# Patient Record
Sex: FEMALE | Race: WHITE | NOT HISPANIC OR LATINO | Employment: FULL TIME | URBAN - METROPOLITAN AREA
[De-identification: names, ages, dates, MRNs, and addresses within clinical notes are randomized per-mention and may not be internally consistent; named-entity substitution may affect disease eponyms.]

---

## 2017-02-15 ENCOUNTER — OFFICE VISIT (OUTPATIENT)
Dept: PEDIATRICS | Facility: CLINIC | Age: 42
End: 2017-02-15
Payer: COMMERCIAL

## 2017-02-15 VITALS
BODY MASS INDEX: 47.56 KG/M2 | HEART RATE: 59 BPM | TEMPERATURE: 99.3 F | DIASTOLIC BLOOD PRESSURE: 82 MMHG | SYSTOLIC BLOOD PRESSURE: 128 MMHG | WEIGHT: 251.7 LBS

## 2017-02-15 DIAGNOSIS — B35.3 TINEA PEDIS, UNSPECIFIED LATERALITY: ICD-10-CM

## 2017-02-15 DIAGNOSIS — K13.0 MUCOCELE OF LOWER LIP: Primary | ICD-10-CM

## 2017-02-15 PROCEDURE — 99213 OFFICE O/P EST LOW 20 MIN: CPT | Performed by: NURSE PRACTITIONER

## 2017-02-15 NOTE — PROGRESS NOTES
SUBJECTIVE:                                                    Janelle Coelho is a 42 year old female who presents to clinic today for the following health issues:    Sore/bump inside lip that is not resolving x one month or so,also possible fungal infection with right great toe,itching but nothing visible.     -------------------------------------    Problem list and histories reviewed & adjusted, as indicated.  Additional history:     Patient Active Problem List   Diagnosis     Acute posthemorrhagic anemia     ASCUS with positive high risk HPV     Obesity     Excessive or frequent menstruation     Generalized anxiety disorder     Major depressive disorder, recurrent episode, in partial remission (H)     CARDIOVASCULAR SCREENING; LDL GOAL LESS THAN 100     Tobacco abuse     Infected cyst of Bartholin's gland duct     Intermittent asthma, uncomplicated     History of gastric bypass     Insomnia, unspecified type     Family history of breast cancer in mother     Past Surgical History   Procedure Laterality Date     [place] teeth extraction  1995     Gastric bypass  2000     with cholecystectomy and subsequent revision gastric bypass     Gastric bypass  2006     revision of gastric bypass     Excis bartholin gland/cyst  2016       Social History   Substance Use Topics     Smoking status: Current Some Day Smoker     Packs/day: 0.20     Years: 10.00     Types: Cigarettes     Smokeless tobacco: Never Used      Comment: does not want to quit at this time     Alcohol use 0.0 oz/week     0 Standard drinks or equivalent per week      Comment: once a week or less     Family History   Problem Relation Age of Onset     Breast Cancer Mother      Breast Cancer Maternal Grandmother      Family History Negative No family hx of            ROS:  Constitutional, HEENT, cardiovascular, pulmonary, gi and gu systems are negative, except as otherwise noted.    OBJECTIVE:                                                    /82 (BP  Location: Right arm, Patient Position: Chair, Cuff Size: Adult Large)  Pulse 59  Temp 99.3  F (37.4  C) (Tympanic)  Wt 251 lb 11.2 oz (114.2 kg)  BMI 47.56 kg/m2  Body mass index is 47.56 kg/(m^2).  GENERAL: healthy, alert and no distress  NECK: no adenopathy, no asymmetry, masses, or scars and thyroid normal to palpation  SKIN: skin of toes and heal c/w fungal infection, peeling skin.   HEENT: lump of concern on inside of bottom lip, clear, approximately 3 mm x 3 mm, raised, nonerythematous       ASSESSMENT/PLAN:                                                      1. Mucocele of lower lip  Diagnosis reviewed, she would like to consult regarding removal, as she tends to bite it  - ORAL SURGERY REFERRAL    2. Tinea pedis, unspecified laterality  otc therapies revieweed    There are no Patient Instructions on file for this visit.      [facility]a [facility]

## 2017-02-15 NOTE — NURSING NOTE
"Chief Complaint   Patient presents with     Mass       Initial /82 (BP Location: Right arm, Patient Position: Chair, Cuff Size: Adult Large)  Pulse 59  Temp 99.3  F (37.4  C) (Tympanic)  Wt 251 lb 11.2 oz (114.2 kg)  BMI 47.56 kg/m2 Estimated body mass index is 47.56 kg/(m^2) as calculated from the following:    Height as of 12/6/16: 5' 1\" (1.549 m).    Weight as of this encounter: 251 lb 11.2 oz (114.2 kg).  Medication Reconciliation: complete    "

## 2017-02-15 NOTE — MR AVS SNAPSHOT
After Visit Summary   2/15/2017    Janelle Coelho    MRN: [id number]           Patient Information     Date Of Birth          1975        Visit Information        Provider Department      2/15/2017 5:15 PM Ivett Sy APRN CNP [facility]        Today's Diagnoses     Mucocele of lower lip    -  1       Follow-ups after your visit        Additional Services     ORAL SURGERY REFERRAL       Your provider has referred you to: [facility]  [phone] and [facility]  [phone] and [facility] [phone]      Please be aware that coverage of these services is subject to the terms and limitations of your health insurance plan.  Call member services at your health plan with any benefit or coverage questions.      Please bring the following to your appointment:    >>   Any x-rays, CTs or MRIs which have been performed.  Contact the facility where they were done to arrange for  prior to your scheduled appointment.    >>   List of current medications   >>   This referral request   >>   Any documents/labs given to you for this referral                  Who to contact     If you have questions or need follow up information about today's clinic visit or your schedule please contact [facility] directly at [phone].  Normal or non-critical lab and imaging results will be communicated to you by MyChart, letter or phone within 4 business days after the clinic has received the results. If you do not hear from us within 7 days, please contact the clinic through MyChart or phone. If you have a critical or abnormal lab result, we will notify you by phone as soon as possible.  Submit refill requests through [employer] or call your pharmacy and they will forward the refill request to us. Please allow 3 business days for your refill to be completed.          Additional Information  About Your Visit        [employer] Information     [employer] gives you secure access to your electronic health record. If you see a primary care provider, you can also send messages to your care team and make appointments. If you have questions, please call your primary care clinic.  If you do not have a primary care provider, please call [phone] and they will assist you.        Care EveryWhere ID     This is your Care EveryWhere ID. This could be used by other organizations to access your [place] medical records  [id number]        Your Vitals Were     Pulse Temperature BMI (Body Mass Index)             59 99.3  F (37.4  C) (Tympanic) 47.56 kg/m2          Blood Pressure from Last 3 Encounters:   02/15/17 128/82   12/06/16 124/88   10/05/16 100/70    Weight from Last 3 Encounters:   02/15/17 251 lb 11.2 oz (114.2 kg)   12/06/16 254 lb 3.2 oz (115.3 kg)   10/05/16 246 lb 12.8 oz (111.9 kg)              We Performed the Following     ORAL SURGERY REFERRAL        Primary Care Provider Office Phone # Fax #    ISA Anglin [phone]       [facility] [address] [place] [address]        Thank you!     Thank you for choosing [facility]  for your care. Our goal is always to provide you with excellent care. Hearing back from our patients is one way we can continue to improve our services. Please take a few minutes to complete the written survey that you may receive in the mail after your visit with us. Thank you!             Your Updated Medication List - Protect others around you: Learn how to safely use, store and throw away your medicines at www.disposemymeds.org.          This list is accurate as of: 2/15/17  5:42 PM.  Always use your most recent med list.                   Brand Name Dispense Instructions for use    albuterol 108 (90 BASE) MCG/ACT Inhaler    VENTOLIN HFA    1 Inhaler    Inhale 2 puffs into the lungs as needed       cyabnocobalamin 2500 MCG  sublingual tablet    VITAMIN B-12    30 tablet    Place 2,500 mcg under the tongue daily       escitalopram 20 MG tablet    LEXAPRO    90 tablet    Take 1 tablet (20 mg) by mouth daily       ferrous gluconate 324 (38 FE) MG tablet    FERGON    180 tablet    Take 1 tablet (324 mg) by mouth 2 times daily If tolerating once a day for 1 week increase to twice daily       levonorgestrel-ethinyl estradiol 0.15-0.03 MG per tablet    QUASENSE    91 tablet    Take 1 tablet by mouth daily       traZODone 100 MG tablet    DESYREL    90 tablet    Take 1 tablet (100 mg) by mouth At Bedtime       ZYRTEC ALLERGY PO      Take 1 tablet by mouth daily

## 2017-02-23 ENCOUNTER — VIRTUAL VISIT (OUTPATIENT)
Dept: FAMILY MEDICINE | Facility: OTHER | Age: 42
End: 2017-02-23

## 2017-02-23 NOTE — PROGRESS NOTES
Date:   Clinician: Madeline Carr  Clinician NPI: [id number]  Patient: Janelle Coelho  Patient : 1975  Patient Address: [address] [place] [address]  Patient Phone: [phone]  Visit Protocol: URI  Patient Summary:  Janelle is a 42 year old ( : 1975 ) female who initiated a Zip for evaluation of bronchitis.      Her  symptoms started suddenly 8-9 days ago  and consist of cough, chest pain, hoarse voice, sore throat, fever, and malaise.   She denies dyspnea, chills, loss of appetite, ear pain, itchy eyes, vomiting, nausea, dysphagia, nasal congestion, petechial or purpuric rash, myalgias, rhinitis, and post-nasal drainage. She denies a history of facial surgery. When asked follow-up questions about her chest pain she denied feeling chest pain or pressure at rest, worsening chest pain with activity, that the chest pain is causing shortness of breath or that the chest pain is the main concern or reason for seeking medical care. The chest pain is pleuritic and aggravated by cough. The patient is confident the chest pain is only from the cough.   Her moderate facial pain or pressure feels worse when bending over or leaning forward and is located on both sides of her head. The facial pain or pressure started before the onset of other URI symptoms.  Janelle has a moderate headache. The headache did not start before her other symptoms and is located on both sides of her head.   She has a moderately painful sore throat. The patient denies having white spots on the tonsils similar to a sample strep throat image provided. She has not been exposed to Strep. When asked to feel her neck she denied feeling enlarged lymph nodes. She denies axillary lymphadenopathy.   Her moderately severe (cough every 5-10 minutes) productive cough is more bothersome at night. Her cough produces unknown color of sputum. She experiences moderate wheezing on a regular basis.   She has passed urine in the  past 12 hours.   Janelle denies having COPD or other chronic lung disease.   Pulse: Not measured beats in 10 seconds.    Weight (in lbs): 243   She states she is not pregnant and denies breastfeeding. She has menstruated in the past month.   Janelle smokes or uses smokeless tobacco.   Additional information as reported by the patient (free text): Woke up 8 days again with a chest cold - tightness, wheezing, burning cough.  After a couple days the cough loosened up and can feel phlegm movement.  Coughing is brought on by talking and laying down. Sitting upright with no talking only occasional cough (still with phlegm and loud barking type cough), at night when I try to go to sleep wheezing increases and cough is nearly constant, at least every minute or two - keeps me awake and wakes me up. I have had Bronchitus several times in the past.   MEDICATIONS:  Escitalopram (Lexapro), ibuprofen (Advil, Motrin), birth control pill, and acetaminophen (Tylenol)  , ALLERGIES:  NKDA   Clinician Response:  Dear Janelle,  Based on the information you have provided, you likely have viral bronchitis.   I am prescribing fluticasone propionate nasal (Flonase) 50 mcg/spray. Take one or two inhalations in each nostril one time a day. There are no refills with this prescription.   I am prescribing benzonatate (Tessalon Perles) 100 mg to treat your cough. Take one to two tablets by mouth three times a day as needed for cough. There are no refills with this prescription.   Unless your are allergic to the over-the-counter medication(s) below, I recommend using:   Guaifenesin + dextromethorphan (Robitussin DM, Mucinex DM). This is an over-the-counter medication that does not require a prescription.   A decongestant such as pseudoephedrine (Sudafed or store brand) to help your symptoms. This is an over-the-counter medication that does not require a prescription.   Try the following to help with your throat pain and discomfort:     Use throat  lozenges    Gargle with warm salt water (1/4 teaspoon of salt per 8 ounce glass of water).    Suck on frozen items such as Popsicles or ice cubes.     Call 911 or go to the emergency room if you feel that your throat is closing off, you suddenly develop a rash, you are unable to swallow fluids, you are drooling, or you are having difficulty breathing.  Follow up with your primary care clinician if your symptoms are not improving in 3-4 days.   Drink plenty of liquids, especially water and take time to rest your body. This may mean taking a nap or going to bed earlier. Your body is fighting a virus and liquids and rest will improve the pace of recovery. Remember to regularly wash your hands and avoid close contact with others to prevent spreading your condition.   Finally, as your clinician, I need you to know that becoming tobacco-free is the most important thing you can do to protect your current and future health.   Diagnosis: Viral Bronchitis possible  Diagnosis ICD: J40  Prescription: fluticasone propionate (Flonase) 50mcg nasal spray 16 gm, 30 days supply. Take one or two inhalations in each nostril one time a day. Refills: 0, Refill as needed: no, Allow substitutions: yes  Prescription: benzonatate (Tessalon Perles) 100mg oral tablet 30 tablets, 5 days supply. Take one to two tablets by mouth three times a day as needed. disp. 30. Refills: 0, Refill as needed: no, Allow substitutions: yes

## 2017-02-24 ENCOUNTER — OFFICE VISIT (OUTPATIENT)
Dept: ENDOCRINOLOGY | Facility: CLINIC | Age: 42
End: 2017-02-24
Payer: COMMERCIAL

## 2017-02-24 VITALS
SYSTOLIC BLOOD PRESSURE: 116 MMHG | WEIGHT: 243.4 LBS | HEIGHT: 61 IN | BODY MASS INDEX: 45.95 KG/M2 | DIASTOLIC BLOOD PRESSURE: 80 MMHG | HEART RATE: 76 BPM

## 2017-02-24 DIAGNOSIS — E66.01 MORBID OBESITY, UNSPECIFIED OBESITY TYPE (H): Primary | ICD-10-CM

## 2017-02-24 DIAGNOSIS — Z13.6 CARDIOVASCULAR SCREENING; LDL GOAL LESS THAN 100: ICD-10-CM

## 2017-02-24 DIAGNOSIS — E55.9 VITAMIN D DEFICIENCY: ICD-10-CM

## 2017-02-24 DIAGNOSIS — Z98.84 HISTORY OF ROUX-EN-Y GASTRIC BYPASS: ICD-10-CM

## 2017-02-24 PROCEDURE — 93000 ELECTROCARDIOGRAM COMPLETE: CPT | Performed by: CLINICAL NURSE SPECIALIST

## 2017-02-24 PROCEDURE — 99204 OFFICE O/P NEW MOD 45 MIN: CPT | Performed by: CLINICAL NURSE SPECIALIST

## 2017-02-24 RX ORDER — PHENTERMINE HYDROCHLORIDE 37.5 MG/1
37.5 TABLET ORAL
Qty: 32 TABLET | Refills: 0 | Status: SHIPPED | OUTPATIENT
Start: 2017-02-24 | End: 2017-04-03

## 2017-02-24 NOTE — MR AVS SNAPSHOT
After Visit Summary   2/24/2017    Janelle Coelho    MRN: [id number]           Patient Information     Date Of Birth          1975        Visit Information        Provider Department      2/24/2017 10:00 AM Mikaela España APRN CNP [facility]        Today's Diagnoses     Morbid obesity, unspecified obesity type (H)    -  1    BMI 45.0-49.9, adult (H)        History of Heraclio-en-Y gastric bypass        CARDIOVASCULAR SCREENING; LDL GOAL LESS THAN 100           Follow-ups after your visit        Follow-up notes from your care team     Return in about 1 month (around 3/24/2017).      Who to contact     If you have questions or need follow up information about today's clinic visit or your schedule please contact [facility] directly at [phone].  Normal or non-critical lab and imaging results will be communicated to you by MyChart, letter or phone within 4 business days after the clinic has received the results. If you do not hear from us within 7 days, please contact the clinic through [employer] or phone. If you have a critical or abnormal lab result, we will notify you by phone as soon as possible.  Submit refill requests through [employer] or call your pharmacy and they will forward the refill request to us. Please allow 3 business days for your refill to be completed.          Additional Information About Your Visit        MyChart Information     [employer] gives you secure access to your electronic health record. If you see a primary care provider, you can also send messages to your care team and make appointments. If you have questions, please call your primary care clinic.  If you do not have a primary care provider, please call [phone] and they will assist you.        Care EveryWhere ID     This is your Care EveryWhere ID. This could be used by other organizations to access your [place] medical records  [id number]        Your Vitals Were     Pulse  "Height BMI (Body Mass Index)             76 1.549 m (5' 1\") 45.99 kg/m2          Blood Pressure from Last 3 Encounters:   02/24/17 116/80   02/15/17 128/82   12/06/16 124/88    Weight from Last 3 Encounters:   02/24/17 110.4 kg (243 lb 6.4 oz)   02/15/17 114.2 kg (251 lb 11.2 oz)   12/06/16 115.3 kg (254 lb 3.2 oz)              We Performed the Following     EKG 12-lead complete w/read - Clinics          Today's Medication Changes          These changes are accurate as of: 2/24/17 11:59 PM.  If you have any questions, ask your nurse or doctor.               Start taking these medicines.        Dose/Directions    phentermine 37.5 MG tablet   Commonly known as:  ADIPEX-P   Used for:  Morbid obesity, unspecified obesity type (H), BMI 45.0-49.9, adult (H), History of Heraclio-en-Y gastric bypass, CARDIOVASCULAR SCREENING; LDL GOAL LESS THAN 100   Started by:  Mikaela España APRN CNP        Dose:  37.5 mg   Take 1 tablet (37.5 mg) by mouth every morning (before breakfast)   Quantity:  32 tablet   Refills:  0            Where to get your medicines      Some of these will need a paper prescription and others can be bought over the counter.  Ask your nurse if you have questions.     Bring a paper prescription for each of these medications     phentermine 37.5 MG tablet                Primary Care Provider Office Phone # Fax #    ISA Anglin [phone]       [facility] [address] [place] [address] [place] [address]        Thank you!     Thank you for choosing [facility]  for your care. Our goal is always to provide you with excellent care. Hearing back from our patients is one way we can continue to improve our services. Please take a few minutes to complete the written survey that you may receive in the mail after your visit with us. Thank you!             Your Updated Medication List - Protect others around you: Learn how to safely use, store and throw away " your medicines at www.disposemymeds.org.          This list is accurate as of: 2/24/17 11:59 PM.  Always use your most recent med list.                   Brand Name Dispense Instructions for use    albuterol 108 (90 BASE) MCG/ACT Inhaler    VENTOLIN HFA    1 Inhaler    Inhale 2 puffs into the lungs as needed       cyabnocobalamin 2500 MCG sublingual tablet    VITAMIN B-12    30 tablet    Place 2,500 mcg under the tongue daily       escitalopram 20 MG tablet    LEXAPRO    90 tablet    Take 1 tablet (20 mg) by mouth daily       ferrous gluconate 324 (38 FE) MG tablet    FERGON    180 tablet    Take 1 tablet (324 mg) by mouth 2 times daily If tolerating once a day for 1 week increase to twice daily       levonorgestrel-ethinyl estradiol 0.15-0.03 MG per tablet    QUASENSE    91 tablet    Take 1 tablet by mouth daily       phentermine 37.5 MG tablet    ADIPEX-P    32 tablet    Take 1 tablet (37.5 mg) by mouth every morning (before breakfast)       traZODone 100 MG tablet    DESYREL    90 tablet    Take 1 tablet (100 mg) by mouth At Bedtime       ZYRTEC ALLERGY PO      Take 1 tablet by mouth daily

## 2017-02-24 NOTE — NURSING NOTE
"Chief Complaint   Patient presents with     Consult For     Weight loss       Initial /80 (BP Location: Left arm, Cuff Size: Adult Regular)  Pulse 76  Ht 1.549 m (5' 1\")  Wt 110.4 kg (243 lb 6.4 oz)  BMI 45.99 kg/m2 Estimated body mass index is 45.99 kg/(m^2) as calculated from the following:    Height as of this encounter: 1.549 m (5' 1\").    Weight as of this encounter: 110.4 kg (243 lb 6.4 oz).  Medication Reconciliation: complete     Waist: 50.75in    Noemi Willams CMA    2/24/2017  10:05 AM      "

## 2017-02-24 NOTE — PROGRESS NOTES
Name: Janelle oCelho  Seen at the request of Ivett Sy for obesity.  HPI:  Janelle Coelho is a 42 year old female who presents for the evaluation of obesity.  She has a history of gastric bypass approximately 1998, highest weight prior to bypass was 275 lbs, she lost about 100-110 lbs post bypass.  She underwent bypass revision in 2004.  She had significant iron deficiency following the first surgery requiring IV iron infusions.  She developed an allergy to the infusions and this required treatment with steroids in order to tolerate the iron replacement.  She subsequently gained weight partly due to the steroids.      Is taking OTC vitamin D3, unsure of exact dose (? 1000 vs 2000 IU/day).    Menses: on oral contraceptives, has a light period q 3 months  Diarrhea/Constipation:Yes: chronic diarrhea, looses stools 2-3 times per day  Changes in Hair or Skin:No  Diabetes:No  Sleep:  Sleep Apnea/Snores:No  Hypertension or CAD:No  Hyperlipidemia:No  Hirsutism:No  Easy Bruising:No  Use of Steroids:Yes, history of steroid use due to iron allergy and need for iron infusion due anemia, secondary to gastric bypass  Family history of Obesity:   Diet: Started weight watchers last week  Exercise:Recent hernia surgery  PMH/PSH:  Past Medical History   Diagnosis Date     ASCUS favor benign 2012     hpv neg. needs cotest in 3 years     Depressive disorder, not elsewhere classified 2/10/2014     LSIL (low grade squamous intraepithelial lesion) on Pap smear 2008     per chart notes colp dysplasia?     Obesity      Past Surgical History   Procedure Laterality Date     [place] teeth extraction  1995     Gastric bypass  2000     with cholecystectomy and subsequent revision gastric bypass     Gastric bypass  2006     revision of gastric bypass     Excis bartholin gland/cyst  2016     Family Hx:  Family History   Problem Relation Age of Onset     Breast Cancer Mother      Breast Cancer Maternal Grandmother      Family History  "Negative No family hx of      Thyroid disease: No         DM2: Yes: mother, brother         Autoimmune: DM1, SLE, RA, Vitiligo No    Social Hx:  Social History     Social History     Marital status: Single     Spouse name: N/A     Number of children: N/A     Years of education: N/A     Occupational History      [employer]      [employer]     Social History Main Topics     Smoking status: Current Some Day Smoker     Packs/day: 0.20     Years: 10.00     Types: Cigarettes     Smokeless tobacco: Never Used      Comment: does not want to quit at this time     Alcohol use 0.0 oz/week     0 Standard drinks or equivalent per week      Comment: once a week or less     Drug use: No     Sexual activity: Not Currently     Partners: Male     Other Topics Concern     Bike Helmet Yes     Seat Belt Yes     Social History Narrative          MEDICATIONS:  has a current medication list which includes the following prescription(s): phentermine, albuterol, trazodone, levonorgestrel-ethinyl estradiol, escitalopram, ferrous gluconate, cyabnocobalamin, and cetirizine hcl.    ROS     GENERAL: no weight loss, fevers, chills, malaise, or night sweats. + weight gain.  HEENT: no dysphagia, odynophagia, diplopia, neck pain or tenderness, dry/scratch eyes, URI, cough, sinus drainage, tinnitus, sinus pressure  CV: no chest pain, pressure, palpitations, skipped beats, LOC  LUNGS: no SOB, BENAVIDEZ, cough, sputum production, wheezing   ABDOMEN: no diarrhea, constipation, abdominal pain  EXTREMITIES: no rashes, ulcers, edema  NEUROLOGY: no changes in vision, tingling or numbness in hands or feet.   MSK: no muscle aches or pains, weakness  SKIN: no rashes or lesions  ENDOCRINE: no heat or cold intolerance    Physical Exam   VS: /80 (BP Location: Left arm, Cuff Size: Adult Regular)  Pulse 76  Ht 1.549 m (5' 1\")  Wt 110.4 kg (243 lb 6.4 oz)  BMI 45.99 kg/m2  GENERAL: AXOX3, NAD, well dressed, answering questions appropriately, " "appears stated age.  HEENT: no exophthalmos, no proptosis, EOMI, no lig lag, no retraction  NECK:  Supple, no thyromegaly, no adenopathy  CV: RRR, no rubs, gallops, no murmurs  LUNGS: CTAB, no wheezes, rales, or rhonchi  ABDOMEN: soft, nontender, nondistended, no broad striae noted.  EXTREMITIES: no edema, +pulses, no rashes, no lesions  NEUROLOGY: CN grossly intact, no tremors  MSK: grossly intact  SKIN: no acanthosis, skin tags; no rashes, no lesions, no intertrigo    LABS:  !COMPREHENSIVE Latest Ref Rng & Units 10/13/2015   SODIUM 133 - 144 mmol/L 138   POTASSIUM 3.4 - 5.3 mmol/L 4.2   CHLORIDE 94 - 109 mmol/L 106   BUN 5 - 24 mg/dL 6   Creatinine 0.52 - 1.04 mg/dL 0.80   Glucose 70 - 99 mg/dL 76   ANION GAP 3 - 14 mmol/L 9.4   CALCIUM 8.5 - 10.4 mg/dL 8.5   ALBUMIN 3.4 - 5.0 g/dL 2.9 (L)   PROTEIN, TOTAL 6.8 - 8.8 g/dL 7.0     Component    Latest Ref Rng & Units 10/13/2015 10/5/2016   Vitamin D Deficiency screening    20 - 75 ug/L <13 (L) . <13 (L) .     Vital Signs 12/6/2016 2/15/2017 2/24/2017   Weight (LB) 254 lb 3.2 oz 251 lb 11.2 oz 243 lb 6.4 oz   Height 5' 1\"  5' 1\"   BMI 48.13  46.09     All pertinent notes, labs, and images personally reviewed by me.     A/P  Ms.Heather TARAH Coelho is a 42 year old here for the evaluation of obesity:    1. Morbid Obesity.  History of Heraclio-en-Y gastric bypass.  BMI 46.09-  Obesity is associated with a significant increase in mortality and risk of many disorders, including diabetes mellitus, hypertension, dyslipidemia, heart disease, stroke, sleep apnea, cancer, and many others. Conversely, weight loss is associated with a reduction in obesity-associated morbidity.    Endocrine evaluation of obesity includes Diabetes/prediabetes, Cushing's and thyroid dysfunction.  The relevant work up for the diagnosis and management of obesity and various treatment options were discussed. Body Mass Index (BMI) has been a standard means for calculating risk for overweight and obesity. The " new American Association of Clinical Endocrinology (AACE) algorithm recommends lifestyle modifications as the initial phase of treatment for all patients with the BMI equal or greater than 25 kg/m2. Lifestyle modifications includes use of medical nutrition therapy, exercise, tobacco cessation, adequate quality and quantity of sleep, limited consumption of alcohol and reduced stress through implementation of a structured, multidisciplinary program.    In patients with complications associated with obesity, graded interventions are recommended including pharmacological therapy such as phentermine, orlistat, lorcaserin and phentermine/topiramate ER, contrave ( buproprion/naltreone) and the use of very low calorie meal replacement programs.    If medical intervention is insufficient, surgical therapy may be considered, especially in patients with BMI greater than or equal to 35 kg/m2 with multiple complications. Surgical treatments include lap-band, gastric sleeve or gastric bypass surgery.    Will obtain the following:    Labs ordered today:   Orders Placed This Encounter   Procedures     Comprehensive metabolic panel     C-peptide     Hemoglobin A1c     Insulin level     Lipid panel reflex to direct LDL     TSH     T4 FREE     Vitamin D Deficiency     EKG 12-lead complete w/read - Clinics     I informed the pt that:  1.Weight loss medications can be taken to assist with weight reduction when combined with appropriate healthy lifestyle changes.  2.I discussed possible s/e, risks and benefits of weight loss medications.  3.All medications are FDA approved, however, some may be used ''off label'' for their weight loss benefits and some ''short term'' medications can be used for longer periods to achieve desired clinical outcomes.  4.All patients taking weight loss medications must be seen in clinic for refill authorization.    Counseling exercise ( V65.41)  Dietary counseling( V65.3)  Calculated BMI above the upper  parameter and f/u plan was documented in the medical record.  Discussed indications, risks and benefits of all medications prescribed, and answered questions to patient's satisfaction.  EKG obtained and read in clinic - wnl.  Start Phentermine 37.5 mg, 1/2 tab q am x 1 week then increase to 1 tab po qd.      Radiology/Consults ordered today: None    More than 50% of the time spent with Ms. Coelho on counseling / coordinating her care.  Total face to face time was greater than or equal to 45 minutes.      Follow-up:  1 month    Mikaela España NP  Endocrinology  [facility]  CC: Ivett Sy   ____________________________________________________________

## 2017-03-05 PROCEDURE — 82530 CORTISOL FREE: CPT | Mod: 90 | Performed by: INTERNAL MEDICINE

## 2017-03-05 PROCEDURE — 99000 SPECIMEN HANDLING OFFICE-LAB: CPT | Performed by: INTERNAL MEDICINE

## 2017-03-06 ENCOUNTER — OFFICE VISIT (OUTPATIENT)
Dept: PEDIATRICS | Facility: CLINIC | Age: 42
End: 2017-03-06
Payer: COMMERCIAL

## 2017-03-06 VITALS
OXYGEN SATURATION: 98 % | DIASTOLIC BLOOD PRESSURE: 70 MMHG | BODY MASS INDEX: 45.12 KG/M2 | HEIGHT: 61 IN | HEART RATE: 76 BPM | SYSTOLIC BLOOD PRESSURE: 108 MMHG | WEIGHT: 239 LBS | TEMPERATURE: 98 F

## 2017-03-06 DIAGNOSIS — D64.9 ANEMIA, UNSPECIFIED TYPE: ICD-10-CM

## 2017-03-06 DIAGNOSIS — Z13.6 CARDIOVASCULAR SCREENING; LDL GOAL LESS THAN 100: ICD-10-CM

## 2017-03-06 DIAGNOSIS — Z01.818 PREOP GENERAL PHYSICAL EXAM: Primary | ICD-10-CM

## 2017-03-06 DIAGNOSIS — E66.01 MORBID OBESITY, UNSPECIFIED OBESITY TYPE (H): ICD-10-CM

## 2017-03-06 DIAGNOSIS — Z98.84 HISTORY OF ROUX-EN-Y GASTRIC BYPASS: ICD-10-CM

## 2017-03-06 LAB
ALBUMIN SERPL-MCNC: 3.4 G/DL (ref 3.4–5)
ALP SERPL-CCNC: 77 U/L (ref 40–150)
ALT SERPL W P-5'-P-CCNC: 23 U/L (ref 0–50)
ANION GAP SERPL CALCULATED.3IONS-SCNC: 10 MMOL/L (ref 3–14)
AST SERPL W P-5'-P-CCNC: 11 U/L (ref 0–45)
BASOPHILS # BLD AUTO: 0 10E9/L (ref 0–0.2)
BASOPHILS NFR BLD AUTO: 0.4 %
BILIRUB SERPL-MCNC: 0.3 MG/DL (ref 0.2–1.3)
BUN SERPL-MCNC: 11 MG/DL (ref 7–30)
CALCIUM SERPL-MCNC: 8.7 MG/DL (ref 8.5–10.1)
CHLORIDE SERPL-SCNC: 108 MMOL/L (ref 94–109)
CHOLEST SERPL-MCNC: 159 MG/DL
CO2 SERPL-SCNC: 22 MMOL/L (ref 20–32)
CREAT SERPL-MCNC: 0.88 MG/DL (ref 0.52–1.04)
DEPRECATED CALCIDIOL+CALCIFEROL SERPL-MC: 21 UG/L (ref 20–75)
DIFFERENTIAL METHOD BLD: ABNORMAL
EOSINOPHIL # BLD AUTO: 0.3 10E9/L (ref 0–0.7)
EOSINOPHIL NFR BLD AUTO: 3.6 %
ERYTHROCYTE [DISTWIDTH] IN BLOOD BY AUTOMATED COUNT: 18.4 % (ref 10–15)
GFR SERPL CREATININE-BSD FRML MDRD: 70 ML/MIN/1.7M2
GLUCOSE SERPL-MCNC: 76 MG/DL (ref 70–99)
HBA1C MFR BLD: 5.1 % (ref 4.3–6)
HCT VFR BLD AUTO: 33.7 % (ref 35–47)
HDLC SERPL-MCNC: 43 MG/DL
HGB BLD-MCNC: 10.7 G/DL (ref 11.7–15.7)
INSULIN SERPL-ACNC: 12 MU/L (ref 3–25)
LDLC SERPL CALC-MCNC: 94 MG/DL
LYMPHOCYTES # BLD AUTO: 1.8 10E9/L (ref 0.8–5.3)
LYMPHOCYTES NFR BLD AUTO: 23.2 %
MCH RBC QN AUTO: 28.2 PG (ref 26.5–33)
MCHC RBC AUTO-ENTMCNC: 31.8 G/DL (ref 31.5–36.5)
MCV RBC AUTO: 89 FL (ref 78–100)
MONOCYTES # BLD AUTO: 0.5 10E9/L (ref 0–1.3)
MONOCYTES NFR BLD AUTO: 7.1 %
NEUTROPHILS # BLD AUTO: 5 10E9/L (ref 1.6–8.3)
NEUTROPHILS NFR BLD AUTO: 65.7 %
NONHDLC SERPL-MCNC: 116 MG/DL
PLATELET # BLD AUTO: 459 10E9/L (ref 150–450)
POTASSIUM SERPL-SCNC: 4.3 MMOL/L (ref 3.4–5.3)
PROT SERPL-MCNC: 7.1 G/DL (ref 6.8–8.8)
RBC # BLD AUTO: 3.79 10E12/L (ref 3.8–5.2)
SODIUM SERPL-SCNC: 140 MMOL/L (ref 133–144)
T4 FREE SERPL-MCNC: 0.99 NG/DL (ref 0.76–1.46)
TRIGL SERPL-MCNC: 108 MG/DL
TSH SERPL DL<=0.05 MIU/L-ACNC: 2.37 MU/L (ref 0.4–4)
WBC # BLD AUTO: 7.6 10E9/L (ref 4–11)

## 2017-03-06 PROCEDURE — 99214 OFFICE O/P EST MOD 30 MIN: CPT | Performed by: NURSE PRACTITIONER

## 2017-03-06 PROCEDURE — 83525 ASSAY OF INSULIN: CPT | Performed by: NURSE PRACTITIONER

## 2017-03-06 PROCEDURE — 82306 VITAMIN D 25 HYDROXY: CPT | Performed by: NURSE PRACTITIONER

## 2017-03-06 PROCEDURE — 80050 GENERAL HEALTH PANEL: CPT | Performed by: NURSE PRACTITIONER

## 2017-03-06 PROCEDURE — 36415 COLL VENOUS BLD VENIPUNCTURE: CPT | Performed by: NURSE PRACTITIONER

## 2017-03-06 PROCEDURE — 84681 ASSAY OF C-PEPTIDE: CPT | Performed by: NURSE PRACTITIONER

## 2017-03-06 PROCEDURE — 83036 HEMOGLOBIN GLYCOSYLATED A1C: CPT | Performed by: NURSE PRACTITIONER

## 2017-03-06 PROCEDURE — 84439 ASSAY OF FREE THYROXINE: CPT | Performed by: NURSE PRACTITIONER

## 2017-03-06 PROCEDURE — 80061 LIPID PANEL: CPT | Performed by: NURSE PRACTITIONER

## 2017-03-06 NOTE — NURSING NOTE
"Chief Complaint   Patient presents with     Pre-Op Exam       Initial /70 (Cuff Size: Adult Large)  Pulse 76  Temp 98  F (36.7  C) (Tympanic)  Ht 5' 1\" (1.549 m)  Wt 239 lb (108.4 kg)  LMP 02/15/2017 (Approximate)  SpO2 98%  BMI 45.16 kg/m2 Estimated body mass index is 45.16 kg/(m^2) as calculated from the following:    Height as of this encounter: 5' 1\" (1.549 m).    Weight as of this encounter: 239 lb (108.4 kg).  Medication Reconciliation: complete   Siri Keith, PASTORA    "

## 2017-03-06 NOTE — PROGRESS NOTES
"[facility]  [address]  [address]  [place] [address]  [phone]  Dept: [phone]    PRE-OP EVALUATION:  Today's date: 3/6/2017    Janelle Coelho (: 1975) presents for pre-operative evaluation assessment as requested by Dr. Yap.  She requires evaluation and anesthesia risk assessment prior to undergoing surgery/procedure for treatment of OBGYN problem .  Proposed procedure: revision of Bartholin gland scar tissue    Date of Surgery/ Procedure: 3/8/17  Time of Surgery/ Procedure: 1:00pm   Hospital/Surgical Facility: [facility]  Fax number for surgical facility: [phone]  Primary Physician: Ivett Sy  Type of Anesthesia Anticipated: to be determined - \"some type of local\" per patient    Patient has a Health Care Directive or Living Will:  NO    Preop Questions 3/3/2017   1.  Do you have a history of heart attack, stroke, stent, bypass or surgery on an artery in the head, neck, heart or legs? No   2.  Do you ever have any pain or discomfort in your chest? No   3.  Do you have a history of  Heart Failure? No   4.   Are you troubled by shortness of breath when:  walking on a level surface, or up a slight hill, or at night? No   5.  Do you currently have a cold, bronchitis or other respiratory infection? No   6.  Do you have a cough, shortness of breath, or wheezing? No   7.  Do you sometimes get pains in the calves of your legs when you walk? No   8. Do you or anyone in your family have previous history of blood clots? YES - Mom had blood clots after double mastectomy   9.  Do you or does anyone in your family have a serious bleeding problem such as prolonged bleeding following surgeries or cuts? No   10. Have you ever had problems with anemia or been told to take iron pills? YES - Chronic TABITHA due to bariatric surgery. Takes iron.    11. Have you had any abnormal blood loss such as black, tarry or bloody stools, or abnormal vaginal " bleeding? No   12. Have you ever had a blood transfusion? YES - After bariatric surgery.    13. Have you or any of your relatives ever had problems with anesthesia? No   14. Do you have sleep apnea, excessive snoring or daytime drowsiness? No   15. Do you have any prosthetic heart valves? No   16. Do you have prosthetic joints? No   17. Is there any chance that you may be pregnant? No         HPI:                                                      Brief HPI related to upcoming procedure:       See problem list for active medical problems.  Problems all longstanding and stable, except as noted/documented.  See ROS for pertinent symptoms related to these conditions.              Anemia: Chronic related to gastric bypass. Stable. Taking iron.                                                                                         .    MEDICAL HISTORY:                                                      Patient Active Problem List    Diagnosis Date Noted     BMI 45.0-49.9, adult (H) 02/24/2017     Priority: Medium     Intermittent asthma, uncomplicated 10/05/2016     Priority: Medium     History of Heraclio-en-Y gastric bypass 10/05/2016     Priority: Medium     Insomnia, unspecified type 10/05/2016     Priority: Medium     Family history of breast cancer in mother 10/05/2016     Priority: Medium     Major depressive disorder, recurrent episode, in partial remission (H) 10/13/2015     Priority: Medium     CARDIOVASCULAR SCREENING; LDL GOAL LESS THAN 100 10/13/2015     Priority: Medium     Tobacco abuse 10/13/2015     Priority: Medium     Infected cyst of Bartholin's gland duct 10/13/2015     Priority: Medium     Generalized anxiety disorder 08/21/2014     Priority: Medium     Diagnosis updated by automated process. Provider to review and confirm.       Excessive or frequent menstruation 05/23/2014     Priority: Medium     Obesity      Priority: Medium     ASCUS with positive high risk HPV 03/23/2013     Priority: Medium      2008: ASCUS , HR HPV, dysplasia on colp per notes  7/09: NIL pap  7/10: NIL pap  9/12: ASCUS, neg HPV. Per MD pap in 1 yr.  4/2014: NIL pap. New ASCCP algorithms, cotest 3 years after ASCUS, neg pap.       Acute posthemorrhagic anemia 10/17/2012     Priority: Medium      Past Medical History   Diagnosis Date     ASCUS favor benign 2012     hpv neg. needs cotest in 3 years     Depressive disorder, not elsewhere classified 2/10/2014     LSIL (low grade squamous intraepithelial lesion) on Pap smear 2008     per chart notes colp dysplasia?     Obesity      Past Surgical History   Procedure Laterality Date     [place] teeth extraction  1995     Gastric bypass  2000     with cholecystectomy and subsequent revision gastric bypass     Gastric bypass  2006     revision of gastric bypass     Excis bartholin gland/cyst  2016     Current Outpatient Prescriptions   Medication Sig Dispense Refill     levonorgestrel-ethinyl estradiol (QUASENSE) 0.15-0.03 MG per tablet Take 1 tablet by mouth daily 91 tablet 3     escitalopram (LEXAPRO) 20 MG tablet Take 1 tablet (20 mg) by mouth daily 90 tablet 1     phentermine (ADIPEX-P) 37.5 MG tablet Take 1 tablet (37.5 mg) by mouth every morning (before breakfast) (Patient not taking: Reported on 3/6/2017) 32 tablet 0     albuterol (VENTOLIN HFA) 108 (90 BASE) MCG/ACT inhaler Inhale 2 puffs into the lungs as needed (Patient not taking: Reported on 3/6/2017) 1 Inhaler 11     traZODone (DESYREL) 100 MG tablet Take 1 tablet (100 mg) by mouth At Bedtime (Patient not taking: Reported on 3/6/2017) 90 tablet 3     ferrous gluconate (FERGON) 324 (38 FE) MG tablet Take 1 tablet (324 mg) by mouth 2 times daily If tolerating once a day for 1 week increase to twice daily (Patient not taking: Reported on 3/6/2017) 180 tablet 3     Cyanocobalamin (VITAMIN  B-12) 2500 MCG tablet Place 2,500 mcg under the tongue daily (Patient not taking: Reported on 3/6/2017) 30 tablet 6     Cetirizine HCl (ZYRTEC ALLERGY  "PO) Take 1 tablet by mouth daily Reported on 3/6/2017       OTC products: none    Allergies   Allergen Reactions     Dextran Anaphylaxis      Latex Allergy: NO    Social History   Substance Use Topics     Smoking status: Current Some Day Smoker     Packs/day: 0.20     Years: 10.00     Types: Cigarettes     Smokeless tobacco: Never Used      Comment: does not want to quit at this time     Alcohol use 0.0 oz/week     0 Standard drinks or equivalent per week      Comment: once a week or less     History   Drug Use No       REVIEW OF SYSTEMS:                                                    Constitutional, neuro, ENT, endocrine, pulmonary, cardiac, gastrointestinal, genitourinary, musculoskeletal, integument and psychiatric systems are negative, except as otherwise noted.    EXAM:                                                    /70 (Cuff Size: Adult Large)  Pulse 76  Temp 98  F (36.7  C) (Tympanic)  Ht 5' 1\" (1.549 m)  Wt 239 lb (108.4 kg)  LMP 02/15/2017 (Approximate)  SpO2 98%  BMI 45.16 kg/m2    GENERAL APPEARANCE: healthy, alert and no distress     EYES: EOMI, PERRL     HENT: ear canals and TM's normal and nose and mouth without ulcers or lesions     NECK: no adenopathy, no asymmetry, masses, or scars and thyroid normal to palpation     RESP: lungs clear to auscultation - no rales, rhonchi or wheezes     CV: regular rates and rhythm, normal S1 S2, no S3 or S4 and no murmur, click or rub     ABDOMEN:  soft, nontender, no HSM or masses and bowel sounds normal     MS: extremities normal- no gross deformities noted, no evidence of inflammation in joints, FROM in all extremities.     SKIN: no suspicious lesions or rashes     NEURO: Normal strength and tone, sensory exam grossly normal, mentation intact and speech normal     PSYCH: mentation appears normal. and affect normal/bright     LYMPHATICS: No axillary, cervical, or supraclavicular nodes    DIAGNOSTICS:                                                  "     EKG: appears normal, NSR, normal axis, normal intervals, no acute ST/T changes c/w ischemia, no LVH by voltage criteria, unchanged from previous tracings    Unresulted Labs Ordered in the Past 30 Days of this Admission     Date and Time Order Name Status Description    3/6/2017 0746 VITAMIN D DEFICIENCY SCREENING In process     3/6/2017 0746 T4 FREE In process     3/6/2017 0746 TSH In process     3/6/2017 0746 LIPID REFLEX TO DIRECT LDL PANEL In process     3/6/2017 0746 INSULIN LEVEL In process     3/6/2017 0746 HEMOGLOBIN A1C In process     3/6/2017 0746 C-PEPTIDE In process     3/6/2017 0746 CORTISOL SALIVA In process     3/6/2017 0746 COMPREHENSIVE METABOLIC PANEL In process     3/6/2017 0740 CBC WITH PLATELETS DIFFERENTIAL In process         Hemoglobin today: 10.7  Recent Labs   Lab Test  12/06/16   1730  09/01/16   1458  03/14/16   1304  10/13/15   0909   05/21/14   1609   HGB  9.2*  11.1*  12.4  10.1*   < >  8.6*   PLT   --   463*  471*  435   < >  388   NA   --    --    --   138   --   137   POTASSIUM   --    --    --   4.2   --   4.1   CR   --    --    --   0.80   --   0.77    < > = values in this interval not displayed.        IMPRESSION:                                                    Reason for surgery/procedure:  revision of Bartholin gland scar tissue      The proposed surgical procedure is considered INTERMEDIATE risk.    REVISED CARDIAC RISK INDEX  The patient has the following serious cardiovascular risks for perioperative complications such as (MI, PE, VFib and 3  AV Block):  No serious cardiac risks    The patient has the following additional risks for perioperative complications:  No identified additional risks      ICD-10-CM    1. Preop general physical exam Z01.818 Revision of bartholin gland cyst scar tissue. CBC with platelets differential   2. Anemia, unspecified type D64.9 Stable. Monitoring. On iron.    3. Morbid obesity, unspecified obesity type (H) E66.01 Not starting phentermine  until after surgery. Comprehensive metabolic panel                                                                                                                                                                                       RECOMMENDATIONS:                                                      Anemia  Anemia and does not require treatment prior to surgery.  Monitor Hemoglobin postoperatively.      --Patient is to take all scheduled medications on the day of surgery EXCEPT for modifications listed below.    APPROVAL GIVEN to proceed with proposed procedure, without further diagnostic evaluation       Signed Electronically by: ISA Olmos CNP    Copy of this evaluation report is provided to requesting physician.    [facility] Preop Guidelines

## 2017-03-06 NOTE — MR AVS SNAPSHOT
After Visit Summary   3/6/2017    Janelle Coelho    MRN: [id number]           Patient Information     Date Of Birth          1975        Visit Information        Provider Department      3/6/2017 7:40 AM Flor Sabillon APRN CNP [facility]        Today's Diagnoses     Preop general physical exam    -  1    Anemia, unspecified type        Morbid obesity, unspecified obesity type (H)        BMI 45.0-49.9, adult (H)        History of Heraclio-en-Y gastric bypass        CARDIOVASCULAR SCREENING; LDL GOAL LESS THAN 100          Care Instructions      Before Your Surgery      Call your surgeon if there is any change in your health. This includes signs of a cold or flu (such as a sore throat, runny nose, cough, rash or fever).    Do not smoke, drink alcohol or take over the counter medicine (unless your surgeon or primary care doctor tells you to) for the 24 hours before and after surgery.    If you take prescribed drugs: Follow your doctor s orders about which medicines to take and which to stop until after surgery.    Eating and drinking prior to surgery: follow the instructions from your surgeon    Take a shower or bath the night before surgery. Use the soap your surgeon gave you to gently clean your skin. If you do not have soap from your surgeon, use your regular soap. Do not shave or scrub the surgery site.  Wear clean pajamas and have clean sheets on your bed.         Follow-ups after your visit        Your next 10 appointments already scheduled     Apr 13, 2017  7:00 AM CDT   [facility] ISA Leone CNP   [facility]    [address]  [facility] [address] [phone]              Who to contact     If you have questions or need follow up information about today's clinic visit or your schedule please contact [facility] directly at [phone].  Normal or non-critical lab and imaging  "results will be communicated to you by MyChart, letter or phone within 4 business days after the clinic has received the results. If you do not hear from us within 7 days, please contact the clinic through Vinny or phone. If you have a critical or abnormal lab result, we will notify you by phone as soon as possible.  Submit refill requests through Vinny or call your pharmacy and they will forward the refill request to us. Please allow 3 business days for your refill to be completed.          Additional Information About Your Visit        Mahindra REVAharUniplaces Information     Vinny gives you secure access to your electronic health record. If you see a primary care provider, you can also send messages to your care team and make appointments. If you have questions, please call your primary care clinic.  If you do not have a primary care provider, please call [phone] and they will assist you.        Care EveryWhere ID     This is your Care EveryWhere ID. This could be used by other organizations to access your [place] medical records  [id number]        Your Vitals Were     Pulse Temperature Height Last Period Pulse Oximetry BMI (Body Mass Index)    76 98  F (36.7  C) (Tympanic) 5' 1\" (1.549 m) 02/15/2017 (Approximate) 98% 45.16 kg/m2       Blood Pressure from Last 3 Encounters:   03/06/17 108/70   02/24/17 116/80   02/15/17 128/82    Weight from Last 3 Encounters:   03/06/17 239 lb (108.4 kg)   02/24/17 243 lb 6.4 oz (110.4 kg)   02/15/17 251 lb 11.2 oz (114.2 kg)              We Performed the Following     C-peptide     CBC with platelets differential     Comprehensive metabolic panel     Cortisol Saliva     Hemoglobin A1c     Insulin level     Lipid panel reflex to direct LDL     T4 FREE     TSH     Vitamin D Deficiency        Primary Care Provider Office Phone # Fax #    ISA Anglin [phone]       [address] [place] [address]        Thank you! "     Thank you for choosing [facility]  for your care. Our goal is always to provide you with excellent care. Hearing back from our patients is one way we can continue to improve our services. Please take a few minutes to complete the written survey that you may receive in the mail after your visit with us. Thank you!             Your Updated Medication List - Protect others around you: Learn how to safely use, store and throw away your medicines at www.disposemymeds.org.          This list is accurate as of: 3/6/17  8:24 AM.  Always use your most recent med list.                   Brand Name Dispense Instructions for use    albuterol 108 (90 BASE) MCG/ACT Inhaler    VENTOLIN HFA    1 Inhaler    Inhale 2 puffs into the lungs as needed       cyabnocobalamin 2500 MCG sublingual tablet    VITAMIN B-12    30 tablet    Place 2,500 mcg under the tongue daily       escitalopram 20 MG tablet    LEXAPRO    90 tablet    Take 1 tablet (20 mg) by mouth daily       ferrous gluconate 324 (38 FE) MG tablet    FERGON    180 tablet    Take 1 tablet (324 mg) by mouth 2 times daily If tolerating once a day for 1 week increase to twice daily       levonorgestrel-ethinyl estradiol 0.15-0.03 MG per tablet    QUASENSE    91 tablet    Take 1 tablet by mouth daily       phentermine 37.5 MG tablet    ADIPEX-P    32 tablet    Take 1 tablet (37.5 mg) by mouth every morning (before breakfast)       traZODone 100 MG tablet    DESYREL    90 tablet    Take 1 tablet (100 mg) by mouth At Bedtime       ZYRTEC ALLERGY PO      Take 1 tablet by mouth daily Reported on 3/6/2017

## 2017-03-07 LAB — C PEPTIDE SERPL-MCNC: 3 NG/ML (ref 0.9–6.9)

## 2017-03-07 RX ORDER — ERGOCALCIFEROL 1.25 MG/1
50000 CAPSULE, LIQUID FILLED ORAL WEEKLY
Qty: 12 CAPSULE | Refills: 1 | Status: SHIPPED | OUTPATIENT
Start: 2017-03-07 | End: 2017-08-16

## 2017-03-07 NOTE — PROGRESS NOTES
Janelle,  Your glucose measures are normal - I don't see any indication of prediabetes.  Your vitamin D is low, it is best to have a D level of at least 30.    I recommend starting a prescription D, 50,000 IU once weekly.  I'll send a prescription to your pharmacy.  Please discontinue the over the counter D supplement when you start the prescription.  Your liver and kidney function are normal.  Your thyroid function is also normal.  Your cholesterol numbers look good other than a slightly low HDL (the good cholesterol).  We can talk about the results in detail at your next follow up if you want.  Mikaela España NP  Endocrinology

## 2017-03-08 LAB
COLLECT TME SPEC: NORMAL
CORTIS SAL-MCNC: 0.05 UG/DL

## 2017-03-10 NOTE — PROGRESS NOTES
Janelle,  Your salivary cortisol level is normal.  Here's a copy for your records.  Mikaela España NP  Endocrinology

## 2017-04-03 DIAGNOSIS — Z98.84 HISTORY OF ROUX-EN-Y GASTRIC BYPASS: ICD-10-CM

## 2017-04-03 DIAGNOSIS — E66.01 MORBID OBESITY, UNSPECIFIED OBESITY TYPE (H): ICD-10-CM

## 2017-04-03 DIAGNOSIS — Z13.6 CARDIOVASCULAR SCREENING; LDL GOAL LESS THAN 100: ICD-10-CM

## 2017-04-03 RX ORDER — PHENTERMINE HYDROCHLORIDE 37.5 MG/1
37.5 TABLET ORAL
Qty: 32 TABLET | Refills: 0 | Status: SHIPPED | OUTPATIENT
Start: 2017-04-03 | End: 2017-04-13

## 2017-04-03 NOTE — TELEPHONE ENCOUNTER
Controlled Substance Refill Request for Phentermine-pt will be 3 days short for appt  Problem List Complete:  No     PROVIDER TO CONSIDER COMPLETION OF PROBLEM LIST AND OVERVIEW/CONTROLLED SUBSTANCE AGREEMENT    Last Written Prescription Date:  02/24/17  Last Fill Quantity: 32,   # refills: 0    Last Office Visit with [facility] provider: 02/24/17 w/Mikaela España    Future Office visit:   Next 5 appointments (look out 90 days)     Apr 13, 2017  7:00 AM CDT   Return Visit with ISA Leone CNP   [facility]    [address]  [facility] [address]   [phone]                  Controlled substance agreement on file: No.     Processing:  Fax Rx to [facility] pharmacy   checked in past 6 months?  No, route to RN

## 2017-04-04 NOTE — TELEPHONE ENCOUNTER
Pt calls to check status. Informed Rx sent 4/3/17.  Pt had not received notice from [facility] so will f/u with pharm.  Ruben Cristobal RN

## 2017-04-13 ENCOUNTER — OFFICE VISIT (OUTPATIENT)
Dept: ENDOCRINOLOGY | Facility: CLINIC | Age: 42
End: 2017-04-13
Payer: COMMERCIAL

## 2017-04-13 VITALS
DIASTOLIC BLOOD PRESSURE: 73 MMHG | HEIGHT: 61 IN | BODY MASS INDEX: 42.29 KG/M2 | RESPIRATION RATE: 16 BRPM | TEMPERATURE: 98.8 F | SYSTOLIC BLOOD PRESSURE: 105 MMHG | HEART RATE: 92 BPM | WEIGHT: 224 LBS

## 2017-04-13 DIAGNOSIS — E55.9 VITAMIN D DEFICIENCY: ICD-10-CM

## 2017-04-13 DIAGNOSIS — E66.01 MORBID OBESITY, UNSPECIFIED OBESITY TYPE (H): Primary | ICD-10-CM

## 2017-04-13 DIAGNOSIS — Z98.84 HISTORY OF ROUX-EN-Y GASTRIC BYPASS: ICD-10-CM

## 2017-04-13 DIAGNOSIS — Z13.6 CARDIOVASCULAR SCREENING; LDL GOAL LESS THAN 100: ICD-10-CM

## 2017-04-13 DIAGNOSIS — D50.9 IRON DEFICIENCY ANEMIA, UNSPECIFIED IRON DEFICIENCY ANEMIA TYPE: ICD-10-CM

## 2017-04-13 PROCEDURE — 99214 OFFICE O/P EST MOD 30 MIN: CPT | Performed by: CLINICAL NURSE SPECIALIST

## 2017-04-13 RX ORDER — CYANOCOBALAMIN (VITAMIN B-12) 2500 MCG
2500 TABLET, SUBLINGUAL SUBLINGUAL DAILY
Qty: 30 TABLET | Refills: 6 | Status: SHIPPED | OUTPATIENT
Start: 2017-04-13 | End: 2018-03-09

## 2017-04-13 RX ORDER — PHENTERMINE HYDROCHLORIDE 37.5 MG/1
37.5 TABLET ORAL
Qty: 32 TABLET | Refills: 2 | Status: SHIPPED | OUTPATIENT
Start: 2017-04-13 | End: 2017-07-06

## 2017-04-13 NOTE — MR AVS SNAPSHOT
After Visit Summary   4/13/2017    Janelle Coelho    MRN: [id number]           Patient Information     Date Of Birth          1975        Visit Information        Provider Department      4/13/2017 7:00 AM Mikaela España APRN CNP [facility]        Today's Diagnoses     Morbid obesity, unspecified obesity type (H)    -  1    Vitamin D deficiency        History of Heraclio-en-Y gastric bypass        Iron deficiency anemia, unspecified iron deficiency anemia type        BMI 40.0-44.9, adult (H)        CARDIOVASCULAR SCREENING; LDL GOAL LESS THAN 100           Follow-ups after your visit        Follow-up notes from your care team     Return in about 3 months (around 7/13/2017).      Who to contact     If you have questions or need follow up information about today's clinic visit or your schedule please contact [facility] directly at [phone].  Normal or non-critical lab and imaging results will be communicated to you by MyChart, letter or phone within 4 business days after the clinic has received the results. If you do not hear from us within 7 days, please contact the clinic through Cognusehart or phone. If you have a critical or abnormal lab result, we will notify you by phone as soon as possible.  Submit refill requests through Loudcaster or call your pharmacy and they will forward the refill request to us. Please allow 3 business days for your refill to be completed.          Additional Information About Your Visit        MyChart Information     Loudcaster gives you secure access to your electronic health record. If you see a primary care provider, you can also send messages to your care team and make appointments. If you have questions, please call your primary care clinic.  If you do not have a primary care provider, please call [phone] and they will assist you.        Care EveryWhere ID     This is your Care EveryWhere ID. This could be used by other  "organizations to access your [place] medical records  [id number]        Your Vitals Were     Pulse Temperature Respirations Height Breastfeeding? BMI (Body Mass Index)    92 98.8  F (37.1  C) (Oral) 16 1.549 m (5' 1\") No 42.32 kg/m2       Blood Pressure from Last 3 Encounters:   04/13/17 105/73   03/06/17 108/70   02/24/17 116/80    Weight from Last 3 Encounters:   04/13/17 101.6 kg (224 lb)   03/06/17 108.4 kg (239 lb)   02/24/17 110.4 kg (243 lb 6.4 oz)              Today, you had the following     No orders found for display         Where to get your medicines      These medications were sent to [facility] [address] [address] [address]  [address]  [address] [facility] [address]     Phone:  [phone]     cyabnocobalamin 2500 MCG sublingual tablet         Some of these will need a paper prescription and others can be bought over the counter.  Ask your nurse if you have questions.     Bring a paper prescription for each of these medications     phentermine 37.5 MG tablet          Primary Care Provider Office Phone # Fax #    ISA Anglin [phone]       [address] [place] [address]        Thank you!     Thank you for choosing [facility]  for your care. Our goal is always to provide you with excellent care. Hearing back from our patients is one way we can continue to improve our services. Please take a few minutes to complete the written survey that you may receive in the mail after your visit with us. Thank you!             Your Updated Medication List - Protect others around you: Learn how to safely use, store and throw away your medicines at www.disposemymeds.org.          This list is accurate as of: 4/13/17  7:40 AM.  Always use your most recent med list.                   Brand Name Dispense Instructions for use    albuterol 108 (90 BASE) MCG/ACT Inhaler    VENTOLIN " HFA    1 Inhaler    Inhale 2 puffs into the lungs as needed       cyabnocobalamin 2500 MCG sublingual tablet    VITAMIN B-12    30 tablet    Place 2,500 mcg under the tongue daily       escitalopram 20 MG tablet    LEXAPRO    90 tablet    Take 1 tablet (20 mg) by mouth daily       ferrous gluconate 324 (38 FE) MG tablet    FERGON    180 tablet    Take 1 tablet (324 mg) by mouth 2 times daily If tolerating once a day for 1 week increase to twice daily       levonorgestrel-ethinyl estradiol 0.15-0.03 MG per tablet    QUASENSE    91 tablet    Take 1 tablet by mouth daily       phentermine 37.5 MG tablet    ADIPEX-P    32 tablet    Take 1 tablet (37.5 mg) by mouth every morning (before breakfast)       traZODone 100 MG tablet    DESYREL    90 tablet    Take 1 tablet (100 mg) by mouth At Bedtime       vitamin D 53707 UNIT capsule    ERGOCALCIFEROL    12 capsule    Take 1 capsule (50,000 Units) by mouth once a week       ZYRTEC ALLERGY PO      Take 1 tablet by mouth daily Reported on 3/6/2017

## 2017-04-13 NOTE — NURSING NOTE
"Chief Complaint   Patient presents with     Weight Loss     f/u meds, weight loss       Initial /73 (BP Location: Right arm, Patient Position: Chair, Cuff Size: Adult Large)  Pulse 92  Temp 98.8  F (37.1  C) (Oral)  Resp 16  Ht 5' 1\" (1.549 m)  Wt 224 lb (101.6 kg)  Breastfeeding? No  BMI 42.32 kg/m2 Estimated body mass index is 42.32 kg/(m^2) as calculated from the following:    Height as of this encounter: 5' 1\" (1.549 m).    Weight as of this encounter: 224 lb (101.6 kg).  Medication Reconciliation: complete     Wt Readings from Last 2 Encounters:   04/13/17 224 lb (101.6 kg)   03/06/17 239 lb (108.4 kg)     Waist circ=47 inches    Shelia [place]/PASTORA  [place]---[facility]      "

## 2017-04-13 NOTE — PROGRESS NOTES
Name: Janelle Coelho  Seen at the request of Ivett Sy for obesity (Last seen 2/24/2017).  HPI:  Janelle Coelho is a 42 year old female who presents for the follow up of obesity.    She has a history of gastric bypass approximately 1998, highest weight prior to bypass was 275 lbs, she lost about 100-110 lbs post bypass.  She underwent bypass revision in 2004.    She had significant iron deficiency following the first surgery requiring IV iron infusions.    She developed an allergy to the infusions and this required treatment with steroids in order to tolerate the iron replacement.    She subsequently gained weight partly due to the steroids.    She started phentermine 37.5 mg/day 2/2017.    Was taking OTC vitamin D3.  Recent D level was still below 30.  OTC was discontinued and she was started on D 50,000 IU weekly 2/2017.    + continued fatigue.  Taking iron more regularly.    Menses: on oral contraceptives, has a light period q 3 months  Diarrhea/Constipation:Yes: chronic diarrhea, looses stools 2-3 times per day  Changes in Hair or Skin:No  Diabetes:No  Sleep:  Sleep Apnea/Snores:No  Hypertension or CAD:No  Hyperlipidemia:No  Hirsutism:No  Easy Bruising:No  Use of Steroids:Yes, history of steroid use due to iron allergy and need for iron infusion due anemia, secondary to gastric bypass  Family history of Obesity:   Diet: Started weight watchers last week  Exercise:Recent hernia surgery  PMH/PSH:  Past Medical History:   Diagnosis Date     ASCUS favor benign 2012    hpv neg. needs cotest in 3 years     Depressive disorder, not elsewhere classified 2/10/2014     LSIL (low grade squamous intraepithelial lesion) on Pap smear 2008    per chart notes colp dysplasia?     Obesity      Past Surgical History:   Procedure Laterality Date     EXCIS BARTHOLIN GLAND/CYST  2016     GASTRIC BYPASS  2000    with cholecystectomy and subsequent revision gastric bypass     GASTRIC BYPASS  2006    revision of gastric  bypass     wisdom teeth extraction  1995     Family Hx:  Family History   Problem Relation Age of Onset     Breast Cancer Mother      Breast Cancer Maternal Grandmother      Family History Negative No family hx of      Thyroid disease: No         DM2: Yes: mother, brother         Autoimmune: DM1, SLE, RA, Vitiligo No    Social Hx:  Social History     Social History     Marital status: Single     Spouse name: N/A     Number of children: N/A     Years of education: N/A     Occupational History      [employer]      [employer]     Social History Main Topics     Smoking status: Current Some Day Smoker     Packs/day: 0.20     Years: 10.00     Types: Cigarettes     Smokeless tobacco: Never Used      Comment: does not want to quit at this time     Alcohol use 0.0 oz/week     0 Standard drinks or equivalent per week      Comment: once a week or less     Drug use: No     Sexual activity: Not Currently     Partners: Male     Other Topics Concern     Bike Helmet Yes     Seat Belt Yes     Social History Narrative          MEDICATIONS:  has a current medication list which includes the following prescription(s): cyabnocobalamin, phentermine, albuterol, trazodone, escitalopram, ferrous gluconate, cetirizine hcl, vitamin d, and levonorgestrel-ethinyl estradiol.    ROS     GENERAL: + intentional weight loss.  No fevers, chills, or night sweats.  HEENT: no dysphagia, odynophagia, diplopia, neck pain or tenderness, dry/scratch eyes, URI, cough, sinus drainage, tinnitus, sinus pressure  CV: no chest pain, pressure, palpitations, skipped beats, LOC  LUNGS: no SOB, BENAVIDEZ, cough, sputum production, wheezing   ABDOMEN: no diarrhea, constipation, abdominal pain  EXTREMITIES: no rashes, ulcers, edema  NEUROLOGY: no changes in vision, tingling or numbness in hands or feet.   MSK: no muscle aches or pains, weakness  SKIN: no rashes or lesions  ENDOCRINE: no heat or cold intolerance    Physical Exam   VS: /73 (BP  "Location: Right arm, Patient Position: Chair, Cuff Size: Adult Large)  Pulse 92  Temp 98.8  F (37.1  C) (Oral)  Resp 16  Ht 1.549 m (5' 1\")  Wt 101.6 kg (224 lb)  Breastfeeding? No  BMI 42.32 kg/m2  GENERAL: AXOX3, NAD, well dressed, answering questions appropriately, appears stated age.  HEENT: no exophthalmos, no proptosis, EOMI, no lig lag, no retraction  NECK:  Supple, no thyromegaly, no adenopathy  CV: RRR, no rubs, gallops, no murmurs  LUNGS: CTAB, no wheezes, rales, or rhonchi  ABDOMEN: soft, nontender, nondistended, no broad striae noted.  EXTREMITIES: no edema, +pulses, no rashes, no lesions  NEUROLOGY: CN grossly intact, no tremors  MSK: grossly intact  SKIN: no acanthosis, skin tags; no rashes, no lesions, no intertrigo    LABS:  !COMPREHENSIVE Latest Ref Rng & Units 10/13/2015 3/6/2017   SODIUM 133 - 144 mmol/L 138 140   POTASSIUM 3.4 - 5.3 mmol/L 4.2 4.3   CHLORIDE 94 - 109 mmol/L 106 108   BUN 7 - 30 mg/dL 6 11   Creatinine 0.52 - 1.04 mg/dL 0.80 0.88   Glucose 70 - 99 mg/dL 76 76   ANION GAP 3 - 14 mmol/L 9.4 10   CALCIUM 8.5 - 10.1 mg/dL 8.5 8.7     !LIPID/HEPATIC Latest Ref Rng & Units 3/6/2017   CHOLESTEROL <200 mg/dL 159   TRIGLYCERIDES <150 mg/dL 108   HDL CHOLESTEROL >49 mg/dL 43 (L)   LDL CHOLESTEROL DIRECT 0.0 - 100.0 mg/dL    LDL CHOLESTEROL, CALCULATED <100 mg/dL 94   VLDL-CHOLESTEROL 0 - 30 mg/dL    NON HDL CHOLESTEROL <130 mg/dL 116   CHOLESTEROL/HDL RATIO 0.0 - 5.0    AST 0 - 45 U/L 11   ALT 0 - 50 U/L 23     Component    Latest Ref Rng & Units 10/13/2015 10/5/2016 3/6/2017   Vitamin D Deficiency screening    20 - 75 ug/L <13 (L) . . . <13 (L) . . . 21     !THYROID Latest Ref Rng & Units 3/6/2017 8/21/2014 5/21/2014   TSH 0.40 - 4.00 mU/L 2.37 2.52 2.20   T4 FREE 0.76 - 1.46 ng/dL 0.99       Vital Signs 12/6/2016 2/15/2017 2/24/2017   Weight (LB) 254 lb 3.2 oz 251 lb 11.2 oz 243 lb 6.4 oz   Height 5' 1\"  5' 1\"   BMI 48.13  46.09     Vital Signs 4/13/2017 4/13/2017   Weight (LB)  " "224 lb   Height  5' 1\"   BMI 42.32      All pertinent notes, labs, and images personally reviewed by me.     A/P  Ms.Janelle Coelho is a 42 year old here for the evaluation of obesity:    1. Morbid Obesity.  History of Heraclio-en-Y gastric bypass.  BMI 46.09-  Obesity is associated with a significant increase in mortality and risk of many disorders, including diabetes mellitus, hypertension, dyslipidemia, heart disease, stroke, sleep apnea, cancer, and many others. Conversely, weight loss is associated with a reduction in obesity-associated morbidity.    Endocrine evaluation of obesity includes Diabetes/prediabetes, Cushing's and thyroid dysfunction.  The relevant work up for the diagnosis and management of obesity and various treatment options were discussed. Body Mass Index (BMI) has been a standard means for calculating risk for overweight and obesity. The new American Association of Clinical Endocrinology (AACE) algorithm recommends lifestyle modifications as the initial phase of treatment for all patients with the BMI equal or greater than 25 kg/m2. Lifestyle modifications includes use of medical nutrition therapy, exercise, tobacco cessation, adequate quality and quantity of sleep, limited consumption of alcohol and reduced stress through implementation of a structured, multidisciplinary program.    In patients with complications associated with obesity, graded interventions are recommended including pharmacological therapy such as phentermine, orlistat, lorcaserin and phentermine/topiramate ER, contrave ( buproprion/naltreone) and the use of very low calorie meal replacement programs.    If medical intervention is insufficient, surgical therapy may be considered, especially in patients with BMI greater than or equal to 35 kg/m2 with multiple complications. Surgical treatments include lap-band, gastric sleeve or gastric bypass surgery.    Will obtain the following:    Labs ordered today:   No orders of the " defined types were placed in this encounter.    I informed the pt that:  1.Weight loss medications can be taken to assist with weight reduction when combined with appropriate healthy lifestyle changes.  2.I discussed possible s/e, risks and benefits of weight loss medications.  3.All medications are FDA approved, however, some may be used ''off label'' for their weight loss benefits and some ''short term'' medications can be used for longer periods to achieve desired clinical outcomes.  4.All patients taking weight loss medications must be seen in clinic for refill authorization.    Counseling exercise ( V65.41) - fit bit, tracking activity  Dietary counseling( V65.3) - weight watchers  Calculated BMI above the upper parameter and f/u plan was documented in the medical record.  Discussed indications, risks and benefits of all medications prescribed, and answered questions to patient's satisfaction.  EKG obtained and read in clinic - wnl.  She has lost 19 lbs since last seen, 243-->224 lbs.  Continue Phentermine 37.5 mg qd.    2.  Vitamin D deficiency.  Currently treated with D 50,000 IU daily.  Continue current D dose.  Recheck D level in another 3 months.    Radiology/Consults ordered today: None    More than 50% of the time spent with Ms. Coelho on counseling / coordinating her care.  Total face to face time was greater than or equal to 25 minutes.      Follow-up:  3 months    Mikaela España NP  Endocrinology  [facility]  CC: Ivett Sy   ____________________________________________________________

## 2017-05-08 DIAGNOSIS — F33.41 MAJOR DEPRESSIVE DISORDER, RECURRENT EPISODE, IN PARTIAL REMISSION (H): Chronic | ICD-10-CM

## 2017-05-08 NOTE — TELEPHONE ENCOUNTER
escitalopram (LEXAPRO) 20 MG tablet      Last Written Prescription Date: 10/05/16  Last Fill Quantity: 90, # refills: 1  Last Office Visit with G primary care provider:  03/06/17        Last PHQ-9 score on record=   PHQ-9 SCORE 10/5/2016   Total Score -   Total Score 0

## 2017-05-10 ENCOUNTER — MYC MEDICAL ADVICE (OUTPATIENT)
Dept: PEDIATRICS | Facility: CLINIC | Age: 42
End: 2017-05-10

## 2017-05-10 RX ORDER — ESCITALOPRAM OXALATE 20 MG/1
20 TABLET ORAL DAILY
Qty: 90 TABLET | Refills: 0 | Status: SHIPPED | OUTPATIENT
Start: 2017-05-10 | End: 2017-08-18

## 2017-05-10 ASSESSMENT — PATIENT HEALTH QUESTIONNAIRE - PHQ9
10. IF YOU CHECKED OFF ANY PROBLEMS, HOW DIFFICULT HAVE THESE PROBLEMS MADE IT FOR YOU TO DO YOUR WORK, TAKE CARE OF THINGS AT HOME, OR GET ALONG WITH OTHER PEOPLE: VERY DIFFICULT
SUM OF ALL RESPONSES TO PHQ QUESTIONS 1-9: 13

## 2017-05-10 NOTE — TELEPHONE ENCOUNTER
Medication is being filled for 1 time refill only due to:  phq9 sent thru [facility] now.   Pily Arias RN

## 2017-05-11 ASSESSMENT — PATIENT HEALTH QUESTIONNAIRE - PHQ9: SUM OF ALL RESPONSES TO PHQ QUESTIONS 1-9: 13

## 2017-07-06 ENCOUNTER — OFFICE VISIT (OUTPATIENT)
Dept: ENDOCRINOLOGY | Facility: CLINIC | Age: 42
End: 2017-07-06
Payer: COMMERCIAL

## 2017-07-06 VITALS
SYSTOLIC BLOOD PRESSURE: 110 MMHG | DIASTOLIC BLOOD PRESSURE: 70 MMHG | HEART RATE: 85 BPM | TEMPERATURE: 98.2 F | BODY MASS INDEX: 39.21 KG/M2 | RESPIRATION RATE: 16 BRPM | OXYGEN SATURATION: 98 % | WEIGHT: 207.5 LBS

## 2017-07-06 DIAGNOSIS — E66.01 MORBID OBESITY DUE TO EXCESS CALORIES (H): Primary | ICD-10-CM

## 2017-07-06 DIAGNOSIS — Z98.84 HISTORY OF ROUX-EN-Y GASTRIC BYPASS: ICD-10-CM

## 2017-07-06 DIAGNOSIS — E55.9 VITAMIN D DEFICIENCY: ICD-10-CM

## 2017-07-06 DIAGNOSIS — E66.01 MORBID OBESITY, UNSPECIFIED OBESITY TYPE (H): ICD-10-CM

## 2017-07-06 DIAGNOSIS — Z13.6 CARDIOVASCULAR SCREENING; LDL GOAL LESS THAN 100: ICD-10-CM

## 2017-07-06 PROCEDURE — 99214 OFFICE O/P EST MOD 30 MIN: CPT | Performed by: CLINICAL NURSE SPECIALIST

## 2017-07-06 RX ORDER — TOPIRAMATE 25 MG/1
25 TABLET, FILM COATED ORAL 2 TIMES DAILY
Qty: 60 TABLET | Refills: 3 | Status: SHIPPED | OUTPATIENT
Start: 2017-07-06 | End: 2017-10-17 | Stop reason: SINTOL

## 2017-07-06 RX ORDER — PHENTERMINE HYDROCHLORIDE 37.5 MG/1
37.5 TABLET ORAL
Qty: 32 TABLET | Refills: 2 | Status: SHIPPED | OUTPATIENT
Start: 2017-07-06 | End: 2017-10-17

## 2017-07-06 NOTE — MR AVS SNAPSHOT
After Visit Summary   7/6/2017    Janelle Coelho    MRN: [id number]           Patient Information     Date Of Birth          1975        Visit Information        Provider Department      7/6/2017 7:00 AM Mikaela España APRN CNP [facility]        Today's Diagnoses     Morbid obesity due to excess calories (H)    -  1    BMI 40.0-44.9, adult (H)        History of Heraclio-en-Y gastric bypass        Vitamin D deficiency        Morbid obesity, unspecified obesity type (H)        CARDIOVASCULAR SCREENING; LDL GOAL LESS THAN 100           Follow-ups after your visit        Follow-up notes from your care team     Return in about 3 months (around 10/6/2017).      Who to contact     If you have questions or need follow up information about today's clinic visit or your schedule please contact [facility] directly at [phone].  Normal or non-critical lab and imaging results will be communicated to you by MyChart, letter or phone within 4 business days after the clinic has received the results. If you do not hear from us within 7 days, please contact the clinic through [employer] or phone. If you have a critical or abnormal lab result, we will notify you by phone as soon as possible.  Submit refill requests through [employer] or call your pharmacy and they will forward the refill request to us. Please allow 3 business days for your refill to be completed.          Additional Information About Your Visit        MyChart Information     [employer] gives you secure access to your electronic health record. If you see a primary care provider, you can also send messages to your care team and make appointments. If you have questions, please call your primary care clinic.  If you do not have a primary care provider, please call [phone] and they will assist you.        Care EveryWhere ID     This is your Care EveryWhere ID. This could be used by other organizations to access your  [place] medical records  [id number]        Your Vitals Were     Pulse Temperature Respirations Last Period Pulse Oximetry BMI (Body Mass Index)    85 98.2  F (36.8  C) (Oral) 16 05/23/2017 (Approximate) 98% 39.21 kg/m2       Blood Pressure from Last 3 Encounters:   07/06/17 110/70   04/13/17 105/73   03/06/17 108/70    Weight from Last 3 Encounters:   07/06/17 94.1 kg (207 lb 8 oz)   04/13/17 101.6 kg (224 lb)   03/06/17 108.4 kg (239 lb)              Today, you had the following     No orders found for display         Today's Medication Changes          These changes are accurate as of: 7/6/17  7:40 AM.  If you have any questions, ask your nurse or doctor.               Start taking these medicines.        Dose/Directions    topiramate 25 MG tablet   Commonly known as:  TOPAMAX   Used for:  Morbid obesity due to excess calories (H), BMI 40.0-44.9, adult (H), History of Heraclio-en-Y gastric bypass, Vitamin D deficiency   Started by:  Mikaela España APRN CNP        Dose:  25 mg   Take 1 tablet (25 mg) by mouth 2 times daily   Quantity:  60 tablet   Refills:  3            Where to get your medicines      [facility] [address] [place] [address] AT [address]  [address] [facility] [address]     Phone:  [phone]     topiramate 25 MG tablet         Some of these will need a paper prescription and others can be bought over the counter.  Ask your nurse if you have questions.     Bring a paper prescription for each of these medications     phentermine 37.5 MG tablet                Primary Care Provider Office Phone # Fax #    ISA Anglin [phone]       [facility] [address] [place] [address]        Equal Access to Services     JASEN VIERA AH: Juan Patiño, razia luqadaha, qavickie kaalbeny sheikh. So davion  [phone].    ATENCIÓN: Si richard chaudhari, tiene a kline disposición servicios gratuitos de asistencia lingüística. Jeri ahn [phone].    We comply with applicable federal civil rights laws and [place] laws. We do not discriminate on the basis of race, color, national origin, age, disability sex, sexual orientation or gender identity.            Thank you!     Thank you for choosing [facility]  for your care. Our goal is always to provide you with excellent care. Hearing back from our patients is one way we can continue to improve our services. Please take a few minutes to complete the written survey that you may receive in the mail after your visit with us. Thank you!             Your Updated Medication List - Protect others around you: Learn how to safely use, store and throw away your medicines at www.disposemymeds.org.          This list is accurate as of: 7/6/17  7:40 AM.  Always use your most recent med list.                   Brand Name Dispense Instructions for use Diagnosis    albuterol 108 (90 BASE) MCG/ACT Inhaler    VENTOLIN HFA    1 Inhaler    Inhale 2 puffs into the lungs as needed    Intermittent asthma, uncomplicated       cyabnocobalamin 2500 MCG sublingual tablet    VITAMIN B-12    30 tablet    Place 2,500 mcg under the tongue daily    History of Heraclio-en-Y gastric bypass, Iron deficiency anemia, unspecified iron deficiency anemia type       escitalopram 20 MG tablet    LEXAPRO    90 tablet    Take 1 tablet (20 mg) by mouth daily    Major depressive disorder, recurrent episode, in partial remission (H)       ferrous gluconate 324 (38 FE) MG tablet    FERGON    180 tablet    Take 1 tablet (324 mg) by mouth 2 times daily If tolerating once a day for 1 week increase to twice daily    Anemia, iron deficiency       levonorgestrel-ethinyl estradiol 0.15-0.03 MG per tablet    QUASENSE    91 tablet    Take 1 tablet by mouth daily    Excessive or frequent menstruation        phentermine 37.5 MG tablet    ADIPEX-P    32 tablet    Take 1 tablet (37.5 mg) by mouth every morning (before breakfast)    Morbid obesity, unspecified obesity type (H), History of Heraclio-en-Y gastric bypass, CARDIOVASCULAR SCREENING; LDL GOAL LESS THAN 100       topiramate 25 MG tablet    TOPAMAX    60 tablet    Take 1 tablet (25 mg) by mouth 2 times daily    Morbid obesity due to excess calories (H), BMI 40.0-44.9, adult (H), History of Heraclio-en-Y gastric bypass, Vitamin D deficiency       traZODone 100 MG tablet    DESYREL    90 tablet    Take 1 tablet (100 mg) by mouth At Bedtime    Insomnia, unspecified type       vitamin D 30830 UNIT capsule    ERGOCALCIFEROL    12 capsule    Take 1 capsule (50,000 Units) by mouth once a week    Vitamin D deficiency       ZYRTEC ALLERGY PO      Take 1 tablet by mouth daily Reported on 3/6/2017

## 2017-07-06 NOTE — PROGRESS NOTES
Name: Janelle Coelho  Seen at the request of Ivett Sy for obesity (Last seen 4/13/2017).  HPI:  Janelle Coelho is a 42 year old female who presents for the follow up of obesity.    She has a history of gastric bypass approximately 1998, highest weight prior to bypass was 275 lbs, she lost about 100-110 lbs post bypass.  She underwent bypass revision in 2004.    She had significant iron deficiency following the first surgery requiring IV iron infusions.    She developed an allergy to the infusions and this required treatment with steroids in order to tolerate the iron replacement.    She subsequently gained weight partly due to the steroids.    She started phentermine 37.5 mg/day 2/2017.    Was taking OTC vitamin D3.  Recent D level was still below 30.  OTC was discontinued and she was started on D 50,000 IU weekly 2/2017.    + continued fatigue.  Taking iron more regularly.    Menses: on oral contraceptives, has a light period q 3 months  Diarrhea/Constipation:Yes: chronic diarrhea, looses stools 2-3 times per day  Changes in Hair or Skin:No  Diabetes:No  Sleep:  Sleep Apnea/Snores:No  Hypertension or CAD:No  Hyperlipidemia:No  Hirsutism:No  Easy Bruising:No  Use of Steroids:Yes, history of steroid use due to iron allergy and need for iron infusion due anemia, secondary to gastric bypass  Family history of Obesity:   Diet: Started weight watchers last week  Exercise:Recent hernia surgery  PMH/PSH:  Past Medical History:   Diagnosis Date     ASCUS favor benign 2012    hpv neg. needs cotest in 3 years     Depressive disorder, not elsewhere classified 2/10/2014     LSIL (low grade squamous intraepithelial lesion) on Pap smear 2008    per chart notes colp dysplasia?     Obesity      Past Surgical History:   Procedure Laterality Date     EXCIS BARTHOLIN GLAND/CYST  2016     GASTRIC BYPASS  2000    with cholecystectomy and subsequent revision gastric bypass     GASTRIC BYPASS  2006    revision of gastric  bypass     wisdom teeth extraction  1995     Family Hx:  Family History   Problem Relation Age of Onset     Breast Cancer Mother      Breast Cancer Maternal Grandmother      Family History Negative No family hx of      Thyroid disease: No         DM2: Yes: mother, brother         Autoimmune: DM1, SLE, RA, Vitiligo No    Social Hx:  Social History     Social History     Marital status: Single     Spouse name: N/A     Number of children: N/A     Years of education: N/A     Occupational History      [employer]      [employer]     Social History Main Topics     Smoking status: Current Some Day Smoker     Packs/day: 0.20     Years: 10.00     Types: Cigarettes     Smokeless tobacco: Never Used      Comment: does not want to quit at this time     Alcohol use 0.0 oz/week     0 Standard drinks or equivalent per week      Comment: once a week or less     Drug use: No     Sexual activity: Not Currently     Partners: Male     Other Topics Concern     Bike Helmet Yes     Seat Belt Yes     Social History Narrative          MEDICATIONS:  has a current medication list which includes the following prescription(s): escitalopram, cyabnocobalamin, phentermine, vitamin d, albuterol, trazodone, levonorgestrel-ethinyl estradiol, ferrous gluconate, and cetirizine hcl.    ROS     GENERAL: + intentional weight loss.  No fevers, chills, or night sweats.  HEENT: no dysphagia, odynophagia, diplopia, neck pain or tenderness  CV: no chest pain, pressure, palpitations, skipped beats, LOC  LUNGS: no SOB, BENAVIDEZ, cough, sputum production, wheezing   ABDOMEN: no diarrhea, constipation, abdominal pain  EXTREMITIES: no rashes, ulcers, edema  NEUROLOGY: no changes in vision, tingling or numbness in hands or feet.   MSK: no muscle aches or pains, weakness  SKIN: no rashes or lesions  ENDOCRINE: no heat or cold intolerance    Physical Exam   VS: There were no vitals taken for this visit.  GENERAL: AXOX3, NAD, well dressed, answering  "questions appropriately, appears stated age.  HEENT: no exophthalmos, no proptosis, EOMI, no lig lag, no retraction  NECK:  Supple, no thyromegaly, no adenopathy  CV: RRR, no rubs, gallops, no murmurs  LUNGS: CTAB, no wheezes, rales, or rhonchi  ABDOMEN: nondistended  EXTREMITIES: no edema, +pulses, no rashes, no lesions  NEUROLOGY: CN grossly intact, no tremors  MSK: grossly intact  SKIN: no acanthosis, skin tags; no rashes, no lesions, no intertrigo    LABS:  !COMPREHENSIVE Latest Ref Rng & Units 10/13/2015 3/6/2017   SODIUM 133 - 144 mmol/L 138 140   POTASSIUM 3.4 - 5.3 mmol/L 4.2 4.3   CHLORIDE 94 - 109 mmol/L 106 108   BUN 7 - 30 mg/dL 6 11   Creatinine 0.52 - 1.04 mg/dL 0.80 0.88   Glucose 70 - 99 mg/dL 76 76   ANION GAP 3 - 14 mmol/L 9.4 10   CALCIUM 8.5 - 10.1 mg/dL 8.5 8.7     !LIPID/HEPATIC Latest Ref Rng & Units 3/6/2017   CHOLESTEROL <200 mg/dL 159   TRIGLYCERIDES <150 mg/dL 108   HDL CHOLESTEROL >49 mg/dL 43 (L)   LDL CHOLESTEROL DIRECT 0.0 - 100.0 mg/dL    LDL CHOLESTEROL, CALCULATED <100 mg/dL 94   VLDL-CHOLESTEROL 0 - 30 mg/dL    NON HDL CHOLESTEROL <130 mg/dL 116   CHOLESTEROL/HDL RATIO 0.0 - 5.0    AST 0 - 45 U/L 11   ALT 0 - 50 U/L 23     Component    Latest Ref Rng & Units 10/13/2015 10/5/2016 3/6/2017   Vitamin D Deficiency screening    20 - 75 ug/L <13 (L) . . . <13 (L) . . . 21     !THYROID Latest Ref Rng & Units 3/6/2017 8/21/2014 5/21/2014   TSH 0.40 - 4.00 mU/L 2.37 2.52 2.20   T4 FREE 0.76 - 1.46 ng/dL 0.99       Vital Signs 12/6/2016 2/15/2017 2/24/2017   Weight (LB) 254 lb 3.2 oz 251 lb 11.2 oz 243 lb 6.4 oz   Height 5' 1\"  5' 1\"   BMI 48.13  46.09     Vital Signs 4/13/2017 7/6/2017   Weight (LB) 224 lb 207 lb 8 oz   Height 5' 1\"    BMI (Calculated) 42.41        All pertinent notes, labs, and images personally reviewed by me.     A/P  Ms.Heather TARAH Coelho is a 42 year old here for the evaluation of obesity:    1. Morbid Obesity.  History of Heraclio-en-Y gastric bypass.  BMI 46.09-  Obesity is " associated with a significant increase in mortality and risk of many disorders, including diabetes mellitus, hypertension, dyslipidemia, heart disease, stroke, sleep apnea, cancer, and many others. Conversely, weight loss is associated with a reduction in obesity-associated morbidity.    Endocrine evaluation of obesity includes Diabetes/prediabetes, Cushing's and thyroid dysfunction.  The relevant work up for the diagnosis and management of obesity and various treatment options were discussed. Body Mass Index (BMI) has been a standard means for calculating risk for overweight and obesity. The new American Association of Clinical Endocrinology (AACE) algorithm recommends lifestyle modifications as the initial phase of treatment for all patients with the BMI equal or greater than 25 kg/m2. Lifestyle modifications includes use of medical nutrition therapy, exercise, tobacco cessation, adequate quality and quantity of sleep, limited consumption of alcohol and reduced stress through implementation of a structured, multidisciplinary program.    In patients with complications associated with obesity, graded interventions are recommended including pharmacological therapy such as phentermine, orlistat, lorcaserin and phentermine/topiramate ER, contrave ( buproprion/naltreone) and the use of very low calorie meal replacement programs.    If medical intervention is insufficient, surgical therapy may be considered, especially in patients with BMI greater than or equal to 35 kg/m2 with multiple complications. Surgical treatments include lap-band, gastric sleeve or gastric bypass surgery.    Will obtain the following:    Labs ordered today:   No orders of the defined types were placed in this encounter.    I informed the pt that:  1.Weight loss medications can be taken to assist with weight reduction when combined with appropriate healthy lifestyle changes.  2.I discussed possible s/e, risks and benefits of weight loss  medications.  3.All medications are FDA approved, however, some may be used ''off label'' for their weight loss benefits and some ''short term'' medications can be used for longer periods to achieve desired clinical outcomes.  4.All patients taking weight loss medications must be seen in clinic for refill authorization.    Counseling exercise ( V65.41) - fit bit, tracking activity  Dietary counseling( V65.3) - weight watchers  Calculated BMI above the upper parameter and f/u plan was documented in the medical record.  Discussed indications, risks and benefits of all medications prescribed, and answered questions to patient's satisfaction.  EKG obtained and read in clinic - wnl.  She has lost 17 lbs since last seen, 224-->207 lbs.  She has lost 47 lbs total since 12/2016, 254-->207 lbs.  Feeling hungry in the evening - phentermine not as effective.  Continue Phentermine 37.5 mg qd.  Add Topamax 25 mg bid.    2.  Vitamin D deficiency.  Most recent D level 3/2017 improved to 21.  Currently treated with D 50,000 IU daily.  Continue current D dose.  Recheck D level in another 3 months (9/2017).    Radiology/Consults ordered today: None    More than 50% of the time spent with Ms. Coelho on counseling / coordinating her care.  Total face to face time was greater than or equal to 25 minutes.      Follow-up:  3 months    Mikaela España NP  Endocrinology  [facility]  CC: Ivett Sy   ____________________________________________________________

## 2017-07-06 NOTE — NURSING NOTE
"Chief Complaint   Patient presents with     Weight Problem     management       Initial /70 (BP Location: Left arm, Patient Position: Chair, Cuff Size: Adult Large)  Pulse 85  Temp 98.2  F (36.8  C) (Oral)  Resp 16  Wt 207 lb 8 oz (94.1 kg)  LMP 05/23/2017 (Approximate)  SpO2 98%  BMI 39.21 kg/m2 Estimated body mass index is 39.21 kg/(m^2) as calculated from the following:    Height as of 4/13/17: 5' 1\" (1.549 m).    Weight as of this encounter: 207 lb 8 oz (94.1 kg).  Medication Reconciliation: complete  Demi Blake M.A.  "

## 2017-08-11 ENCOUNTER — TELEPHONE (OUTPATIENT)
Dept: PEDIATRICS | Facility: CLINIC | Age: 42
End: 2017-08-11

## 2017-08-11 DIAGNOSIS — F33.41 MAJOR DEPRESSIVE DISORDER, RECURRENT EPISODE, IN PARTIAL REMISSION (H): Chronic | ICD-10-CM

## 2017-08-11 NOTE — TELEPHONE ENCOUNTER
escitalopram (LEXAPRO) 20 MG tablet     Last Written Prescription Date: 5/10/17  Last Fill Quantity: 90, # refills: 0  Last Office Visit with G primary care provider:  3/6/17        Last PHQ-9 score on record=   PHQ-9 SCORE 5/10/2017   Total Score -   Total Score MyChart 13 (Moderate depression)   Total Score -

## 2017-08-14 RX ORDER — ESCITALOPRAM OXALATE 20 MG/1
20 TABLET ORAL DAILY
Qty: 90 TABLET | Status: CANCELLED | OUTPATIENT
Start: 2017-08-14

## 2017-08-15 ENCOUNTER — MYC MEDICAL ADVICE (OUTPATIENT)
Dept: PEDIATRICS | Facility: CLINIC | Age: 42
End: 2017-08-15

## 2017-08-15 NOTE — TELEPHONE ENCOUNTER
PHQ-9 SCORE 9/1/2016 10/5/2016 5/10/2017   Total Score - - -   Total Score MyChart - - 13 (Moderate depression)   Total Score 8 0 -     EVIE-7 SCORE 8/21/2014 10/13/2015 10/5/2016   Total Score 9 - -   Total Score - 5 2       Need to update PHQ and EVIE please

## 2017-08-15 NOTE — TELEPHONE ENCOUNTER
Ivett wanted PHQ9 & GAD7 updated. Sent via [employer] message.     Refill for Lexapro is pending. Once patient responds, please make sure it's refilled.

## 2017-08-16 ENCOUNTER — TELEPHONE (OUTPATIENT)
Dept: ENDOCRINOLOGY | Facility: CLINIC | Age: 42
End: 2017-08-16

## 2017-08-16 DIAGNOSIS — E55.9 VITAMIN D DEFICIENCY: ICD-10-CM

## 2017-08-16 ASSESSMENT — PATIENT HEALTH QUESTIONNAIRE - PHQ9
SUM OF ALL RESPONSES TO PHQ QUESTIONS 1-9: 19
SUM OF ALL RESPONSES TO PHQ QUESTIONS 1-9: 19
10. IF YOU CHECKED OFF ANY PROBLEMS, HOW DIFFICULT HAVE THESE PROBLEMS MADE IT FOR YOU TO DO YOUR WORK, TAKE CARE OF THINGS AT HOME, OR GET ALONG WITH OTHER PEOPLE: EXTREMELY DIFFICULT

## 2017-08-16 ASSESSMENT — ANXIETY QUESTIONNAIRES
6. BECOMING EASILY ANNOYED OR IRRITABLE: NEARLY EVERY DAY
GAD7 TOTAL SCORE: 9
2. NOT BEING ABLE TO STOP OR CONTROL WORRYING: NOT AT ALL
GAD7 TOTAL SCORE: 9
4. TROUBLE RELAXING: NEARLY EVERY DAY
5. BEING SO RESTLESS THAT IT IS HARD TO SIT STILL: NOT AT ALL
1. FEELING NERVOUS, ANXIOUS, OR ON EDGE: NEARLY EVERY DAY
7. FEELING AFRAID AS IF SOMETHING AWFUL MIGHT HAPPEN: NOT AT ALL
GAD7 TOTAL SCORE: 9
3. WORRYING TOO MUCH ABOUT DIFFERENT THINGS: NOT AT ALL
7. FEELING AFRAID AS IF SOMETHING AWFUL MIGHT HAPPEN: NOT AT ALL

## 2017-08-16 NOTE — TELEPHONE ENCOUNTER
Called and left a message for the pt to call back.  When she does, please update phq9 and EVIE per below.  Thanks!

## 2017-08-16 NOTE — TELEPHONE ENCOUNTER
vitamin D (ERGOCALCIFEROL) 37449 UNIT capsule    Last Written Prescription Date: 3/7/17  Last Fill Quantity: 12, # refills: 1  Last Office Visit with [facility], [facility] or [facility] prescribing provider: 7/6/2017    Next 5 appointments (look out 90 days)     Aug 25, 2017  8:00 AM CDT   SHORT with ISA Anglin CNP   [facility] ([facility])    [address] [place] [address]  [address] [place] [id number] [phone]                   DEXA Scan:  No order of DX HIP/PELVIS/SPINE is found.     No order of DX HIP/PELVIS/SPINE W LAT FRACTION ANALYSIS is found.       Creatinine   Date Value Ref Range Status   03/06/2017 0.88 0.52 - 1.04 mg/dL Final       Collette Gonsalez, ROMANT  August 16, 2017  1:09 PM

## 2017-08-16 NOTE — TELEPHONE ENCOUNTER
Patient called back that she will do via [employer] and scheduled an appointment.  Nasreen Rosario RN

## 2017-08-17 RX ORDER — ERGOCALCIFEROL 1.25 MG/1
CAPSULE, LIQUID FILLED ORAL
Status: SHIPPED
Start: 2017-08-17 | End: 2017-08-25

## 2017-08-17 ASSESSMENT — ANXIETY QUESTIONNAIRES: GAD7 TOTAL SCORE: 9

## 2017-08-17 ASSESSMENT — PATIENT HEALTH QUESTIONNAIRE - PHQ9: SUM OF ALL RESPONSES TO PHQ QUESTIONS 1-9: 19

## 2017-08-17 NOTE — TELEPHONE ENCOUNTER
PHQ9 & GAD7 updated, patient has appointment scheduled on 8/25/17.     PHQ-9 score:    PHQ-9 SCORE 8/16/2017   Total Score -   Total Score MyChart 19 (Moderately severe depression)   Total Score 19     EVIE-7 SCORE 10/13/2015 10/5/2016 8/16/2017   Total Score - - -   Total Score - - 9 (mild anxiety)   Total Score 5 2 9

## 2017-08-17 NOTE — TELEPHONE ENCOUNTER
Needs lab recheck.  Should not be out of med til 9/7/17.  Note to pharmacy.  Lab ordered.  L/M to call.  Jazmin Khanna RN       2.  Vitamin D deficiency.  Most recent D level 3/2017 improved to 21.  Currently treated with D 50,000 IU daily.  Continue current D dose.  Recheck D level in another 3 months (9/2017).

## 2017-08-17 NOTE — TELEPHONE ENCOUNTER
Pt returned call, 24 weeks given 3.7.17, so runs out 8-8-17  Plans to have lab drawn in am when sees PCP  Prescription approved per [facility] Refill Protocol  Malinda Ramon RN BS

## 2017-08-18 ENCOUNTER — TELEPHONE (OUTPATIENT)
Dept: PEDIATRICS | Facility: CLINIC | Age: 42
End: 2017-08-18

## 2017-08-18 DIAGNOSIS — F33.41 MAJOR DEPRESSIVE DISORDER, RECURRENT EPISODE, IN PARTIAL REMISSION (H): Chronic | ICD-10-CM

## 2017-08-18 RX ORDER — ESCITALOPRAM OXALATE 20 MG/1
20 TABLET ORAL DAILY
Qty: 30 TABLET | Refills: 0 | Status: SHIPPED | OUTPATIENT
Start: 2017-08-18 | End: 2017-10-10

## 2017-08-18 NOTE — TELEPHONE ENCOUNTER
Janelle came into the clinic today  Thinking she had an appointment today at 8:00 she specifically made the appointment for today because she only has a few left of Escitalopram.  Here appointment was schedule on 8/25/17  She would like to know if she could please get her prescription filled before next Friday, since she will be out of this medication.  Please Call Janelle an let her know.

## 2017-08-25 ENCOUNTER — OFFICE VISIT (OUTPATIENT)
Dept: PEDIATRICS | Facility: CLINIC | Age: 42
End: 2017-08-25
Payer: COMMERCIAL

## 2017-08-25 VITALS
BODY MASS INDEX: 36.44 KG/M2 | TEMPERATURE: 98 F | HEART RATE: 87 BPM | DIASTOLIC BLOOD PRESSURE: 70 MMHG | OXYGEN SATURATION: 99 % | WEIGHT: 193 LBS | SYSTOLIC BLOOD PRESSURE: 116 MMHG | HEIGHT: 61 IN

## 2017-08-25 DIAGNOSIS — F33.41 MAJOR DEPRESSIVE DISORDER, RECURRENT EPISODE, IN PARTIAL REMISSION (H): Chronic | ICD-10-CM

## 2017-08-25 DIAGNOSIS — E66.01 MORBID OBESITY DUE TO EXCESS CALORIES (H): ICD-10-CM

## 2017-08-25 DIAGNOSIS — F41.1 GENERALIZED ANXIETY DISORDER: Primary | Chronic | ICD-10-CM

## 2017-08-25 DIAGNOSIS — Z72.0 TOBACCO ABUSE: ICD-10-CM

## 2017-08-25 DIAGNOSIS — E55.9 VITAMIN D DEFICIENCY: ICD-10-CM

## 2017-08-25 DIAGNOSIS — G47.00 INSOMNIA, UNSPECIFIED TYPE: ICD-10-CM

## 2017-08-25 PROCEDURE — 99214 OFFICE O/P EST MOD 30 MIN: CPT | Performed by: NURSE PRACTITIONER

## 2017-08-25 RX ORDER — SERTRALINE HYDROCHLORIDE 25 MG/1
TABLET, FILM COATED ORAL
Qty: 30 TABLET | Refills: 0 | Status: SHIPPED | OUTPATIENT
Start: 2017-08-25 | End: 2017-09-07 | Stop reason: DRUGHIGH

## 2017-08-25 RX ORDER — ERGOCALCIFEROL 1.25 MG/1
CAPSULE, LIQUID FILLED ORAL
Qty: 30 CAPSULE | Refills: 1 | Status: SHIPPED | OUTPATIENT
Start: 2017-08-25 | End: 2017-10-17

## 2017-08-25 NOTE — PROGRESS NOTES
SUBJECTIVE:   Janelle Coelho is a 42 year old female who presents to clinic today for the following health issues:      Medication Followup of Escitalopram     Taking Medication as prescribed: yes    Side Effects:  None    Medication Helping Symptoms:  Not sure      PHQ-9 SCORE 10/5/2016 5/10/2017 8/16/2017   Total Score - - -   Total Score MyChart - 13 (Moderate depression) 19 (Moderately severe depression)   Total Score 0 - 19     EVIE-7 SCORE 10/13/2015 10/5/2016 8/16/2017   Total Score - - -   Total Score - - 9 (mild anxiety)   Total Score 5 2 9     She has a long hx of dep and anx, at last OV we discussed this was 1 yr ago, she noted she had increase in symptoms and upset stomach on lexapro. Hx of citalopram and effexor with withdrawal symptoms. At that time,s he decided to stay on current meds. We had discussed potentially trying zoloft, which she declined at that time. SHe has had worseniing symptoms of anx, which affect her at work and home.     She has been working with endocrine and weight loss, going to weight watchers. She is overall happy with her weight loss thus far.     Wt Readings from Last 4 Encounters:   08/25/17 193 lb (87.5 kg)   07/06/17 207 lb 8 oz (94.1 kg)   04/13/17 224 lb (101.6 kg)   03/06/17 239 lb (108.4 kg)         Problem list and histories reviewed & adjusted, as indicated.  Additional history: as documented    Patient Active Problem List   Diagnosis     Acute posthemorrhagic anemia     ASCUS with positive high risk HPV     Obesity     Excessive or frequent menstruation     Generalized anxiety disorder     Major depressive disorder, recurrent episode, in partial remission (H)     CARDIOVASCULAR SCREENING; LDL GOAL LESS THAN 100     Tobacco abuse     Infected cyst of Bartholin's gland duct     Intermittent asthma, uncomplicated     History of Heraclio-en-Y gastric bypass     Insomnia, unspecified type     Family history of breast cancer in mother     BMI 40.0-44.9, adult (H)     Vitamin  "D deficiency     Past Surgical History:   Procedure Laterality Date     EXCIS BARTHOLIN GLAND/CYST  2016     GASTRIC BYPASS  2000    with cholecystectomy and subsequent revision gastric bypass     GASTRIC BYPASS  2006    revision of gastric bypass     wisdom teeth extraction  1995       Social History   Substance Use Topics     Smoking status: Current Some Day Smoker     Packs/day: 0.20     Years: 10.00     Types: Cigarettes     Smokeless tobacco: Never Used      Comment: does not want to quit at this time     Alcohol use 0.0 oz/week     0 Standard drinks or equivalent per week      Comment: once a week or less     Family History   Problem Relation Age of Onset     Breast Cancer Mother      Breast Cancer Maternal Grandmother      Family History Negative No family hx of              Reviewed and updated as needed this visit by clinical staff     Reviewed and updated as needed this visit by Provider         ROS:  Constitutional, HEENT, cardiovascular, pulmonary, gi and gu systems are negative, except as otherwise noted.      OBJECTIVE:   /70 (BP Location: Right arm, Cuff Size: Adult Regular)  Pulse 87  Temp 98  F (36.7  C) (Oral)  Ht 5' 1\" (1.549 m)  Wt 193 lb (87.5 kg)  SpO2 99%  BMI 36.47 kg/m2  Body mass index is 36.47 kg/(m^2).  GENERAL: healthy, alert and no distress  PSYCH: mentation appears normal, affect normal/bright      ASSESSMENT/PLAN:     1. Generalized anxiety disorder  Not well controlled, will switch to zoloft. We discussed risks of increased symptoms with switching. She notes her symptoms of anx are more so than depression at this time. She will follow up in 1-2 mo  - sertraline (ZOLOFT) 25 MG tablet; Take one tab PO daily x 1 wk, then take TWO tabs daily  Dispense: 30 tablet; Refill: 0    2. Major depressive disorder, recurrent episode, in partial remission (H)  Not as much an issue at this time, notes more symptoms of anx  - sertraline (ZOLOFT) 25 MG tablet; Take one tab PO daily x 1 wk, " then take TWO tabs daily  Dispense: 30 tablet; Refill: 0    3. Insomnia, unspecified type  Stable per meds, takes them 1 time per wk, on average    4. Vitamin D deficiency  Due for refills, will follow up with endocrine regarding this issue.   - vitamin D (ERGOCALCIFEROL) 69467 UNIT capsule; TAKE 1 CAPSULE BY MOUTH 1 TIME A WEEK  Dispense: 30 capsule; Refill: 1    5. Tobacco abuse  Not quite ready to quit, she is aware of the importance. We disucssed medications to use, however since she is having concerns with mood, declines wellbutrin and/or chantix at this time we discussed nicotine replcaement as well. She will consider and follow up prn    6. Morbid obesity due to excess calories (H)  Doing well with weight loss, encouraged to continue with weight watchers and encorine      Patient Instructions   Take half tab of the lexapro x 3 days, then start with ONE tab zoloft x 1 wk, then TWO tabs zoloft thereafter.     RETURN TO CLINIC in 1 - 2 months.       ISA Tavera [facility]

## 2017-08-25 NOTE — MR AVS SNAPSHOT
After Visit Summary   8/25/2017    Janelle Coelho    MRN: [id number]           Patient Information     Date Of Birth          1975        Visit Information        Provider Department      8/25/2017 8:00 AM Ivett Sy APRN CNP [facility]        Today's Diagnoses     Generalized anxiety disorder    -  1    Major depressive disorder, recurrent episode, in partial remission (H)        Insomnia, unspecified type          Care Instructions    Take half tab of the lexapro x 3 days, then start with ONE tab zoloft x 1 wk, then TWO tabs zoloft thereafter.     RETURN TO CLINIC in 1 - 2 months.           Follow-ups after your visit        Follow-up notes from your care team     Return in about 4 weeks (around 9/22/2017) for Follow up.      Who to contact     If you have questions or need follow up information about today's clinic visit or your schedule please contact [facility] directly at [phone].  Normal or non-critical lab and imaging results will be communicated to you by Foodahart, letter or phone within 4 business days after the clinic has received the results. If you do not hear from us within 7 days, please contact the clinic through Foodahart or phone. If you have a critical or abnormal lab result, we will notify you by phone as soon as possible.  Submit refill requests through [email or web address] or call your pharmacy and they will forward the refill request to us. Please allow 3 business days for your refill to be completed.          Additional Information About Your Visit        MyChart Information     [email or web address] gives you secure access to your electronic health record. If you see a primary care provider, you can also send messages to your care team and make appointments. If you have questions, please call your primary care clinic.  If you do not have a primary care provider, please call [phone] and they will assist you.        Care EveryWhere ID     This is your Care  "EveryWhere ID. This could be used by other organizations to access your [facility] medical records  [id number]        Your Vitals Were     Pulse Temperature Height Pulse Oximetry BMI (Body Mass Index)       87 98  F (36.7  C) (Oral) 5' 1\" (1.549 m) 99% 36.47 kg/m2        Blood Pressure from Last 3 Encounters:   08/25/17 116/70   07/06/17 110/70   04/13/17 105/73    Weight from Last 3 Encounters:   08/25/17 193 lb (87.5 kg)   07/06/17 207 lb 8 oz (94.1 kg)   04/13/17 224 lb (101.6 kg)              Today, you had the following     No orders found for display         Today's Medication Changes          These changes are accurate as of: 8/25/17  8:24 AM.  If you have any questions, ask your nurse or doctor.               Start taking these medicines.        Dose/Directions    sertraline 25 MG tablet   Commonly known as:  ZOLOFT   Used for:  Generalized anxiety disorder, Major depressive disorder, recurrent episode, in partial remission (H)   Started by:  Ivett Sy, ISA CNP        Take one tab PO daily x 1 wk, then take TWO tabs daily   Quantity:  30 tablet   Refills:  0            Where to get your medicines      These medications were sent to [facility] [address] [place] [address] AT [address]  [address] [facility] [address]     Phone:  [phone]     sertraline 25 MG tablet                Primary Care Provider Office Phone # Fax #    ISA Anglin [phone] [address] [place] [address]        Equal Access to Services     [facility] CenterDENISE AH: Hadii twin christianson Sojoleen, waaxda luqadaha, qaybta kaalmada adedhara, beny christianson. So [facility] [phone].    ATENCIÓN: Si habla español, tiene a kline disposición servicios gratuitos de asistencia lingüística. Llame al [phone].    We comply with applicable federal civil rights laws and [place] laws. We do not discriminate on the " basis of race, color, national origin, age, disability sex, sexual orientation or gender identity.            Thank you!     Thank you for choosing [facility]  for your care. Our goal is always to provide you with excellent care. Hearing back from our patients is one way we can continue to improve our services. Please take a few minutes to complete the written survey that you may receive in the mail after your visit with us. Thank you!             Your Updated Medication List - Protect others around you: Learn how to safely use, store and throw away your medicines at www.disposemymeds.org.          This list is accurate as of: 8/25/17  8:24 AM.  Always use your most recent med list.                   Brand Name Dispense Instructions for use Diagnosis    albuterol 108 (90 BASE) MCG/ACT Inhaler    VENTOLIN HFA    1 Inhaler    Inhale 2 puffs into the lungs as needed    Intermittent asthma, uncomplicated       cyabnocobalamin 2500 MCG sublingual tablet    VITAMIN B-12    30 tablet    Place 2,500 mcg under the tongue daily    History of Heraclio-en-Y gastric bypass, Iron deficiency anemia, unspecified iron deficiency anemia type       escitalopram 20 MG tablet    LEXAPRO    30 tablet    Take 1 tablet (20 mg) by mouth daily    Major depressive disorder, recurrent episode, in partial remission (H)       ferrous gluconate 324 (38 FE) MG tablet    FERGON    180 tablet    Take 1 tablet (324 mg) by mouth 2 times daily If tolerating once a day for 1 week increase to twice daily    Anemia, iron deficiency       levonorgestrel-ethinyl estradiol 0.15-0.03 MG per tablet    QUASENSE    91 tablet    Take 1 tablet by mouth daily    Excessive or frequent menstruation       phentermine 37.5 MG tablet    ADIPEX-P    32 tablet    Take 1 tablet (37.5 mg) by mouth every morning (before breakfast)    Morbid obesity, unspecified obesity type (H), History of Heraclio-en-Y gastric bypass, CARDIOVASCULAR SCREENING; LDL GOAL LESS THAN 100        sertraline 25 MG tablet    ZOLOFT    30 tablet    Take one tab PO daily x 1 wk, then take TWO tabs daily    Generalized anxiety disorder, Major depressive disorder, recurrent episode, in partial remission (H)       topiramate 25 MG tablet    TOPAMAX    60 tablet    Take 1 tablet (25 mg) by mouth 2 times daily    Morbid obesity due to excess calories (H), BMI 40.0-44.9, adult (H), History of Heraclio-en-Y gastric bypass, Vitamin D deficiency       traZODone 100 MG tablet    DESYREL    90 tablet    Take 1 tablet (100 mg) by mouth At Bedtime    Insomnia, unspecified type       vitamin D 93413 UNIT capsule    ERGOCALCIFEROL     TAKE 1 CAPSULE BY MOUTH 1 TIME A WEEK    Vitamin D deficiency       ZYRTEC ALLERGY PO      Take 1 tablet by mouth daily Reported on 3/6/2017

## 2017-08-25 NOTE — PATIENT INSTRUCTIONS
Take half tab of the lexapro x 3 days, then start with ONE tab zoloft x 1 wk, then TWO tabs zoloft thereafter.     RETURN TO CLINIC in 1 - 2 months.

## 2017-08-25 NOTE — NURSING NOTE
"Chief Complaint   Patient presents with     Recheck Medication       Initial /70 (BP Location: Right arm, Cuff Size: Adult Regular)  Pulse 87  Temp 98  F (36.7  C) (Oral)  Ht 5' 1\" (1.549 m)  Wt 193 lb (87.5 kg)  SpO2 99%  BMI 36.47 kg/m2 Estimated body mass index is 36.47 kg/(m^2) as calculated from the following:    Height as of this encounter: 5' 1\" (1.549 m).    Weight as of this encounter: 193 lb (87.5 kg).  Medication Reconciliation: complete   Susana Gonzales MA    "

## 2017-09-07 ENCOUNTER — MYC MEDICAL ADVICE (OUTPATIENT)
Dept: PEDIATRICS | Facility: CLINIC | Age: 42
End: 2017-09-07

## 2017-09-07 DIAGNOSIS — F41.1 GENERALIZED ANXIETY DISORDER: Chronic | ICD-10-CM

## 2017-09-07 DIAGNOSIS — F33.41 MAJOR DEPRESSIVE DISORDER, RECURRENT EPISODE, IN PARTIAL REMISSION (H): Chronic | ICD-10-CM

## 2017-10-09 DIAGNOSIS — F33.41 MAJOR DEPRESSIVE DISORDER, RECURRENT EPISODE, IN PARTIAL REMISSION (H): Chronic | ICD-10-CM

## 2017-10-09 DIAGNOSIS — F41.1 GENERALIZED ANXIETY DISORDER: Chronic | ICD-10-CM

## 2017-10-10 ENCOUNTER — OFFICE VISIT (OUTPATIENT)
Dept: PEDIATRICS | Facility: CLINIC | Age: 42
End: 2017-10-10
Payer: COMMERCIAL

## 2017-10-10 VITALS
SYSTOLIC BLOOD PRESSURE: 103 MMHG | HEART RATE: 90 BPM | WEIGHT: 186.8 LBS | TEMPERATURE: 97.8 F | BODY MASS INDEX: 35.3 KG/M2 | DIASTOLIC BLOOD PRESSURE: 70 MMHG

## 2017-10-10 DIAGNOSIS — E55.9 VITAMIN D DEFICIENCY: ICD-10-CM

## 2017-10-10 DIAGNOSIS — F33.41 MAJOR DEPRESSIVE DISORDER, RECURRENT EPISODE, IN PARTIAL REMISSION (H): Primary | Chronic | ICD-10-CM

## 2017-10-10 DIAGNOSIS — D50.8 IRON DEFICIENCY ANEMIA SECONDARY TO INADEQUATE DIETARY IRON INTAKE: ICD-10-CM

## 2017-10-10 DIAGNOSIS — F41.1 GENERALIZED ANXIETY DISORDER: Chronic | ICD-10-CM

## 2017-10-10 DIAGNOSIS — J45.20 INTERMITTENT ASTHMA, UNCOMPLICATED: ICD-10-CM

## 2017-10-10 LAB
ERYTHROCYTE [DISTWIDTH] IN BLOOD BY AUTOMATED COUNT: 15.4 % (ref 10–15)
HCT VFR BLD AUTO: 33.1 % (ref 35–47)
HGB BLD-MCNC: 10.7 G/DL (ref 11.7–15.7)
MCH RBC QN AUTO: 25.4 PG (ref 26.5–33)
MCHC RBC AUTO-ENTMCNC: 32.3 G/DL (ref 31.5–36.5)
MCV RBC AUTO: 79 FL (ref 78–100)
PLATELET # BLD AUTO: 436 10E9/L (ref 150–450)
RBC # BLD AUTO: 4.21 10E12/L (ref 3.8–5.2)
VIT B12 SERPL-MCNC: 398 PG/ML (ref 193–986)
WBC # BLD AUTO: 10.6 10E9/L (ref 4–11)

## 2017-10-10 PROCEDURE — 99214 OFFICE O/P EST MOD 30 MIN: CPT | Performed by: NURSE PRACTITIONER

## 2017-10-10 PROCEDURE — 82306 VITAMIN D 25 HYDROXY: CPT | Performed by: INTERNAL MEDICINE

## 2017-10-10 PROCEDURE — 82728 ASSAY OF FERRITIN: CPT | Performed by: INTERNAL MEDICINE

## 2017-10-10 PROCEDURE — 83540 ASSAY OF IRON: CPT | Performed by: INTERNAL MEDICINE

## 2017-10-10 PROCEDURE — 82607 VITAMIN B-12: CPT | Performed by: INTERNAL MEDICINE

## 2017-10-10 PROCEDURE — 36415 COLL VENOUS BLD VENIPUNCTURE: CPT | Performed by: INTERNAL MEDICINE

## 2017-10-10 PROCEDURE — 85027 COMPLETE CBC AUTOMATED: CPT | Performed by: INTERNAL MEDICINE

## 2017-10-10 PROCEDURE — 83550 IRON BINDING TEST: CPT | Performed by: INTERNAL MEDICINE

## 2017-10-10 RX ORDER — SERTRALINE HYDROCHLORIDE 100 MG/1
100 TABLET, FILM COATED ORAL DAILY
Qty: 90 TABLET | Refills: 0 | Status: SHIPPED | OUTPATIENT
Start: 2017-10-10 | End: 2018-01-08

## 2017-10-10 ASSESSMENT — ANXIETY QUESTIONNAIRES
7. FEELING AFRAID AS IF SOMETHING AWFUL MIGHT HAPPEN: SEVERAL DAYS
2. NOT BEING ABLE TO STOP OR CONTROL WORRYING: MORE THAN HALF THE DAYS
IF YOU CHECKED OFF ANY PROBLEMS ON THIS QUESTIONNAIRE, HOW DIFFICULT HAVE THESE PROBLEMS MADE IT FOR YOU TO DO YOUR WORK, TAKE CARE OF THINGS AT HOME, OR GET ALONG WITH OTHER PEOPLE: VERY DIFFICULT
3. WORRYING TOO MUCH ABOUT DIFFERENT THINGS: MORE THAN HALF THE DAYS
1. FEELING NERVOUS, ANXIOUS, OR ON EDGE: MORE THAN HALF THE DAYS
6. BECOMING EASILY ANNOYED OR IRRITABLE: MORE THAN HALF THE DAYS
GAD7 TOTAL SCORE: 12
5. BEING SO RESTLESS THAT IT IS HARD TO SIT STILL: SEVERAL DAYS

## 2017-10-10 ASSESSMENT — PATIENT HEALTH QUESTIONNAIRE - PHQ9
5. POOR APPETITE OR OVEREATING: MORE THAN HALF THE DAYS
SUM OF ALL RESPONSES TO PHQ QUESTIONS 1-9: 16

## 2017-10-10 NOTE — MR AVS SNAPSHOT
After Visit Summary   10/10/2017    Janelle Coelho    MRN: [id number]           Patient Information     Date Of Birth          1975        Visit Information        Provider Department      10/10/2017 2:15 PM Ivett Sy APRN CNP [facility]        Today's Diagnoses     Major depressive disorder, recurrent episode, in partial remission (H)    -  1    Intermittent asthma, uncomplicated        Generalized anxiety disorder          Care Instructions    Increase the zoloft dose to 100 mg daily and if all is going well in 1 mo, mycriat me with an update. If things get worse or you have concerns, schedule an office visit.           Follow-ups after your visit        Your next 10 appointments already scheduled     Oct 17, 2017  8:30 AM CDT   Return Visit with ISA Leone CNP   [facility]    [address] [facility] [address] [phone]              Who to contact     If you have questions or need follow up information about today's clinic visit or your schedule please contact [facility] directly at [phone].  Normal or non-critical lab and imaging results will be communicated to you by Hapten Scienceshart, letter or phone within 4 business days after the clinic has received the results. If you do not hear from us within 7 days, please contact the clinic through Quandoot or phone. If you have a critical or abnormal lab result, we will notify you by phone as soon as possible.  Submit refill requests through Instamour or call your pharmacy and they will forward the refill request to us. Please allow 3 business days for your refill to be completed.          Additional Information About Your Visit        MyChart Information     Instamour gives you secure access to your electronic health record. If you see a primary care provider, you can also send messages to your care team and make appointments. If you have  questions, please call your primary care clinic.  If you do not have a primary care provider, please call [phone] and they will assist you.        Care EveryWhere ID     This is your Care EveryWhere ID. This could be used by other organizations to access your [facility] medical records  [id number]        Your Vitals Were     Pulse Temperature BMI (Body Mass Index)             90 97.8  F (36.6  C) (Tympanic) 35.3 kg/m2          Blood Pressure from Last 3 Encounters:   10/10/17 103/70   08/25/17 116/70   07/06/17 110/70    Weight from Last 3 Encounters:   10/10/17 186 lb 12.8 oz (84.7 kg)   08/25/17 193 lb (87.5 kg)   07/06/17 207 lb 8 oz (94.1 kg)              We Performed the Following     Asthma Action Plan (AAP)     DEPRESSION ACTION PLAN (DAP)          Today's Medication Changes          These changes are accurate as of: 10/10/17  3:18 PM.  If you have any questions, ask your nurse or doctor.               These medicines have changed or have updated prescriptions.        Dose/Directions    * sertraline 50 MG tablet   Commonly known as:  ZOLOFT   This may have changed:  Another medication with the same name was added. Make sure you understand how and when to take each.   Used for:  Generalized anxiety disorder, Major depressive disorder, recurrent episode, in partial remission (H)   Changed by:  Ivett Sy APRN CNP        Dose:  50 mg   Take 1 tablet (50 mg) by mouth daily   Quantity:  30 tablet   Refills:  0       * sertraline 100 MG tablet   Commonly known as:  ZOLOFT   This may have changed:  You were already taking a medication with the same name, and this prescription was added. Make sure you understand how and when to take each.   Used for:  Major depressive disorder, recurrent episode, in partial remission (H), Generalized anxiety disorder   Changed by:  Ivett Sy APRN CNP        Dose:  100 mg   Take 1 tablet (100 mg) by mouth daily   Quantity:  90 tablet   Refills:  0        * Notice:  This list has 2 medication(s) that are the same as other medications prescribed for you. Read the directions carefully, and ask your doctor or other care provider to review them with you.         Where to get your medicines      These medications were sent to [facility] [id number]  [place], [place] - [address] [place] [address] AT [address]  [address] [facility] [address]     Phone:  [phone]     sertraline 100 MG tablet                Primary Care Provider Office Phone # Fax #    Ivett DANGELO ISA Sy [phone] [address] [place] [address]        Equal Access to Services     ROCIO VIERA : Hadii twin holly hadasho Soomaali, waaxda luqadaha, qaybta kaalmada adeegyada, beny stephens . So [facility] [phone].    ATENCIÓN: Si habla español, tiene a kline disposición servicios gratuitos de asistencia lingüística. Llame al [phone].    We comply with applicable federal civil rights laws and [place] laws. We do not discriminate on the basis of race, color, national origin, age, disability, sex, sexual orientation, or gender identity.            Thank you!     Thank you for choosing [facility]  for your care. Our goal is always to provide you with excellent care. Hearing back from our patients is one way we can continue to improve our services. Please take a few minutes to complete the written survey that you may receive in the mail after your visit with us. Thank you!             Your Updated Medication List - Protect others around you: Learn how to safely use, store and throw away your medicines at www.disposemymeds.org.          This list is accurate as of: 10/10/17  3:18 PM.  Always use your most recent med list.                   Brand Name Dispense Instructions for use Diagnosis    albuterol 108 (90 BASE) MCG/ACT Inhaler    VENTOLIN HFA    1 Inhaler    Inhale 2 puffs into the lungs as needed     Intermittent asthma, uncomplicated       cyabnocobalamin 2500 MCG sublingual tablet    VITAMIN B-12    30 tablet    Place 2,500 mcg under the tongue daily    History of Heraclio-en-Y gastric bypass, Iron deficiency anemia, unspecified iron deficiency anemia type       ferrous gluconate 324 (38 FE) MG tablet    FERGON    180 tablet    Take 1 tablet (324 mg) by mouth 2 times daily If tolerating once a day for 1 week increase to twice daily    Anemia, iron deficiency       levonorgestrel-ethinyl estradiol 0.15-0.03 MG per tablet    QUASENSE    91 tablet    Take 1 tablet by mouth daily    Excessive or frequent menstruation       phentermine 37.5 MG tablet    ADIPEX-P    32 tablet    Take 1 tablet (37.5 mg) by mouth every morning (before breakfast)    Morbid obesity, unspecified obesity type (H), History of Heraclio-en-Y gastric bypass, CARDIOVASCULAR SCREENING; LDL GOAL LESS THAN 100       * sertraline 50 MG tablet    ZOLOFT    30 tablet    Take 1 tablet (50 mg) by mouth daily    Generalized anxiety disorder, Major depressive disorder, recurrent episode, in partial remission (H)       * sertraline 100 MG tablet    ZOLOFT    90 tablet    Take 1 tablet (100 mg) by mouth daily    Major depressive disorder, recurrent episode, in partial remission (H), Generalized anxiety disorder       topiramate 25 MG tablet    TOPAMAX    60 tablet    Take 1 tablet (25 mg) by mouth 2 times daily    Morbid obesity due to excess calories (H), BMI 40.0-44.9, adult (H), History of Heraclio-en-Y gastric bypass, Vitamin D deficiency       traZODone 100 MG tablet    DESYREL    90 tablet    Take 1 tablet (100 mg) by mouth At Bedtime    Insomnia, unspecified type       vitamin D 91844 UNIT capsule    ERGOCALCIFEROL    30 capsule    TAKE 1 CAPSULE BY MOUTH 1 TIME A WEEK    Vitamin D deficiency       ZYRTEC ALLERGY PO      Take 1 tablet by mouth daily Reported on 3/6/2017        * Notice:  This list has 2 medication(s) that are the same as other medications  prescribed for you. Read the directions carefully, and ask your doctor or other care provider to review them with you.

## 2017-10-10 NOTE — TELEPHONE ENCOUNTER
sertraline (ZOLOFT) 50 MG tablet     Last Written Prescription Date: 9/7/2017  Last Fill Quantity: 30, # refills: 0  [place] Office [place] with [facility] [place]:  8/25/2017   Next 5 appointments (look out 90 days)     Oct 10, 2017  2:15 PM CDT   SHORT with ISA Anglin CNP   [facility] ([facility])    [address]  [address]  [facility] [address]   [phone]            Oct 17, 2017  8:30 AM CDT   Return Visit with ISA Leone CNP   [facility]    [address]  [facility] [address] [phone]                   Last PHQ-9 score on record=   PHQ-9 SCORE 8/16/2017   Total Score -   Total Score MyChart 19 (Moderately severe depression)   Total Score 19

## 2017-10-10 NOTE — PROGRESS NOTES
SUBJECTIVE:   Janelle Coelho is a 42 year old female who presents to clinic today for the following health issues    Depression and Anxiety Follow-Up    Status since last visit: Improved     Other associated symptoms:None    Complicating factors:     Significant life event: No     Current substance abuse: None    PHQ-9 Score and MyChart F/U Questions 10/5/2016 8/16/2017 10/10/2017   Total Score 0 19 16   Q9: Suicide Ideation Not at all Not at all Not at all     EVIE-7 SCORE 10/5/2016 8/16/2017 10/10/2017   Total Score - - -   Total Score - 9 (mild anxiety) -   Total Score 2 9 12       PHQ-9  English  PHQ-9   Any Language  GAD7  Suicide Assessment Five-step Evaluation and Treatment (SAFE-T)      Amount of exercise or physical activity: None    Problems taking medications regularly: No    Medication side effects: none  Diet: regular (no restrictions)    At last OV 6 wks ago, we switched from citalopram and efexor TO zoloft. Since switching, she notes her symptoms have been improved. She notes her symptoms improved somewhat, but still struggles with dep and anx symptoms. She noted previoulsy her anx had been worse, and at thispoint, she feels both symptoms equally.     Also, hx of iron deficiency anemia and B12 deficiency. These levels had previously been monitored, but has been a while. She takes a b12 daily, an an iron supplement about 3 times per wk as she tends to forget. She does have ongoing fatigue like symptoms, but could be more attributed to mood.     Problem list and histories reviewed & adjusted, as indicated.  Additional history: as documented    Patient Active Problem List   Diagnosis     Acute posthemorrhagic anemia     ASCUS with positive high risk HPV     Obesity     Excessive or frequent menstruation     Generalized anxiety disorder     Major depressive disorder, recurrent episode, in partial remission (H)     CARDIOVASCULAR SCREENING; LDL GOAL LESS THAN 100     Tobacco abuse     Infected cyst of  Bartholin's gland duct     Intermittent asthma, uncomplicated     History of Heraclio-en-Y gastric bypass     Insomnia, unspecified type     Family history of breast cancer in mother     BMI 40.0-44.9, adult (H)     Vitamin D deficiency     Past Surgical History:   Procedure Laterality Date     EXCIS BARTHOLIN GLAND/CYST  2016     GASTRIC BYPASS  2000    with cholecystectomy and subsequent revision gastric bypass     GASTRIC BYPASS  2006    revision of gastric bypass     wisdom teeth extraction  1995       Social History   Substance Use Topics     Smoking status: Current Some Day Smoker     Packs/day: 0.20     Years: 10.00     Types: Cigarettes     Smokeless tobacco: Never Used      Comment: does not want to quit at this time     Alcohol use 0.0 oz/week     0 Standard drinks or equivalent per week      Comment: once a week or less     Family History   Problem Relation Age of Onset     Breast Cancer Mother      Breast Cancer Maternal Grandmother      Family History Negative No family hx of              Reviewed and updated as needed this visit by clinical staffTobacco  Allergies  Meds  Med Hx  Surg Hx  Fam Hx  Soc Hx      Reviewed and updated as needed this visit by Provider         ROS:  Constitutional, HEENT, cardiovascular, pulmonary, gi and gu systems are negative, except as otherwise noted.      OBJECTIVE:   /70  Pulse 90  Temp 97.8  F (36.6  C) (Tympanic)  Wt 186 lb 12.8 oz (84.7 kg)  BMI 35.3 kg/m2  Body mass index is 35.3 kg/(m^2).  GENERAL: healthy, alert and no distress  PSYCH: mentation appears normal, affect normal/bright      ASSESSMENT/PLAN:     1. Major depressive disorder, recurrent episode, in partial remission (H)  Imprved, but not greatly. Will increase zoloft dose. If all is well in 1 mo, ok to continue to refill x 6 mo. If not, instructed to schedule OV.   - DEPRESSION ACTION PLAN (DAP)  - sertraline (ZOLOFT) 100 MG tablet; Take 1 tablet (100 mg) by mouth daily  Dispense: 90 tablet;  Refill: 0    2. Intermittent asthma, uncomplicated  stable  - Asthma Action Plan (AAP)    3. Generalized anxiety disorder  Per above  - sertraline (ZOLOFT) 100 MG tablet; Take 1 tablet (100 mg) by mouth daily  Dispense: 90 tablet; Refill: 0    4. Iron deficiency anemia secondary to inadequate dietary iron intake  Fatigue like symptoms, could be due to many things. Will check labs.   - CBC with platelets  - Iron and iron binding capacity  - Ferritin  - Vitamin B12    5. Vitamin D deficiency    - Vitamin D Deficiency  - Ferritin  - Vitamin B12    Patient Instructions   Increase the zoloft dose to 100 mg daily and if all is going well in 1 mo, mychart me with an update. If things get worse or you have concerns, schedule an office visit.       ISA Tavera [facility]

## 2017-10-10 NOTE — LETTER
My Asthma Action Plan  Name: Janelle Coelho   YOB: 1975  Date: 10/10/2017   My doctor: ISA Tavera CNP   My clinic: [facility]        My Control Medicine: None  My Rescue Medicine: Albuterol (Proair/Ventolin/Proventil) inhaler 1-2 puffs every 4-6hrs as needed   My Asthma Severity: intermittent  Avoid your asthma triggers: upper respiratory infections and exercise or sports               GREEN ZONE   Good Control    I feel good    No cough or wheeze    Can work, sleep and play without asthma symptoms       Take your asthma control medicine every day.     1. If exercise triggers your asthma, take your rescue medication    15 minutes before exercise or sports, and    During exercise if you have asthma symptoms  2. Spacer to use with inhaler: If you have a spacer, make sure to use it with your inhaler             YELLOW ZONE Getting Worse  I have ANY of these:    I do not feel good    Cough or wheeze    Chest feels tight    Wake up at night   1. Keep taking your Green Zone medications  2. Start taking your rescue medicine:    every 20 minutes for up to 1 hour. Then every 4 hours for 24-48 hours.  3. If you stay in the Yellow Zone for more than 12-24 hours, contact your doctor.  4. If you do not return to the Green Zone in 12-24 hours or you get worse, start taking your oral steroid medicine if prescribed by your provider.           RED ZONE Medical Alert - Get Help  I have ANY of these:    I feel awful    Medicine is not helping    Breathing getting harder    Trouble walking or talking    Nose opens wide to breathe       1. Take your rescue medicine NOW  2. If your provider has prescribed an oral steroid medicine, start taking it NOW  3. Call your doctor NOW  4. If you are still in the Red Zone after 20 minutes and you have not reached your doctor:    Take your rescue medicine again and    Call 911 or go to the emergency room right away    See your regular doctor within 2  weeks of an Emergency Room or Urgent Care visit for follow-up treatment.        Electronically signed by: Maryanne Harkins, October 10, 2017    Annual Reminders:  Meet with Asthma Educator,  Flu Shot in the Fall, consider Pneumonia Vaccination for patients with asthma (aged 19 and older).    Pharmacy: [facility] [address] [place] [address] [place]  AT [address]                    Asthma Triggers  How To Control Things That Make Your Asthma Worse    Triggers are things that make your asthma worse.  Look at the list below to help you find your triggers and what you can do about them.  You can help prevent asthma flare-ups by staying away from your triggers.      Trigger                                                          What you can do   Cigarette Smoke  Tobacco smoke can make asthma worse. Do not allow smoking in your home, car or around you.  Be sure no one smokes at a child s day care or school.  If you smoke, ask your health care provider for ways to help you quit.  Ask family members to quit too.  Ask your health care provider for a referral to Quit Plan to help you quit smoking, or call 0-496-594-PLAN.     Colds, Flu, Bronchitis  These are common triggers of asthma. Wash your hands often.  Don t touch your eyes, nose or mouth.  Get a flu shot every year.     Dust Mites  These are tiny bugs that live in cloth or carpet. They are too small to see. Wash sheets and blankets in hot water every week.   Encase pillows and mattress in dust mite proof covers.  Avoid having carpet if you can. If you have carpet, vacuum weekly.   Use a dust mask and HEPA vacuum.   Pollen and Outdoor Mold  Some people are allergic to trees, grass, or weed pollen, or molds. Try to keep your windows closed.  Limit time out doors when pollen count is high.   Ask you health care provider about taking medicine during allergy season.     Animal Dander  Some people are allergic to skin flakes, urine or saliva from  pets with fur or feathers. Keep pets with fur or feathers out of your home.    If you can t keep the pet outdoors, then keep the pet out of your bedroom.  Keep the bedroom door closed.  Keep pets off cloth furniture and away from stuffed toys.     Mice, Rats, and Cockroaches  Some people are allergic to the waste from these pests.   Cover food and garbage.  Clean up spills and food crumbs.  Store grease in the refrigerator.   Keep food out of the bedroom.   Indoor Mold  This can be a trigger if your home has high moisture. Fix leaking faucets, pipes, or other sources of water.   Clean moldy surfaces.  Dehumidify basement if it is damp and smelly.   Smoke, Strong Odors, and Sprays  These can reduce air quality. Stay away from strong odors and sprays, such as perfume, powder, hair spray, paints, smoke incense, paint, cleaning products, candles and new carpet.   Exercise or Sports  Some people with asthma have this trigger. Be active!  Ask your doctor about taking medicine before sports or exercise to prevent symptoms.    Warm up for 5-10 minutes before and after sports or exercise.     Other Triggers of Asthma  Cold air:  Cover your nose and mouth with a scarf.  Sometimes laughing or crying can be a trigger.  Some medicines and food can trigger asthma.

## 2017-10-10 NOTE — NURSING NOTE
"Chief Complaint   Patient presents with     Anxiety     Depression       Initial /70  Pulse 90  Temp 97.8  F (36.6  C) (Tympanic)  Wt 186 lb 12.8 oz (84.7 kg)  BMI 35.3 kg/m2 Estimated body mass index is 35.3 kg/(m^2) as calculated from the following:    Height as of 8/25/17: 5' 1\" (1.549 m).    Weight as of this encounter: 186 lb 12.8 oz (84.7 kg).  Medication Reconciliation: complete  "

## 2017-10-10 NOTE — PATIENT INSTRUCTIONS
Increase the zoloft dose to 100 mg daily and if all is going well in 1 mo, mychart me with an update. If things get worse or you have concerns, schedule an office visit.

## 2017-10-10 NOTE — LETTER
My Depression Action Plan  Name: Janelle Coelho   Date of Birth 1975  Date: 10/10/2017    My doctor: Ivett Sy   My clinic: [address]  [address]  [place] [address] [phone]          GREEN    ZONE   Good Control    What it looks like:     Things are going generally well. You have normal up s and down s. You may even feel depressed from time to time, but bad moods usually last less than a day.   What you need to do:  1. Continue to care for yourself (see self care plan)  2. Check your depression survival kit and update it as needed  3. Follow your physician s recommendations including any medication.  4. Do not stop taking medication unless you consult with your physician first.           YELLOW         ZONE Getting Worse    What it looks like:     Depression is starting to interfere with your life.     It may be hard to get out of bed; you may be starting to isolate yourself from others.    Symptoms of depression are starting to last most all day and this has happened for several days.     You may have suicidal thoughts but they are not constant.   What you need to do:     1. Call your care team, your response to treatment will improve if you keep your care team informed of your progress. Yellow periods are signs an adjustment may need to be made.     2. Continue your self-care, even if you have to fake it!    3. Talk to someone in your support network    4. Open up your depression survival kit           RED    ZONE Medical Alert - Get Help    What it looks like:     Depression is seriously interfering with your life.     You may experience these or other symptoms: You can t get out of bed most days, can t work or engage in other necessary activities, you have trouble taking care of basic hygiene, or basic responsibilities, thoughts of suicide or death that will not go away, self-injurious behavior.     What you need to do:  1. Call  your care team and request a same-day appointment. If they are not available (weekends or after hours) call your local crisis line, emergency room or 911.      Electronically signed by: Maryanne Harkins, October 10, 2017    Depression Self Care Plan / Survival Kit    Self-Care for Depression  Here s the deal. Your body and mind are really not as separate as most people think.  What you do and think affects how you feel and how you feel influences what you do and think. This means if you do things that people who feel good do, it will help you feel better.  Sometimes this is all it takes.  There is also a place for medication and therapy depending on how severe your depression is, so be sure to consult with your medical provider and/ or Behavioral Health Consultant if your symptoms are worsening or not improving.     In order to better manage my stress, I will:    Exercise  Get some form of exercise, every day. This will help reduce pain and release endorphins, the  feel good  chemicals in your brain. This is almost as good as taking antidepressants!  This is not the same as joining a gym and then never going! (they count on that by the way ) It can be as simple as just going for a walk or doing some gardening, anything that will get you moving.      Hygiene   Maintain good hygiene (Get out of bed in the morning, Make your bed, Brush your teeth, Take a shower, and Get dressed like you were going to work, even if you are unemployed).  If your clothes don't fit try to get ones that do.    Diet  I will strive to eat foods that are good for me, drink plenty of water, and avoid excessive sugar, caffeine, alcohol, and other mood-altering substances.  Some foods that are helpful in depression are: complex carbohydrates, B vitamins, flaxseed, fish or fish oil, fresh fruits and vegetables.    Psychotherapy  I agree to participate in Individual Therapy (if recommended).    Medication  If prescribed medications, I agree to take  them.  Missing doses can result in serious side effects.  I understand that drinking alcohol, or other illicit drug use, may cause potential side effects.  I will not stop my medication abruptly without first discussing it with my provider.    Staying Connected With Others  I will stay in touch with my friends, family members, and my primary care provider/team.    Use your imagination  Be creative.  We all have a creative side; it doesn t matter if it s oil painting, sand castles, or mud pies! This will also kick up the endorphins.    Witness Beauty  (AKA stop and smell the roses) Take a look outside, even in mid-winter. Notice colors, textures. Watch the squirrels and birds.     Service to others  Be of service to others.  There is always someone else in need.  By helping others we can  get out of ourselves  and remember the really important things.  This also provides opportunities for practicing all the other parts of the program.    Humor  Laugh and be silly!  Adjust your TV habits for less news and crime-drama and more comedy.    Control your stress  Try breathing deep, massage therapy, biofeedback, and meditation. Find time to relax each day.     My support system    Clinic Contact:  Phone number:    Contact 1:  Phone number:    Contact 2:  Phone number:    Worship/:  Phone number:    Therapist:  Phone number:    Local crisis center:    Phone number:    Other community support:  Phone number:

## 2017-10-11 LAB
DEPRECATED CALCIDIOL+CALCIFEROL SERPL-MC: 33 UG/L (ref 20–75)
FERRITIN SERPL-MCNC: 17 NG/ML (ref 12–150)
IRON SATN MFR SERPL: 5 % (ref 15–46)
IRON SERPL-MCNC: 21 UG/DL (ref 35–180)
TIBC SERPL-MCNC: 397 UG/DL (ref 240–430)

## 2017-10-11 ASSESSMENT — ANXIETY QUESTIONNAIRES: GAD7 TOTAL SCORE: 12

## 2017-10-11 ASSESSMENT — ASTHMA QUESTIONNAIRES: ACT_TOTALSCORE: 25

## 2017-10-16 ENCOUNTER — MYC MEDICAL ADVICE (OUTPATIENT)
Dept: PEDIATRICS | Facility: CLINIC | Age: 42
End: 2017-10-16

## 2017-10-16 RX ORDER — ERGOCALCIFEROL 1.25 MG/1
50000 CAPSULE, LIQUID FILLED ORAL WEEKLY
Qty: 12 CAPSULE | Refills: 1 | Status: SHIPPED | OUTPATIENT
Start: 2017-10-16 | End: 2017-10-17

## 2017-10-16 NOTE — PROGRESS NOTES
Janelle,  Your D level is now in the normal range.  You will need to continue taking vitamin D to keep the level from going below normal.  I'll renew your D prescription.  I recommend rechecking the level in another 6 months to make sure it isn't getting too high.  Let me know if you have any questions.  Mikaela España NP  Endocrinology

## 2017-10-16 NOTE — TELEPHONE ENCOUNTER
Okay to wait until patient's PCP is back in the office next week. Would not necessarily need referral to heme. Ivett could place orders in appropriate.

## 2017-10-17 ENCOUNTER — OFFICE VISIT (OUTPATIENT)
Dept: ENDOCRINOLOGY | Facility: CLINIC | Age: 42
End: 2017-10-17
Payer: COMMERCIAL

## 2017-10-17 VITALS
BODY MASS INDEX: 34.86 KG/M2 | TEMPERATURE: 98.4 F | RESPIRATION RATE: 12 BRPM | SYSTOLIC BLOOD PRESSURE: 124 MMHG | HEART RATE: 98 BPM | WEIGHT: 184.5 LBS | DIASTOLIC BLOOD PRESSURE: 84 MMHG

## 2017-10-17 DIAGNOSIS — E55.9 VITAMIN D DEFICIENCY: Primary | ICD-10-CM

## 2017-10-17 DIAGNOSIS — Z98.84 HISTORY OF ROUX-EN-Y GASTRIC BYPASS: ICD-10-CM

## 2017-10-17 DIAGNOSIS — E66.01 MORBID OBESITY, UNSPECIFIED OBESITY TYPE (H): ICD-10-CM

## 2017-10-17 DIAGNOSIS — Z13.6 CARDIOVASCULAR SCREENING; LDL GOAL LESS THAN 100: ICD-10-CM

## 2017-10-17 PROCEDURE — 99214 OFFICE O/P EST MOD 30 MIN: CPT | Performed by: CLINICAL NURSE SPECIALIST

## 2017-10-17 RX ORDER — ERGOCALCIFEROL 1.25 MG/1
50000 CAPSULE, LIQUID FILLED ORAL WEEKLY
Qty: 12 CAPSULE | Refills: 1 | Status: SHIPPED | OUTPATIENT
Start: 2017-10-17 | End: 2018-06-04

## 2017-10-17 RX ORDER — PHENTERMINE HYDROCHLORIDE 37.5 MG/1
37.5 TABLET ORAL
Qty: 32 TABLET | Refills: 2 | Status: SHIPPED | OUTPATIENT
Start: 2017-10-17 | End: 2018-03-09

## 2017-10-17 NOTE — MR AVS SNAPSHOT
After Visit Summary   10/17/2017    Janelle Coehlo    MRN: [id number]           Patient Information     Date Of Birth          1975        Visit Information        Provider Department      10/17/2017 8:30 AM Mikaela España APRN CNP [facility]        Today's Diagnoses     Vitamin D deficiency    -  1    Morbid obesity, unspecified obesity type (H)        BMI 34.0-34.9,adult        History of Heraclio-en-Y gastric bypass        CARDIOVASCULAR SCREENING; LDL GOAL LESS THAN 100           Follow-ups after your visit        Follow-up notes from your care team     Return in about 3 months (around 1/17/2018).      Who to contact     If you have questions or need follow up information about today's clinic visit or your schedule please contact [facility] directly at [phone].  Normal or non-critical lab and imaging results will be communicated to you by MyChart, letter or phone within 4 business days after the clinic has received the results. If you do not hear from us within 7 days, please contact the clinic through [employer] or phone. If you have a critical or abnormal lab result, we will notify you by phone as soon as possible.  Submit refill requests through [employer] or call your pharmacy and they will forward the refill request to us. Please allow 3 business days for your refill to be completed.          Additional Information About Your Visit        MyChart Information     [employer] gives you secure access to your electronic health record. If you see a primary care provider, you can also send messages to your care team and make appointments. If you have questions, please call your primary care clinic.  If you do not have a primary care provider, please call [phone] and they will assist you.        Care EveryWhere ID     This is your Care EveryWhere ID. This could be used by other organizations to access your [facility] medical records  [id number]        Your  Vitals Were     Pulse Temperature Respirations BMI (Body Mass Index)          98 98.4  F (36.9  C) (Oral) 12 34.86 kg/m2         Blood Pressure from Last 3 Encounters:   10/17/17 124/84   10/10/17 103/70   08/25/17 116/70    Weight from Last 3 Encounters:   10/17/17 83.7 kg (184 lb 8 oz)   10/10/17 84.7 kg (186 lb 12.8 oz)   08/25/17 87.5 kg (193 lb)              Today, you had the following     No orders found for display         Today's Medication Changes          These changes are accurate as of: 10/17/17  9:12 AM.  If you have any questions, ask your nurse or doctor.               Start taking these medicines.        Dose/Directions    vitamin D 28915 UNIT capsule   Commonly known as:  ERGOCALCIFEROL   Used for:  Vitamin D deficiency   Started by:  Mikaela España APRN CNP        Dose:  85321 Units   Take 1 capsule (50,000 Units) by mouth once a week   Quantity:  12 capsule   Refills:  1         Stop taking these medicines if you haven't already. Please contact your care team if you have questions.     topiramate 25 MG tablet   Commonly known as:  TOPAMAX   Stopped by:  Mikaela España APRN CNP                Where to get your medicines      These medications were sent to [facility] [address] [place] [address] AT [address]  [address] [facility] [address]     Phone:  [phone]     vitamin D 25960 UNIT capsule         Some of these will need a paper prescription and others can be bought over the counter.  Ask your nurse if you have questions.     Bring a paper prescription for each of these medications     phentermine 37.5 MG tablet                Primary Care Provider Office Phone # Fax #    ISA Anglin [phone] [address] [place] [address]        Equal Access to Services     JASEN VIERA AH: Juan Patiño, waaxda luqadaha, qaybta ratna meraz, beny gonzalez  lael leonardo So [facility] [phone].    ATENCIÓN: Si habla watson, tiene a kline disposición servicios gratuitos de asistencia lingüística. Jeri ahn [phone].    We comply with applicable federal civil rights laws and [place] laws. We do not discriminate on the basis of race, color, national origin, age, disability, sex, sexual orientation, or gender identity.            Thank you!     Thank you for choosing [facility]  for your care. Our goal is always to provide you with excellent care. Hearing back from our patients is one way we can continue to improve our services. Please take a few minutes to complete the written survey that you may receive in the mail after your visit with us. Thank you!             Your Updated Medication List - Protect others around you: Learn how to safely use, store and throw away your medicines at www.disposemymeds.org.          This list is accurate as of: 10/17/17  9:12 AM.  Always use your most recent med list.                   Brand Name Dispense Instructions for use Diagnosis    albuterol 108 (90 BASE) MCG/ACT Inhaler    VENTOLIN HFA    1 Inhaler    Inhale 2 puffs into the lungs as needed    Intermittent asthma, uncomplicated       cyabnocobalamin 2500 MCG sublingual tablet    VITAMIN B-12    30 tablet    Place 2,500 mcg under the tongue daily    History of Heraclio-en-Y gastric bypass, Iron deficiency anemia, unspecified iron deficiency anemia type       ferrous gluconate 324 (38 FE) MG tablet    FERGON    180 tablet    Take 1 tablet (324 mg) by mouth 2 times daily If tolerating once a day for 1 week increase to twice daily    Anemia, iron deficiency       levonorgestrel-ethinyl estradiol 0.15-0.03 MG per tablet    QUASENSE    91 tablet    Take 1 tablet by mouth daily    Excessive or frequent menstruation       phentermine 37.5 MG tablet    ADIPEX-P    32 tablet    Take 1 tablet (37.5 mg) by mouth every morning (before breakfast)    Morbid obesity, unspecified  obesity type (H), History of Heraclio-en-Y gastric bypass, CARDIOVASCULAR SCREENING; LDL GOAL LESS THAN 100       sertraline 100 MG tablet    ZOLOFT    90 tablet    Take 1 tablet (100 mg) by mouth daily    Major depressive disorder, recurrent episode, in partial remission (H), Generalized anxiety disorder       traZODone 100 MG tablet    DESYREL    90 tablet    Take 1 tablet (100 mg) by mouth At Bedtime    Insomnia, unspecified type       vitamin D 52650 UNIT capsule    ERGOCALCIFEROL    12 capsule    Take 1 capsule (50,000 Units) by mouth once a week    Vitamin D deficiency       ZYRTEC ALLERGY PO      Take 1 tablet by mouth daily Reported on 3/6/2017

## 2017-10-17 NOTE — PROGRESS NOTES
Name: Janelle Ceolho  Seen at the request of Ivett Sy for obesity (Last seen 7/6/2017).  HPI:  Janelle Coelho is a 42 year old female who presents for the follow up of obesity.    She has a history of gastric bypass approximately 1998, highest weight prior to bypass was 275 lbs, she lost about 100-110 lbs post bypass.  She underwent bypass revision in 2004.    She had significant iron deficiency following the first surgery requiring IV iron infusions.    She developed an allergy to the infusions and this required treatment with steroids in order to tolerate the iron replacement.    She subsequently gained weight partly due to the steroids.    She started phentermine 37.5 mg/day 2/2017.    Was taking OTC vitamin D3.  Recent D level was still below 30.  OTC was discontinued and she was started on D 50,000 IU weekly 2/2017.    + continued fatigue.  Taking iron more regularly.    Menses: on oral contraceptives, has a light period q 3 months  Diarrhea/Constipation:Yes: chronic diarrhea, looses stools 2-3 times per day  Changes in Hair or Skin:No  Diabetes:No  Sleep:  Sleep Apnea/Snores:No  Hypertension or CAD:No  Hyperlipidemia:No  Hirsutism:No  Easy Bruising:No  Use of Steroids:Yes, history of steroid use due to iron allergy and need for iron infusion due anemia, secondary to gastric bypass  Family history of Obesity:   Diet: Started weight watchers last week  Exercise:Recent hernia surgery  PMH/PSH:  Past Medical History:   Diagnosis Date     ASCUS favor benign 2012    hpv neg. needs cotest in 3 years     Depressive disorder, not elsewhere classified 2/10/2014     LSIL (low grade squamous intraepithelial lesion) on Pap smear 2008    per chart notes colp dysplasia?     Obesity      Past Surgical History:   Procedure Laterality Date     EXCIS BARTHOLIN GLAND/CYST  2016     GASTRIC BYPASS  2000    with cholecystectomy and subsequent revision gastric bypass     GASTRIC BYPASS  2006    revision of gastric  bypass     wisdom teeth extraction  1995     Family Hx:  Family History   Problem Relation Age of Onset     Breast Cancer Mother      Breast Cancer Maternal Grandmother      Family History Negative No family hx of      Thyroid disease: No         DM2: Yes: mother, brother         Autoimmune: DM1, SLE, RA, Vitiligo No    Social Hx:  Social History     Social History     Marital status: Single     Spouse name: N/A     Number of children: N/A     Years of education: N/A     Occupational History      [employer]      counter [employer]     Social History Main Topics     Smoking status: Current Some Day Smoker     Packs/day: 0.20     Years: 10.00     Types: Cigarettes     Smokeless tobacco: Never Used      Comment: does not want to quit at this time     Alcohol use 0.0 oz/week     0 Standard drinks or equivalent per week      Comment: once a week or less     Drug use: No     Sexual activity: No     Other Topics Concern     Bike Helmet Yes     Seat Belt Yes     Social History Narrative          MEDICATIONS:  has a current medication list which includes the following prescription(s): vitamin d, phentermine, sertraline, cyabnocobalamin, albuterol, trazodone, levonorgestrel-ethinyl estradiol, ferrous gluconate, and cetirizine hcl.    ROS     GENERAL: + intentional weight loss.  No fevers, chills, or night sweats.  HEENT: no dysphagia, odynophagia, diplopia, neck pain or tenderness  CV: no chest pain, pressure, palpitations, skipped beats, LOC  LUNGS: no SOB, BENAVIDEZ, cough, sputum production, wheezing   ABDOMEN: no diarrhea, constipation, abdominal pain  EXTREMITIES: no rashes, ulcers, edema  NEUROLOGY: no changes in vision, tingling or numbness in hands or feet.   MSK: no muscle aches or pains, weakness  SKIN: no rashes or lesions  ENDOCRINE: no heat or cold intolerance    Physical Exam   VS: /84 (BP Location: Right arm, Patient Position: Chair, Cuff Size: Adult Regular)  Pulse 98  Temp 98.4  F (36.9  C)  "(Oral)  Resp 12  Wt 83.7 kg (184 lb 8 oz)  BMI 34.86 kg/m2  GENERAL: AXOX3, NAD, well dressed, answering questions appropriately, appears stated age.  HEENT: no exophthalmos, no proptosis, EOMI, no lig lag, no retraction  NECK:  Supple, no thyromegaly, no adenopathy  CV: RRR, no rubs, gallops, no murmurs  LUNGS: CTAB, no wheezes, rales, or rhonchi  ABDOMEN: nondistended  EXTREMITIES: no edema, +pulses, no rashes, no lesions  NEUROLOGY: CN grossly intact, no tremors  MSK: grossly intact  SKIN: no acanthosis, skin tags; no rashes, no lesions, no intertrigo    LABS:  !COMPREHENSIVE Latest Ref Rng & Units 10/13/2015 3/6/2017   SODIUM 133 - 144 mmol/L 138 140   POTASSIUM 3.4 - 5.3 mmol/L 4.2 4.3   CHLORIDE 94 - 109 mmol/L 106 108   BUN 7 - 30 mg/dL 6 11   Creatinine 0.52 - 1.04 mg/dL 0.80 0.88   Glucose 70 - 99 mg/dL 76 76   ANION GAP 3 - 14 mmol/L 9.4 10   CALCIUM 8.5 - 10.1 mg/dL 8.5 8.7     !LIPID/HEPATIC Latest Ref Rng & Units 3/6/2017   CHOLESTEROL <200 mg/dL 159   TRIGLYCERIDES <150 mg/dL 108   HDL CHOLESTEROL >49 mg/dL 43 (L)   LDL CHOLESTEROL DIRECT 0.0 - 100.0 mg/dL    LDL CHOLESTEROL, CALCULATED <100 mg/dL 94   VLDL-CHOLESTEROL 0 - 30 mg/dL    NON HDL CHOLESTEROL <130 mg/dL 116   CHOLESTEROL/HDL RATIO 0.0 - 5.0    AST 0 - 45 U/L 11   ALT 0 - 50 U/L 23     Component    Latest Ref Rng & Units 10/5/2016 3/6/2017 10/10/2017   Vitamin D Deficiency screening    20 - 75 ug/L <13 (L)  21 33     !THYROID Latest Ref Rng & Units 3/6/2017 8/21/2014 5/21/2014   TSH 0.40 - 4.00 mU/L 2.37 2.52 2.20   T4 FREE 0.76 - 1.46 ng/dL 0.99       Vital Signs 12/6/2016 2/15/2017 2/24/2017   Weight (LB) 254 lb 3.2 oz 251 lb 11.2 oz 243 lb 6.4 oz   Height 5' 1\"  5' 1\"   BMI 48.13  46.09     Vital Signs 4/13/2017 7/6/2017 8/25/2017 10/10/2017   Weight (LB) 224 lb 207 lb 8 oz 193 lb 186 lb 12.8 oz   Height 5' 1\"  5' 1\"    BMI (Calculated) 42.41  36.54      Vital Signs 10/17/2017   Weight (LB) 184 lb 8 oz   Height    BMI (Calculated) " 34.86       All pertinent notes, labs, and images personally reviewed by me.     A/P  Ms.Janelle Coelho is a 42 year old here for the evaluation of obesity:    1. Morbid Obesity.  History of Heraclio-en-Y gastric bypass.  BMI 48.13-->34.86-  Obesity is associated with a significant increase in mortality and risk of many disorders, including diabetes mellitus, hypertension, dyslipidemia, heart disease, stroke, sleep apnea, cancer, and many others. Conversely, weight loss is associated with a reduction in obesity-associated morbidity.    Endocrine evaluation of obesity includes Diabetes/prediabetes, Cushing's and thyroid dysfunction.  The relevant work up for the diagnosis and management of obesity and various treatment options were discussed. Body Mass Index (BMI) has been a standard means for calculating risk for overweight and obesity. The new American Association of Clinical Endocrinology (AACE) algorithm recommends lifestyle modifications as the initial phase of treatment for all patients with the BMI equal or greater than 25 kg/m2. Lifestyle modifications includes use of medical nutrition therapy, exercise, tobacco cessation, adequate quality and quantity of sleep, limited consumption of alcohol and reduced stress through implementation of a structured, multidisciplinary program.    In patients with complications associated with obesity, graded interventions are recommended including pharmacological therapy such as phentermine, orlistat, lorcaserin and phentermine/topiramate ER, contrave ( buproprion/naltreone) and the use of very low calorie meal replacement programs.    If medical intervention is insufficient, surgical therapy may be considered, especially in patients with BMI greater than or equal to 35 kg/m2 with multiple complications. Surgical treatments include lap-band, gastric sleeve or gastric bypass surgery.    Will obtain the following:    Labs ordered today:   No orders of the defined types were placed  in this encounter.    I informed the pt that:  1.Weight loss medications can be taken to assist with weight reduction when combined with appropriate healthy lifestyle changes.  2.I discussed possible s/e, risks and benefits of weight loss medications.  3.All medications are FDA approved, however, some may be used ''off label'' for their weight loss benefits and some ''short term'' medications can be used for longer periods to achieve desired clinical outcomes.  4.All patients taking weight loss medications must be seen in clinic for refill authorization.    Counseling exercise ( V65.41) - fit bit, tracking activity, not as active lately due to work and upcoming exam - needs to study when not working.  Dietary counseling( V65.3) - weight watchers  Calculated BMI above the upper parameter and f/u plan was documented in the medical record.  Discussed indications, risks and benefits of all medications prescribed, and answered questions to patient's satisfaction.  EKG obtained and read in clinic - wnl.  She has lost 23 lbs since last seen, 207-->184 lbs.  She has lost 70 lbs total since 12/2016, 254-->184 lbs.  Altogether, she has lost 28% of her initial weight.  Feeling hungry in the evening - phentermine not as effective.  Continue Phentermine 37.5 mg qd.  Discontinue Topamax 25 mg bid     2.  Vitamin D deficiency.  Most recent D level 3/2017 improved to 21.  Currently treated with D 50,000 IU daily.  Recent D level 33. Continue current D dose - 50,000 IU weekly.      Radiology/Consults ordered today: None    More than 50% of the time spent with Ms. Coelho on counseling / coordinating her care.  Total face to face time was greater than or equal to 25 minutes.      Follow-up:  3 months    Mikaela España NP  Endocrinology  [facility]  CC: Ivett Sy   ____________________________________________________________

## 2017-10-17 NOTE — NURSING NOTE
"Chief Complaint   Patient presents with     Weight Loss       Initial /84 (BP Location: Right arm, Patient Position: Chair, Cuff Size: Adult Regular)  Pulse 98  Temp 98.4  F (36.9  C) (Oral)  Resp 12  Wt 184 lb 8 oz (83.7 kg)  BMI 34.86 kg/m2 Estimated body mass index is 34.86 kg/(m^2) as calculated from the following:    Height as of 8/25/17: 5' 1\" (1.549 m).    Weight as of this encounter: 184 lb 8 oz (83.7 kg).  Medication Reconciliation: complete    "

## 2017-11-14 DIAGNOSIS — N92.0 EXCESSIVE OR FREQUENT MENSTRUATION: ICD-10-CM

## 2017-11-16 RX ORDER — LEVONORGESTREL AND ETHINYL ESTRADIOL 0.15-0.03
1 KIT ORAL DAILY
Qty: 91 TABLET | Refills: 0 | Status: SHIPPED | OUTPATIENT
Start: 2017-11-16 | End: 2018-02-10

## 2017-11-17 NOTE — TELEPHONE ENCOUNTER
1 month Supply with 2 RF sent 10/17/17.    Fax sent to pharm informing above.  Please disregard request.    Collette Gonsalez, ROMANT  November 17, 2017  2:44 PM

## 2017-11-20 RX ORDER — PHENTERMINE HYDROCHLORIDE 37.5 MG/1
CAPSULE ORAL
Qty: 32 CAPSULE | Refills: 0
Start: 2017-11-20

## 2017-12-27 ENCOUNTER — TELEPHONE (OUTPATIENT)
Dept: PEDIATRICS | Facility: CLINIC | Age: 42
End: 2017-12-27

## 2017-12-27 NOTE — TELEPHONE ENCOUNTER
Panel Management Review          Composite cancer screening  Chart review shows that this patient is due/due soon for the following None  Summary:    Patient is due/failing the following:   PHQ9    Action needed:   Patient needs to do PHQ9.    Type of outreach:    Sent J[employer] message.    Questions for provider review:    None                                                                                                                                    Maryanne Harkins CMA([facility])

## 2018-01-08 DIAGNOSIS — F41.1 GENERALIZED ANXIETY DISORDER: Chronic | ICD-10-CM

## 2018-01-08 DIAGNOSIS — F33.41 MAJOR DEPRESSIVE DISORDER, RECURRENT EPISODE, IN PARTIAL REMISSION (H): Chronic | ICD-10-CM

## 2018-01-08 NOTE — TELEPHONE ENCOUNTER
"Requested Prescriptions   Pending Prescriptions Disp Refills     sertraline (ZOLOFT) 100 MG tablet [Pharmacy Med Name: SERTRALINE 100MG TABLETS]    Last Written Prescription Date:  10/10/2017  Last Fill Quantity: 90,  # refills: 0   Last Office Visit with [facility], [facility] or [facility] prescribing provider:  10/10/2017   Future Office Visit:    Next 5 appointments (look out 90 days)     Jan 18, 2018  7:30 AM CST   [facility] with ISA Leone CNP   [facility]    [address]  [facility] [address] [phone]                  90 tablet 0     Sig: TAKE 1 TABLET(100 MG) BY MOUTH DAILY    SSRIs Protocol Failed    1/8/2018  2:11 PM       Failed - PHQ-9 score less than 5 in past 6 months    The PHQ-9 criteria is meant to fail. It requires a PHQ-9 score review    PHQ-9 SCORE 5/10/2017 8/16/2017 10/10/2017   Total Score - - -   Total Score MyChart 13 (Moderate depression) 19 (Moderately severe depression) -   Total Score - 19 16     EVIE-7 SCORE 10/5/2016 8/16/2017 10/10/2017   Total Score - - -   Total Score - 9 (mild anxiety) -   Total Score 2 9 12                Passed - Patient is age 18 or older       Passed - No active pregnancy on record       Passed - No positive pregnancy test in last 12 months       Passed - Recent (6 mo) or future visit with authorizing provider's specialty    Patient had office visit in the last 6 months or has a visit in the next 30 days with authorizing provider.  See \"Patient Info\" tab in inbasket, or \"Choose Columns\" in Meds & Orders section of the refill encounter.             "

## 2018-01-10 ENCOUNTER — MYC MEDICAL ADVICE (OUTPATIENT)
Dept: PEDIATRICS | Facility: CLINIC | Age: 43
End: 2018-01-10

## 2018-01-10 DIAGNOSIS — D50.9 ANEMIA, IRON DEFICIENCY: ICD-10-CM

## 2018-01-10 RX ORDER — FERROUS GLUCONATE 324(38)MG
324 TABLET ORAL 2 TIMES DAILY
Qty: 180 TABLET | Refills: 3 | Status: SHIPPED | OUTPATIENT
Start: 2018-01-10 | End: 2018-06-01

## 2018-01-10 NOTE — TELEPHONE ENCOUNTER
Requested Prescriptions   Pending Prescriptions Disp Refills     ferrous gluconate (FERGON) 324 (38 FE) MG tablet 180 tablet 3     Sig: Take 1 tablet (324 mg) by mouth 2 times daily If tolerating once a day for 1 week increase to twice daily    There is no refill protocol information for this order      Last office visit 10/10/17  Prescription approved per [facility] Refill Protocol.  Nasreen Rosario RN

## 2018-01-11 RX ORDER — SERTRALINE HYDROCHLORIDE 100 MG/1
TABLET, FILM COATED ORAL
Qty: 90 TABLET | Refills: 0 | Status: SHIPPED | OUTPATIENT
Start: 2018-01-11 | End: 2018-04-04

## 2018-01-11 ASSESSMENT — ANXIETY QUESTIONNAIRES
IF YOU CHECKED OFF ANY PROBLEMS ON THIS QUESTIONNAIRE, HOW DIFFICULT HAVE THESE PROBLEMS MADE IT FOR YOU TO DO YOUR WORK, TAKE CARE OF THINGS AT HOME, OR GET ALONG WITH OTHER PEOPLE: VERY DIFFICULT
2. NOT BEING ABLE TO STOP OR CONTROL WORRYING: NOT AT ALL
1. FEELING NERVOUS, ANXIOUS, OR ON EDGE: SEVERAL DAYS
GAD7 TOTAL SCORE: 5
6. BECOMING EASILY ANNOYED OR IRRITABLE: NEARLY EVERY DAY
7. FEELING AFRAID AS IF SOMETHING AWFUL MIGHT HAPPEN: NOT AT ALL
5. BEING SO RESTLESS THAT IT IS HARD TO SIT STILL: NOT AT ALL
3. WORRYING TOO MUCH ABOUT DIFFERENT THINGS: SEVERAL DAYS

## 2018-01-11 ASSESSMENT — PATIENT HEALTH QUESTIONNAIRE - PHQ9
5. POOR APPETITE OR OVEREATING: NOT AT ALL
SUM OF ALL RESPONSES TO PHQ QUESTIONS 1-9: 7

## 2018-01-11 NOTE — TELEPHONE ENCOUNTER
EVIE-7 SCORE 8/16/2017 10/10/2017 1/11/2018   Total Score - - -   Total Score 9 (mild anxiety) - -   Total Score 9 12 5       PHQ-9 SCORE 8/16/2017 10/10/2017 1/11/2018   Total Score - - -   Total Score MyChart 19 (Moderately severe depression) - -   Total Score 19 16 7       Refilled, ok to follow up in 2 months.

## 2018-01-11 NOTE — TELEPHONE ENCOUNTER
Patient was sent PHQ/EVIE to fill out through, please review scores,  Please refill x 1 if possible, she has some other medical issues going on  Right now, so is not able to come in.    Cari Colón, RN  Triage Nurse

## 2018-01-12 ASSESSMENT — ANXIETY QUESTIONNAIRES: GAD7 TOTAL SCORE: 5

## 2018-01-31 ENCOUNTER — TRANSFERRED RECORDS (OUTPATIENT)
Dept: HEALTH INFORMATION MANAGEMENT | Facility: CLINIC | Age: 43
End: 2018-01-31

## 2018-02-05 ENCOUNTER — TRANSFERRED RECORDS (OUTPATIENT)
Dept: HEALTH INFORMATION MANAGEMENT | Facility: CLINIC | Age: 43
End: 2018-02-05

## 2018-02-07 ENCOUNTER — TRANSFERRED RECORDS (OUTPATIENT)
Dept: HEALTH INFORMATION MANAGEMENT | Facility: CLINIC | Age: 43
End: 2018-02-07

## 2018-02-08 ENCOUNTER — ANESTHESIA - HEALTHEAST (OUTPATIENT)
Dept: SURGERY | Facility: AMBULATORY SURGERY CENTER | Age: 43
End: 2018-02-08

## 2018-02-08 ASSESSMENT — MIFFLIN-ST. JEOR: SCORE: 1348.49

## 2018-02-09 ENCOUNTER — SURGERY - HEALTHEAST (OUTPATIENT)
Dept: SURGERY | Facility: AMBULATORY SURGERY CENTER | Age: 43
End: 2018-02-09

## 2018-02-09 ASSESSMENT — MIFFLIN-ST. JEOR: SCORE: 1339.42

## 2018-02-10 DIAGNOSIS — N92.0 EXCESSIVE OR FREQUENT MENSTRUATION: ICD-10-CM

## 2018-02-12 NOTE — TELEPHONE ENCOUNTER
"Requested Prescriptions   Pending Prescriptions Disp Refills     QUASENSE 0.15-0.03 MG per tablet [Pharmacy Med Name: QUASENSE TABLETS 91S]    Last Written Prescription Date:  11/16/2017  Last Fill Quantity: 91,  # refills: 0   Last office visit: 10/10/2017 with prescribing provider:  Ivett Sy     Future Office Visit:     91 tablet 0     Sig: TAKE 1 TABLET BY MOUTH ONCE DAILY    Contraceptives Protocol Failed    2/10/2018  2:57 PM       Failed - Patient is not a current smoker if age is 35 or older       Passed - Recent or future visit with authorizing provider's specialty    Patient had office visit in the last year or has a visit in the next 30 days with authorizing provider.  See \"Patient Info\" tab in inbasket, or \"Choose Columns\" in Meds & Orders section of the refill encounter.            Passed - No active pregnancy on record       Passed - No positive pregnancy test in past 12 months          "

## 2018-02-13 NOTE — TELEPHONE ENCOUNTER
Routing refill request to provider for review/approval because:  Patient is a current smoker.     Kaitlyn ORTIZ RN, BSN, PHN  [place] [facility] RN

## 2018-03-02 ENCOUNTER — TRANSFERRED RECORDS (OUTPATIENT)
Dept: HEALTH INFORMATION MANAGEMENT | Facility: CLINIC | Age: 43
End: 2018-03-02

## 2018-03-09 ENCOUNTER — OFFICE VISIT (OUTPATIENT)
Dept: ENDOCRINOLOGY | Facility: CLINIC | Age: 43
End: 2018-03-09
Payer: COMMERCIAL

## 2018-03-09 VITALS
WEIGHT: 177.2 LBS | BODY MASS INDEX: 33.46 KG/M2 | TEMPERATURE: 98.3 F | DIASTOLIC BLOOD PRESSURE: 60 MMHG | HEIGHT: 61 IN | HEART RATE: 76 BPM | SYSTOLIC BLOOD PRESSURE: 100 MMHG

## 2018-03-09 DIAGNOSIS — Z98.84 HISTORY OF ROUX-EN-Y GASTRIC BYPASS: ICD-10-CM

## 2018-03-09 DIAGNOSIS — E55.9 VITAMIN D DEFICIENCY: ICD-10-CM

## 2018-03-09 DIAGNOSIS — E66.811 CLASS 1 OBESITY DUE TO EXCESS CALORIES WITH SERIOUS COMORBIDITY AND BODY MASS INDEX (BMI) OF 33.0 TO 33.9 IN ADULT: Primary | ICD-10-CM

## 2018-03-09 DIAGNOSIS — E66.09 CLASS 1 OBESITY DUE TO EXCESS CALORIES WITH SERIOUS COMORBIDITY AND BODY MASS INDEX (BMI) OF 33.0 TO 33.9 IN ADULT: Primary | ICD-10-CM

## 2018-03-09 PROCEDURE — 99214 OFFICE O/P EST MOD 30 MIN: CPT | Performed by: CLINICAL NURSE SPECIALIST

## 2018-03-09 NOTE — MR AVS SNAPSHOT
After Visit Summary   3/9/2018    Janelle Coelho    MRN: [id number]           Patient Information     Date Of Birth          1975        Visit Information        Provider Department      3/9/2018 9:30 AM Mikaela España APRN CNP [facility]        Today's Diagnoses     Class 1 obesity due to excess calories with serious comorbidity and body mass index (BMI) of 33.0 to 33.9 in adult    -  1      Care Instructions        Take contrave with food and titrate as follows:    Week 1: one tablet with food.  Week 2: one tablet twice daily, breakfast and dinner  Week 3: two tabs with breakfast, one tab with dinner  Wee 4 and thereafter (if tolerated): two tabs twice daily.    Nausea is the most common side effect.      We'll plan to check your vitamin D level at your next follow up in 3 months.    Websites:   Meal planning [facility] [employer] program - handouts.    Mikaela España NP  Endocrinology            Follow-ups after your visit        Who to contact     If you have questions or need follow up information about today's clinic visit or your schedule please contact [facility] directly at [phone].  Normal or non-critical lab and imaging results will be communicated to you by [employer], letter or phone within 4 business days after the clinic has received the results. If you do not hear from us within 7 days, please contact the clinic through [employer] or phone. If you have a critical or abnormal lab result, we will notify you by phone as soon as possible.  Submit refill requests through [employer] or call your pharmacy and they will forward the refill request to us. Please allow 3 business days for your refill to be completed.          Additional Information About Your Visit        MyChart Information     [employer] gives you secure access to your electronic health record. If you see a primary care provider, you can also send messages to your care team and make  "appointments. If you have questions, please call your primary care clinic.  If you do not have a primary care provider, please call [phone] and they will assist you.        Care EveryWhere ID     This is your Care EveryWhere ID. This could be used by other organizations to access your [facility] medical records  [id number]        Your Vitals Were     Pulse Temperature Height Last Period Breastfeeding? BMI (Body Mass Index)    76 98.3  F (36.8  C) (Oral) 1.556 m (5' 1.25\") 02/14/2018 (Approximate) No 33.21 kg/m2       Blood Pressure from Last 3 Encounters:   03/09/18 100/60   10/17/17 124/84   10/10/17 103/70    Weight from Last 3 Encounters:   03/09/18 80.4 kg (177 lb 3.2 oz)   10/17/17 83.7 kg (184 lb 8 oz)   10/10/17 84.7 kg (186 lb 12.8 oz)              Today, you had the following     No orders found for display         Today's Medication Changes          These changes are accurate as of 3/9/18 10:01 AM.  If you have any questions, ask your nurse or doctor.               Start taking these medicines.        Dose/Directions    naltrexone-bupropion 8-90 MG per 12 hr tablet   Commonly known as:  CONTRAVE   Used for:  Class 1 obesity due to excess calories with serious comorbidity and body mass index (BMI) of 33.0 to 33.9 in adult   Started by:  Mikaela España APRN CNP        Dose:  2 tablet   Take 2 tablets by mouth 2 times daily   Quantity:  360 tablet   Refills:  3            Where to get your medicines      These medications were sent to [facility] [address] [id number] [facility] [address] [facility] [address]  [address] [facility] [address]     Phone:  [phone]     naltrexone-bupropion 8-90 MG per 12 hr tablet                Primary Care Provider Office Phone # Fax #    ISA Anglin [phone] [address] [place] [address]        Equal Access to Services     JASEN VIERA AH: razia Matt " theo stacy viniciusbeny lay. So [facility] [phone].    ATENCIÓN: Si richard chaudhari, tiene a kline disposición servicios gratuitos de asistencia lingüística. Jeri al [phone].    We comply with applicable federal civil rights laws and [place] laws. We do not discriminate on the basis of race, color, national origin, age, disability, sex, sexual orientation, or gender identity.            Thank you!     Thank you for choosing [facility]  for your care. Our goal is always to provide you with excellent care. Hearing back from our patients is one way we can continue to improve our services. Please take a few minutes to complete the written survey that you may receive in the mail after your visit with us. Thank you!             Your Updated Medication List - Protect others around you: Learn how to safely use, store and throw away your medicines at www.disposemymeds.org.          This list is accurate as of 3/9/18 10:01 AM.  Always use your most recent med list.                   Brand Name Dispense Instructions for use Diagnosis    ferrous gluconate 324 (38 FE) MG tablet    FERGON    180 tablet    Take 1 tablet (324 mg) by mouth 2 times daily If tolerating once a day for 1 week increase to twice daily    Anemia, iron deficiency       naltrexone-bupropion 8-90 MG per 12 hr tablet    CONTRAVE    360 tablet    Take 2 tablets by mouth 2 times daily    Class 1 obesity due to excess calories with serious comorbidity and body mass index (BMI) of 33.0 to 33.9 in adult       QUASENSE 0.15-0.03 MG per tablet   Generic drug:  levonorgestrel-ethinyl estradiol     91 tablet    TAKE 1 TABLET BY MOUTH ONCE DAILY    Excessive or frequent menstruation       REMICADE IV      Working way up to every 8 weeks        sertraline 100 MG tablet    ZOLOFT    90 tablet    TAKE 1 TABLET(100 MG) BY MOUTH DAILY    Major depressive disorder, recurrent episode, in partial remission  (H), Generalized anxiety disorder       traZODone 100 MG tablet    DESYREL    90 tablet    Take 1 tablet (100 mg) by mouth At Bedtime    Insomnia, unspecified type       vitamin D 41342 UNIT capsule    ERGOCALCIFEROL    12 capsule    Take 1 capsule (50,000 Units) by mouth once a week    Vitamin D deficiency       ZYRTEC ALLERGY PO      Take 1 tablet by mouth daily Reported on 3/6/2017

## 2018-03-09 NOTE — PATIENT INSTRUCTIONS
Take contrave with food and titrate as follows:    Week 1: one tablet with food.  Week 2: one tablet twice daily, breakfast and dinner  Week 3: two tabs with breakfast, one tab with dinner  Wee 4 and thereafter (if tolerated): two tabs twice daily.    Nausea is the most common side effect.      We'll plan to check your vitamin D level at your next follow up in 3 months.    Websites:   Meal planning [facility] move program - handouts.    Mikaela España NP  Endocrinology

## 2018-03-09 NOTE — LETTER
3/9/2018         RE: Janelle Coelho  [address]  [place] [address]        Dear Colleague,    Thank you for referring your patient, Janelle Coelho, to the [facility]. Please see a copy of my visit note below.    Name: Janelle Coelho  Seen at the request of Ivett Sy for obesity (Last seen 10/17/2017).  HPI:  Janelle Coelho is a 43 year old female who presents for the follow up of obesity.    She has a history of gastric bypass approximately 1998, highest weight prior to bypass was 275 lbs, she lost about 100-110 lbs post bypass.  She underwent bypass revision in 2004.    She had significant iron deficiency following the first surgery requiring IV iron infusions.    She developed an allergy to the infusions and this required treatment with steroids in order to tolerate the iron replacement.    She subsequently gained weight partly due to the steroids.    She started phentermine 37.5 mg/day 2/2017.    Was diagnosed with Crohn's disease since last seen.  She stopped the phentermine in January 2018 - wasn't effective any longer.  Is interested in trying Contrave.  Was taking OTC vitamin D3.  Recent D level was still below 30.  OTC was discontinued and she was started on D 50,000 IU weekly 2/2017.    + continued fatigue.  Taking iron more regularly.    Menses: on oral contraceptives, has a light period q 3 months  Diarrhea/Constipation:Yes: chronic diarrhea, looses stools 2-3 times per day  Changes in Hair or Skin:No  Diabetes:No  Sleep:  Sleep Apnea/Snores:No  Hypertension or CAD:No  Hyperlipidemia:No  Hirsutism:No  Easy Bruising:No  Use of Steroids:Yes, history of steroid use due to iron allergy and need for iron infusion due anemia, secondary to gastric bypass  Family history of Obesity:   Diet: Started weight watchers last week  Exercise:Recent hernia surgery  PMH/PSH:  Past Medical History:   Diagnosis Date     ASCUS favor benign 2012    hpv neg. needs cotest  in 3 years     Depressive disorder, not elsewhere classified 2/10/2014     LSIL (low grade squamous intraepithelial lesion) on Pap smear 2008    per chart notes colp dysplasia?     Obesity      Past Surgical History:   Procedure Laterality Date     EXCIS BARTHOLIN GLAND/CYST  2016     GASTRIC BYPASS  2000    with cholecystectomy and subsequent revision gastric bypass     GASTRIC BYPASS  2006    revision of gastric bypass     wisdom teeth extraction  1995     Family Hx:  Family History   Problem Relation Age of Onset     Breast Cancer Mother      Breast Cancer Maternal Grandmother      Family History Negative No family hx of      Thyroid disease: No         DM2: Yes: mother, brother         Autoimmune: DM1, SLE, RA, Vitiligo No    Social Hx:  Social History     Social History     Marital status: Single     Spouse name: N/A     Number of children: N/A     Years of education: N/A     Occupational History      [employer]      [email or web address]     Social History Main Topics     Smoking status: Current Some Day Smoker     Packs/day: 0.20     Years: 10.00     Types: Cigarettes     Smokeless tobacco: Never Used      Comment: does not want to quit at this time     Alcohol use 0.0 oz/week     0 Standard drinks or equivalent per week      Comment: once a week or less     Drug use: No     Sexual activity: No     Other Topics Concern     Bike Helmet Yes     Seat Belt Yes     Social History Narrative          MEDICATIONS:  has a current medication list which includes the following prescription(s): infliximab, naltrexone-bupropion, quasense, sertraline, ferrous gluconate, vitamin d, trazodone, and cetirizine hcl.    ROS     GENERAL: + intentional weight loss.  No fevers, chills, or night sweats.  HEENT: no dysphagia, odynophagia, diplopia, neck pain or tenderness  CV: no chest pain, pressure, palpitations, skipped beats, LOC  LUNGS: no SOB, BENAVIDEZ, cough, sputum production, wheezing   ABDOMEN: no diarrhea,  "constipation, abdominal pain  EXTREMITIES: no rashes, ulcers, edema  NEUROLOGY: no changes in vision, tingling or numbness in hands or feet.   MSK: no muscle aches or pains, weakness  SKIN: no rashes or lesions  ENDOCRINE: no heat or cold intolerance    Physical Exam   VS: /60 (BP Location: Left arm, Patient Position: Chair, Cuff Size: Adult Large)  Pulse 76  Temp 98.3  F (36.8  C) (Oral)  Ht 1.556 m (5' 1.25\")  Wt 80.4 kg (177 lb 3.2 oz)  LMP 02/14/2018 (Approximate)  Breastfeeding? No  BMI 33.21 kg/m2  GENERAL: NAD, well dressed, answering questions appropriately, appears stated age.  HEENT: OP clear, no exopthalmous, no proptosis, EOMI, no lig lag, no retraction, no scleral icterus  RESPIRATORY: Normal respiratory effort.  CARDIOVASCULAR: No peripheral edema.  EXTREMITIES: no edema, +pulses, no rashes, no lesions  NEUROLOGY: CN grossly intact, no tremors  MSK: grossly intact, No digital cyanosis. Normal gait and station.  SKIN: no rashes, no lesions, no ulcers  PSYCH: Intact judgment and insight. A&OX3 with a cordial affect.    LABS:  !COMPREHENSIVE Latest Ref Rng & Units 10/13/2015 3/6/2017   SODIUM 133 - 144 mmol/L 138 140   POTASSIUM 3.4 - 5.3 mmol/L 4.2 4.3   CHLORIDE 94 - 109 mmol/L 106 108   BUN 7 - 30 mg/dL 6 11   Creatinine 0.52 - 1.04 mg/dL 0.80 0.88   Glucose 70 - 99 mg/dL 76 76   ANION GAP 3 - 14 mmol/L 9.4 10   CALCIUM 8.5 - 10.1 mg/dL 8.5 8.7     Component    Latest Ref Rng & Units 3/6/2017   Glucose 76   C-Peptide    0.9 - 6.9 ng/mL 3.0   Hemoglobin A1C    4.3 - 6.0 % 5.1   Insulin    3 - 25 mU/L 12.0     !LIPID/HEPATIC Latest Ref Rng & Units 3/6/2017   CHOLESTEROL <200 mg/dL 159   TRIGLYCERIDES <150 mg/dL 108   HDL CHOLESTEROL >49 mg/dL 43 (L)   LDL CHOLESTEROL DIRECT 0.0 - 100.0 mg/dL    LDL CHOLESTEROL, CALCULATED <100 mg/dL 94   VLDL-CHOLESTEROL 0 - 30 mg/dL    NON HDL CHOLESTEROL <130 mg/dL 116   CHOLESTEROL/HDL RATIO 0.0 - 5.0    AST 0 - 45 U/L 11   ALT 0 - 50 U/L 23     Component " "   Latest Ref Rng & Units 10/5/2016 3/6/2017 10/10/2017   Vitamin D Deficiency screening    20 - 75 ug/L <13 (L)  21 33     !THYROID Latest Ref Rng & Units 3/6/2017 8/21/2014 5/21/2014   TSH 0.40 - 4.00 mU/L 2.37 2.52 2.20   T4 FREE 0.76 - 1.46 ng/dL 0.99       Vital Signs 12/6/2016 2/15/2017 2/24/2017   Weight (LB) 254 lb 3.2 oz 251 lb 11.2 oz 243 lb 6.4 oz   Height 5' 1\"  5' 1\"   BMI 48.13  46.09     Vital Signs 4/13/2017 7/6/2017 8/25/2017 10/10/2017   Weight (LB) 224 lb 207 lb 8 oz 193 lb 186 lb 12.8 oz   Height 5' 1\"  5' 1\"    BMI (Calculated) 42.41  36.54      Vital Signs 10/10/2017 10/17/2017 3/9/2018   Weight (LB) 186 lb 12.8 oz 184 lb 8 oz 177 lb 3.2 oz   Height   5' 1.25\"   BMI (Calculated)   33.28     Waist circumference: 38.75\" (today)    All pertinent notes, labs, and images personally reviewed by me.     A/P  Ms.Heather TARAH Coelho is a 43 year old here for the evaluation of obesity:    1. Morbid Obesity.  History of Heraclio-en-Y gastric bypass.  BMI 48.13-->34.86-  Obesity is associated with a significant increase in mortality and risk of many disorders, including diabetes mellitus, hypertension, dyslipidemia, heart disease, stroke, sleep apnea, cancer, and many others. Conversely, weight loss is associated with a reduction in obesity-associated morbidity.    Endocrine evaluation of obesity includes Diabetes/prediabetes, Cushing's and thyroid dysfunction.  The relevant work up for the diagnosis and management of obesity and various treatment options were discussed. Body Mass Index (BMI) has been a standard means for calculating risk for overweight and obesity. The new American Association of Clinical Endocrinology (AACE) algorithm recommends lifestyle modifications as the initial phase of treatment for all patients with the BMI equal or greater than 25 kg/m2. Lifestyle modifications includes use of medical nutrition therapy, exercise, tobacco cessation, adequate quality and quantity of sleep, limited " consumption of alcohol and reduced stress through implementation of a structured, multidisciplinary program.    In patients with complications associated with obesity, graded interventions are recommended including pharmacological therapy such as phentermine, orlistat, lorcaserin and phentermine/topiramate ER, contrave ( buproprion/naltreone) and the use of very low calorie meal replacement programs.    If medical intervention is insufficient, surgical therapy may be considered, especially in patients with BMI greater than or equal to 35 kg/m2 with multiple complications. Surgical treatments include lap-band, gastric sleeve or gastric bypass surgery.      Labs ordered:   Orders Placed This Encounter   Procedures     Comprehensive metabolic panel     Vitamin D Deficiency     **TSH with free T4 reflex FUTURE anytime     I informed the pt that:  1.Weight loss medications can be taken to assist with weight reduction when combined with appropriate healthy lifestyle changes.  2.I discussed possible s/e, risks and benefits of weight loss medications.  3.All medications are FDA approved, however, some may be used ''off label'' for their weight loss benefits and some ''short term'' medications can be used for longer periods to achieve desired clinical outcomes.  4.All patients taking weight loss medications must be seen in clinic for refill authorization.    Counseling exercise ( V65.41) - fit bit, tracking activity, not as active lately due to work and upcoming exam - needs to study when not working.  Dietary counseling( V65.3) - weight watchers  Calculated BMI above the upper parameter and f/u plan was documented in the medical record.  Discussed indications, risks and benefits of all medications prescribed, and answered questions to patient's satisfaction.  EKG obtained and read in clinic - wnl.  She has lost 9 lbs since last seen, 186-->177 lbs.  She has lost 77 lbs total since 12/2016, 254-->177 lbs.  Altogether, she  has lost 30% of her initial weight.  Feeling hungry in the evening - phentermine not as effective.  Discontinue Phentermine 37.5 mg qd.  Start Contrave, titrate as directed one tab qd --> two tabs bid.    2.  Vitamin D deficiency.  Most recent D level 10/2017 improved to 33.  Currently treated with D 50,000 IU daily.  Recent D level 33. Continue current D dose - 50,000 IU weekly.      Radiology/Consults ordered today: None    More than 50% of the time spent with Ms. Coelho on counseling / coordinating her care.  Total face to face time was greater than or equal to 25 minutes.      Follow-up:  3 months    Mikaela España NP  Endocrinology  [facility]  CC: Ivett Sy   ____________________________________________________________      Again, thank you for allowing me to participate in the care of your patient.        Sincerely,        ISA Leone CNP

## 2018-03-09 NOTE — PROGRESS NOTES
Name: Janelle Coelho  Seen at the request of Ivett Sy for obesity (Last seen 10/17/2017).  HPI:  Janelle Coelho is a 43 year old female who presents for the follow up of obesity.    She has a history of gastric bypass approximately 1998, highest weight prior to bypass was 275 lbs, she lost about 100-110 lbs post bypass.  She underwent bypass revision in 2004.    She had significant iron deficiency following the first surgery requiring IV iron infusions.    She developed an allergy to the infusions and this required treatment with steroids in order to tolerate the iron replacement.    She subsequently gained weight partly due to the steroids.    She started phentermine 37.5 mg/day 2/2017.    Was diagnosed with Crohn's disease since last seen.  She stopped the phentermine in January 2018 - wasn't effective any longer.  Is interested in trying Contrave.  Was taking OTC vitamin D3.  Recent D level was still below 30.  OTC was discontinued and she was started on D 50,000 IU weekly 2/2017.    + continued fatigue.  Taking iron more regularly.    Menses: on oral contraceptives, has a light period q 3 months  Diarrhea/Constipation:Yes: chronic diarrhea, looses stools 2-3 times per day  Changes in Hair or Skin:No  Diabetes:No  Sleep:  Sleep Apnea/Snores:No  Hypertension or CAD:No  Hyperlipidemia:No  Hirsutism:No  Easy Bruising:No  Use of Steroids:Yes, history of steroid use due to iron allergy and need for iron infusion due anemia, secondary to gastric bypass  Family history of Obesity:   Diet: Started weight watchers last week  Exercise:Recent hernia surgery  PMH/PSH:  Past Medical History:   Diagnosis Date     ASCUS favor benign 2012    hpv neg. needs cotest in 3 years     Depressive disorder, not elsewhere classified 2/10/2014     LSIL (low grade squamous intraepithelial lesion) on Pap smear 2008    per chart notes colp dysplasia?     Obesity      Past Surgical History:   Procedure Laterality Date     EXCIS  BARTHOLIN GLAND/CYST  2016     GASTRIC BYPASS  2000    with cholecystectomy and subsequent revision gastric bypass     GASTRIC BYPASS  2006    revision of gastric bypass     wisdom teeth extraction  1995     Family Hx:  Family History   Problem Relation Age of Onset     Breast Cancer Mother      Breast Cancer Maternal Grandmother      Family History Negative No family hx of      Thyroid disease: No         DM2: Yes: mother, brother         Autoimmune: DM1, SLE, RA, Vitiligo No    Social Hx:  Social History     Social History     Marital status: Single     Spouse name: N/A     Number of children: N/A     Years of education: N/A     Occupational History      [employer]      counter [employer]     Social History Main Topics     Smoking status: Current Some Day Smoker     Packs/day: 0.20     Years: 10.00     Types: Cigarettes     Smokeless tobacco: Never Used      Comment: does not want to quit at this time     Alcohol use 0.0 oz/week     0 Standard drinks or equivalent per week      Comment: once a week or less     Drug use: No     Sexual activity: No     Other Topics Concern     Bike Helmet Yes     Seat Belt Yes     Social History Narrative          MEDICATIONS:  has a current medication list which includes the following prescription(s): infliximab, naltrexone-bupropion, quasense, sertraline, ferrous gluconate, vitamin d, trazodone, and cetirizine hcl.    ROS     GENERAL: + intentional weight loss.  No fevers, chills, or night sweats.  HEENT: no dysphagia, odynophagia, diplopia, neck pain or tenderness  CV: no chest pain, pressure, palpitations, skipped beats, LOC  LUNGS: no SOB, BENAVIDEZ, cough, sputum production, wheezing   ABDOMEN: no diarrhea, constipation, abdominal pain  EXTREMITIES: no rashes, ulcers, edema  NEUROLOGY: no changes in vision, tingling or numbness in hands or feet.   MSK: no muscle aches or pains, weakness  SKIN: no rashes or lesions  ENDOCRINE: no heat or cold intolerance    Physical Exam  "  VS: /60 (BP Location: Left arm, Patient Position: Chair, Cuff Size: Adult Large)  Pulse 76  Temp 98.3  F (36.8  C) (Oral)  Ht 1.556 m (5' 1.25\")  Wt 80.4 kg (177 lb 3.2 oz)  LMP 02/14/2018 (Approximate)  Breastfeeding? No  BMI 33.21 kg/m2  GENERAL: NAD, well dressed, answering questions appropriately, appears stated age.  HEENT: OP clear, no exopthalmous, no proptosis, EOMI, no lig lag, no retraction, no scleral icterus  RESPIRATORY: Normal respiratory effort.  CARDIOVASCULAR: No peripheral edema.  EXTREMITIES: no edema, +pulses, no rashes, no lesions  NEUROLOGY: CN grossly intact, no tremors  MSK: grossly intact, No digital cyanosis. Normal gait and station.  SKIN: no rashes, no lesions, no ulcers  PSYCH: Intact judgment and insight. A&OX3 with a cordial affect.    LABS:  !COMPREHENSIVE Latest Ref Rng & Units 10/13/2015 3/6/2017   SODIUM 133 - 144 mmol/L 138 140   POTASSIUM 3.4 - 5.3 mmol/L 4.2 4.3   CHLORIDE 94 - 109 mmol/L 106 108   BUN 7 - 30 mg/dL 6 11   Creatinine 0.52 - 1.04 mg/dL 0.80 0.88   Glucose 70 - 99 mg/dL 76 76   ANION GAP 3 - 14 mmol/L 9.4 10   CALCIUM 8.5 - 10.1 mg/dL 8.5 8.7     Component    Latest Ref Rng & Units 3/6/2017   Glucose 76   C-Peptide    0.9 - 6.9 ng/mL 3.0   Hemoglobin A1C    4.3 - 6.0 % 5.1   Insulin    3 - 25 mU/L 12.0     !LIPID/HEPATIC Latest Ref Rng & Units 3/6/2017   CHOLESTEROL <200 mg/dL 159   TRIGLYCERIDES <150 mg/dL 108   HDL CHOLESTEROL >49 mg/dL 43 (L)   LDL CHOLESTEROL DIRECT 0.0 - 100.0 mg/dL    LDL CHOLESTEROL, CALCULATED <100 mg/dL 94   VLDL-CHOLESTEROL 0 - 30 mg/dL    NON HDL CHOLESTEROL <130 mg/dL 116   CHOLESTEROL/HDL RATIO 0.0 - 5.0    AST 0 - 45 U/L 11   ALT 0 - 50 U/L 23     Component    Latest Ref Rng & Units 10/5/2016 3/6/2017 10/10/2017   Vitamin D Deficiency screening    20 - 75 ug/L <13 (L)  21 33     !THYROID Latest Ref Rng & Units 3/6/2017 8/21/2014 5/21/2014   TSH 0.40 - 4.00 mU/L 2.37 2.52 2.20   T4 FREE 0.76 - 1.46 ng/dL 0.99       Vital " "Signs 12/6/2016 2/15/2017 2/24/2017   Weight (LB) 254 lb 3.2 oz 251 lb 11.2 oz 243 lb 6.4 oz   Height 5' 1\"  5' 1\"   BMI 48.13  46.09     Vital Signs 4/13/2017 7/6/2017 8/25/2017 10/10/2017   Weight (LB) 224 lb 207 lb 8 oz 193 lb 186 lb 12.8 oz   Height 5' 1\"  5' 1\"    BMI (Calculated) 42.41  36.54      Vital Signs 10/10/2017 10/17/2017 3/9/2018   Weight (LB) 186 lb 12.8 oz 184 lb 8 oz 177 lb 3.2 oz   Height   5' 1.25\"   BMI (Calculated)   33.28     Waist circumference: 38.75\" (today)    All pertinent notes, labs, and images personally reviewed by me.     A/P  Ms.Heather TARAH Coelho is a 43 year old here for the evaluation of obesity:    1. Morbid Obesity.  History of Heraclio-en-Y gastric bypass.  BMI 48.13-->34.86-  Obesity is associated with a significant increase in mortality and risk of many disorders, including diabetes mellitus, hypertension, dyslipidemia, heart disease, stroke, sleep apnea, cancer, and many others. Conversely, weight loss is associated with a reduction in obesity-associated morbidity.    Endocrine evaluation of obesity includes Diabetes/prediabetes, Cushing's and thyroid dysfunction.  The relevant work up for the diagnosis and management of obesity and various treatment options were discussed. Body Mass Index (BMI) has been a standard means for calculating risk for overweight and obesity. The new American Association of Clinical Endocrinology (AACE) algorithm recommends lifestyle modifications as the initial phase of treatment for all patients with the BMI equal or greater than 25 kg/m2. Lifestyle modifications includes use of medical nutrition therapy, exercise, tobacco cessation, adequate quality and quantity of sleep, limited consumption of alcohol and reduced stress through implementation of a structured, multidisciplinary program.    In patients with complications associated with obesity, graded interventions are recommended including pharmacological therapy such as phentermine, orlistat, " lorcaserin and phentermine/topiramate ER, contrave ( buproprion/naltreone) and the use of very low calorie meal replacement programs.    If medical intervention is insufficient, surgical therapy may be considered, especially in patients with BMI greater than or equal to 35 kg/m2 with multiple complications. Surgical treatments include lap-band, gastric sleeve or gastric bypass surgery.      Labs ordered:   Orders Placed This Encounter   Procedures     Comprehensive metabolic panel     Vitamin D Deficiency     **TSH with free T4 reflex FUTURE anytime     I informed the pt that:  1.Weight loss medications can be taken to assist with weight reduction when combined with appropriate healthy lifestyle changes.  2.I discussed possible s/e, risks and benefits of weight loss medications.  3.All medications are FDA approved, however, some may be used ''off label'' for their weight loss benefits and some ''short term'' medications can be used for longer periods to achieve desired clinical outcomes.  4.All patients taking weight loss medications must be seen in clinic for refill authorization.    Counseling exercise ( V65.41) - fit bit, tracking activity, not as active lately due to work and upcoming exam - needs to study when not working.  Dietary counseling( V65.3) - weight watchers  Calculated BMI above the upper parameter and f/u plan was documented in the medical record.  Discussed indications, risks and benefits of all medications prescribed, and answered questions to patient's satisfaction.  EKG obtained and read in clinic - wnl.  She has lost 9 lbs since last seen, 186-->177 lbs.  She has lost 77 lbs total since 12/2016, 254-->177 lbs.  Altogether, she has lost 30% of her initial weight.  Feeling hungry in the evening - phentermine not as effective.  Discontinue Phentermine 37.5 mg qd.  Start Contrave, titrate as directed one tab qd --> two tabs bid.    2.  Vitamin D deficiency.  Most recent D level 10/2017 improved to  33.  Currently treated with D 50,000 IU daily.  Recent D level 33. Continue current D dose - 50,000 IU weekly.      Radiology/Consults ordered today: None    More than 50% of the time spent with Ms. Coelho on counseling / coordinating her care.  Total face to face time was greater than or equal to 25 minutes.      Follow-up:  3 months    Mikaela España NP  Endocrinology  [facility]  CC: Ivett Sy   ____________________________________________________________

## 2018-04-04 ENCOUNTER — TRANSFERRED RECORDS (OUTPATIENT)
Dept: HEALTH INFORMATION MANAGEMENT | Facility: CLINIC | Age: 43
End: 2018-04-04

## 2018-04-09 ENCOUNTER — TRANSFERRED RECORDS (OUTPATIENT)
Dept: HEALTH INFORMATION MANAGEMENT | Facility: CLINIC | Age: 43
End: 2018-04-09

## 2018-04-12 ENCOUNTER — MYC MEDICAL ADVICE (OUTPATIENT)
Dept: PEDIATRICS | Facility: CLINIC | Age: 43
End: 2018-04-12

## 2018-04-12 NOTE — LETTER
[address] [address]  [phone]      May 22, 2018    Janelle Coelho                                                                                                                                                       [address] [place] [address]              Dear Janelle,    We have been unable to reach you by phone. Please make an appointment with Ivett Sy to follow up on your depression/anxiety.                      Sincerely,  Ivett Sy, BRANDON/keeley

## 2018-05-04 ENCOUNTER — TRANSFERRED RECORDS (OUTPATIENT)
Dept: HEALTH INFORMATION MANAGEMENT | Facility: CLINIC | Age: 43
End: 2018-05-04

## 2018-05-10 ASSESSMENT — ANXIETY QUESTIONNAIRES
2. NOT BEING ABLE TO STOP OR CONTROL WORRYING: MORE THAN HALF THE DAYS
GAD7 TOTAL SCORE: 10
7. FEELING AFRAID AS IF SOMETHING AWFUL MIGHT HAPPEN: NOT AT ALL
3. WORRYING TOO MUCH ABOUT DIFFERENT THINGS: MORE THAN HALF THE DAYS
1. FEELING NERVOUS, ANXIOUS, OR ON EDGE: MORE THAN HALF THE DAYS
4. TROUBLE RELAXING: SEVERAL DAYS
7. FEELING AFRAID AS IF SOMETHING AWFUL MIGHT HAPPEN: NOT AT ALL
5. BEING SO RESTLESS THAT IT IS HARD TO SIT STILL: NOT AT ALL
GAD7 TOTAL SCORE: 10
GAD7 TOTAL SCORE: 10
6. BECOMING EASILY ANNOYED OR IRRITABLE: NEARLY EVERY DAY

## 2018-05-10 ASSESSMENT — PATIENT HEALTH QUESTIONNAIRE - PHQ9
10. IF YOU CHECKED OFF ANY PROBLEMS, HOW DIFFICULT HAVE THESE PROBLEMS MADE IT FOR YOU TO DO YOUR WORK, TAKE CARE OF THINGS AT HOME, OR GET ALONG WITH OTHER PEOPLE: VERY DIFFICULT
SUM OF ALL RESPONSES TO PHQ QUESTIONS 1-9: 13
SUM OF ALL RESPONSES TO PHQ QUESTIONS 1-9: 13

## 2018-05-10 NOTE — TELEPHONE ENCOUNTER
AMS-PHQ9 score today is 13, previously 7 and GAD7 score today is 10, previously 5, advise on how to proceed.

## 2018-05-11 ASSESSMENT — PATIENT HEALTH QUESTIONNAIRE - PHQ9: SUM OF ALL RESPONSES TO PHQ QUESTIONS 1-9: 13

## 2018-05-11 ASSESSMENT — ANXIETY QUESTIONNAIRES: GAD7 TOTAL SCORE: 10

## 2018-05-31 ENCOUNTER — TRANSFERRED RECORDS (OUTPATIENT)
Dept: HEALTH INFORMATION MANAGEMENT | Facility: CLINIC | Age: 43
End: 2018-05-31

## 2018-05-31 ENCOUNTER — MEDICAL CORRESPONDENCE (OUTPATIENT)
Dept: HEALTH INFORMATION MANAGEMENT | Facility: CLINIC | Age: 43
End: 2018-05-31

## 2018-06-01 ENCOUNTER — VIRTUAL VISIT (OUTPATIENT)
Dept: PEDIATRICS | Facility: CLINIC | Age: 43
End: 2018-06-01
Payer: COMMERCIAL

## 2018-06-01 DIAGNOSIS — K50.119 CROHN'S DISEASE OF LARGE INTESTINE WITH COMPLICATION (H): ICD-10-CM

## 2018-06-01 DIAGNOSIS — F41.1 GENERALIZED ANXIETY DISORDER: Chronic | ICD-10-CM

## 2018-06-01 DIAGNOSIS — F33.41 MAJOR DEPRESSIVE DISORDER, RECURRENT EPISODE, IN PARTIAL REMISSION (H): Primary | Chronic | ICD-10-CM

## 2018-06-01 PROCEDURE — 98967 PH1 ASSMT&MGMT NQHP 11-20: CPT | Performed by: NURSE PRACTITIONER

## 2018-06-01 RX ORDER — SERTRALINE HYDROCHLORIDE 100 MG/1
150 TABLET, FILM COATED ORAL DAILY
Qty: 90 TABLET | Refills: 0 | Status: SHIPPED | OUTPATIENT
Start: 2018-06-01 | End: 2018-08-17

## 2018-06-01 ASSESSMENT — ANXIETY QUESTIONNAIRES
2. NOT BEING ABLE TO STOP OR CONTROL WORRYING: NOT AT ALL
7. FEELING AFRAID AS IF SOMETHING AWFUL MIGHT HAPPEN: NOT AT ALL
1. FEELING NERVOUS, ANXIOUS, OR ON EDGE: SEVERAL DAYS
IF YOU CHECKED OFF ANY PROBLEMS ON THIS QUESTIONNAIRE, HOW DIFFICULT HAVE THESE PROBLEMS MADE IT FOR YOU TO DO YOUR WORK, TAKE CARE OF THINGS AT HOME, OR GET ALONG WITH OTHER PEOPLE: VERY DIFFICULT
6. BECOMING EASILY ANNOYED OR IRRITABLE: NEARLY EVERY DAY
GAD7 TOTAL SCORE: 4
3. WORRYING TOO MUCH ABOUT DIFFERENT THINGS: NOT AT ALL
5. BEING SO RESTLESS THAT IT IS HARD TO SIT STILL: NOT AT ALL

## 2018-06-01 ASSESSMENT — PATIENT HEALTH QUESTIONNAIRE - PHQ9: 5. POOR APPETITE OR OVEREATING: NOT AT ALL

## 2018-06-01 NOTE — MR AVS SNAPSHOT
After Visit Summary   6/1/2018    Janelle Coelho    MRN: [id number]           Patient Information     Date Of Birth          1975        Visit Information        Provider Department      6/1/2018 2:45 PM Ivett Sy APRN CNP [facility]        Today's Diagnoses     Major depressive disorder, recurrent episode, in partial remission (H)    -  1    Generalized anxiety disorder        Crohn's disease of large intestine with complication (H)           Follow-ups after your visit        Additional Services     MENTAL HEALTH REFERRAL  - Adult; Outpatient Treatment; Individual/Couples/Family/Group Therapy/Health Psychology; FMG: [facility] [phone]; We will contact you to schedule the appointment or please call with any questions       All scheduling is subject to the client's specific insurance plan & benefits, provider/location availability, and provider clinical specialities.  Please arrive 15 minutes early for your first appointment and bring your completed paperwork.    Please be aware that coverage of these services is subject to the terms and limitations of your health insurance plan.  Call member services at your health plan with any benefit or coverage questions.                            Follow-up notes from your care team     Return in about 4 weeks (around 6/29/2018) for Depression and/or anxiety.      Your next 10 appointments already scheduled     Tao 15, 2018  7:00 AM CDT   Return Visit with ISA Leone CNP   [facility] ([facility]    [address]  [facility] [address] [phone]              Who to contact     If you have questions or need follow up information about today's clinic visit or your schedule please contact [facility] directly at [phone].  Normal or non-critical lab and imaging results will be communicated to you by MyChart, letter or phone within 4  business days after the clinic has received the results. If you do not hear from us within 7 days, please contact the clinic through [employer] or phone. If you have a critical or abnormal lab result, we will notify you by phone as soon as possible.  Submit refill requests through [employer] or call your pharmacy and they will forward the refill request to us. Please allow 3 business days for your refill to be completed.          Additional Information About Your Visit        [employer] Information     [employer] gives you secure access to your electronic health record. If you see a primary care provider, you can also send messages to your care team and make appointments. If you have questions, please call your primary care clinic.  If you do not have a primary care provider, please call [phone] and they will assist you.        Care EveryWhere ID     This is your Care EveryWhere ID. This could be used by other organizations to access your [place] medical records  [id number]         Blood Pressure from Last 3 Encounters:   03/09/18 100/60   10/17/17 124/84   10/10/17 103/70    Weight from Last 3 Encounters:   03/09/18 177 lb 3.2 oz (80.4 kg)   10/17/17 184 lb 8 oz (83.7 kg)   10/10/17 186 lb 12.8 oz (84.7 kg)              We Performed the Following     MENTAL HEALTH REFERRAL  - Adult; Outpatient Treatment; Individual/Couples/Family/Group Therapy/Health Psychology; G: [facility] [phone]; We will contact you to schedule the appointment or please call with any questions          Today's Medication Changes          These changes are accurate as of 6/1/18  3:14 PM.  If you have any questions, ask your nurse or doctor.               These medicines have changed or have updated prescriptions.        Dose/Directions    * sertraline 100 MG tablet   Commonly known as:  ZOLOFT   This may have changed:  Another medication with the same name was added. Make sure you understand how and when to take each.    Used for:  Major depressive disorder, recurrent episode, in partial remission (H), Generalized anxiety disorder   Changed by:  Ivett Sy APRN CNP        TAKE 1 TABLET(100 MG) BY MOUTH DAILY   Quantity:  90 tablet   Refills:  0       * sertraline 100 MG tablet   Commonly known as:  ZOLOFT   This may have changed:  You were already taking a medication with the same name, and this prescription was added. Make sure you understand how and when to take each.   Used for:  Major depressive disorder, recurrent episode, in partial remission (H), Generalized anxiety disorder   Changed by:  Ivett Sy APRN CNP        Dose:  150 mg   Take 1.5 tablets (150 mg) by mouth daily   Quantity:  90 tablet   Refills:  0       * Notice:  This list has 2 medication(s) that are the same as other medications prescribed for you. Read the directions carefully, and ask your doctor or other care provider to review them with you.         Where to get your medicines      These medications were sent to [facility] [address] [place] [address] [place] [id number] [place], [facility] [address]     Phone:  [phone]     sertraline 100 MG tablet                Primary Care Provider Office Phone # Fax #    ISA Anglin [phone]       [address] [place] [address]        Equal Access to Services     [facility]: Hadii twin holly hadasho Sochristelali, waaxda luqadaha, qaybta kaalmada mariya, beny christianson. So [facility] [phone].    ATENCIÓN: Si habla español, tiene a kline disposición servicios gratuitos de asistencia lingüística. Jeri al [phone].    We comply with applicable federal civil rights laws and [place] laws. We do not discriminate on the basis of race, color, national origin, age, disability, sex, sexual orientation, or gender identity.            Thank you!     Thank you  for choosing [facility]  for your care. Our goal is always to provide you with excellent care. Hearing back from our patients is one way we can continue to improve our services. Please take a few minutes to complete the written survey that you may receive in the mail after your visit with us. Thank you!             Your Updated Medication List - Protect others around you: Learn how to safely use, store and throw away your medicines at www.disposemymeds.org.          This list is accurate as of 6/1/18  3:14 PM.  Always use your most recent med list.                   Brand Name Dispense Instructions for use Diagnosis    QUASENSE 0.15-0.03 MG per tablet   Generic drug:  levonorgestrel-ethinyl estradiol     91 tablet    TAKE 1 TABLET BY MOUTH ONCE DAILY    Excessive or frequent menstruation       REMICADE IV      Working way up to every 8 weeks        * sertraline 100 MG tablet    ZOLOFT    90 tablet    TAKE 1 TABLET(100 MG) BY MOUTH DAILY    Major depressive disorder, recurrent episode, in partial remission (H), Generalized anxiety disorder       * sertraline 100 MG tablet    ZOLOFT    90 tablet    Take 1.5 tablets (150 mg) by mouth daily    Major depressive disorder, recurrent episode, in partial remission (H), Generalized anxiety disorder       traZODone 100 MG tablet    DESYREL    90 tablet    Take 1 tablet (100 mg) by mouth At Bedtime    Insomnia, unspecified type       vitamin D 17382 UNIT capsule    ERGOCALCIFEROL    12 capsule    Take 1 capsule (50,000 Units) by mouth once a week    Vitamin D deficiency       ZYRTEC ALLERGY PO      Take 1 tablet by mouth daily Reported on 3/6/2017        * Notice:  This list has 2 medication(s) that are the same as other medications prescribed for you. Read the directions carefully, and ask your doctor or other care provider to review them with you.

## 2018-06-01 NOTE — PROGRESS NOTES
Subjective:   Janelle Coelho is a 43 year old female who scheduled a phone visit to discuss mood.    CC:   Depression and Anxiety Follow-Up    Status since last visit: Worsened-she is isolating herself, irritable, not negative thoughts/no self harm, may be due to dealing with Chrons dx.    Other associated symptoms:None    Complicating factors:     Significant life event: Yes-dx with Chrons disease     Current substance abuse: None    PHQ-9 SCORE 1/11/2018 5/10/2018 6/1/2018   Total Score - - -   Total Score MyChart - 13 (Moderate depression) -   Total Score 7 13 11     EVIE-7 SCORE 1/11/2018 5/10/2018 6/1/2018   Total Score - - -   Total Score - 10 (moderate anxiety) -   Total Score 5 10 4     She has had crohns and struggling with maintaining a good diet. She has lost a lot, but regained about 20 pounds.     At last OV 8 mo ago, we increased zoloft dose. Since then, she doesn't remember a marked improvement of her mood. She has been working veyr closely with GI and feels the cause of her low mood has to do with figuring out  Symptom management through GI. Denies thoughts of self harm or harming others and denies suicidal ideation.    Medical, surgical and social histories reviewed.  Current Outpatient Prescriptions   Medication Sig Dispense Refill     Cetirizine HCl (ZYRTEC ALLERGY PO) Take 1 tablet by mouth daily Reported on 3/6/2017       InFLIXimab (REMICADE IV) Working way up to every 8 weeks       QUASENSE 0.15-0.03 MG per tablet TAKE 1 TABLET BY MOUTH ONCE DAILY 91 tablet 1     sertraline (ZOLOFT) 100 MG tablet TAKE 1 TABLET(100 MG) BY MOUTH DAILY 90 tablet 0     traZODone (DESYREL) 100 MG tablet Take 1 tablet (100 mg) by mouth At Bedtime 90 tablet 3     vitamin D (ERGOCALCIFEROL) 32201 UNIT capsule Take 1 capsule (50,000 Units) by mouth once a week 12 capsule 1       Objective:   Phone visit, speech, thought content within normal limits. Affective component of speech within normal limits. Rate and tone  within normal limits.    Assessment:  1. Major depressive disorder, recurrent episode, in partial remission (H)    2. Generalized anxiety disorder    3. Crohn's disease of large intestine with complication (H)      PLAN:  She is willing to establish with a therapist and I did offer to fill out FMLA papers so she could have the work time to go to appts for therapist. She will continue to work on diet and exercise and will increase dose of zoloft from 100 mg to 150 mg.     A total of 12 minutes was spent on the phone with the patient.

## 2018-06-02 ASSESSMENT — PATIENT HEALTH QUESTIONNAIRE - PHQ9: SUM OF ALL RESPONSES TO PHQ QUESTIONS 1-9: 11

## 2018-06-02 ASSESSMENT — ASTHMA QUESTIONNAIRES: ACT_TOTALSCORE: 25

## 2018-06-02 ASSESSMENT — ANXIETY QUESTIONNAIRES: GAD7 TOTAL SCORE: 4

## 2018-06-04 ENCOUNTER — MYC MEDICAL ADVICE (OUTPATIENT)
Dept: PEDIATRICS | Facility: CLINIC | Age: 43
End: 2018-06-04

## 2018-06-04 DIAGNOSIS — E55.9 VITAMIN D DEFICIENCY: ICD-10-CM

## 2018-06-04 NOTE — TELEPHONE ENCOUNTER
Per Ivett-Virtual visit dated 6/1/18     PLAN:  She is willing to establish with a therapist and I did offer to fill out FMLA papers so she could have the work time to go to [facility] for therapist. She will continue to work on diet and exercise and will increase dose of zoloft from 100 mg to 150 mg.     Called pharmacy and clarified prescription for Zoloft.  Pharmacy states that rx did go through insurance now.    Will inform pt of the above through [employer].    Annette Harmon RN

## 2018-06-06 RX ORDER — ERGOCALCIFEROL 1.25 MG/1
CAPSULE, LIQUID FILLED ORAL
Qty: 12 CAPSULE | Refills: 0 | Status: SHIPPED | OUTPATIENT
Start: 2018-06-06 | End: 2018-06-15

## 2018-06-06 NOTE — TELEPHONE ENCOUNTER
Please contact Janelle and ask her to schedule a lab appointment. I have previously placed lab orders.  Thanks  Mikaela España NP  Endocrinology

## 2018-06-06 NOTE — TELEPHONE ENCOUNTER
Appt 6/15/18 for weight management.  Not PSO dose.  Sent to provider.  Jazmin Khanna RN    3/9/18  A/P  Ms.Heather TARAH Coelho is a 43 year old here for the evaluation of obesity:     1. Morbid Obesity.  History of Heraclio-en-Y gastric bypass.  BMI 48.13-->34.86-  Obesity is associated with a significant increase in mortality and risk of many disorders, including diabetes mellitus, hypertension, dyslipidemia, heart disease, stroke, sleep apnea, cancer, and many others. Conversely, weight loss is associated with a reduction in obesity-associated morbidity.    Endocrine evaluation of obesity includes Diabetes/prediabetes, Cushing's and thyroid dysfunction.  The relevant work up for the diagnosis and management of obesity and various treatment options were discussed. Body Mass Index (BMI) has been a standard means for calculating risk for overweight and obesity. The new American Association of Clinical Endocrinology (AACE) algorithm recommends lifestyle modifications as the initial phase of treatment for all patients with the BMI equal or greater than 25 kg/m2. Lifestyle modifications includes use of medical nutrition therapy, exercise, tobacco cessation, adequate quality and quantity of sleep, limited consumption of alcohol and reduced stress through implementation of a structured, multidisciplinary program.     In patients with complications associated with obesity, graded interventions are recommended including pharmacological therapy such as phentermine, orlistat, lorcaserin and phentermine/topiramate ER, contrave ( buproprion/naltreone) and the use of very low calorie meal replacement programs.     If medical intervention is insufficient, surgical therapy may be considered, especially in patients with BMI greater than or equal to 35 kg/m2 with multiple complications. Surgical treatments include lap-band, gastric sleeve or gastric bypass surgery.        Labs ordered:       Orders Placed This Encounter   Procedures      Comprehensive metabolic panel     Vitamin D Deficiency     **TSH with free T4 reflex FUTURE anytime      I informed the pt that:  1.Weight loss medications can be taken to assist with weight reduction when combined with appropriate healthy lifestyle changes.  2.I discussed possible s/e, risks and benefits of weight loss medications.  3.All medications are FDA approved, however, some may be used ''off label'' for their weight loss benefits and some ''short term'' medications can be used for longer periods to achieve desired clinical outcomes.  4.All patients taking weight loss medications must be seen in clinic for refill authorization.     Counseling exercise ( V65.41) - fit bit, tracking activity, not as active lately due to work and upcoming exam - needs to study when not working.  Dietary counseling( V65.3) - weight watchers  Calculated BMI above the upper parameter and f/u plan was documented in the medical record.  Discussed indications, risks and benefits of all medications prescribed, and answered questions to patient's satisfaction.  EKG obtained and read in clinic - wnl.  She has lost 9 lbs since last seen, 186-->177 lbs.  She has lost 77 lbs total since 12/2016, 254-->177 lbs.  Altogether, she has lost 30% of her initial weight.  Feeling hungry in the evening - phentermine not as effective.  Discontinue Phentermine 37.5 mg qd.  Start Contrave, titrate as directed one tab qd --> two tabs bid.     2.  Vitamin D deficiency.  Most recent D level 10/2017 improved to 33.  Currently treated with D 50,000 IU daily.  Recent D level 33. Continue current D dose - 50,000 IU weekly.       Radiology/Consults ordered today: None     More than 50% of the time spent with Ms. Coelho on counseling / coordinating her care.  Total face to face time was greater than or equal to 25 minutes.        Follow-up:  3 months     Mikaela España NP  Endocrinology  [facility]  CC: Ivett Sy    ____________________________________________________________      Instructions        Return in about 3 months (around 6/9/2018).

## 2018-06-06 NOTE — TELEPHONE ENCOUNTER
Requested Prescriptions   Pending Prescriptions Disp Refills     vitamin D (ERGOCALCIFEROL) 69210 UNIT capsule [Pharmacy Med Name: VITAMIN D2 50,000IU (ERGO) CAP RX] 12 capsule 0     Sig: TAKE 1 CAPSULE BY MOUTH 1 TIME A WEEK    There is no refill protocol information for this order        Last Written Prescription Date:  10/17/17  Last Fill Quantity: 12,  # refills: 1   Last Office Visit: 3/9/2018   Future Office Visit:    Next 5 appointments (look out 90 days)     Tao 15, 2018  7:00 AM CDT   Return Visit with ISA Leone [facility] ([facility])    [address]  [facility] [address] [phone]

## 2018-06-15 ENCOUNTER — OFFICE VISIT (OUTPATIENT)
Dept: ENDOCRINOLOGY | Facility: CLINIC | Age: 43
End: 2018-06-15
Payer: COMMERCIAL

## 2018-06-15 VITALS
TEMPERATURE: 98.4 F | BODY MASS INDEX: 36.51 KG/M2 | HEART RATE: 74 BPM | SYSTOLIC BLOOD PRESSURE: 103 MMHG | WEIGHT: 194.8 LBS | DIASTOLIC BLOOD PRESSURE: 70 MMHG

## 2018-06-15 DIAGNOSIS — E55.9 VITAMIN D DEFICIENCY: ICD-10-CM

## 2018-06-15 DIAGNOSIS — Z98.84 HISTORY OF ROUX-EN-Y GASTRIC BYPASS: ICD-10-CM

## 2018-06-15 DIAGNOSIS — E66.09 CLASS 1 OBESITY DUE TO EXCESS CALORIES WITH SERIOUS COMORBIDITY AND BODY MASS INDEX (BMI) OF 33.0 TO 33.9 IN ADULT: ICD-10-CM

## 2018-06-15 DIAGNOSIS — E66.811 CLASS 1 OBESITY DUE TO EXCESS CALORIES WITH SERIOUS COMORBIDITY AND BODY MASS INDEX (BMI) OF 33.0 TO 33.9 IN ADULT: ICD-10-CM

## 2018-06-15 LAB — DEPRECATED CALCIDIOL+CALCIFEROL SERPL-MC: 16 UG/L (ref 20–75)

## 2018-06-15 PROCEDURE — 82306 VITAMIN D 25 HYDROXY: CPT | Performed by: CLINICAL NURSE SPECIALIST

## 2018-06-15 PROCEDURE — 36415 COLL VENOUS BLD VENIPUNCTURE: CPT | Performed by: CLINICAL NURSE SPECIALIST

## 2018-06-15 PROCEDURE — 99214 OFFICE O/P EST MOD 30 MIN: CPT | Performed by: CLINICAL NURSE SPECIALIST

## 2018-06-15 RX ORDER — ERGOCALCIFEROL 1.25 MG/1
CAPSULE, LIQUID FILLED ORAL
Qty: 12 CAPSULE | Refills: 3 | Status: SHIPPED | OUTPATIENT
Start: 2018-06-15 | End: 2018-06-18

## 2018-06-15 NOTE — PROGRESS NOTES
Name: Janelle Coelho  Seen at the request of Ivett Sy for obesity (Last seen 3/9/2018).  HPI:  Janelle Coelho is a 43 year old female who presents for the follow up of obesity.    She has a history of gastric bypass approximately 1998, highest weight prior to bypass was 275 lbs, she lost about 100-110 lbs post bypass.  She underwent bypass revision in 2004.    She had significant iron deficiency following the first surgery requiring IV iron infusions.    She developed an allergy to the infusions and this required treatment with steroids in order to tolerate the iron replacement.    She subsequently gained weight partly due to the steroids.    She started phentermine 37.5 mg/day 2/2017.  Phentermine discontinued and started Contrave 3 months ago.  Has gained 17 lbs since starting Contrave.    Was diagnosed with Crohn's disease.  She stopped the phentermine in January 2018 - wasn't effective any longer.  Interested in trying Contrave.  Was taking OTC vitamin D3.  Recent D level was still below 30.  OTC was discontinued and she was started on D 50,000 IU weekly 2/2017.    + continued fatigue.  Taking iron more regularly.    Menses: on oral contraceptives, has a light period q 3 months  Diarrhea/Constipation:Yes: chronic diarrhea, looses stools 2-3 times per day  Changes in Hair or Skin:No  Diabetes:No  Sleep:  Sleep Apnea/Snores:No  Hypertension or CAD:No  Hyperlipidemia:No  Hirsutism:No  Easy Bruising:No  Use of Steroids:Yes, history of steroid use due to iron allergy and need for iron infusion due anemia, secondary to gastric bypass  Family history of Obesity:   Diet: Started weight watchers last week  Exercise:Recent hernia surgery  PMH/PSH:  Past Medical History:   Diagnosis Date     ASCUS favor benign 2012    hpv neg. needs cotest in 3 years     Depressive disorder, not elsewhere classified 2/10/2014     LSIL (low grade squamous intraepithelial lesion) on Pap smear 2008    per chart notes colp  dysplasia?     Obesity      Past Surgical History:   Procedure Laterality Date     EXCIS BARTHOLIN GLAND/CYST  2016     GASTRIC BYPASS  2000    with cholecystectomy and subsequent revision gastric bypass     GASTRIC BYPASS  2006    revision of gastric bypass     wisdom teeth extraction  1995     Family Hx:  Family History   Problem Relation Age of Onset     Breast Cancer Mother      Breast Cancer Maternal Grandmother      Family History Negative No family hx of      Thyroid disease: No         DM2: Yes: mother, brother         Autoimmune: DM1, SLE, RA, Vitiligo No    Social Hx:  Social History     Social History     Marital status: Single     Spouse name: N/A     Number of children: N/A     Years of education: N/A     Occupational History      [employer]      counter tops     Social History Main Topics     Smoking status: Current Some Day Smoker     Packs/day: 0.20     Years: 10.00     Types: Cigarettes     Smokeless tobacco: Never Used      Comment: does not want to quit at this time     Alcohol use 0.0 oz/week     0 Standard drinks or equivalent per week      Comment: once a week or less     Drug use: No     Sexual activity: No     Other Topics Concern     Bike Helmet Yes     Seat Belt Yes     Social History Narrative          MEDICATIONS:  has a current medication list which includes the following prescription(s): cetirizine hcl, infliximab, quasense, sertraline, trazodone, and vitamin d.    ROS     GENERAL: + intentional weight loss.  No fevers, chills, or night sweats.  HEENT: no dysphagia, odynophagia, diplopia, neck pain or tenderness  CV: no chest pain, pressure, palpitations, skipped beats, LOC  LUNGS: no SOB, BENAVIDEZ, cough, sputum production, wheezing   ABDOMEN: no diarrhea, constipation, abdominal pain  EXTREMITIES: no rashes, ulcers, edema  NEUROLOGY: no changes in vision, tingling or numbness in hands or feet.   MSK: no muscle aches or pains, weakness  SKIN: no rashes or  lesions  ENDOCRINE: no heat or cold intolerance    Physical Exam   VS: /70 (BP Location: Left arm, Patient Position: Chair, Cuff Size: Adult Large)  Pulse 74  Temp 98.4  F (36.9  C) (Oral)  Wt 88.4 kg (194 lb 12.8 oz)  LMP 05/15/2018 (Approximate)  Breastfeeding? No  BMI 36.51 kg/m2  GENERAL: NAD, well dressed, answering questions appropriately, appears stated age.  HEENT:no exopthalmous, no proptosisI, no lig lag, no retraction, no scleral icterus  RESPIRATORY: Clear.  Normal respiratory effort.  CARDIOVASCULAR: RRR.  No peripheral edema.  EXTREMITIES: no edema, +pulses, no rashes, no lesions  NEUROLOGY: CN grossly intact, no tremors  MSK: grossly intact, No digital cyanosis. Normal gait and station.  SKIN: no rashes, no lesions, no ulcers  PSYCH: Intact judgment and insight. A&OX3 with a cordial affect.    LABS:  !COMPREHENSIVE Latest Ref Rng & Units 10/13/2015 3/6/2017   SODIUM 133 - 144 mmol/L 138 140   POTASSIUM 3.4 - 5.3 mmol/L 4.2 4.3   CHLORIDE 94 - 109 mmol/L 106 108   BUN 7 - 30 mg/dL 6 11   Creatinine 0.52 - 1.04 mg/dL 0.80 0.88   Glucose 70 - 99 mg/dL 76 76   ANION GAP 3 - 14 mmol/L 9.4 10   CALCIUM 8.5 - 10.1 mg/dL 8.5 8.7     Component    Latest Ref Rng & Units 3/6/2017   Glucose 76   C-Peptide    0.9 - 6.9 ng/mL 3.0   Hemoglobin A1C    4.3 - 6.0 % 5.1   Insulin    3 - 25 mU/L 12.0     !LIPID/HEPATIC Latest Ref Rng & Units 3/6/2017   CHOLESTEROL <200 mg/dL 159   TRIGLYCERIDES <150 mg/dL 108   HDL CHOLESTEROL >49 mg/dL 43 (L)   LDL CHOLESTEROL DIRECT 0.0 - 100.0 mg/dL    LDL CHOLESTEROL, CALCULATED <100 mg/dL 94   VLDL-CHOLESTEROL 0 - 30 mg/dL    NON HDL CHOLESTEROL <130 mg/dL 116   CHOLESTEROL/HDL RATIO 0.0 - 5.0    AST 0 - 45 U/L 11   ALT 0 - 50 U/L 23     Component    Latest Ref Rng & Units 10/5/2016 3/6/2017 10/10/2017   Vitamin D Deficiency screening    20 - 75 ug/L <13 (L)  21 33     !THYROID Latest Ref Rng & Units 3/6/2017 8/21/2014 5/21/2014   TSH 0.40 - 4.00 mU/L 2.37 2.52 2.20   T4  "FREE 0.76 - 1.46 ng/dL 0.99       Vital Signs 12/6/2016 2/15/2017 2/24/2017   Weight (LB) 254 lb 3.2 oz 251 lb 11.2 oz 243 lb 6.4 oz   Height 5' 1\"  5' 1\"   BMI 48.13  46.09     Vital Signs 4/13/2017 7/6/2017 8/25/2017 10/10/2017   Weight (LB) 224 lb 207 lb 8 oz 193 lb 186 lb 12.8 oz   Height 5' 1\"  5' 1\"    BMI (Calculated) 42.41  36.54      Vital Signs 10/17/2017 3/9/2018 6/15/2018   Weight (LB) 184 lb 8 oz 177 lb 3.2 oz 194 lb 12.8 oz   Height  5' 1.25\"    BMI (Calculated)  33.28 36.51       Waist circumference: 38.75\" (3/9/2018).  40.25\" (today).    All pertinent notes, labs, and images personally reviewed by me.     A/P  Ms.Heather TARAH Coelho is a 43 year old here for the evaluation of obesity:    1. Morbid Obesity.  History of Heraclio-en-Y gastric bypass.  BMI 48.13-->34.86, now increased 36.51.  Obesity is associated with a significant increase in mortality and risk of many disorders, including diabetes mellitus, hypertension, dyslipidemia, heart disease, stroke, sleep apnea, cancer, and many others. Conversely, weight loss is associated with a reduction in obesity-associated morbidity.    Endocrine evaluation of obesity includes Diabetes/prediabetes, Cushing's and thyroid dysfunction.  The relevant work up for the diagnosis and management of obesity and various treatment options were discussed. Body Mass Index (BMI) has been a standard means for calculating risk for overweight and obesity. The new American Association of Clinical Endocrinology (AACE) algorithm recommends lifestyle modifications as the initial phase of treatment for all patients with the BMI equal or greater than 25 kg/m2. Lifestyle modifications includes use of medical nutrition therapy, exercise, tobacco cessation, adequate quality and quantity of sleep, limited consumption of alcohol and reduced stress through implementation of a structured, multidisciplinary program.    In patients with complications associated with obesity, graded " interventions are recommended including pharmacological therapy such as phentermine, orlistat, lorcaserin and phentermine/topiramate ER, contrave ( buproprion/naltreone) and the use of very low calorie meal replacement programs.    If medical intervention is insufficient, surgical therapy may be considered, especially in patients with BMI greater than or equal to 35 kg/m2 with multiple complications. Surgical treatments include lap-band, gastric sleeve or gastric bypass surgery.      Labs ordered:   No orders of the defined types were placed in this encounter.    I informed the pt that:  1.Weight loss medications can be taken to assist with weight reduction when combined with appropriate healthy lifestyle changes.  2.I discussed possible s/e, risks and benefits of weight loss medications.  3.All medications are FDA approved, however, some may be used ''off label'' for their weight loss benefits and some ''short term'' medications can be used for longer periods to achieve desired clinical outcomes.  4.All patients taking weight loss medications must be seen in clinic for refill authorization.    Counseling exercise ( V65.41) - fit bit, tracking activity, not as active lately due to work and upcoming exam - needs to study when not working.  Dietary counseling( V65.3) - weight watchers  Calculated BMI above the upper parameter and f/u plan was documented in the medical record.  Discussed indications, risks and benefits of all medications prescribed, and answered questions to patient's satisfaction.  EKG obtained and read in clinic - wnl.  She had lost 77 lbs total since 12/2016, 254-->177 lbs.  Altogether, she had lost 30% of her initial weight.  Phentermine became ineffective.  Discontinued Phentermine 37.5 mg qd. 3/2018.  Started Contrave 3/2018.  Has gained 17 lbs since starting contrave.  Discontinue Contrave.  Start Saxenda qd, titrate dose gradually as tolerated 0.6 mg qd --> 3.0 mg qd.    2.  Vitamin D  deficiency.  Most recent D level 10/2017 improved to 33.  Currently treated with D 50,000 IU daily.  Recent D level 33. Continue current D dose - 50,000 IU weekly.  Will obtain D level today and adjust D dose if indicted.    Radiology/Consults ordered today: None    More than 50% of the time spent with Ms. Coelho on counseling / coordinating her care.  Total face to face time was greater than or equal to 25 minutes.      Follow-up:  3 months    Mikaela España NP  Endocrinology  [facility]  CC: Ivett Sy   ____________________________________________________________

## 2018-06-15 NOTE — LETTER
6/15/2018         RE: Janelle Coelho  [address]  [place] [address]        Dear Colleague,    Thank you for referring your patient, Janelle Coelho, to the [facility]. Please see a copy of my visit note below.    Name: Janelle Coelho  Seen at the request of Ivett Sy for obesity (Last seen 3/9/2018).  HPI:  Janelle Coelho is a 43 year old female who presents for the follow up of obesity.    She has a history of gastric bypass approximately 1998, highest weight prior to bypass was 275 lbs, she lost about 100-110 lbs post bypass.  She underwent bypass revision in 2004.    She had significant iron deficiency following the first surgery requiring IV iron infusions.    She developed an allergy to the infusions and this required treatment with steroids in order to tolerate the iron replacement.    She subsequently gained weight partly due to the steroids.    She started phentermine 37.5 mg/day 2/2017.  Phentermine discontinued and started Contrave 3 months ago.  Has gained 17 lbs since starting Contrave.    Was diagnosed with Crohn's disease.  She stopped the phentermine in January 2018 - wasn't effective any longer.  Interested in trying Contrave.  Was taking OTC vitamin D3.  Recent D level was still below 30.  OTC was discontinued and she was started on D 50,000 IU weekly 2/2017.    + continued fatigue.  Taking iron more regularly.    Menses: on oral contraceptives, has a light period q 3 months  Diarrhea/Constipation:Yes: chronic diarrhea, looses stools 2-3 times per day  Changes in Hair or Skin:No  Diabetes:No  Sleep:  Sleep Apnea/Snores:No  Hypertension or CAD:No  Hyperlipidemia:No  Hirsutism:No  Easy Bruising:No  Use of Steroids:Yes, history of steroid use due to iron allergy and need for iron infusion due anemia, secondary to gastric bypass  Family history of Obesity:   Diet: Started weight watchers last week  Exercise:Recent hernia surgery  PMH/PSH:  Past  Medical History:   Diagnosis Date     ASCUS favor benign 2012    hpv neg. needs cotest in 3 years     Depressive disorder, not elsewhere classified 2/10/2014     LSIL (low grade squamous intraepithelial lesion) on Pap smear 2008    per chart notes colp dysplasia?     Obesity      Past Surgical History:   Procedure Laterality Date     EXCIS BARTHOLIN GLAND/CYST  2016     GASTRIC BYPASS  2000    with cholecystectomy and subsequent revision gastric bypass     GASTRIC BYPASS  2006    revision of gastric bypass     wisdom teeth extraction  1995     Family Hx:  Family History   Problem Relation Age of Onset     Breast Cancer Mother      Breast Cancer Maternal Grandmother      Family History Negative No family hx of      Thyroid disease: No         DM2: Yes: mother, brother         Autoimmune: DM1, SLE, RA, Vitiligo No    Social Hx:  Social History     Social History     Marital status: Single     Spouse name: N/A     Number of children: N/A     Years of education: N/A     Occupational History      [employer]      counter tops     Social History Main Topics     Smoking status: Current Some Day Smoker     Packs/day: 0.20     Years: 10.00     Types: Cigarettes     Smokeless tobacco: Never Used      Comment: does not want to quit at this time     Alcohol use 0.0 oz/week     0 Standard drinks or equivalent per week      Comment: once a week or less     Drug use: No     Sexual activity: No     Other Topics Concern     Bike Helmet Yes     Seat Belt Yes     Social History Narrative          MEDICATIONS:  has a current medication list which includes the following prescription(s): cetirizine hcl, infliximab, quasense, sertraline, trazodone, and vitamin d.    ROS     GENERAL: + intentional weight loss.  No fevers, chills, or night sweats.  HEENT: no dysphagia, odynophagia, diplopia, neck pain or tenderness  CV: no chest pain, pressure, palpitations, skipped beats, LOC  LUNGS: no SOB, BENAVIDEZ, cough, sputum production,  wheezing   ABDOMEN: no diarrhea, constipation, abdominal pain  EXTREMITIES: no rashes, ulcers, edema  NEUROLOGY: no changes in vision, tingling or numbness in hands or feet.   MSK: no muscle aches or pains, weakness  SKIN: no rashes or lesions  ENDOCRINE: no heat or cold intolerance    Physical Exam   VS: /70 (BP Location: Left arm, Patient Position: Chair, Cuff Size: Adult Large)  Pulse 74  Temp 98.4  F (36.9  C) (Oral)  Wt 88.4 kg (194 lb 12.8 oz)  LMP 05/15/2018 (Approximate)  Breastfeeding? No  BMI 36.51 kg/m2  GENERAL: NAD, well dressed, answering questions appropriately, appears stated age.  HEENT:no exopthalmous, no proptosisI, no lig lag, no retraction, no scleral icterus  RESPIRATORY: Clear.  Normal respiratory effort.  CARDIOVASCULAR: RRR.  No peripheral edema.  EXTREMITIES: no edema, +pulses, no rashes, no lesions  NEUROLOGY: CN grossly intact, no tremors  MSK: grossly intact, No digital cyanosis. Normal gait and station.  SKIN: no rashes, no lesions, no ulcers  PSYCH: Intact judgment and insight. A&OX3 with a cordial affect.    LABS:  !COMPREHENSIVE Latest Ref Rng & Units 10/13/2015 3/6/2017   SODIUM 133 - 144 mmol/L 138 140   POTASSIUM 3.4 - 5.3 mmol/L 4.2 4.3   CHLORIDE 94 - 109 mmol/L 106 108   BUN 7 - 30 mg/dL 6 11   Creatinine 0.52 - 1.04 mg/dL 0.80 0.88   Glucose 70 - 99 mg/dL 76 76   ANION GAP 3 - 14 mmol/L 9.4 10   CALCIUM 8.5 - 10.1 mg/dL 8.5 8.7     Component    Latest Ref Rng & Units 3/6/2017   Glucose 76   C-Peptide    0.9 - 6.9 ng/mL 3.0   Hemoglobin A1C    4.3 - 6.0 % 5.1   Insulin    3 - 25 mU/L 12.0     !LIPID/HEPATIC Latest Ref Rng & Units 3/6/2017   CHOLESTEROL <200 mg/dL 159   TRIGLYCERIDES <150 mg/dL 108   HDL CHOLESTEROL >49 mg/dL 43 (L)   LDL CHOLESTEROL DIRECT 0.0 - 100.0 mg/dL    LDL CHOLESTEROL, CALCULATED <100 mg/dL 94   VLDL-CHOLESTEROL 0 - 30 mg/dL    NON HDL CHOLESTEROL <130 mg/dL 116   CHOLESTEROL/HDL RATIO 0.0 - 5.0    AST 0 - 45 U/L 11   ALT 0 - 50 U/L 23  "    Component    Latest Ref Rng & Units 10/5/2016 3/6/2017 10/10/2017   Vitamin D Deficiency screening    20 - 75 ug/L <13 (L)  21 33     !THYROID Latest Ref Rng & Units 3/6/2017 8/21/2014 5/21/2014   TSH 0.40 - 4.00 mU/L 2.37 2.52 2.20   T4 FREE 0.76 - 1.46 ng/dL 0.99       Vital Signs 12/6/2016 2/15/2017 2/24/2017   Weight (LB) 254 lb 3.2 oz 251 lb 11.2 oz 243 lb 6.4 oz   Height 5' 1\"  5' 1\"   BMI 48.13  46.09     Vital Signs 4/13/2017 7/6/2017 8/25/2017 10/10/2017   Weight (LB) 224 lb 207 lb 8 oz 193 lb 186 lb 12.8 oz   Height 5' 1\"  5' 1\"    BMI (Calculated) 42.41  36.54      Vital Signs 10/17/2017 3/9/2018 6/15/2018   Weight (LB) 184 lb 8 oz 177 lb 3.2 oz 194 lb 12.8 oz   Height  5' 1.25\"    BMI (Calculated)  33.28 36.51       Waist circumference: 38.75\" (3/9/2018).  40.25\" (today).    All pertinent notes, labs, and images personally reviewed by me.     A/P  Ms.Heather TARAH Coelho is a 43 year old here for the evaluation of obesity:    1. Morbid Obesity.  History of Heraclio-en-Y gastric bypass.  BMI 48.13-->34.86, now increased 36.51.  Obesity is associated with a significant increase in mortality and risk of many disorders, including diabetes mellitus, hypertension, dyslipidemia, heart disease, stroke, sleep apnea, cancer, and many others. Conversely, weight loss is associated with a reduction in obesity-associated morbidity.    Endocrine evaluation of obesity includes Diabetes/prediabetes, Cushing's and thyroid dysfunction.  The relevant work up for the diagnosis and management of obesity and various treatment options were discussed. Body Mass Index (BMI) has been a standard means for calculating risk for overweight and obesity. The new American Association of Clinical Endocrinology (AACE) algorithm recommends lifestyle modifications as the initial phase of treatment for all patients with the BMI equal or greater than 25 kg/m2. Lifestyle modifications includes use of medical nutrition therapy, exercise, tobacco " cessation, adequate quality and quantity of sleep, limited consumption of alcohol and reduced stress through implementation of a structured, multidisciplinary program.    In patients with complications associated with obesity, graded interventions are recommended including pharmacological therapy such as phentermine, orlistat, lorcaserin and phentermine/topiramate ER, contrave ( buproprion/naltreone) and the use of very low calorie meal replacement programs.    If medical intervention is insufficient, surgical therapy may be considered, especially in patients with BMI greater than or equal to 35 kg/m2 with multiple complications. Surgical treatments include lap-band, gastric sleeve or gastric bypass surgery.      Labs ordered:   No orders of the defined types were placed in this encounter.    I informed the pt that:  1.Weight loss medications can be taken to assist with weight reduction when combined with appropriate healthy lifestyle changes.  2.I discussed possible s/e, risks and benefits of weight loss medications.  3.All medications are FDA approved, however, some may be used ''off label'' for their weight loss benefits and some ''short term'' medications can be used for longer periods to achieve desired clinical outcomes.  4.All patients taking weight loss medications must be seen in clinic for refill authorization.    Counseling exercise ( V65.41) - fit bit, tracking activity, not as active lately due to work and upcoming exam - needs to study when not working.  Dietary counseling( V65.3) - weight watchers  Calculated BMI above the upper parameter and f/u plan was documented in the medical record.  Discussed indications, risks and benefits of all medications prescribed, and answered questions to patient's satisfaction.  EKG obtained and read in clinic - wnl.  She had lost 77 lbs total since 12/2016, 254-->177 lbs.  Altogether, she had lost 30% of her initial weight.  Phentermine became  ineffective.  Discontinued Phentermine 37.5 mg qd. 3/2018.  Started Contrave 3/2018.  Has gained 17 lbs since starting contrave.  Discontinue Contrave.  Start Saxenda qd, titrate dose gradually as tolerated 0.6 mg qd --> 3.0 mg qd.    2.  Vitamin D deficiency.  Most recent D level 10/2017 improved to 33.  Currently treated with D 50,000 IU daily.  Recent D level 33. Continue current D dose - 50,000 IU weekly.  Will obtain D level today and adjust D dose if indicted.    Radiology/Consults ordered today: None    More than 50% of the time spent with Ms. Coelho on counseling / coordinating her care.  Total face to face time was greater than or equal to 25 minutes.      Follow-up:  3 months    Mikaela España NP  Endocrinology  [facility]  CC: Ivett Sy   ____________________________________________________________      Again, thank you for allowing me to participate in the care of your patient.        Sincerely,        IAS Leone CNP

## 2018-06-15 NOTE — MR AVS SNAPSHOT
After Visit Summary   6/15/2018    Janelle Coelho    MRN: [id number]           Patient Information     Date Of Birth          1975        Visit Information        Provider Department      6/15/2018 7:00 AM Mikaela España APRN CNP [facility]        Today's Diagnoses     BMI 36.0-36.9,adult    -  1    morbid obesity        Vitamin D deficiency        History of Heraclio-en-Y gastric bypass           Follow-ups after your visit        Follow-up notes from your care team     Return in about 3 months (around 9/15/2018).      Your next 10 appointments already scheduled     Sep 14, 2018  7:00 AM CDT   Return Visit with ISA Leone CNP   [facility] ([facility]    [address]  [facility] [address] [phone]              Who to contact     If you have questions or need follow up information about today's clinic visit or your schedule please contact [facility] directly at [phone].  Normal or non-critical lab and imaging results will be communicated to you by Kabanchikhart, letter or phone within 4 business days after the clinic has received the results. If you do not hear from us within 7 days, please contact the clinic through [employer] or phone. If you have a critical or abnormal lab result, we will notify you by phone as soon as possible.  Submit refill requests through [facility] or call your pharmacy and they will forward the refill request to us. Please allow 3 business days for your refill to be completed.          Additional Information About Your Visit        Kabanchikhart Information     [facility] gives you secure access to your electronic health record. If you see a primary care provider, you can also send messages to your care team and make appointments. If you have questions, please call your primary care clinic.  If you do not have a primary care provider, please call [phone] and they will assist  you.        Care EveryWhere ID     This is your Care EveryWhere ID. This could be used by other organizations to access your [facility] medical records  [id number]        Your Vitals Were     Pulse Temperature Last Period Breastfeeding? BMI (Body Mass Index)       74 98.4  F (36.9  C) (Oral) 05/15/2018 (Approximate) No 36.51 kg/m2        Blood Pressure from Last 3 Encounters:   06/15/18 103/70   03/09/18 100/60   10/17/17 124/84    Weight from Last 3 Encounters:   06/15/18 88.4 kg (194 lb 12.8 oz)   03/09/18 80.4 kg (177 lb 3.2 oz)   10/17/17 83.7 kg (184 lb 8 oz)              We Performed the Following     Vitamin D Deficiency          Today's Medication Changes          These changes are accurate as of 6/15/18  7:41 AM.  If you have any questions, ask your nurse or doctor.               Start taking these medicines.        Dose/Directions    insulin pen needle 31G X 5 MM   Commonly known as:  B-D U/F   Used for:  Class 1 obesity due to excess calories with serious comorbidity and body mass index (BMI) of 33.0 to 33.9 in adult, Vitamin D deficiency, History of Heraclio-en-Y gastric bypass, BMI 36.0-36.9,adult   Started by:  Mikaela España APRN CNP        Use 1 daily or as directed.   Quantity:  100 each   Refills:  prn       Liraglutide -Weight Management 18 MG/3ML pen   Commonly known as:  SAXENDA   Used for:  Class 1 obesity due to excess calories with serious comorbidity and body mass index (BMI) of 33.0 to 33.9 in adult, Vitamin D deficiency, History of Heraclio-en-Y gastric bypass, BMI 36.0-36.9,adult   Started by:  Mikaela España APRN CNP        Titrate as follows: Week 1: 0.6 mg sq daily Week 2: 1.2 mg sq daily Week 3: 1.8 mg sq daily. Week 4: 2.4 mg sq daily Week 5 and thereafter: 3.0 mg sq daily   Quantity:  15 mL   Refills:  3            Where to get your medicines      These medications were sent to [facility] [address] [place], [place] [address] [facility] [address]  [facility] [place] [address]     Phone:  [phone]     insulin pen needle 31G X 5 MM    vitamin D 04495 UNIT capsule         Some of these will need a paper prescription and others can be bought over the counter.  Ask your nurse if you have questions.     Bring a paper prescription for each of these medications     Liraglutide -Weight Management 18 MG/3ML pen                Primary Care Provider Office Phone # Fax #    Ivett DANGELO ISA Sy [phone] [address] [place] [address]        Equal Access to Services     [facility]: Hadii aad ku hadasho Soomaali, waaxda luqadaha, qaybta kaalmada adeegyajose, beny stephens . So [facility] [phone].    ATENCIÓN: Si habla español, tiene a kline disposición servicios gratuitos de asistencia lingüística. Llame al [phone].    We comply with applicable federal civil rights laws and [place] laws. We do not discriminate on the basis of race, color, national origin, age, disability, sex, sexual orientation, or gender identity.            Thank you!     Thank you for choosing [facility]  for your care. Our goal is always to provide you with excellent care. Hearing back from our patients is one way we can continue to improve our services. Please take a few minutes to complete the written survey that you may receive in the mail after your visit with us. Thank you!             Your Updated Medication List - Protect others around you: Learn how to safely use, store and throw away your medicines at www.disposemymeds.org.          This list is accurate as of 6/15/18  7:41 AM.  Always use your most recent med list.                   Brand Name Dispense Instructions for use Diagnosis    insulin pen needle 31G X 5 MM    B-D U/F    100 each    Use 1 daily or as directed.    Class 1 obesity due to excess calories with serious comorbidity and body mass index (BMI) of 33.0 to 33.9 in  adult, Vitamin D deficiency, History of Heraclio-en-Y gastric bypass, BMI 36.0-36.9,adult       Liraglutide -Weight Management 18 MG/3ML pen    SAXENDA    15 mL    Titrate as follows: Week 1: 0.6 mg sq daily Week 2: 1.2 mg sq daily Week 3: 1.8 mg sq daily. Week 4: 2.4 mg sq daily Week 5 and thereafter: 3.0 mg sq daily    Class 1 obesity due to excess calories with serious comorbidity and body mass index (BMI) of 33.0 to 33.9 in adult, Vitamin D deficiency, History of Heraclio-en-Y gastric bypass, BMI 36.0-36.9,adult       QUASENSE 0.15-0.03 MG per tablet   Generic drug:  levonorgestrel-ethinyl estradiol     91 tablet    TAKE 1 TABLET BY MOUTH ONCE DAILY    Excessive or frequent menstruation       REMICADE IV      Working way up to every 8 weeks        sertraline 100 MG tablet    ZOLOFT    90 tablet    Take 1.5 tablets (150 mg) by mouth daily    Major depressive disorder, recurrent episode, in partial remission (H), Generalized anxiety disorder       traZODone 100 MG tablet    DESYREL    90 tablet    Take 1 tablet (100 mg) by mouth At Bedtime    Insomnia, unspecified type       vitamin D 90988 UNIT capsule    ERGOCALCIFEROL    12 capsule    TAKE 1 CAPSULE BY MOUTH 1 TIME A WEEK    Vitamin D deficiency       ZYRTEC ALLERGY PO      Take 1 tablet by mouth daily Reported on 3/6/2017

## 2018-06-18 ENCOUNTER — TELEPHONE (OUTPATIENT)
Dept: ENDOCRINOLOGY | Facility: CLINIC | Age: 43
End: 2018-06-18

## 2018-06-18 RX ORDER — ERGOCALCIFEROL 1.25 MG/1
CAPSULE, LIQUID FILLED ORAL
Qty: 12 CAPSULE | Refills: 3 | Status: SHIPPED | OUTPATIENT
Start: 2018-06-18 | End: 2018-11-01

## 2018-06-18 NOTE — TELEPHONE ENCOUNTER
Fax from [employer] that Saxenda needs PA.  [facility]ly [facility] key: [id number].  Please try PA due to below.  Jazmin Khanna, RN    6/15/18  Janelle Coelho is a 43 year old female who presents for the follow up of obesity.    She has a history of gastric bypass approximately 1998, highest weight prior to bypass was 275 lbs, she lost about 100-110 lbs post bypass.  She underwent bypass revision in 2004.    She had significant iron deficiency following the first surgery requiring IV iron infusions.    She developed an allergy to the infusions and this required treatment with steroids in order to tolerate the iron replacement.    She subsequently gained weight partly due to the steroids.    She started phentermine 37.5 mg/day 2/2017.  Phentermine discontinued and started Contrave 3 months ago.  Has gained 17 lbs since starting Contrave.    Counseling exercise ( V65.41) - fit bit, tracking activity, not as active lately due to work and upcoming exam - needs to study when not working.  Dietary counseling( V65.3) - weight watchers  Calculated BMI above the upper parameter and f/u plan was documented in the medical record.  Discussed indications, risks and benefits of all medications prescribed, and answered questions to patient's satisfaction.  EKG obtained and read in clinic - wnl.  She had lost 77 lbs total since 12/2016, 254-->177 lbs.  Altogether, she had lost 30% of her initial weight.  Phentermine became ineffective.  Discontinued Phentermine 37.5 mg qd. 3/2018.  Started Contrave 3/2018.  Has gained 17 lbs since starting contrave.  Discontinue Contrave.  Start Saxenda qd, titrate dose gradually as tolerated 0.6 mg qd --> 3.0 mg qd.

## 2018-06-18 NOTE — TELEPHONE ENCOUNTER
PA Initiation    Medication: SAXENDA 18MG/3ML  Insurance Company: [employer] - Phone [phone] Fax [phone]  Pharmacy Filling the Rx: [facility] [address] [place] [address] [place] [address] [address]  [address]  Filling Pharmacy Phone: [phone]  Filling Pharmacy Fax:    Start Date: 6/18/18

## 2018-06-18 NOTE — PROGRESS NOTES
Janelle,  Your D is still low.  I would recommend continuing the prescription D longer, at least until your D level is normal.  I'll send a new prescription to your pharmacy.  We can recheck your D level again in 3-6 months.  Mikaela España NP  Endocrinology

## 2018-06-18 NOTE — TELEPHONE ENCOUNTER
Prior Authorization Approval    Authorization Effective Date: 5/18/2018  Authorization Expiration Date: 9/19/2018  Medication: SAXENDA 18MG/3ML - APPROVED  Approved Dose/Quantity: DAILY  Reference #: [id number]   Insurance Company: [employer] - Phone [phone] Fax [phone]  Expected CoPay: NA     CoPay Card Available:      Foundation Assistance Needed:    Which Pharmacy is filling the prescription (Not needed for infusion/clinic administered): [facility] [address] [place] [address] AT [facility] [address]  Pharmacy Notified: Yes  Patient Notified: No

## 2018-06-21 ENCOUNTER — TRANSFERRED RECORDS (OUTPATIENT)
Dept: HEALTH INFORMATION MANAGEMENT | Facility: CLINIC | Age: 43
End: 2018-06-21

## 2018-07-26 ENCOUNTER — MEDICAL CORRESPONDENCE (OUTPATIENT)
Dept: HEALTH INFORMATION MANAGEMENT | Facility: CLINIC | Age: 43
End: 2018-07-26

## 2018-07-31 ENCOUNTER — TRANSFERRED RECORDS (OUTPATIENT)
Dept: HEALTH INFORMATION MANAGEMENT | Facility: CLINIC | Age: 43
End: 2018-07-31

## 2018-08-06 ENCOUNTER — TRANSFERRED RECORDS (OUTPATIENT)
Dept: HEALTH INFORMATION MANAGEMENT | Facility: CLINIC | Age: 43
End: 2018-08-06

## 2018-08-07 ENCOUNTER — TRANSFERRED RECORDS (OUTPATIENT)
Dept: HEALTH INFORMATION MANAGEMENT | Facility: CLINIC | Age: 43
End: 2018-08-07

## 2018-08-17 DIAGNOSIS — F33.41 MAJOR DEPRESSIVE DISORDER, RECURRENT EPISODE, IN PARTIAL REMISSION (H): Chronic | ICD-10-CM

## 2018-08-17 DIAGNOSIS — F41.1 GENERALIZED ANXIETY DISORDER: Chronic | ICD-10-CM

## 2018-08-17 NOTE — TELEPHONE ENCOUNTER
"Requested Prescriptions   Pending Prescriptions Disp Refills     sertraline (ZOLOFT) 100 MG tablet [Pharmacy Med Name: SERTRALINE 100MG TABLETS]    Last Written Prescription Date:  6/1/2018  Last Fill Quantity: 90,  # refills: 0   Last office visit: 6/1/2018 with prescribing provider:  Ivett Sy     Future Office Visit:   Next 5 appointments (look out 90 days)     Sep 14, 2018  7:00 AM CDT   Return Visit with ISA Leone CNP   [facility] ([facility]    [address]  [facility] [address]   [phone]                  135 tablet 0     Sig: TAKE 1 AND 1/2 TABLETS(150 MG) BY MOUTH DAILY    SSRIs Protocol Failed    8/17/2018  9:26 AM       Failed - PHQ-9 score less than 5 in past 6 months    Please review last PHQ-9 score.          Passed - Patient is age 18 or older       Passed - No active pregnancy on record       Passed - No positive pregnancy test in last 12 months       Passed - Recent (6 mo) or future (30 days) visit within the authorizing provider's specialty    Patient had office visit in the last 6 months or has a visit in the next 30 days with authorizing provider or within the authorizing provider's specialty.  See \"Patient Info\" tab in inbasket, or \"Choose Columns\" in Meds & Orders section of the refill encounter.                "

## 2018-08-21 ENCOUNTER — TRANSFERRED RECORDS (OUTPATIENT)
Dept: HEALTH INFORMATION MANAGEMENT | Facility: CLINIC | Age: 43
End: 2018-08-21

## 2018-08-21 RX ORDER — SERTRALINE HYDROCHLORIDE 100 MG/1
TABLET, FILM COATED ORAL
Qty: 135 TABLET | Refills: 0 | Status: SHIPPED | OUTPATIENT
Start: 2018-08-21 | End: 2018-11-01 | Stop reason: DRUGHIGH

## 2018-08-21 NOTE — TELEPHONE ENCOUNTER
Prescription refilled x 1 per [facility] Refill Protocol.     Due for PHQ9 & office visit for follow up on depression.     Sent [employer] message.

## 2018-09-04 ENCOUNTER — TRANSFERRED RECORDS (OUTPATIENT)
Dept: HEALTH INFORMATION MANAGEMENT | Facility: CLINIC | Age: 43
End: 2018-09-04

## 2018-09-17 ENCOUNTER — MYC MEDICAL ADVICE (OUTPATIENT)
Dept: PEDIATRICS | Facility: CLINIC | Age: 43
End: 2018-09-17

## 2018-09-21 ENCOUNTER — MEDICAL CORRESPONDENCE (OUTPATIENT)
Dept: HEALTH INFORMATION MANAGEMENT | Facility: CLINIC | Age: 43
End: 2018-09-21

## 2018-09-24 ENCOUNTER — TRANSFERRED RECORDS (OUTPATIENT)
Dept: HEALTH INFORMATION MANAGEMENT | Facility: CLINIC | Age: 43
End: 2018-09-24

## 2018-10-22 ENCOUNTER — OFFICE VISIT (OUTPATIENT)
Dept: PEDIATRICS | Facility: CLINIC | Age: 43
End: 2018-10-22
Payer: COMMERCIAL

## 2018-10-22 VITALS
SYSTOLIC BLOOD PRESSURE: 112 MMHG | HEART RATE: 76 BPM | WEIGHT: 210.3 LBS | TEMPERATURE: 98.1 F | DIASTOLIC BLOOD PRESSURE: 78 MMHG | BODY MASS INDEX: 39.41 KG/M2

## 2018-10-22 DIAGNOSIS — F41.1 GENERALIZED ANXIETY DISORDER: Chronic | ICD-10-CM

## 2018-10-22 DIAGNOSIS — F33.41 MAJOR DEPRESSIVE DISORDER, RECURRENT EPISODE, IN PARTIAL REMISSION (H): Chronic | ICD-10-CM

## 2018-10-22 DIAGNOSIS — Z00.00 ROUTINE GENERAL MEDICAL EXAMINATION AT A HEALTH CARE FACILITY: Primary | ICD-10-CM

## 2018-10-22 DIAGNOSIS — K50.119 CROHN'S DISEASE OF LARGE INTESTINE WITH COMPLICATION (H): ICD-10-CM

## 2018-10-22 DIAGNOSIS — R53.83 FATIGUE, UNSPECIFIED TYPE: ICD-10-CM

## 2018-10-22 LAB
ANION GAP SERPL CALCULATED.3IONS-SCNC: 7 MMOL/L (ref 3–14)
BUN SERPL-MCNC: 9 MG/DL (ref 7–30)
CALCIUM SERPL-MCNC: 8.3 MG/DL (ref 8.5–10.1)
CHLORIDE SERPL-SCNC: 108 MMOL/L (ref 94–109)
CO2 SERPL-SCNC: 23 MMOL/L (ref 20–32)
CREAT SERPL-MCNC: 0.82 MG/DL (ref 0.52–1.04)
GFR SERPL CREATININE-BSD FRML MDRD: 76 ML/MIN/1.7M2
GLUCOSE SERPL-MCNC: 84 MG/DL (ref 70–99)
POTASSIUM SERPL-SCNC: 4.3 MMOL/L (ref 3.4–5.3)
SODIUM SERPL-SCNC: 138 MMOL/L (ref 133–144)
TSH SERPL DL<=0.005 MIU/L-ACNC: 1.5 MU/L (ref 0.4–4)

## 2018-10-22 PROCEDURE — 99214 OFFICE O/P EST MOD 30 MIN: CPT | Mod: 25 | Performed by: NURSE PRACTITIONER

## 2018-10-22 PROCEDURE — 99396 PREV VISIT EST AGE 40-64: CPT | Performed by: NURSE PRACTITIONER

## 2018-10-22 PROCEDURE — 84443 ASSAY THYROID STIM HORMONE: CPT | Performed by: NURSE PRACTITIONER

## 2018-10-22 PROCEDURE — 80048 BASIC METABOLIC PNL TOTAL CA: CPT | Performed by: NURSE PRACTITIONER

## 2018-10-22 PROCEDURE — 36415 COLL VENOUS BLD VENIPUNCTURE: CPT | Performed by: NURSE PRACTITIONER

## 2018-10-22 RX ORDER — CYANOCOBALAMIN 1000 UG/ML
1 INJECTION, SOLUTION INTRAMUSCULAR; SUBCUTANEOUS
COMMUNITY

## 2018-10-22 RX ORDER — SERTRALINE HYDROCHLORIDE 100 MG/1
200 TABLET, FILM COATED ORAL DAILY
Qty: 180 TABLET | Refills: 1 | Status: SHIPPED | OUTPATIENT
Start: 2018-10-22 | End: 2019-06-05

## 2018-10-22 ASSESSMENT — ENCOUNTER SYMPTOMS
DYSURIA: 0
BREAST MASS: 0
HEARTBURN: 0
COUGH: 0
HEMATOCHEZIA: 0
DIZZINESS: 0
ARTHRALGIAS: 0
JOINT SWELLING: 0
FEVER: 0
WEAKNESS: 0
CONSTIPATION: 0
SHORTNESS OF BREATH: 0
FREQUENCY: 0
CHILLS: 0
HEADACHES: 1
NAUSEA: 0
DIARRHEA: 1
HEMATURIA: 0
NERVOUS/ANXIOUS: 1
PARESTHESIAS: 1
EYE PAIN: 0
PALPITATIONS: 0
MYALGIAS: 0
SORE THROAT: 0
ABDOMINAL PAIN: 1

## 2018-10-22 ASSESSMENT — ANXIETY QUESTIONNAIRES
2. NOT BEING ABLE TO STOP OR CONTROL WORRYING: NOT AT ALL
GAD7 TOTAL SCORE: 2
7. FEELING AFRAID AS IF SOMETHING AWFUL MIGHT HAPPEN: NOT AT ALL
6. BECOMING EASILY ANNOYED OR IRRITABLE: SEVERAL DAYS
1. FEELING NERVOUS, ANXIOUS, OR ON EDGE: NOT AT ALL
5. BEING SO RESTLESS THAT IT IS HARD TO SIT STILL: NOT AT ALL
3. WORRYING TOO MUCH ABOUT DIFFERENT THINGS: NOT AT ALL
IF YOU CHECKED OFF ANY PROBLEMS ON THIS QUESTIONNAIRE, HOW DIFFICULT HAVE THESE PROBLEMS MADE IT FOR YOU TO DO YOUR WORK, TAKE CARE OF THINGS AT HOME, OR GET ALONG WITH OTHER PEOPLE: SOMEWHAT DIFFICULT

## 2018-10-22 ASSESSMENT — PATIENT HEALTH QUESTIONNAIRE - PHQ9: 5. POOR APPETITE OR OVEREATING: SEVERAL DAYS

## 2018-10-22 NOTE — LETTER
My Depression Action Plan  Name: Janelle Coelho   Date of Birth 1975  Date: 10/22/2018    My doctor: Ivett Sy   My clinic: [address]  [address]  [place] [address] [phone]          GREEN    ZONE   Good Control    What it looks like:     Things are going generally well. You have normal up s and down s. You may even feel depressed from time to time, but bad moods usually last less than a day.   What you need to do:  1. Continue to care for yourself (see self care plan)  2. Check your depression survival kit and update it as needed  3. Follow your physician s recommendations including any medication.  4. Do not stop taking medication unless you consult with your physician first.           YELLOW         ZONE Getting Worse    What it looks like:     Depression is starting to interfere with your life.     It may be hard to get out of bed; you may be starting to isolate yourself from others.    Symptoms of depression are starting to last most all day and this has happened for several days.     You may have suicidal thoughts but they are not constant.   What you need to do:     1. Call your care team, your response to treatment will improve if you keep your care team informed of your progress. Yellow periods are signs an adjustment may need to be made.     2. Continue your self-care, even if you have to fake it!    3. Talk to someone in your support network    4. Open up your depression survival kit           RED    ZONE Medical Alert - Get Help    What it looks like:     Depression is seriously interfering with your life.     You may experience these or other symptoms: You can t get out of bed most days, can t work or engage in other necessary activities, you have trouble taking care of basic hygiene, or basic responsibilities, thoughts of suicide or death that will not go away, self-injurious behavior.     What you need to do:  1. Call  your care team and request a same-day appointment. If they are not available (weekends or after hours) call your local crisis line, emergency room or 911.            Depression Self Care Plan / Survival Kit    Self-Care for Depression  Here s the deal. Your body and mind are really not as separate as most people think.  What you do and think affects how you feel and how you feel influences what you do and think. This means if you do things that people who feel good do, it will help you feel better.  Sometimes this is all it takes.  There is also a place for medication and therapy depending on how severe your depression is, so be sure to consult with your medical provider and/ or Behavioral Health Consultant if your symptoms are worsening or not improving.     In order to better manage my stress, I will:    Exercise  Get some form of exercise, every day. This will help reduce pain and release endorphins, the  feel good  chemicals in your brain. This is almost as good as taking antidepressants!  This is not the same as joining a gym and then never going! (they count on that by the way ) It can be as simple as just going for a walk or doing some gardening, anything that will get you moving.      Hygiene   Maintain good hygiene (Get out of bed in the morning, Make your bed, Brush your teeth, Take a shower, and Get dressed like you were going to work, even if you are unemployed).  If your clothes don't fit try to get ones that do.    Diet  I will strive to eat foods that are good for me, drink plenty of water, and avoid excessive sugar, caffeine, alcohol, and other mood-altering substances.  Some foods that are helpful in depression are: complex carbohydrates, B vitamins, flaxseed, fish or fish oil, fresh fruits and vegetables.    Psychotherapy  I agree to participate in Individual Therapy (if recommended).    Medication  If prescribed medications, I agree to take them.  Missing doses can result in serious side effects.   I understand that drinking alcohol, or other illicit drug use, may cause potential side effects.  I will not stop my medication abruptly without first discussing it with my provider.    Staying Connected With Others  I will stay in touch with my friends, family members, and my primary care provider/team.    Use your imagination  Be creative.  We all have a creative side; it doesn t matter if it s oil painting, sand castles, or mud pies! This will also kick up the endorphins.    Witness Beauty  (AKA stop and smell the roses) Take a look outside, even in mid-winter. Notice colors, textures. Watch the squirrels and birds.     Service to others  Be of service to others.  There is always someone else in need.  By helping others we can  get out of ourselves  and remember the really important things.  This also provides opportunities for practicing all the other parts of the program.    Humor  Laugh and be silly!  Adjust your TV habits for less news and crime-drama and more comedy.    Control your stress  Try breathing deep, massage therapy, biofeedback, and meditation. Find time to relax each day.     My support system    Clinic Contact:  Phone number:    Contact 1:  Phone number:    Contact 2:  Phone number:    Rastafarian/:  Phone number:    Therapist:  Phone number:    [facility] crisis center:    Phone number:    Other community support:  Phone number:

## 2018-10-22 NOTE — PROGRESS NOTES
SUBJECTIVE:   CC: Janelle Coelho is an 43 year old woman who presents for preventive health visit.     Physical   Annual:     Getting at least 3 servings of Calcium per day:  Yes    Bi-annual eye exam:  Yes    Dental care twice a year:  Yes    Sleep apnea or symptoms of sleep apnea:  Daytime drowsiness    Diet:  Other    Frequency of exercise:  None    Taking medications regularly:  Yes    Medication side effects:  None    Additional concerns today:  No    History of crohns - has been on remacaid and inflammation is going down. Has a colonoscopy in march.     History of depression and is on zoloft - she does feel her symptoms are improved, but sometimes takes a double dose. She does feel she has an increase in fatigue. She had a recent hgb at GI and was normal. She gets about 8 hrs of sleep at night, but feels exhausted after work. She works in HR. She used to do WW and exercise, but stopped last jan.     Today's PHQ-2 Score:   PHQ-2 ( 1999 Pfizer) 10/22/2018   Q1: Little interest or pleasure in doing things 1   Q2: Feeling down, depressed or hopeless 0   PHQ-2 Score 1   Q1: Little interest or pleasure in doing things Several days   Q2: Feeling down, depressed or hopeless Not at all   PHQ-2 Score 1       Abuse: Current or Past(Physical, Sexual or Emotional)- No  Do you feel safe in your environment - Yes    Social History   Substance Use Topics     Smoking status: Current Some Day Smoker     Packs/day: 0.20     Years: 10.00     Types: Cigarettes     Smokeless tobacco: Never Used      Comment: does not want to quit at this time     Alcohol use 0.0 oz/week     0 Standard drinks or equivalent per week      Comment: once a week or less     Alcohol Use 10/22/2018   If you drink alcohol do you typically have greater than 3 drinks per day OR greater than 7 drinks per week? No   No flowsheet data found.    Reviewed orders with patient.  Reviewed health maintenance and updated orders accordingly - Yes  Labs reviewed in  EPIC    Patient under age 50, mutual decision reflected in health maintenance.      Pertinent mammograms are reviewed under the imaging tab.  History of abnormal Pap smear: NO - age 30-65 PAP every 5 years with negative HPV co-testing recommended  PAP / HPV Latest Ref Rng & Units 10/13/2015 4/15/2014   PAP - NIL NIL   HPV 16 DNA NEG Negative -   HPV 18 DNA NEG Negative -   OTHER HR HPV NEG Negative -     Reviewed and updated as needed this visit by clinical staff  Tobacco  Allergies  Meds  Med Hx  Surg Hx  Fam Hx  Soc Hx        Reviewed and updated as needed this visit by Provider            Review of Systems   Constitutional: Negative for chills and fever.   HENT: Positive for ear pain (hurt wtih ear plugs, hurts infrequently). Negative for congestion, hearing loss and sore throat.    Eyes: Positive for visual disturbance (recent eye exam). Negative for pain.   Respiratory: Negative for cough and shortness of breath.    Cardiovascular: Negative for chest pain, palpitations and peripheral edema.   Gastrointestinal: Positive for abdominal pain (hx of crohns) and diarrhea. Negative for constipation, heartburn, hematochezia and nausea.   Breasts:  Negative for tenderness, breast mass and discharge.   Genitourinary: Positive for vaginal discharge (r/t crohns). Negative for dysuria, frequency, genital sores, hematuria, pelvic pain, urgency and vaginal bleeding.   Musculoskeletal: Negative for arthralgias, joint swelling and myalgias.   Skin: Negative for rash.   Neurological: Positive for headaches (r/t vision, needs readers) and paresthesias. Negative for dizziness and weakness.   Psychiatric/Behavioral: Positive for mood changes. The patient is nervous/anxious.         OBJECTIVE:   /78  Pulse 76  Temp 98.1  F (36.7  C) (Tympanic)  Wt 210 lb 4.8 oz (95.4 kg)  LMP 08/13/2018  BMI 39.41 kg/m2  Physical Exam  GENERAL: healthy, alert and no distress  EYES: Eyes grossly normal to inspection, PERRL and  conjunctivae and sclerae normal  HENT: normal cephalic/atraumatic, both ears: clear effusion, nose and mouth without ulcers or lesions, oropharynx clear and oral mucous membranes moist  NECK: no adenopathy, no asymmetry, masses, or scars and thyroid normal to palpation  RESP: lungs clear to auscultation - no rales, rhonchi or wheezes  CV: regular rate and rhythm, normal S1 S2, no S3 or S4, no murmur, click or rub, no peripheral edema and peripheral pulses strong  MS: no gross musculoskeletal defects noted, no edema  SKIN: no suspicious lesions or rashes  PSYCH: mentation appears normal, affect normal/bright      ASSESSMENT/PLAN:   1. Routine general medical examination at a health care facility    - *MA Screening Digital Bilateral; Future    2. Major depressive disorder, recurrent episode, in partial remission (H)  Not well controlled - she does feel her symptoms are marginlaly controlled, bvut willing to increase dose to see if it helps. She also has some fatigue -difficult to know if this is mood related or something else. Will increase zolfot and follow up in 1-2 mo  - sertraline (ZOLOFT) 100 MG tablet; Take 2 tablets (200 mg) by mouth daily  Dispense: 180 tablet; Refill: 1    3. Generalized anxiety disorder  Not well controlled per above  - sertraline (ZOLOFT) 100 MG tablet; Take 2 tablets (200 mg) by mouth daily  Dispense: 180 tablet; Refill: 1    4. Crohn's disease of large intestine with complication (H)  We reviewed her course of treatment she doesf eel things are getting better gradually    5. Fatigue, unspecified type  Will draw labs to rule out other causes, she does admit this is likely related to m ood.   - TSH with free T4 reflex  - Basic metabolic panel    COUNSELING:  Reviewed preventive health counseling, as reflected in patient instructions  Special attention given to:        Regular exercise       Healthy diet/nutrition    BP Readings from Last 1 Encounters:   10/22/18 112/78     Estimated body  "mass index is 39.41 kg/(m^2) as calculated from the following:    Height as of 3/9/18: 5' 1.25\" (1.556 m).    Weight as of this encounter: 210 lb 4.8 oz (95.4 kg).      Weight management plan: Patient was referred to their PCP to discuss a diet and exercise plan.     reports that she has been smoking Cigarettes.  She has a 2.00 pack-year smoking history. She has never used smokeless tobacco.      Counseling Resources:  ATP IV Guidelines  Pooled Cohorts Equation Calculator  Breast Cancer Risk Calculator  FRAX Risk Assessment  ICSI Preventive Guidelines  Dietary Guidelines for Americans, 2010  USDA's MyPlate  ASA Prophylaxis  Lung CA Screening    ISA [facility] SUSAN  "

## 2018-10-22 NOTE — PATIENT INSTRUCTIONS
Update me by man in about 2-3 months no how things are going with the increase dose of zoloft.     Preventive Health Recommendations  Female Ages 40 to 49    Yearly exam:     See your health care provider every year in order to  1. Review health changes.   2. Discuss preventive care.    3. Review your medicines if your doctor prescribed any.      Get a Pap test every three years (unless you have an abnormal result and your provider advises testing more often).      If you get Pap tests with HPV test, you only need to test every 5 years, unless you have an abnormal result. You do not need a Pap test if your uterus was removed (hysterectomy) and you have not had cancer.      You should be tested each year for STDs (sexually transmitted diseases), if you're at risk.     Ask your doctor if you should have a mammogram.      Have a colonoscopy (test for colon cancer) if someone in your family has had colon cancer or polyps before age 50.       Have a cholesterol test every 5 years.       Have a diabetes test (fasting glucose) after age 45. If you are at risk for diabetes, you should have this test every 3 years.    Shots: Get a flu shot each year. Get a tetanus shot every 10 years.     Nutrition:     Eat at least 5 servings of fruits and vegetables each day.    Eat whole-grain bread, whole-wheat pasta and brown rice instead of white grains and rice.    Get adequate Calcium and Vitamin D.      Lifestyle    Exercise at least 150 minutes a week (an average of 30 minutes a day, 5 days a week). This will help you control your weight and prevent disease.    Limit alcohol to one drink per day.    No smoking.     Wear sunscreen to prevent skin cancer.    See your dentist every six months for an exam and cleaning.

## 2018-10-22 NOTE — MR AVS SNAPSHOT
After Visit Summary   10/22/2018    Janelle Coelho    MRN: [id number]           Patient Information     Date Of Birth          1975        Visit Information        Provider Department      10/22/2018 11:00 AM Ivett Sy APRN [facility]        Today's Diagnoses     Routine general medical examination at a health care facility    -  1    Major depressive disorder, recurrent episode, in partial remission (H)        Generalized anxiety disorder        Crohn's disease of large intestine with complication (H)        Fatigue, unspecified type          Care Instructions    Update me by man in about 2-3 months no how things are going with the increase dose of zoloft.     Preventive Health Recommendations  Female Ages 40 to 49    Yearly exam:     See your health care provider every year in order to  1. Review health changes.   2. Discuss preventive care.    3. Review your medicines if your doctor prescribed any.      Get a Pap test every three years (unless you have an abnormal result and your provider advises testing more often).      If you get Pap tests with HPV test, you only need to test every 5 years, unless you have an abnormal result. You do not need a Pap test if your uterus was removed (hysterectomy) and you have not had cancer.      You should be tested each year for STDs (sexually transmitted diseases), if you're at risk.     Ask your doctor if you should have a mammogram.      Have a colonoscopy (test for colon cancer) if someone in your family has had colon cancer or polyps before age 50.       Have a cholesterol test every 5 years.       Have a diabetes test (fasting glucose) after age 45. If you are at risk for diabetes, you should have this test every 3 years.    Shots: Get a flu shot each year. Get a tetanus shot every 10 years.     Nutrition:     Eat at least 5 servings of fruits and vegetables each day.    Eat whole-grain bread, whole-wheat pasta and  brown rice instead of white grains and rice.    Get adequate Calcium and Vitamin D.      Lifestyle    Exercise at least 150 minutes a week (an average of 30 minutes a day, 5 days a week). This will help you control your weight and prevent disease.    Limit alcohol to one drink per day.    No smoking.     Wear sunscreen to prevent skin cancer.    See your dentist every six months for an exam and cleaning.          Follow-ups after your visit        Follow-up notes from your care team     Return in about 3 months (around 1/22/2019) for Depression and/or anxiety.      Your next 10 appointments already scheduled     Oct 22, 2018 11:00 AM CDT   [facility] ISA Anglin CNP   [facility]    [address]  [address]  [facility] [id number] [phone]            Jan 03, 2019  7:00 AM CST   MyChart Endocrine Return with ISA Leone CNP   [facility]    [address]  [facility] [id number] [phone]              Future tests that were ordered for you today     Open Future Orders        Priority Expected Expires Ordered    *MA Screening Digital Bilateral Routine  10/22/2019 10/22/2018            Who to contact     If you have questions or need follow up information about today's clinic visit or your schedule please contact [facility] directly at [phone].  Normal or non-critical lab and imaging results will be communicated to you by MyChart, letter or phone within 4 business days after the clinic has received the results. If you do not hear from us within 7 days, please contact the clinic through [employer] or phone. If you have a critical or abnormal lab result, we will notify you by phone as soon as possible.  Submit refill requests through [facility] or call your pharmacy and they will forward the refill request to us. Please allow 3 business days for your refill to  be completed.          Additional Information About Your Visit        [facility] Information     [facility] gives you secure access to your electronic health record. If you see a primary care provider, you can also send messages to your care team and make appointments. If you have questions, please call your primary care clinic.  If you do not have a primary care provider, please call [phone] and they will assist you.        Care EveryWhere ID     This is your Care EveryWhere ID. This could be used by other organizations to access your [place] medical records  [id number]        Your Vitals Were     Pulse Temperature Last Period BMI (Body Mass Index)          76 98.1  F (36.7  C) (Tympanic) 08/13/2018 39.41 kg/m2         Blood Pressure from Last 3 Encounters:   10/22/18 112/78   06/15/18 103/70   03/09/18 100/60    Weight from Last 3 Encounters:   10/22/18 210 lb 4.8 oz (95.4 kg)   06/15/18 194 lb 12.8 oz (88.4 kg)   03/09/18 177 lb 3.2 oz (80.4 kg)              We Performed the Following     Basic metabolic panel     TSH with free T4 reflex          Today's Medication Changes          These changes are accurate as of 10/22/18 10:56 AM.  If you have any questions, ask your nurse or doctor.               These medicines have changed or have updated prescriptions.        Dose/Directions    * sertraline 100 MG tablet   Commonly known as:  ZOLOFT   This may have changed:  Another medication with the same name was added. Make sure you understand how and when to take each.   Used for:  Major depressive disorder, recurrent episode, in partial remission (H), Generalized anxiety disorder   Changed by:  Ivett Sy, APRN CNP        TAKE 1 AND 1/2 TABLETS(150 MG) BY MOUTH DAILY   Quantity:  135 tablet   Refills:  0       * sertraline 100 MG tablet   Commonly known as:  ZOLOFT   This may have changed:  You were already taking a medication with the same name, and this prescription was added. Make sure you understand  how and when to take each.   Used for:  Major depressive disorder, recurrent episode, in partial remission (H), Generalized anxiety disorder   Changed by:  Ivett Sy APRN CNP        Dose:  200 mg   Take 2 tablets (200 mg) by mouth daily   Quantity:  180 tablet   Refills:  1       * Notice:  This list has 2 medication(s) that are the same as other medications prescribed for you. Read the directions carefully, and ask your doctor or other care provider to review them with you.         Where to get your medicines      These medications were sent to [facility] [id number] [place] [address] [address] [place] [address] AT [address]  [address] [facility] [address]     Phone:  [phone]     sertraline 100 MG tablet                Primary Care Provider Office Phone # Fax #    ISA Anglin [phone] [address] [place] [address]        Equal Access to Services     [facility] AH: Hadii aad ku hadasho Soomaali, waaxda luqadaha, qaybta kaalmada adeegyada, waxay idiin hayaan amalia kharaviv stephens . So [facility] [phone].    ATENCIÓN: Si habla español, tiene a kline disposición servicios gratuitos de asistencia lingüística. [facility] [phone].    We comply with applicable federal civil rights laws and [place] laws. We do not discriminate on the basis of race, color, national origin, age, disability, sex, sexual orientation, or gender identity.            Thank you!     Thank you for choosing [facility]  for your care. Our goal is always to provide you with excellent care. Hearing back from our patients is one way we can continue to improve our services. Please take a few minutes to complete the written survey that you may receive in the mail after your visit with us. Thank you!             Your Updated Medication List - Protect others around you: Learn how to safely use, store and throw away your medicines at  www.disposemymeds.org.          This list is accurate as of 10/22/18 10:56 AM.  Always use your most recent med list.                   Brand Name Dispense Instructions for use Diagnosis    cyanocobalamin 1000 MCG/ML injection    VITAMIN B12     Inject 1 mL into the muscle every 30 days        insulin pen needle 31G X 5 MM    B-D U/F    100 each    Use 1 daily or as directed.    Class 1 obesity due to excess calories with serious comorbidity and body mass index (BMI) of 33.0 to 33.9 in adult, Vitamin D deficiency, History of Heraclio-en-Y gastric bypass, BMI 36.0-36.9,adult       liraglutide - Weight Management 18 MG/3ML pen    SAXENDA    15 mL    Titrate as follows: Week 1: 0.6 mg sq daily Week 2: 1.2 mg sq daily Week 3: 1.8 mg sq daily. Week 4: 2.4 mg sq daily Week 5 and thereafter: 3.0 mg sq daily    Class 1 obesity due to excess calories with serious comorbidity and body mass index (BMI) of 33.0 to 33.9 in adult, Vitamin D deficiency, History of Heraclio-en-Y gastric bypass, BMI 36.0-36.9,adult       QUASENSE 0.15-0.03 MG per tablet   Generic drug:  levonorgestrel-ethinyl estradiol     91 tablet    TAKE 1 TABLET BY MOUTH ONCE DAILY    Excessive or frequent menstruation       REMICADE IV      Working way up to every 8 weeks        * sertraline 100 MG tablet    ZOLOFT    135 tablet    TAKE 1 AND 1/2 TABLETS(150 MG) BY MOUTH DAILY    Major depressive disorder, recurrent episode, in partial remission (H), Generalized anxiety disorder       * sertraline 100 MG tablet    ZOLOFT    180 tablet    Take 2 tablets (200 mg) by mouth daily    Major depressive disorder, recurrent episode, in partial remission (H), Generalized anxiety disorder       traZODone 100 MG tablet    DESYREL    90 tablet    Take 1 tablet (100 mg) by mouth At Bedtime    Insomnia, unspecified type       vitamin D 94453 UNIT capsule    ERGOCALCIFEROL    12 capsule    TAKE 1 CAPSULE BY MOUTH 1 TIME A WEEK    Vitamin D deficiency       ZYRTEC ALLERGY PO      Take  1 tablet by mouth daily Reported on 3/6/2017        * Notice:  This list has 2 medication(s) that are the same as other medications prescribed for you. Read the directions carefully, and ask your doctor or other care provider to review them with you.

## 2018-10-23 ASSESSMENT — PATIENT HEALTH QUESTIONNAIRE - PHQ9: SUM OF ALL RESPONSES TO PHQ QUESTIONS 1-9: 8

## 2018-10-23 ASSESSMENT — ANXIETY QUESTIONNAIRES: GAD7 TOTAL SCORE: 2

## 2018-10-30 ENCOUNTER — RADIANT APPOINTMENT (OUTPATIENT)
Dept: MAMMOGRAPHY | Facility: CLINIC | Age: 43
End: 2018-10-30
Attending: NURSE PRACTITIONER
Payer: COMMERCIAL

## 2018-10-30 DIAGNOSIS — Z00.00 ROUTINE GENERAL MEDICAL EXAMINATION AT A HEALTH CARE FACILITY: ICD-10-CM

## 2018-10-30 PROCEDURE — 77063 BREAST TOMOSYNTHESIS BI: CPT | Mod: TC

## 2018-10-30 PROCEDURE — 77067 SCR MAMMO BI INCL CAD: CPT | Mod: TC

## 2018-11-01 ENCOUNTER — VIRTUAL VISIT (OUTPATIENT)
Dept: ENDOCRINOLOGY | Facility: CLINIC | Age: 43
End: 2018-11-01
Payer: COMMERCIAL

## 2018-11-01 DIAGNOSIS — Z98.84 HISTORY OF ROUX-EN-Y GASTRIC BYPASS: ICD-10-CM

## 2018-11-01 DIAGNOSIS — E55.9 VITAMIN D DEFICIENCY: ICD-10-CM

## 2018-11-01 DIAGNOSIS — E66.09 CLASS 1 OBESITY DUE TO EXCESS CALORIES WITH SERIOUS COMORBIDITY AND BODY MASS INDEX (BMI) OF 33.0 TO 33.9 IN ADULT: ICD-10-CM

## 2018-11-01 DIAGNOSIS — E66.811 CLASS 1 OBESITY DUE TO EXCESS CALORIES WITH SERIOUS COMORBIDITY AND BODY MASS INDEX (BMI) OF 33.0 TO 33.9 IN ADULT: ICD-10-CM

## 2018-11-01 PROCEDURE — 99441 ZZC PHYSICIAN TELEPHONE EVALUATION 5-10 MIN: CPT | Performed by: CLINICAL NURSE SPECIALIST

## 2018-11-01 NOTE — PROGRESS NOTES
Name: Jaenlle Coelho  Telephone encounter for obesity (Last seen in clinic 3/9/2018).  HPI:  Janelle Coelho is a 43 year old female who presents for the follow up of obesity.    She has a history of gastric bypass approximately 1998, highest weight prior to bypass was 275 lbs, she lost about 100-110 lbs post bypass.  She underwent bypass revision in 2004.    She had significant iron deficiency following the first surgery requiring IV iron infusions.    She developed an allergy to the infusions and this required treatment with steroids in order to tolerate the iron replacement.    She subsequently gained weight partly due to the steroids.    She started phentermine 37.5 mg/day 2/2017.  Phentermine became ineffective.  Discontinued Phentermine 37.5 mg qd. 3/2018.  Started Contrave 3/2018.  Gained 17 lbs since starting contrave.  Discontinued Contrave.  Started Saxenda every day 3/2018.  Titrated dose as directed to 3.0 mg every day.  Was tolerating Saxenda well and starting to lose weight.  Had some hectic life events occur-selling and showing house, packing, ++stress.  Stopped saxenda and gained weight.  Recently resumed Saxenda 0.6 mg every day.  Plans to titrate dose as previously advised.      Was taking OTC vitamin D3.  Recent D level was still below 30.  OTC was discontinued and she was started on D 50,000 IU weekly 2/2017.  Ran out of vitamin D about 2 months ago.      LABS:  !COMPREHENSIVE Latest Ref Rng & Units 10/13/2015 3/6/2017   SODIUM 133 - 144 mmol/L 138 140   POTASSIUM 3.4 - 5.3 mmol/L 4.2 4.3   CHLORIDE 94 - 109 mmol/L 106 108   BUN 7 - 30 mg/dL 6 11   Creatinine 0.52 - 1.04 mg/dL 0.80 0.88   Glucose 70 - 99 mg/dL 76 76   ANION GAP 3 - 14 mmol/L 9.4 10   CALCIUM 8.5 - 10.1 mg/dL 8.5 8.7     Component    Latest Ref Rng & Units 3/6/2017   Glucose 76   C-Peptide    0.9 - 6.9 ng/mL 3.0   Hemoglobin A1C    4.3 - 6.0 % 5.1   Insulin    3 - 25 mU/L 12.0     !LIPID/HEPATIC Latest Ref Rng & Units 3/6/2017  "  CHOLESTEROL <200 mg/dL 159   TRIGLYCERIDES <150 mg/dL 108   HDL CHOLESTEROL >49 mg/dL 43 (L)   LDL CHOLESTEROL DIRECT 0.0 - 100.0 mg/dL    LDL CHOLESTEROL, CALCULATED <100 mg/dL 94   VLDL-CHOLESTEROL 0 - 30 mg/dL    NON HDL CHOLESTEROL <130 mg/dL 116   CHOLESTEROL/HDL RATIO 0.0 - 5.0    AST 0 - 45 U/L 11   ALT 0 - 50 U/L 23     Component    Latest Ref Rng & Units 10/5/2016 3/6/2017 10/10/2017   Vitamin D Deficiency screening    20 - 75 ug/L <13 (L)  21 33     !THYROID Latest Ref Rng & Units 3/6/2017 8/21/2014 5/21/2014   TSH 0.40 - 4.00 mU/L 2.37 2.52 2.20   T4 FREE 0.76 - 1.46 ng/dL 0.99       Vital Signs 12/6/2016 2/15/2017 2/24/2017   Weight (LB) 254 lb 3.2 oz 251 lb 11.2 oz 243 lb 6.4 oz   Height 5' 1\"  5' 1\"   BMI 48.13  46.09     Vital Signs 4/13/2017 7/6/2017 8/25/2017 10/10/2017   Weight (LB) 224 lb 207 lb 8 oz 193 lb 186 lb 12.8 oz   Height 5' 1\"  5' 1\"    BMI (Calculated) 42.41  36.54      Vital Signs 10/17/2017 3/9/2018 6/15/2018 10/22/2018   Weight (LB) 184 lb 8 oz 177 lb 3.2 oz 194 lb 12.8 oz 210 lb 4.8 oz   Height  5' 1.25\"     BMI (Calculated)  33.28       Waist circumference: 38.75\" (3/9/2018).  40.25\" (6/2018).    All pertinent notes, labs, and images personally reviewed by me.     A/P  Ms.Heather TARAH Coelho is a 43 year old under treatment, medical weight loss management:    1. Morbid Obesity.  History of Heraclio-en-Y gastric bypass.  BMI 48.13-->34.86, now increased 36.51.      I informed the pt that:  1.Weight loss medications can be taken to assist with weight reduction when combined with appropriate healthy lifestyle changes.  2.I discussed possible s/e, risks and benefits of weight loss medications.  3.All medications are FDA approved, however, some may be used ''off label'' for their weight loss benefits and some ''short term'' medications can be used for longer periods to achieve desired clinical outcomes.  4.All patients taking weight loss medications must be seen in clinic for refill " authorization.    Counseling exercise ( V65.41) - fit bit, tracking activity, not as active lately due to work and upcoming exam - needs to study when not working.  Dietary counseling( V65.3) - weight watchers  Calculated BMI above the upper parameter and f/u plan was documented in the medical record.  Discussed indications, risks and benefits of all medications prescribed, and answered questions to patient's satisfaction.  EKG obtained and read in clinic - wnl.  She had lost 77 lbs total since 12/2016, 254-->177 lbs.  Altogether, she had lost 30% of her initial weight.  Phentermine became ineffective.  Discontinued Phentermine 37.5 mg qd. 3/2018.  Started Contrave 3/2018.  Has gained 17 lbs since starting contrave.  Discontinued Contrave.  Restart Saxenda, titrate dose gradually as tolerated 0.6 mg qd --> 3.0 mg qd.    2.  Vitamin D deficiency.  Most recent D level 10/2017 improved to 33.  Currently treated with D 50,000 IU daily.  Recent D level 33. Continue current D dose - 50,000 IU weekly - ran out 2 months ago.  Restart vitamin D 50,000 international unit(s) weekly.  Plan to recheck D level at next follow up visit in January.    Radiology/Consults ordered today: None      Follow-up:  3 months in clinic    Mikaela España NP  Endocrinology  [facility]  CC:   ____________________________________________________________

## 2018-11-01 NOTE — PROGRESS NOTES
"Janelle Coelho is a 43 year old female who is being evaluated via a billable telephone visit.      The patient has been notified of following:     \"This telephone visit will be conducted via a call between you and your physician/provider. We have found that certain health care needs can be provided without the need for a physical exam.  This service lets us provide the care you need with a short phone conversation.  If a prescription is necessary we can send it directly to your pharmacy.  If lab work is needed we can place an order for that and you can then stop by our lab to have the test done at a later time.    If during the course of the call the physician/provider feels a telephone visit is not appropriate, you will not be charged for this service.\"     Consent has been obtained for this service by 1 care team member: yes. See the scanned image in the medical record.    Janelle Coelho complains of Weight Management    I have reviewed and updated the patient's Past Medical History, Social History, Family History and Medication List.    ALLERGIES  Dextran    Demi Blake M.A. (MA signature)    Additional provider notes: See attached notes.    Assessment/Plan:  Morbid Obesity - restart Saxenda and titrate as directed.  Vitamin D deficiency - restart vitamin D 50,000 units weekly.  F/u in clinic in 3 months.    I have reviewed the note as documented above.  This accurately captures the substance of my conversation with the patient.      Total time of call between patient and provider was 10 minutes     Demi Blake CMA    "

## 2018-11-01 NOTE — MR AVS SNAPSHOT
After Visit Summary   11/1/2018    Janelle Coelho    MRN: [id number]           Patient Information     Date Of Birth          1975        Visit Information        Provider Department      11/1/2018 6:00 PM Mikaela España APRN CNP [facility]        Today's Diagnoses     morbid obesity        Vitamin D deficiency        History of Heraclio-en-Y gastric bypass        BMI 36.0-36.9,adult           Follow-ups after your visit        Follow-up notes from your care team     Return in about 3 months (around 2/1/2019).      Who to contact     If you have questions or need follow up information about today's clinic visit or your schedule please contact [facility] directly at [phone].  Normal or non-critical lab and imaging results will be communicated to you by [employer], letter or phone within 4 business days after the clinic has received the results. If you do not hear from us within 7 days, please contact the clinic through [employer] or phone. If you have a critical or abnormal lab result, we will notify you by phone as soon as possible.  Submit refill requests through [employer] or call your pharmacy and they will forward the refill request to us. Please allow 3 business days for your refill to be completed.          Additional Information About Your Visit        MyChart Information     [employer] gives you secure access to your electronic health record. If you see a primary care provider, you can also send messages to your care team and make appointments. If you have questions, please call your primary care clinic.  If you do not have a primary care provider, please call [phone] and they will assist you.        Care EveryWhere ID     This is your Care EveryWhere ID. This could be used by other organizations to access your [facility] medical records  [id number]         Blood Pressure from Last 3 Encounters:   10/22/18 112/78   06/15/18 103/70   03/09/18 100/60     Weight from Last 3 Encounters:   10/22/18 95.4 kg (210 lb 4.8 oz)   06/15/18 88.4 kg (194 lb 12.8 oz)   03/09/18 80.4 kg (177 lb 3.2 oz)              We Performed the Following     Vitamin D Deficiency          Today's Medication Changes          These changes are accurate as of 11/1/18  6:08 PM.  If you have any questions, ask your nurse or doctor.               These medicines have changed or have updated prescriptions.        Dose/Directions    sertraline 100 MG tablet   Commonly known as:  ZOLOFT   This may have changed:  Another medication with the same name was removed. Continue taking this medication, and follow the directions you see here.   Used for:  Major depressive disorder, recurrent episode, in partial remission (H), Generalized anxiety disorder   Changed by:  Mikaela España APRN CNP        Dose:  200 mg   Take 2 tablets (200 mg) by mouth daily   Quantity:  180 tablet   Refills:  1            Where to get your medicines      These medications were sent to [facility] [address] [place] [address] AT [address]  [address] [facility] [address]     Phone:  [phone]     insulin pen needle 31G X 5 MM         Some of these will need a paper prescription and others can be bought over the counter.  Ask your nurse if you have questions.     Bring a paper prescription for each of these medications     liraglutide - Weight Management 18 MG/3ML pen                Primary Care Provider Office Phone # Fax #    ISA Anglin [phone] [address] [place] [address]        Equal Access to Services     [facility]: Hadii twin holly hadasho Soomaali, waaxda luqadaha, qaybta kaalmada adeegdonavan, beny stephens . So [facility] [phone].    ATENCIÓN: Si habla español, tiene a kline disposición servicios gratuitos de asistencia lingüística. Llame al [phone].    We comply with applicable federal  civil rights laws and [place] laws. We do not discriminate on the basis of race, color, national origin, age, disability, sex, sexual orientation, or gender identity.            Thank you!     Thank you for choosing [facility]  for your care. Our goal is always to provide you with excellent care. Hearing back from our patients is one way we can continue to improve our services. Please take a few minutes to complete the written survey that you may receive in the mail after your visit with us. Thank you!             Your Updated Medication List - Protect others around you: Learn how to safely use, store and throw away your medicines at www.disposemymeds.org.          This list is accurate as of 11/1/18  6:08 PM.  Always use your most recent med list.                   Brand Name Dispense Instructions for use Diagnosis    cyanocobalamin 1000 MCG/ML injection    VITAMIN B12     Inject 1 mL into the muscle every 30 days        insulin pen needle 31G X 5 MM    B-D U/F    100 each    Use 1 daily or as directed.    Class 1 obesity due to excess calories with serious comorbidity and body mass index (BMI) of 33.0 to 33.9 in adult, Vitamin D deficiency, History of Heraclio-en-Y gastric bypass, BMI 36.0-36.9,adult       liraglutide - Weight Management 18 MG/3ML pen    SAXENDA    15 mL    Titrate as follows: Week 1: 0.6 mg sq daily Week 2: 1.2 mg sq daily Week 3: 1.8 mg sq daily. Week 4: 2.4 mg sq daily Week 5 and thereafter: 3.0 mg sq daily    Class 1 obesity due to excess calories with serious comorbidity and body mass index (BMI) of 33.0 to 33.9 in adult, Vitamin D deficiency, History of Heraclio-en-Y gastric bypass, BMI 36.0-36.9,adult       QUASENSE 0.15-0.03 MG per tablet   Generic drug:  levonorgestrel-ethinyl estradiol     91 tablet    TAKE 1 TABLET BY MOUTH ONCE DAILY    Excessive or frequent menstruation       REMICADE IV      Working way up to every 8 weeks        sertraline 100 MG tablet    ZOLOFT    180  tablet    Take 2 tablets (200 mg) by mouth daily    Major depressive disorder, recurrent episode, in partial remission (H), Generalized anxiety disorder       traZODone 100 MG tablet    DESYREL    90 tablet    Take 1 tablet (100 mg) by mouth At Bedtime    Insomnia, unspecified type

## 2018-11-02 ENCOUNTER — TELEPHONE (OUTPATIENT)
Dept: ENDOCRINOLOGY | Facility: CLINIC | Age: 43
End: 2018-11-02

## 2018-11-02 NOTE — TELEPHONE ENCOUNTER
PA renewal.  Previous PA completed 6/18/18 encounter.  Had been off Saxenda so that is why not showing current weight loss.  Wants to go back on Saxenda  Please send PA.  Jazmin Khanna RN    CMM Trinh- [id number]

## 2018-11-05 NOTE — TELEPHONE ENCOUNTER
Central Prior Authorization Team   Phone: [phone]    PA Initiation    Medication: Saxenda  Insurance Company: [employer] - Phone [phone] Fax [phone]  Pharmacy Filling the Rx: [facility] [address] [place] [address] [place] - [address] [place] [address] AT [address]  Filling Pharmacy Phone: [phone]  Filling Pharmacy Fax: [phone]  Start Date: 11/5/2018

## 2018-11-06 NOTE — TELEPHONE ENCOUNTER
Call out to pharmacy  Message given it was approved  LM with pt also    Malinda Ramon RN, BS  Clinical Nurse Triage.

## 2018-11-06 NOTE — TELEPHONE ENCOUNTER
Prior Authorization Approval    Authorization Effective Date: 10/6/2018  Authorization Expiration Date: 11/6/2019  Medication: Saxenda  Approved Dose/Quantity:    Reference #: [id number]   Insurance Company: [employer] - Phone [phone] Fax [phone]  Expected CoPay:       CoPay Card Available:      Foundation Assistance Needed:    Which Pharmacy is filling the prescription (Not needed for infusion/clinic administered): [facility] [address] [place] [address] [address]  Pharmacy Notified: Yes  Patient Notified: Yes

## 2018-11-15 ENCOUNTER — TRANSFERRED RECORDS (OUTPATIENT)
Dept: HEALTH INFORMATION MANAGEMENT | Facility: CLINIC | Age: 43
End: 2018-11-15

## 2018-11-21 ENCOUNTER — MEDICAL CORRESPONDENCE (OUTPATIENT)
Dept: HEALTH INFORMATION MANAGEMENT | Facility: CLINIC | Age: 43
End: 2018-11-21

## 2018-11-21 ENCOUNTER — MYC MEDICAL ADVICE (OUTPATIENT)
Dept: ENDOCRINOLOGY | Facility: CLINIC | Age: 43
End: 2018-11-21

## 2018-11-21 ENCOUNTER — MYC MEDICAL ADVICE (OUTPATIENT)
Dept: PEDIATRICS | Facility: CLINIC | Age: 43
End: 2018-11-21

## 2018-11-21 DIAGNOSIS — E55.9 VITAMIN D DEFICIENCY: ICD-10-CM

## 2018-11-21 DIAGNOSIS — N92.0 EXCESSIVE OR FREQUENT MENSTRUATION: ICD-10-CM

## 2018-11-21 RX ORDER — ERGOCALCIFEROL 1.25 MG/1
50000 CAPSULE, LIQUID FILLED ORAL
Qty: 12 CAPSULE | Refills: 0 | Status: SHIPPED | OUTPATIENT
Start: 2018-11-21 | End: 2019-12-13

## 2018-11-21 RX ORDER — LEVONORGESTREL AND ETHINYL ESTRADIOL 0.15-0.03
1 KIT ORAL DAILY
Qty: 91 TABLET | Refills: 3 | Status: SHIPPED | OUTPATIENT
Start: 2018-11-21 | End: 2019-07-08

## 2018-11-21 NOTE — TELEPHONE ENCOUNTER
Mikaela- see mychart message below.  T'd up.  Jazmin Khanna RN    11/1/18  2.  Vitamin D deficiency.  Most recent D level 10/2017 improved to 33.  Currently treated with D 50,000 IU daily.  Recent D level 33. Continue current D dose - 50,000 IU weekly - ran out 2 months ago.  Restart vitamin D 50,000 international unit(s) weekly.  Plan to recheck D level at next follow up visit in January.     Radiology/Consults ordered today: None        Follow-up:  3 months in clinic     Mikaela España NP  Endocrinology  [facility]

## 2018-11-21 NOTE — TELEPHONE ENCOUNTER
Prescription approved per [facility] Refill Protocol.    Annette Harmon RN

## 2019-01-02 ENCOUNTER — TRANSFERRED RECORDS (OUTPATIENT)
Dept: HEALTH INFORMATION MANAGEMENT | Facility: CLINIC | Age: 44
End: 2019-01-02

## 2019-01-08 ENCOUNTER — TRANSFERRED RECORDS (OUTPATIENT)
Dept: HEALTH INFORMATION MANAGEMENT | Facility: CLINIC | Age: 44
End: 2019-01-08

## 2019-01-08 ENCOUNTER — MEDICAL CORRESPONDENCE (OUTPATIENT)
Dept: HEALTH INFORMATION MANAGEMENT | Facility: CLINIC | Age: 44
End: 2019-01-08

## 2019-02-12 DIAGNOSIS — G47.00 INSOMNIA, UNSPECIFIED TYPE: ICD-10-CM

## 2019-02-13 RX ORDER — TRAZODONE HYDROCHLORIDE 100 MG/1
TABLET ORAL
Qty: 90 TABLET | Refills: 1 | Status: SHIPPED | OUTPATIENT
Start: 2019-02-13 | End: 2019-07-08

## 2019-02-13 NOTE — TELEPHONE ENCOUNTER
Prescription approved per [facility], [facility] or [facility] refill protocol.  Janelle SUH RN - Triage  [facility]

## 2019-02-13 NOTE — TELEPHONE ENCOUNTER
"Requested Prescriptions   Pending Prescriptions Disp Refills     traZODone (DESYREL) 100 MG tablet [Pharmacy Med Name: TRAZODONE 100MG TABLETS]    Last Written Prescription Date:  6/8/2018  Last Fill Quantity: 90,  # refills: 0   Last office visit: 10/22/2018 with prescribing provider:  Ivett yS     Future Office Visit:   Next 5 appointments (look out 90 days)    Feb 14, 2019  7:00 AM CST  [facility] [address] [facility] [address]  [phone]          90 tablet 0     Sig: TAKE 1 TABLET(100 MG) BY MOUTH AT BEDTIME    Serotonin Modulators Passed - 2/12/2019  8:22 PM       Passed - Recent (12 mo) or future (30 days) visit within the authorizing provider's specialty    Patient had office visit in the last 12 months or has a visit in the next 30 days with authorizing provider or within the authorizing provider's specialty.  See \"Patient Info\" tab in inbasket, or \"Choose Columns\" in Meds & Orders section of the refill encounter.             Passed - Medication is active on med list       Passed - Patient is age 18 or older       Passed - No active pregnancy on record       Passed - No positive pregnancy test in past 12 months          "

## 2019-02-25 ENCOUNTER — TRANSFERRED RECORDS (OUTPATIENT)
Dept: HEALTH INFORMATION MANAGEMENT | Facility: CLINIC | Age: 44
End: 2019-02-25

## 2019-03-04 ENCOUNTER — MEDICAL CORRESPONDENCE (OUTPATIENT)
Dept: HEALTH INFORMATION MANAGEMENT | Facility: CLINIC | Age: 44
End: 2019-03-04

## 2019-03-04 ENCOUNTER — TRANSFERRED RECORDS (OUTPATIENT)
Dept: HEALTH INFORMATION MANAGEMENT | Facility: CLINIC | Age: 44
End: 2019-03-04

## 2019-03-08 ENCOUNTER — OFFICE VISIT (OUTPATIENT)
Dept: PEDIATRICS | Facility: CLINIC | Age: 44
End: 2019-03-08
Payer: COMMERCIAL

## 2019-03-08 VITALS
WEIGHT: 210 LBS | BODY MASS INDEX: 39.65 KG/M2 | HEIGHT: 61 IN | DIASTOLIC BLOOD PRESSURE: 82 MMHG | SYSTOLIC BLOOD PRESSURE: 118 MMHG | OXYGEN SATURATION: 97 % | HEART RATE: 65 BPM | TEMPERATURE: 98.3 F

## 2019-03-08 DIAGNOSIS — J01.90 ACUTE NON-RECURRENT SINUSITIS, UNSPECIFIED LOCATION: Primary | ICD-10-CM

## 2019-03-08 DIAGNOSIS — J45.21 MILD INTERMITTENT ASTHMA WITH EXACERBATION: ICD-10-CM

## 2019-03-08 PROCEDURE — 99214 OFFICE O/P EST MOD 30 MIN: CPT | Performed by: PHYSICIAN ASSISTANT

## 2019-03-08 RX ORDER — ALBUTEROL SULFATE 90 UG/1
2 AEROSOL, METERED RESPIRATORY (INHALATION) EVERY 6 HOURS
Qty: 8.5 G | Refills: 0 | Status: SHIPPED | OUTPATIENT
Start: 2019-03-08 | End: 2020-02-25

## 2019-03-08 ASSESSMENT — MIFFLIN-ST. JEOR: SCORE: 1543.89

## 2019-03-08 NOTE — PROGRESS NOTES
"  SUBJECTIVE:   Janelle Coelho is a 44 year old female who presents to clinic today for the following health issues:    Acute Illness   Acute illness concerns: sinus  Onset: months    Fever: no    Chills/Sweats: no    Headache (location?): YES    Sinus Pressure:YES- post-nasal drainage and facial pain    Conjunctivitis:  no    Ear Pain: YES: right    Rhinorrhea: YES- thick, yellow    Congestion: YES- nasal    Sore Throat: no     Cough: YES-productive of yellow sputum    Wheeze: YES    Decreased Appetite: no    Nausea: no    Vomiting: no    Diarrhea:  no    Dysuria/Freq.: no    Fatigue/Achiness: YES    Sick/Strep Exposure: YES     Therapies Tried and outcome: none    Immunocompromised. Received remicaide 5 days ago.   history of asthma, alleriges.     ROS:  ROS otherwise negative    OBJECTIVE:                                                    /82 (BP Location: Right arm, Patient Position: Sitting, Cuff Size: Adult Regular)   Pulse 65   Temp 98.3  F (36.8  C) (Tympanic)   Ht 1.556 m (5' 1.25\")   Wt 95.3 kg (210 lb)   SpO2 97%   BMI 39.36 kg/m    Body mass index is 39.36 kg/m .   GENERAL: alert, no distress  HENT: ear canals- normal; TMs- normal; Nose- normal; Mouth- no ulcers, no lesions; pnd, frontal sinus tenderness  NECK: no tenderness, no adenopathy  RESP: diminished breath sounds throughout with wheezing present   CV: regular rates and rhythm, normal S1 S2, no S3 or S4 and no murmur, no click or rub   Diagnostic test results:  No results found for this or any previous visit (from the past 24 hour(s)).     ASSESSMENT/PLAN:                                                    (J01.90) Acute non-recurrent sinusitis, unspecified location  (primary encounter diagnosis)  Comment: begin antibiotics.   Plan: amoxicillin-clavulanate (AUGMENTIN) 875-125 MG         tablet            (J45.21) Mild intermittent asthma with exacerbation  Comment: begin albuterol four times daily PRN.   Plan: albuterol (PROAIR " HFA/PROVENTIL HFA/VENTOLIN         HFA) 108 (90 Base) MCG/ACT inhaler      Randy Priest PA-C  [facility]

## 2019-04-15 ENCOUNTER — TRANSFERRED RECORDS (OUTPATIENT)
Dept: HEALTH INFORMATION MANAGEMENT | Facility: CLINIC | Age: 44
End: 2019-04-15

## 2019-04-23 ENCOUNTER — TRANSFERRED RECORDS (OUTPATIENT)
Dept: HEALTH INFORMATION MANAGEMENT | Facility: CLINIC | Age: 44
End: 2019-04-23

## 2019-05-03 ENCOUNTER — MEDICAL CORRESPONDENCE (OUTPATIENT)
Dept: HEALTH INFORMATION MANAGEMENT | Facility: CLINIC | Age: 44
End: 2019-05-03

## 2019-05-06 ENCOUNTER — TRANSFERRED RECORDS (OUTPATIENT)
Dept: HEALTH INFORMATION MANAGEMENT | Facility: CLINIC | Age: 44
End: 2019-05-06

## 2019-06-04 ENCOUNTER — MYC MEDICAL ADVICE (OUTPATIENT)
Dept: PEDIATRICS | Facility: CLINIC | Age: 44
End: 2019-06-04

## 2019-06-04 DIAGNOSIS — F33.41 MAJOR DEPRESSIVE DISORDER, RECURRENT EPISODE, IN PARTIAL REMISSION (H): Chronic | ICD-10-CM

## 2019-06-04 DIAGNOSIS — F41.1 GENERALIZED ANXIETY DISORDER: Chronic | ICD-10-CM

## 2019-06-04 NOTE — TELEPHONE ENCOUNTER
Luist  Refill request.     Joe Luis,   I'm sorry about that, from what I see in your chart at your last office visit with Ivett a total of 6 mo supply was given which is through April 2019.  Also at that visit was noted to follow up - medication in 1-2 months.  I will ask Ivett for a one time refill, please schedule a follow up with Ivett for further refills.         Pending Prescriptions:                       Disp   Refills    sertraline (ZOLOFT) 100 MG tablet         180 ta*1            Sig: Take 2 tablets (200 mg) by mouth daily    Last OV 10/22/18       //Christina Love MA// June 4, 2019 4:30 PM

## 2019-06-05 RX ORDER — SERTRALINE HYDROCHLORIDE 100 MG/1
200 TABLET, FILM COATED ORAL DAILY
Qty: 180 TABLET | Refills: 0 | Status: SHIPPED | OUTPATIENT
Start: 2019-06-05 | End: 2019-07-08

## 2019-06-05 NOTE — TELEPHONE ENCOUNTER
"Requested Prescriptions   Pending Prescriptions Disp Refills     sertraline (ZOLOFT) 100 MG tablet    Last Written Prescription Date:  10/22/2018  Last Fill Quantity: 180,  # refills: 1   Last office visit: 3/8/2019 with prescribing provider:  Ivett Sy     Future Office Visit:     180 tablet 1     Sig: Take 2 tablets (200 mg) by mouth daily       SSRIs Protocol Failed - 6/4/2019  4:31 PM        Failed - PHQ-9 score less than 5 in past 6 months     Please review last PHQ-9 score.     PHQ-9 SCORE 5/10/2018 6/1/2018 10/22/2018   PHQ-9 Total Score - - -   PHQ-9 Total Score MyChart 13 (Moderate depression) - -   PHQ-9 Total Score 13 11 8     EVIE-7 SCORE 5/10/2018 6/1/2018 10/22/2018   Total Score - - -   Total Score 10 (moderate anxiety) - -   Total Score 10 4 2             Passed - Medication is active on med list        Passed - Patient is age 18 or older        Passed - No active pregnancy on record        Passed - No positive pregnancy test in last 12 months        Passed - Recent (6 mo) or future (30 days) visit within the authorizing provider's specialty     Patient had office visit in the last 6 months or has a visit in the next 30 days with authorizing provider or within the authorizing provider's specialty.  See \"Patient Info\" tab in inbasket, or \"Choose Columns\" in Meds & Orders section of the refill encounter.          Routing refill request to provider for review/approval because:  Labs out of range:  PHQ9  Labs not current:  PHQ9    Madeline Gee RN            "

## 2019-07-03 ENCOUNTER — TRANSFERRED RECORDS (OUTPATIENT)
Dept: HEALTH INFORMATION MANAGEMENT | Facility: CLINIC | Age: 44
End: 2019-07-03

## 2019-07-03 ENCOUNTER — MEDICAL CORRESPONDENCE (OUTPATIENT)
Dept: HEALTH INFORMATION MANAGEMENT | Facility: CLINIC | Age: 44
End: 2019-07-03

## 2019-07-08 ENCOUNTER — OFFICE VISIT (OUTPATIENT)
Dept: PEDIATRICS | Facility: CLINIC | Age: 44
End: 2019-07-08
Payer: COMMERCIAL

## 2019-07-08 VITALS
WEIGHT: 253.4 LBS | SYSTOLIC BLOOD PRESSURE: 103 MMHG | BODY MASS INDEX: 47.49 KG/M2 | HEART RATE: 67 BPM | OXYGEN SATURATION: 97 % | DIASTOLIC BLOOD PRESSURE: 72 MMHG | TEMPERATURE: 99 F

## 2019-07-08 DIAGNOSIS — F41.1 GENERALIZED ANXIETY DISORDER: Chronic | ICD-10-CM

## 2019-07-08 DIAGNOSIS — D50.8 IRON DEFICIENCY ANEMIA SECONDARY TO INADEQUATE DIETARY IRON INTAKE: ICD-10-CM

## 2019-07-08 DIAGNOSIS — D22.9 ATYPICAL MOLE: ICD-10-CM

## 2019-07-08 DIAGNOSIS — F33.41 MAJOR DEPRESSIVE DISORDER, RECURRENT EPISODE, IN PARTIAL REMISSION (H): Primary | ICD-10-CM

## 2019-07-08 DIAGNOSIS — N92.0 EXCESSIVE OR FREQUENT MENSTRUATION: ICD-10-CM

## 2019-07-08 DIAGNOSIS — E55.9 VITAMIN D DEFICIENCY: ICD-10-CM

## 2019-07-08 DIAGNOSIS — G47.00 INSOMNIA, UNSPECIFIED TYPE: ICD-10-CM

## 2019-07-08 LAB
ERYTHROCYTE [DISTWIDTH] IN BLOOD BY AUTOMATED COUNT: 13.6 % (ref 10–15)
HCT VFR BLD AUTO: 40.3 % (ref 35–47)
HGB BLD-MCNC: 13.1 G/DL (ref 11.7–15.7)
MCH RBC QN AUTO: 27.7 PG (ref 26.5–33)
MCHC RBC AUTO-ENTMCNC: 32.5 G/DL (ref 31.5–36.5)
MCV RBC AUTO: 85 FL (ref 78–100)
PLATELET # BLD AUTO: 307 10E9/L (ref 150–450)
RBC # BLD AUTO: 4.73 10E12/L (ref 3.8–5.2)
WBC # BLD AUTO: 5.8 10E9/L (ref 4–11)

## 2019-07-08 PROCEDURE — 36415 COLL VENOUS BLD VENIPUNCTURE: CPT | Performed by: NURSE PRACTITIONER

## 2019-07-08 PROCEDURE — 82306 VITAMIN D 25 HYDROXY: CPT | Performed by: NURSE PRACTITIONER

## 2019-07-08 PROCEDURE — 99214 OFFICE O/P EST MOD 30 MIN: CPT | Performed by: NURSE PRACTITIONER

## 2019-07-08 PROCEDURE — 85027 COMPLETE CBC AUTOMATED: CPT | Performed by: NURSE PRACTITIONER

## 2019-07-08 RX ORDER — LEVONORGESTREL AND ETHINYL ESTRADIOL 0.15-0.03
1 KIT ORAL DAILY
Qty: 91 TABLET | Refills: 3 | Status: SHIPPED | OUTPATIENT
Start: 2019-07-08 | End: 2020-08-20

## 2019-07-08 RX ORDER — TRAZODONE HYDROCHLORIDE 100 MG/1
TABLET ORAL
Qty: 90 TABLET | Refills: 1 | Status: SHIPPED | OUTPATIENT
Start: 2019-07-08 | End: 2021-08-04

## 2019-07-08 RX ORDER — SERTRALINE HYDROCHLORIDE 100 MG/1
200 TABLET, FILM COATED ORAL DAILY
Qty: 180 TABLET | Refills: 1 | Status: SHIPPED | OUTPATIENT
Start: 2019-07-08 | End: 2020-04-03

## 2019-07-08 ASSESSMENT — ANXIETY QUESTIONNAIRES
1. FEELING NERVOUS, ANXIOUS, OR ON EDGE: SEVERAL DAYS
IF YOU CHECKED OFF ANY PROBLEMS ON THIS QUESTIONNAIRE, HOW DIFFICULT HAVE THESE PROBLEMS MADE IT FOR YOU TO DO YOUR WORK, TAKE CARE OF THINGS AT HOME, OR GET ALONG WITH OTHER PEOPLE: SOMEWHAT DIFFICULT
2. NOT BEING ABLE TO STOP OR CONTROL WORRYING: SEVERAL DAYS
GAD7 TOTAL SCORE: 5
5. BEING SO RESTLESS THAT IT IS HARD TO SIT STILL: SEVERAL DAYS
3. WORRYING TOO MUCH ABOUT DIFFERENT THINGS: NOT AT ALL
7. FEELING AFRAID AS IF SOMETHING AWFUL MIGHT HAPPEN: NOT AT ALL
6. BECOMING EASILY ANNOYED OR IRRITABLE: MORE THAN HALF THE DAYS

## 2019-07-08 ASSESSMENT — PATIENT HEALTH QUESTIONNAIRE - PHQ9
5. POOR APPETITE OR OVEREATING: NOT AT ALL
SUM OF ALL RESPONSES TO PHQ QUESTIONS 1-9: 8

## 2019-07-08 NOTE — PROGRESS NOTES
Subjective     Janelle Coelho is a 44 year old female who presents to clinic today for the following health issues:    HPI   Depression and Anxiety Follow-Up, check area on head and joint pain/possible SE of Remicade.    How are you doing with your depression since your last visit? Improved     How are you doing with your anxiety since your last visit?  Improved     Are you having other symptoms that might be associated with depression or anxiety? No    Have you had a significant life event? No     Do you have any concerns with your use of alcohol or other drugs? No    Social History     Tobacco Use     Smoking status: Former Smoker     Packs/day: 0.20     Years: 10.00     Pack years: 2.00     Types: Cigarettes     Start date: 2001     Last attempt to quit: 2018     Years since quittin.6     Smokeless tobacco: Never Used     Tobacco comment: does not want to quit at this time   Substance Use Topics     Alcohol use: Yes     Alcohol/week: 0.0 oz     Comment: very rarely     Drug use: No     Frequency: 1.0 times per week     PHQ 2018 10/22/2018 2019   PHQ-9 Total Score 11 8 8   Q9: Thoughts of better off dead/self-harm past 2 weeks Not at all Not at all Not at all     EVIE-7 SCORE 2018 10/22/2018 2019   Total Score - - -   Total Score - - -   Total Score 4 2 5       In the past two weeks have you had thoughts of suicide or self-harm?  No.    Do you have concerns about your personal safety or the safety of others?   No      History of depression and anxiety, at last visit 9 month ago her symptoms were not well managed. We increased dose of zoloft and sinc then, she notes an improvement of her symptoms. She is also on trazodone before bed for sleep.     Is on OCP quit smoking last .     She was seeing endocrine fo rwt loss and it did work well, but difficult to get in for appointments.     Patient Active Problem List   Diagnosis     Acute posthemorrhagic anemia     ASCUS with positive  high risk HPV     Obesity     Excessive or frequent menstruation     Generalized anxiety disorder     Major depressive disorder, recurrent episode, in partial remission (H)     CARDIOVASCULAR SCREENING; LDL GOAL LESS THAN 100     Tobacco abuse     Infected cyst of Bartholin's gland duct     Intermittent asthma, uncomplicated     History of Heraclio-en-Y gastric bypass     Insomnia, unspecified type     Family history of breast cancer in mother     Vitamin D deficiency     Crohn's disease of large intestine with complication (H)     Past Surgical History:   Procedure Laterality Date     BIOPSY      Breast cycst (done at [facility]) many years ago     CHOLECYSTECTOMY  late     during gastric bypass     COLONOSCOPY  2018, 2019    Confirmed Crohn's Disease     EXCIS BARTHOLIN GLAND/CYST  2016     GASTRIC BYPASS  2000    with cholecystectomy and subsequent revision gastric bypass     GASTRIC BYPASS      revision of gastric bypass     HERNIA REPAIR  16    SYMONE Zamora     wisdom teeth extraction         Social History     Tobacco Use     Smoking status: Former Smoker     Packs/day: 0.20     Years: 10.00     Pack years: 2.00     Types: Cigarettes     Start date: 2001     Last attempt to quit: 2018     Years since quittin.6     Smokeless tobacco: Never Used     Tobacco comment: does not want to quit at this time   Substance Use Topics     Alcohol use: Yes     Alcohol/week: 0.0 oz     Comment: very rarely     Family History   Problem Relation Age of Onset     Breast Cancer Mother      Diabetes Mother      Depression Mother      Obesity Mother      Prostate Cancer Father      Breast Cancer Maternal Grandmother      Colon Cancer Maternal Grandmother      Prostate Cancer Maternal Grandfather      Breast Cancer Paternal Grandmother      Diabetes Brother      Obesity Brother      Family History Negative No family hx of             -------------------------------------  Reviewed and updated as needed this visit by Provider  Meds         Review of Systems   ROS COMP: Constitutional, HEENT, cardiovascular, pulmonary, gi and gu systems are negative, except as otherwise noted.      Objective    /72   Pulse 67   Temp 99  F (37.2  C) (Tympanic)   Wt 114.9 kg (253 lb 6.4 oz)   SpO2 97%   BMI 47.49 kg/m    Body mass index is 47.49 kg/m .  Physical Exam   GENERAL: healthy, alert and no distress  PSYCH: mentation appears normal, affect normal/bright          Assessment & Plan     1. Major depressive disorder, recurrent episode, in partial remission (H)  Stable on current regime. At last office visit we increased dose and she does feel this is a good match for her.   - sertraline (ZOLOFT) 100 MG tablet; Take 2 tablets (200 mg) by mouth daily  Dispense: 180 tablet; Refill: 1    2. Insomnia, unspecified type  stable  - traZODone (DESYREL) 100 MG tablet; TAKE 1 TABLET(100 MG) BY MOUTH AT BEDTIME  Dispense: 90 tablet; Refill: 1    3. Generalized anxiety disorder  stable  - sertraline (ZOLOFT) 100 MG tablet; Take 2 tablets (200 mg) by mouth daily  Dispense: 180 tablet; Refill: 1    4. Excessive or frequent menstruation  Stable, she has quit smoking.   - levonorgestrel-ethinyl estradiol (QUASENSE) 0.15-0.03 MG tablet; Take 1 tablet by mouth daily  Dispense: 91 tablet; Refill: 3    5. Iron deficiency anemia secondary to inadequate dietary iron intake  No specific symptoms of concern  - CBC with platelets    6. Vitamin D deficiency  Wondering about vit d supplement refill, will check d levels first  - Vitamin D Deficiency    7. Atypical mole  Needs skin check  - DERMATOLOGY REFERRAL   Return in about 6 months (around 1/8/2020) for Follow up.    ISA [facility]avera [facility]

## 2019-07-08 NOTE — PATIENT INSTRUCTIONS
Think about going back to Mikaela España for weight.     Schedule with derm for a full skin check.    Continue the zoloft.

## 2019-07-09 LAB — DEPRECATED CALCIDIOL+CALCIFEROL SERPL-MC: 16 UG/L (ref 20–75)

## 2019-07-09 ASSESSMENT — ANXIETY QUESTIONNAIRES: GAD7 TOTAL SCORE: 5

## 2019-07-10 RX ORDER — ERGOCALCIFEROL 1.25 MG/1
50000 CAPSULE, LIQUID FILLED ORAL WEEKLY
Qty: 8 CAPSULE | Refills: 0 | Status: SHIPPED | OUTPATIENT
Start: 2019-07-10 | End: 2019-12-13

## 2019-09-04 ENCOUNTER — MEDICAL CORRESPONDENCE (OUTPATIENT)
Dept: HEALTH INFORMATION MANAGEMENT | Facility: CLINIC | Age: 44
End: 2019-09-04

## 2019-09-06 ENCOUNTER — TRANSFERRED RECORDS (OUTPATIENT)
Dept: HEALTH INFORMATION MANAGEMENT | Facility: CLINIC | Age: 44
End: 2019-09-06

## 2019-09-09 DIAGNOSIS — E55.9 VITAMIN D DEFICIENCY: ICD-10-CM

## 2019-09-09 NOTE — TELEPHONE ENCOUNTER
Requested Prescriptions   Pending Prescriptions Disp Refills     vitamin D2 (ERGOCALCIFEROL) 94193 units (1250 mcg) capsule [Pharmacy Med Name: VITAMIN D2 50,000IU (ERGO) CAP RX]        Last Written Prescription Date:  7/10/2019  Last Fill Quantity: 8,   # refills: 0  Last Office Visit: 7/8/2019  Future Office visit:       Routing refill request to provider for review/approval because:  Drug not on the [facility], P or  Health refill protocol or controlled substance   8 capsule 0     Sig: TAKE 1 CAPSULE BY MOUTH 1 TIME A WEEK       There is no refill protocol information for this order

## 2019-09-10 RX ORDER — ERGOCALCIFEROL 1.25 MG/1
CAPSULE, LIQUID FILLED ORAL
Qty: 8 CAPSULE | Refills: 0 | OUTPATIENT
Start: 2019-09-10

## 2019-09-10 NOTE — TELEPHONE ENCOUNTER
Left message for pt to call back. Patient needs to make a lab only appointment for her Vitamin D labs.       Iveth Tellez MA on 9/10/2019 at 1:28 PM

## 2019-09-10 NOTE — TELEPHONE ENCOUNTER
No results found for: JRR766, SVGI537, [id number], VITD3, D2VIT, D3VIT, DTOT, [id number], [id number], [id number], [id number], [id number], [id number]  Have patient schedule lab only first

## 2019-09-11 ENCOUNTER — TRANSFERRED RECORDS (OUTPATIENT)
Dept: HEALTH INFORMATION MANAGEMENT | Facility: CLINIC | Age: 44
End: 2019-09-11

## 2019-09-12 ENCOUNTER — TRANSFERRED RECORDS (OUTPATIENT)
Dept: HEALTH INFORMATION MANAGEMENT | Facility: CLINIC | Age: 44
End: 2019-09-12

## 2019-09-17 ENCOUNTER — MYC MEDICAL ADVICE (OUTPATIENT)
Dept: PEDIATRICS | Facility: CLINIC | Age: 44
End: 2019-09-17

## 2019-09-27 NOTE — TELEPHONE ENCOUNTER
Called patient- no answer. Lvm to call clinic to schedule appointment.   Patient also read moka5 message regarding Vit D lab only.     Nancy Shah MA

## 2019-10-03 ENCOUNTER — HEALTH MAINTENANCE LETTER (OUTPATIENT)
Age: 44
End: 2019-10-03

## 2019-11-01 ENCOUNTER — MEDICAL CORRESPONDENCE (OUTPATIENT)
Dept: HEALTH INFORMATION MANAGEMENT | Facility: CLINIC | Age: 44
End: 2019-11-01

## 2019-12-13 ENCOUNTER — OFFICE VISIT (OUTPATIENT)
Dept: PEDIATRICS | Facility: CLINIC | Age: 44
End: 2019-12-13
Payer: COMMERCIAL

## 2019-12-13 VITALS
WEIGHT: 270 LBS | HEIGHT: 61 IN | SYSTOLIC BLOOD PRESSURE: 122 MMHG | RESPIRATION RATE: 18 BRPM | OXYGEN SATURATION: 97 % | TEMPERATURE: 97.7 F | HEART RATE: 87 BPM | DIASTOLIC BLOOD PRESSURE: 70 MMHG | BODY MASS INDEX: 50.98 KG/M2

## 2019-12-13 DIAGNOSIS — B37.2 CANDIDAL INTERTRIGO: Primary | ICD-10-CM

## 2019-12-13 PROCEDURE — 99213 OFFICE O/P EST LOW 20 MIN: CPT | Performed by: NURSE PRACTITIONER

## 2019-12-13 RX ORDER — FLUCONAZOLE 150 MG/1
150 TABLET ORAL WEEKLY
Qty: 4 TABLET | Refills: 0 | Status: SHIPPED | OUTPATIENT
Start: 2019-12-13 | End: 2020-01-04

## 2019-12-13 ASSESSMENT — MIFFLIN-ST. JEOR: SCORE: 1816.05

## 2019-12-13 NOTE — PATIENT INSTRUCTIONS
Patient Education     Take the medication for yeast once weekly for 4 weeks.  You can keep putting the cortisone cream and/or Gold bond on the area for comfort as needed.  Follow-up if not improving after completion of medicine, sooner if worsening.  Candida Skin Infection (Adult)  Candida is type of yeast. It grows naturally on the skin and in the mouth. If it grows out of control, it can cause an infection. Candida can cause infections in the genital area, skin folds, in the mouth, and under the breasts. Anyone can get this infection. It is more common in a person with a weak immune system, such as from diabetes, HIV, or cancer. It s also more common in someone who has been on antibiotic therapy. And it s more common people who are overweight or who have incontinence. Wearing tight-fitting clothing and taking part in activities with lots of skin-to-skin contact can also put you at risk.  Candida causes the skin to become bright red and inflamed. The border of the infected part of the skin is often raised. The infection causes pain and itching. Sometimes the skin peels and bleeds. In the mouth, candida is called thrush, and may cause white thickened areas.  A Candida rash is most often treated with an antifungal cream or ointment. The rash will clear a few days after starting the medicine. Infections that don t go away may need a prescription medicine. In rare cases, a bacterial infection can also occur.  Home care  Your healthcare provider will recommend an antifungal cream or ointment for the rash. He or she may also prescribe a medicine for the itch. Follow all instructions for using these medicines. Don t use cornstarch powder. Cornstarch can cause the Candida infection to get worse.  General care:    Keep your skin clean by washing the area twice a day.    Use the cream as directed until your rash is gone. Once the skin has healed, keep it dry to prevent another infection.     If you are overweight, talk with  your healthcare provider about a plan to lose excess weight.    Avoid clothes that fit tightly.  Follow-up care  Follow up with your healthcare provider, or as advised. Your rash will clear in 7 to 14 days. Call your healthcare provider if the rash is not gone after 14 days.  When to seek medical advice  Call your healthcare provider right away if any of these occur:    Pain or redness that gets worse or spreads    Fluid coming from the skin    Yellow crusts on the skin    Fever of 100.4 F (38 C) or higher, or as directed by your healthcare provider  Date Last Reviewed: 9/1/2016 2000-2018 [employer]. [address] [place] [address], [place] [address]. All rights reserved. This information is not intended as a substitute for professional medical care. Always follow your healthcare professional's instructions.

## 2019-12-13 NOTE — PROGRESS NOTES
"Subjective     Janelle Coelho is a 44 year old female who presents to clinic today for the following health issues:    HPI   Rash  Onset: 2 weeks    Description:   Location: torso and legs   Character: red, itch     Progression of Symptoms:  worsening    Accompanying Signs & Symptoms:  Fever: no   Body aches or joint pain: no   Sore throat symptoms: no   Recent cold symptoms: no     History:   Previous similar rash: no     Precipitating factors:   Exposure to similar rash: no   New exposures: None   Recent travel: no     Alleviating factors:  Gold Bond cream helps with itching     Therapies Tried and outcome: Gold Bond cream and Cortisone cream     Started under abdominal folds, then spread to legs and breasts.  Very itchy, no pain, no drainage.  No fevers, feels well otherwise.    Reviewed and updated as needed this visit by Provider  Meds  Problems         Review of Systems   Otherwise ROS is negative except as stated above.        Objective    /70 (BP Location: Right arm, Patient Position: Chair, Cuff Size: Adult Large)   Pulse 87   Temp 97.7  F (36.5  C) (Tympanic)   Resp 18   Ht 1.556 m (5' 1.25\")   Wt 122.5 kg (270 lb)   SpO2 97%   BMI 50.60 kg/m    Body mass index is 50.6 kg/m .  Physical Exam   GENERAL: healthy, alert and no distress  SKIN: erythematous plaques with satellite lesions noted to b/l inguinal  folds, b/l underside of breasts. Some secondary scratch marks to breasts and abdomen.    Diagnostic Test Results:  none         Assessment & Plan     1. Candidal intertrigo  Based on severity of symptoms and on immunosuppressive meds, start PO anti-fungals. May continue to use topical steroids for comfort.  - fluconazole (DIFLUCAN) 150 MG tablet; Take 1 tablet (150 mg) by mouth once a week for 4 doses  Dispense: 4 tablet; Refill: 0     Patient Instructions   Patient Education     Take the medication for yeast once weekly for 4 weeks.  You can keep putting the cortisone cream and/or Gold " bond on the area for comfort as needed.  Follow-up if not improving after completion of medicine, sooner if worsening.  Candida Skin Infection (Adult)  Candida is type of yeast. It grows naturally on the skin and in the mouth. If it grows out of control, it can cause an infection. Candida can cause infections in the genital area, skin folds, in the mouth, and under the breasts. Anyone can get this infection. It is more common in a person with a weak immune system, such as from diabetes, HIV, or cancer. It s also more common in someone who has been on antibiotic therapy. And it s more common people who are overweight or who have incontinence. Wearing tight-fitting clothing and taking part in activities with lots of skin-to-skin contact can also put you at risk.  Candida causes the skin to become bright red and inflamed. The border of the infected part of the skin is often raised. The infection causes pain and itching. Sometimes the skin peels and bleeds. In the mouth, candida is called thrush, and may cause white thickened areas.  A Candida rash is most often treated with an antifungal cream or ointment. The rash will clear a few days after starting the medicine. Infections that don t go away may need a prescription medicine. In rare cases, a bacterial infection can also occur.  Home care  Your healthcare provider will recommend an antifungal cream or ointment for the rash. He or she may also prescribe a medicine for the itch. Follow all instructions for using these medicines. Don t use cornstarch powder. Cornstarch can cause the Candida infection to get worse.  General care:    Keep your skin clean by washing the area twice a day.    Use the cream as directed until your rash is gone. Once the skin has healed, keep it dry to prevent another infection.     If you are overweight, talk with your healthcare provider about a plan to lose excess weight.    Avoid clothes that fit tightly.  Follow-up care  Follow up with  your healthcare provider, or as advised. Your rash will clear in 7 to 14 days. Call your healthcare provider if the rash is not gone after 14 days.  When to seek medical advice  Call your healthcare provider right away if any of these occur:    Pain or redness that gets worse or spreads    Fluid coming from the skin    Yellow crusts on the skin    Fever of 100.4 F (38 C) or higher, or as directed by your healthcare provider  Date Last Reviewed: 9/1/2016 2000-2018 [employer]. [address] [place] [address]. All rights reserved. This information is not intended as a substitute for professional medical care. Always follow your healthcare professional's instructions.               Return in about 1 month (around 1/13/2020), or if symptoms worsen or fail to improve.    Brittney Badillo NP  [facility]

## 2019-12-16 ENCOUNTER — HEALTH MAINTENANCE LETTER (OUTPATIENT)
Age: 44
End: 2019-12-16

## 2020-01-02 ENCOUNTER — TRANSFERRED RECORDS (OUTPATIENT)
Dept: HEALTH INFORMATION MANAGEMENT | Facility: CLINIC | Age: 45
End: 2020-01-02

## 2020-01-02 LAB
ALT SERPL-CCNC: 18 U/L (ref 0–78)
AST SERPL-CCNC: 14 U/L (ref 0–37)

## 2020-01-08 ENCOUNTER — TRANSFERRED RECORDS (OUTPATIENT)
Dept: HEALTH INFORMATION MANAGEMENT | Facility: CLINIC | Age: 45
End: 2020-01-08

## 2020-02-03 ENCOUNTER — TRANSFERRED RECORDS (OUTPATIENT)
Dept: HEALTH INFORMATION MANAGEMENT | Facility: CLINIC | Age: 45
End: 2020-02-03

## 2020-02-10 ENCOUNTER — TELEPHONE (OUTPATIENT)
Dept: PEDIATRICS | Facility: CLINIC | Age: 45
End: 2020-02-10

## 2020-02-10 NOTE — TELEPHONE ENCOUNTER
Panel Management Review          Composite cancer screening  Chart review shows that this patient is due/due soon for the following None  Summary:    Patient is due/failing the following:   ACT    Action needed:   Routed to provider for review.    Type of outreach:    Patient requested this be removed from her problem list in October 2019 but was never removed, please remove.    Questions for provider review:    See above                                                                                                                                    Maryanne Harkins CMA([facility])       Chart routed to Provider .

## 2020-02-11 ENCOUNTER — TRANSFERRED RECORDS (OUTPATIENT)
Dept: HEALTH INFORMATION MANAGEMENT | Facility: CLINIC | Age: 45
End: 2020-02-11

## 2020-02-11 NOTE — TELEPHONE ENCOUNTER
Can you verify that if she ever uses albuterol (or ever needs a refill), we should keep the diagnosis on. Thanks!

## 2020-02-14 ENCOUNTER — TRANSFERRED RECORDS (OUTPATIENT)
Dept: HEALTH INFORMATION MANAGEMENT | Facility: CLINIC | Age: 45
End: 2020-02-14

## 2020-02-18 ENCOUNTER — TRANSFERRED RECORDS (OUTPATIENT)
Dept: HEALTH INFORMATION MANAGEMENT | Facility: CLINIC | Age: 45
End: 2020-02-18

## 2020-02-22 ENCOUNTER — NURSE TRIAGE (OUTPATIENT)
Dept: NURSING | Facility: CLINIC | Age: 45
End: 2020-02-22

## 2020-02-22 DIAGNOSIS — J45.21 MILD INTERMITTENT ASTHMA WITH EXACERBATION: ICD-10-CM

## 2020-02-22 NOTE — TELEPHONE ENCOUNTER
"Patient states she developed a non productive cough yesterday.   Currently describes her breathing as shallow. Unable to breathe as usual.  States \"I know I'm having trouble because my face is tingling.\"  Also states has wheezing.   States has history of bronchitis and asthma.  States uses an inhaler only when when she has similar symptoms.   Currently cannot find inhaler.  Temperature 100.6 (O).    Protocol-  Cough- Acute Non-Productive- Adult  Care advice reviewed.   Disposition-  Go to ED  Caller states understanding of the recommended disposition.   Advised to call back if further questions or concerns.     COLLEEN Hill RN  [facility]     Reason for Disposition    Difficulty breathing    Additional Information    Negative: Severe difficulty breathing (e.g., struggling for each breath, speaks in single words)    Negative: Bluish (or gray) lips or face now    Negative: [1] Rapid onset of cough AND [2] has hives    Negative: Coughing started suddenly after medicine, an allergic food or bee sting    Negative: [1] Difficulty breathing AND [2] exposure to flames, smoke, or fumes    Negative: [1] Stridor AND [2] difficulty breathing    Negative: Sounds like a life-threatening emergency to the triager    Negative: Chest pain  (Exception: MILD central chest pain, present only when coughing)    Protocols used: COUGH - ACUTE NON-PRODUCTIVE-A-AH    "

## 2020-02-25 RX ORDER — ALBUTEROL SULFATE 90 UG/1
AEROSOL, METERED RESPIRATORY (INHALATION)
Qty: 18 G | Refills: 1 | Status: SHIPPED | OUTPATIENT
Start: 2020-02-25 | End: 2022-03-18

## 2020-02-25 NOTE — TELEPHONE ENCOUNTER
Routing refill request to provider for review/approval because:  Labs out of range: Last ACT 6/1/18: 25  See encounters dated 10/16/19 and 2/10/20. Patient wanted asthma removed from Health Maintenance.   Patient had recent ED visit for influenza.

## 2020-03-03 ENCOUNTER — TRANSFERRED RECORDS (OUTPATIENT)
Dept: HEALTH INFORMATION MANAGEMENT | Facility: CLINIC | Age: 45
End: 2020-03-03

## 2020-03-23 ENCOUNTER — TRANSFERRED RECORDS (OUTPATIENT)
Dept: HEALTH INFORMATION MANAGEMENT | Facility: CLINIC | Age: 45
End: 2020-03-23

## 2020-04-03 DIAGNOSIS — F41.1 GENERALIZED ANXIETY DISORDER: Chronic | ICD-10-CM

## 2020-04-03 DIAGNOSIS — F33.41 MAJOR DEPRESSIVE DISORDER, RECURRENT EPISODE, IN PARTIAL REMISSION (H): ICD-10-CM

## 2020-04-03 RX ORDER — SERTRALINE HYDROCHLORIDE 100 MG/1
TABLET, FILM COATED ORAL
Qty: 180 TABLET | Refills: 0 | Status: SHIPPED | OUTPATIENT
Start: 2020-04-03 | End: 2020-07-06

## 2020-04-03 NOTE — TELEPHONE ENCOUNTER
The pt is aware and scheduled for her upcoming appointment.   Ignacia Norwood on 4/3/2020 at 4:15 PM

## 2020-04-06 ENCOUNTER — TRANSFERRED RECORDS (OUTPATIENT)
Dept: HEALTH INFORMATION MANAGEMENT | Facility: CLINIC | Age: 45
End: 2020-04-06

## 2020-04-17 ENCOUNTER — TRANSFERRED RECORDS (OUTPATIENT)
Dept: HEALTH INFORMATION MANAGEMENT | Facility: CLINIC | Age: 45
End: 2020-04-17

## 2020-05-27 ENCOUNTER — NURSE TRIAGE (OUTPATIENT)
Dept: PEDIATRICS | Facility: CLINIC | Age: 45
End: 2020-05-27

## 2020-05-27 NOTE — TELEPHONE ENCOUNTER
"From [employer] message. Call placed for more information.     Please advise if visit is needed.     Encouraged patient drink the recommended daily dose of water. Increased hydration would be beneficial.   Patient states she does not stay well hydrated.     Patient expressed understanding and acceptance of the plan and had no further questions at this time.  Advised can call back to clinic at any time with concerns.       Additional Information    Taking a medicine that could cause dizziness (e.g., blood pressure medications, diuretics)    Answer Assessment - Initial Assessment Questions  1. DESCRIPTION: \"Describe your lightheaded/dizziness.\"      It is once or twice a day when I get up from office chair to stand.   Doesn't happen when lying to sitting.   2. LIGHTHEADED: \"Do you feel lightheaded?\" (e.g., somewhat faint, woozy, weak upon standing)   Some what faint.   3. VERTIGO: \"Do you feel like either you or the room is spinning or tilting?\" (i.e. vertigo)  YES  4. SEVERITY: \"How bad is it?\"  Mild  \"Do you feel like you are going to faint?\" yes  \"Can you stand and walk?\" Yes  5. ONSET:  \"When did the dizziness begin?\"      I'm not really sure. I few days/week.   6. AGGRAVATING FACTORS: \"Does anything make it worse?\" (e.g., standing, change in head position)     Standing from sitting   7. HEART RATE: \"Can you tell me your heart rate?\" \"How many beats in 15 seconds?\"  (Note: not all patients can do this)        No   8. CAUSE: \"What do you think is causing the dizziness?\"  No idea.   9. RECURRENT SYMPTOM: \"Have you had dizziness before?\" If so, ask: \"When was the last time?\" \"What happened that time?\"  NO   10. OTHER SYMPTOMS: \"Do you have any other symptoms?\" (e.g., fever, chest pain, vomiting, diarrhea, bleeding)      No   11. PREGNANCY: \"Is there any chance you are pregnant?\" \"When was your last menstrual period?\"   No    Protocols used: DIZZINESS-A-OH    Marlene Daniel RN Flex    "

## 2020-06-02 NOTE — PROGRESS NOTES
"Janelle Coelho is a 45 year old female who is being evaluated via a billable video visit.      The patient has been notified of following:     \"This video visit will be conducted via a call between you and your physician/provider. We have found that certain health care needs can be provided without the need for an in-person physical exam.  This service lets us provide the care you need with a video conversation.  If a prescription is necessary we can send it directly to your pharmacy.  If lab work is needed we can place an order for that and you can then stop by our lab to have the test done at a later time.    Video visits are billed at different rates depending on your insurance coverage.  Please reach out to your insurance provider with any questions.    If during the course of the call the physician/provider feels a video visit is not appropriate, you will not be charged for this service.\"    Patient has given verbal consent for Video visit? Yes    How would you like to obtain your AVS? [employer]    Patient would like the video invitation sent by: Text to cell phone: [phone]    Will anyone else be joining your video visit? No      Subjective     Janelle Coelho is a 45 year old female who presents today via video visit for the following health issues:    HPI  Dizziness/note to work from home due to symptoms      Duration: few weeks ago, comes and goes    Description   Feeling faint:  YES- lightheaded and need to stand still before moving forward until it passes  Feeling like the surroundings are moving: no   Loss of consciousness or falls: no     Intensity:  mild    Accompanying signs and symptoms:   Nausea/vomitting: no   Palpitations: no   Weakness in arms or legs: no   Vision or speech changes: no   Ringing in ears (Tinnitus): no   Hearing loss related to dizziness: no   Other (fevers/chills/sweating/dyspnea): no     History (similar episodes/head trauma/previous evaluation/recent bleeding): Maybe has " happened in the past, no hx of head trauma    Precipitating or alleviating factors (new meds/chemicals): not drinking enough water  Worse with activity/head movement: no     Therapies tried and outcome: Increasing fluids no dizziness since this.     She has been on predisone by derm for rashes since thanksgiving. She is also taking zyrtec and hydroxyzine. She has been on remicade and previoulsy received a letter to work from home due to history of intermitent asthma and history of crohns. She would like a work accomodation letter to allower her to work from home. She works in [employer].     Also, she has dizziness intermittently only with standing up, started 2 wks ago or so. Not occurs every time. Not associated with medications. Denies visual changes or headaches, no falls. She does have a desk space and chair. Her last hgb 6 wks.     Video Start Time: 3:20 PM    -------------------------------------    Patient Active Problem List   Diagnosis     Acute posthemorrhagic anemia     ASCUS with positive high risk HPV     Obesity     Excessive or frequent menstruation     Generalized anxiety disorder     Major depressive disorder, recurrent episode, in partial remission (H)     CARDIOVASCULAR SCREENING; LDL GOAL LESS THAN 100     Tobacco abuse     Infected cyst of Bartholin's gland duct     Mild intermittent asthma, unspecified whether complicated     History of Heraclio-en-Y gastric bypass     Insomnia, unspecified type     Family history of breast cancer in mother     Vitamin D deficiency     Crohn's disease of large intestine with complication (H)     Hives     Past Surgical History:   Procedure Laterality Date     BIOPSY      Breast cycst (done at [place]) many years ago     CHOLECYSTECTOMY  late 90's    during gastric bypass     COLONOSCOPY  Jan/Feb 2018, Feb 2019    Confirmed Crohn's Disease     EXCIS BARTHOLIN GLAND/CYST  2016     GASTRIC BYPASS  2000    with cholecystectomy and subsequent revision gastric bypass      "GASTRIC BYPASS  2006    revision of gastric bypass     HERNIA REPAIR  16    [facility]  [address]     wisdom teeth extraction         Social History     Tobacco Use     Smoking status: Former Smoker     Packs/day: 0.20     Years: 10.00     Pack years: 2.00     Types: Cigarettes     Start date: 2001     Last attempt to quit: 2018     Years since quittin.5     Smokeless tobacco: Never Used     Tobacco comment: does not want to quit at this time   Substance Use Topics     Alcohol use: Yes     Alcohol/week: 0.0 standard drinks     Comment: very rarely     Family History   Problem Relation Age of Onset     Breast Cancer Mother      Diabetes Mother      Depression Mother      Obesity Mother      Prostate Cancer Father      Breast Cancer Maternal Grandmother      Colon Cancer Maternal Grandmother      Prostate Cancer Maternal Grandfather      Breast Cancer Paternal Grandmother      Diabetes Brother      Obesity Brother      Family History Negative No family hx of            Reviewed and updated as needed this visit by Provider  Meds         Review of Systems   Constitutional, HEENT, cardiovascular, pulmonary, gi and gu systems are negative, except as otherwise noted.      Objective    There were no vitals taken for this visit.  Estimated body mass index is 50.6 kg/m  as calculated from the following:    Height as of 19: 1.556 m (5' 1.25\").    Weight as of 19: 122.5 kg (270 lb).  Physical Exam     GENERAL: Healthy, alert and no distress  EYES: Eyes grossly normal to inspection.  No discharge or erythema, or obvious scleral/conjunctival abnormalities.  RESP: No audible wheeze, cough, or visible cyanosis.  No visible retractions or increased work of breathing.    SKIN: Visible skin clear. No significant rash, abnormal pigmentation or lesions.  NEURO: Cranial nerves grossly intact.  Mentation and speech appropriate for age.  PSYCH: Mentation appears normal, affect normal/bright, " judgement and insight intact, normal speech and appearance well-groomed.            Assessment & Plan     1. Crohn's disease of large intestine with complication (H)  Wanted a letter to allow her to continue to work from home due to immunocompromised state. Signed [employer] form letter.     2. Mild intermittent asthma, unspecified whether complicated      3. Hives      4. Generalized anxiety disorder  She notes her symptoms are stable at this time    5. Dizziness  Discussed potential orthostatic hypotention - could consider history of anemia and doing labs. Orders placed, encouraged pushignf luids and monitoring.   - Vitamin D Deficiency; Future  - **TSH with free T4 reflex FUTURE anytime; Future  - Iron and iron binding capacity; Future  - **CBC with platelets FUTURE anytime; Future             Return in about 4 months (around 10/4/2020) for Annual Preventative Exam.    ISA Tavera CNP  [facility] [place]      Video-Visit Details    Type of service:  Video Visit    Video End Time:3:34 PM    Originating Location (pt. Location): Home    [facility]     Platform used for Video Visit: AmWell    Return in about 4 months (around 10/4/2020) for Annual Preventative Exam.       ISA Tavera CNP

## 2020-06-04 ENCOUNTER — VIRTUAL VISIT (OUTPATIENT)
Dept: PEDIATRICS | Facility: CLINIC | Age: 45
End: 2020-06-04
Payer: COMMERCIAL

## 2020-06-04 DIAGNOSIS — R42 DIZZINESS: ICD-10-CM

## 2020-06-04 DIAGNOSIS — J45.20 MILD INTERMITTENT ASTHMA, UNSPECIFIED WHETHER COMPLICATED: ICD-10-CM

## 2020-06-04 DIAGNOSIS — F41.1 GENERALIZED ANXIETY DISORDER: ICD-10-CM

## 2020-06-04 DIAGNOSIS — K50.119 CROHN'S DISEASE OF LARGE INTESTINE WITH COMPLICATION (H): Primary | ICD-10-CM

## 2020-06-04 DIAGNOSIS — L50.9 HIVES: ICD-10-CM

## 2020-06-04 PROCEDURE — 99214 OFFICE O/P EST MOD 30 MIN: CPT | Mod: GT | Performed by: NURSE PRACTITIONER

## 2020-06-04 RX ORDER — HYDROXYZINE HYDROCHLORIDE 50 MG/1
50 TABLET, FILM COATED ORAL 3 TIMES DAILY PRN
COMMUNITY
End: 2021-08-21

## 2020-06-04 ASSESSMENT — PATIENT HEALTH QUESTIONNAIRE - PHQ9
SUM OF ALL RESPONSES TO PHQ QUESTIONS 1-9: 0
5. POOR APPETITE OR OVEREATING: NOT AT ALL

## 2020-06-04 ASSESSMENT — ANXIETY QUESTIONNAIRES
1. FEELING NERVOUS, ANXIOUS, OR ON EDGE: NOT AT ALL
GAD7 TOTAL SCORE: 1
5. BEING SO RESTLESS THAT IT IS HARD TO SIT STILL: NOT AT ALL
IF YOU CHECKED OFF ANY PROBLEMS ON THIS QUESTIONNAIRE, HOW DIFFICULT HAVE THESE PROBLEMS MADE IT FOR YOU TO DO YOUR WORK, TAKE CARE OF THINGS AT HOME, OR GET ALONG WITH OTHER PEOPLE: NOT DIFFICULT AT ALL
2. NOT BEING ABLE TO STOP OR CONTROL WORRYING: NOT AT ALL
7. FEELING AFRAID AS IF SOMETHING AWFUL MIGHT HAPPEN: NOT AT ALL
3. WORRYING TOO MUCH ABOUT DIFFERENT THINGS: NOT AT ALL
6. BECOMING EASILY ANNOYED OR IRRITABLE: SEVERAL DAYS

## 2020-06-04 NOTE — LETTER
[facility]  [address]  [address]  [place] [id number] [phone]        June 4, 2020      Janelle Coelho  [address] [place] [id number]      Dear Janelle,    As your health care provider, I am concerned that your age and/or underlying medical condition and medications puts you at particularly high risk for serious complications should you contract a COVID-19 infection.     Specifically, you have the following conditions/diagnoses, included as high risk conditions per the Centers for Disease Control and Prevention at CDC.gov.     COVID-19 is a new disease and there is limited information regarding risk factors for severe disease. Based on currently available information and clinical expertise, older adults and people of any age who have serious underlying medical conditions might be at higher risk for severe illness from COVID-19.     It is my medical opinion that you should avoid close contact with others and work from home if possible during this COVID-19 pandemic.           ISA Tavera [facility]

## 2020-06-05 ASSESSMENT — ANXIETY QUESTIONNAIRES: GAD7 TOTAL SCORE: 1

## 2020-06-05 ASSESSMENT — ASTHMA QUESTIONNAIRES: ACT_TOTALSCORE: 25

## 2020-06-09 DIAGNOSIS — R42 DIZZINESS: ICD-10-CM

## 2020-06-09 DIAGNOSIS — E55.9 VITAMIN D DEFICIENCY: Primary | ICD-10-CM

## 2020-06-09 LAB
ERYTHROCYTE [DISTWIDTH] IN BLOOD BY AUTOMATED COUNT: 13.4 % (ref 10–15)
HCT VFR BLD AUTO: 38.1 % (ref 35–47)
HGB BLD-MCNC: 12.2 G/DL (ref 11.7–15.7)
MCH RBC QN AUTO: 28.1 PG (ref 26.5–33)
MCHC RBC AUTO-ENTMCNC: 32 G/DL (ref 31.5–36.5)
MCV RBC AUTO: 88 FL (ref 78–100)
PLATELET # BLD AUTO: 270 10E9/L (ref 150–450)
RBC # BLD AUTO: 4.34 10E12/L (ref 3.8–5.2)
WBC # BLD AUTO: 5.8 10E9/L (ref 4–11)

## 2020-06-09 PROCEDURE — 84443 ASSAY THYROID STIM HORMONE: CPT | Performed by: NURSE PRACTITIONER

## 2020-06-09 PROCEDURE — 83550 IRON BINDING TEST: CPT | Performed by: NURSE PRACTITIONER

## 2020-06-09 PROCEDURE — 36415 COLL VENOUS BLD VENIPUNCTURE: CPT | Performed by: NURSE PRACTITIONER

## 2020-06-09 PROCEDURE — 83540 ASSAY OF IRON: CPT | Performed by: NURSE PRACTITIONER

## 2020-06-09 PROCEDURE — 82306 VITAMIN D 25 HYDROXY: CPT | Performed by: NURSE PRACTITIONER

## 2020-06-09 PROCEDURE — 85027 COMPLETE CBC AUTOMATED: CPT | Performed by: NURSE PRACTITIONER

## 2020-06-10 LAB
DEPRECATED CALCIDIOL+CALCIFEROL SERPL-MC: 10 UG/L (ref 20–75)
IRON SATN MFR SERPL: 17 % (ref 15–46)
IRON SERPL-MCNC: 58 UG/DL (ref 35–180)
TIBC SERPL-MCNC: 335 UG/DL (ref 240–430)
TSH SERPL DL<=0.005 MIU/L-ACNC: 1.82 MU/L (ref 0.4–4)

## 2020-06-11 ENCOUNTER — TELEPHONE (OUTPATIENT)
Dept: PEDIATRICS | Facility: CLINIC | Age: 45
End: 2020-06-11

## 2020-06-11 DIAGNOSIS — E55.9 VITAMIN D DEFICIENCY: Primary | ICD-10-CM

## 2020-06-11 RX ORDER — ERGOCALCIFEROL 1.25 MG/1
50000 CAPSULE, LIQUID FILLED ORAL
Qty: 8 CAPSULE | Refills: 0 | Status: SHIPPED | OUTPATIENT
Start: 2020-06-11 | End: 2020-07-31

## 2020-06-11 RX ORDER — CHOLECALCIFEROL (VITAMIN D3) 50 MCG
1 TABLET ORAL DAILY
Qty: 90 TABLET | Refills: 3 | Status: SHIPPED | OUTPATIENT
Start: 2020-06-11 | End: 2021-06-15

## 2020-06-11 NOTE — TELEPHONE ENCOUNTER
Spoke with patient.  Relayed message from provider.    Provider:  Pt states she has not been taking any vit D.  She thought it was stopped, pt is unsure when or by who.  Lab appt Aug 7th at noon.    MA/TONG: Please call pt to reschedule her 7/27 appt to after her 8/7 lab appt    Thank you  Alex Melton RN on 6/11/2020 at 9:36 AM

## 2020-06-11 NOTE — TELEPHONE ENCOUNTER
Ok.     So she knows to take the high dose then get labs checked. Thank you.    Please also let her know I've sent in a prescription for 2000iu DAILY. This is to be started AFTER she runs out of the high dose WEEKLY therapy.     She will likely need to be on this forever to keep levels up.

## 2020-06-11 NOTE — TELEPHONE ENCOUNTER
Patient returned call. Patient will take the high dose Vitamin D supplement and follow up with lab work. Renae Barriga RN on 6/11/2020 at 12:07 PM

## 2020-06-11 NOTE — TELEPHONE ENCOUNTER
Informed pt of message below verbatim. Pt verbalized understanding.    Also scheduled pt for phone visit for follow up with PCP after the lab appt on 8/20/2020.    Encouraged pt to reach out with further questions or concerns. Pt agreeable to plan.    Reed Whitfield, EMT at 10:04 AM on June 11, 2020   [facility] Health Guide   [phone]

## 2020-06-11 NOTE — TELEPHONE ENCOUNTER
Result Message from provider:  Flor Sabillon, ISA CNP  P Ea Triage               Please inquire with patient.     Vitamin D is always profoundly low, even after high dose supp. Does she take a lower dose maintenance therapy in between high dose regimens? If so, how much and how consistent is she?     I'd like her to take the high dose therapy then rechech at exactly 8 weeks (no longer). Pls help her schedule lab visit.     Flor Ingram, YOLANDE-BRANDON.        Call placed to pt.  for pt to return call to the clinic    Thank you  Alex Melton RN on 6/11/2020 at 8:44 AM

## 2020-06-12 ENCOUNTER — TRANSFERRED RECORDS (OUTPATIENT)
Dept: HEALTH INFORMATION MANAGEMENT | Facility: CLINIC | Age: 45
End: 2020-06-12

## 2020-06-12 LAB
ALT SERPL-CCNC: 30 U/L (ref 0–78)
AST SERPL-CCNC: 21 U/L (ref 0–37)

## 2020-07-03 DIAGNOSIS — F33.41 MAJOR DEPRESSIVE DISORDER, RECURRENT EPISODE, IN PARTIAL REMISSION (H): ICD-10-CM

## 2020-07-03 DIAGNOSIS — F41.1 GENERALIZED ANXIETY DISORDER: Chronic | ICD-10-CM

## 2020-07-06 RX ORDER — SERTRALINE HYDROCHLORIDE 100 MG/1
TABLET, FILM COATED ORAL
Qty: 180 TABLET | Refills: 1 | Status: SHIPPED | OUTPATIENT
Start: 2020-07-06 | End: 2021-04-11

## 2020-07-06 NOTE — TELEPHONE ENCOUNTER
Routing refill request to provider for review/approval because:  Drug interaction warning    Renae Barriga RN on 7/6/2020 at 8:51 AM

## 2020-07-31 ENCOUNTER — TRANSFERRED RECORDS (OUTPATIENT)
Dept: HEALTH INFORMATION MANAGEMENT | Facility: CLINIC | Age: 45
End: 2020-07-31

## 2020-08-07 DIAGNOSIS — E55.9 VITAMIN D DEFICIENCY: ICD-10-CM

## 2020-08-07 PROCEDURE — 36415 COLL VENOUS BLD VENIPUNCTURE: CPT | Performed by: NURSE PRACTITIONER

## 2020-08-07 PROCEDURE — 82306 VITAMIN D 25 HYDROXY: CPT | Performed by: NURSE PRACTITIONER

## 2020-08-10 LAB — DEPRECATED CALCIDIOL+CALCIFEROL SERPL-MC: 19 UG/L (ref 20–75)

## 2020-08-17 NOTE — PROGRESS NOTES
Pre-Visit Planning     Future Appointments   Date Time Provider Department Center   8/20/2020  8:20 AM Ivett Sy APRN CNP [facility]   10/6/2020  3:00 PM Randy Priest PA-C [facility]     Arrival Time for this Appointment:  8:20 AM   Appointment Notes for this encounter:   Med Ck    Questionnaires Reviewed/Assigned  No additional questionnaires are needed     Patient preferred phone number: [phone]    Unable to reach. Left voicemail. Advised patient to call clinic back at [phone] with any questions, concerns, or if they need to reschedule.    Vitamin D Deficiency Screening Results:  Lab Results   Component Value Date    VITDT 19 (L) 08/07/2020    VITDT 10 (L) 06/09/2020    VITDT 16 (L) 07/08/2019    VITDT 16 (L) 06/15/2018    VITDT 33 10/10/2017     Reed Whitfield, EMT at 3:48 PM on August 17, 2020   Clinic Health Guide   [phone]

## 2020-08-17 NOTE — PROGRESS NOTES
"Janelle Coelho is a 45 year old female who is being evaluated via a billable video visit.      The patient has been notified of following:     \"This video visit will be conducted via a call between you and your physician/provider. We have found that certain health care needs can be provided without the need for an in-person physical exam.  This service lets us provide the care you need with a video conversation.  If a prescription is necessary we can send it directly to your pharmacy.  If lab work is needed we can place an order for that and you can then stop by our lab to have the test done at a later time.    Video visits are billed at different rates depending on your insurance coverage.  Please reach out to your insurance provider with any questions.    If during the course of the call the physician/provider feels a video visit is not appropriate, you will not be charged for this service.\"    Patient has given verbal consent for Video visit? Yes  How would you like to obtain your AVS? MyChart  If you are dropped from the video visit, the video invite should be resent to: Send to e-mail at: [email or web address]  Will anyone else be joining your video visit? No      Subjective     Janelle Coelho is a 45 year old female who presents today via video visit for the following health issues:    History of Present Illness        She eats 2-3 servings of fruits and vegetables daily.She consumes 0 sweetened beverage(s) daily.She exercises with enough effort to increase her heart rate 9 or less minutes per day.  She exercises with enough effort to increase her heart rate 3 or less days per week. She is missing 1 dose(s) of medications per week.  She is not taking prescribed medications regularly due to remembering to take.    Medication Followup of vitamin D3 (CHOLECALCIFEROL) 2000 units (50 mcg) tablet     Taking Medication as prescribed: yes, has increased prescription to 4000 units per day for about 10 days    Side " Effects:  None    Medication Helping Symptoms:  yes     Rash  Onset/Duration: about a year  Description  Location: Groin area  Character: heat rash  Itching: mild  Intensity:  mild  Progression of Symptoms: getting better  Accompanying signs and symptoms:   Fever: no  Body aches or joint pain: no  Sore throat symptoms: no  Recent cold symptoms: no  History:           Previous episodes of similar rash: Heat rashes before  New exposures:  None  Recent travel: no  Exposure to similar rash: no  Precipitating or alleviating factors: None  Therapies tried and outcome: finished a course of prednisone which was therapeutic but starting to come back. Wanting to know if prescription to help with this    Video Start Time: 8:45 AM    H xof vit d def, has moved fro HD vit d supplement to 4,000 IUs started 2 wks ago. Will schedule lab only in 2 month.     History of hives, she did a course of prednisone that did help with rash, took last dose 2 days ago, and she noticing an increse in itchivg. She is taking hydroxyzine at bedtime, and taking zyrtec in the morning every day. Has tried allegra in the past. Derm thought it could be due to crohns or allergic reaction to remicade (and it was switched).     Patient Active Problem List   Diagnosis     Acute posthemorrhagic anemia     ASCUS with positive high risk HPV     Obesity     Excessive or frequent menstruation     Generalized anxiety disorder     Major depressive disorder, recurrent episode, in partial remission (H)     CARDIOVASCULAR SCREENING; LDL GOAL LESS THAN 100     Tobacco abuse     Infected cyst of Bartholin's gland duct     Mild intermittent asthma, unspecified whether complicated     History of Heraclio-en-Y gastric bypass     Insomnia, unspecified type     Family history of breast cancer in mother     Vitamin D deficiency     Crohn's disease of large intestine with complication (H)     Hives     Past Surgical History:   Procedure Laterality Date     BIOPSY      Breast  cycst (done at [facility]) many years ago     CHOLECYSTECTOMY  late     during gastric bypass     COLONOSCOPY  2018, 2019    Confirmed Crohn's Disease     EXCIS BARTHOLIN GLAND/CYST  2016     GASTRIC BYPASS  2000    with cholecystectomy and subsequent revision gastric bypass     GASTRIC BYPASS  2006    revision of gastric bypass     HERNIA REPAIR  16    SYMONE Zamora     wisdom teeth extraction         Social History     Tobacco Use     Smoking status: Former Smoker     Packs/day: 0.20     Years: 10.00     Pack years: 2.00     Types: Cigarettes     Start date: 2001     Last attempt to quit: 2018     Years since quittin.7     Smokeless tobacco: Never Used     Tobacco comment: does not want to quit at this time   Substance Use Topics     Alcohol use: Yes     Alcohol/week: 0.0 standard drinks     Comment: very rarely     Family History   Problem Relation Age of Onset     Breast Cancer Mother      Diabetes Mother      Depression Mother      Obesity Mother      Prostate Cancer Father      Breast Cancer Maternal Grandmother      Colon Cancer Maternal Grandmother      Prostate Cancer Maternal Grandfather      Breast Cancer Paternal Grandmother      Diabetes Brother      Obesity Brother      Family History Negative No family hx of            Reviewed and updated as needed this visit by Provider         Review of Systems   Constitutional, HEENT, cardiovascular, pulmonary, GI, , musculoskeletal, neuro, skin, endocrine and psych systems are negative, except as otherwise noted.      Objective           Vitals:  No vitals were obtained today due to virtual visit.    Physical Exam     GENERAL: Healthy, alert and no distress  EYES: Eyes grossly normal to inspection.  No discharge or erythema, or obvious scleral/conjunctival abnormalities.  RESP: No audible wheeze, cough, or visible cyanosis.  No visible retractions or increased work of breathing.    SKIN: Visible skin clear.  No significant rash, abnormal pigmentation or lesions.  NEURO: Cranial nerves grossly intact.  Mentation and speech appropriate for age.  PSYCH: Mentation appears normal, affect normal/bright, judgement and insight intact, normal speech and appearance well-groomed.            Assessment & Plan     Vitamin D deficiency  Due for labs in 2 month, will schedule lab only. Has switched form HD supplement to a lower dose. Continues to have some fatigue, unsure if it's related.     Hives  Has been seeing derma for this, has tried antihistamines. Admits allergis is a part of this, will add signular    Excessive or frequent menstruation  Well controlled  - levonorgestrel-ethinyl estradiol (QUASENSE) 0.15-0.03 MG tablet; Take 1 tablet by mouth daily         No follow-ups on file.    ISA Tavera CNP  [facility]      Video-Visit Details    Type of service:  Video Visit    Video End Time:902    Originating Location (pt. Location): Home    Distant Location (provider location):  [facility]     Platform used for Video Visit: Sona

## 2020-08-20 ENCOUNTER — VIRTUAL VISIT (OUTPATIENT)
Dept: PEDIATRICS | Facility: CLINIC | Age: 45
End: 2020-08-20
Payer: COMMERCIAL

## 2020-08-20 DIAGNOSIS — L50.9 HIVES: ICD-10-CM

## 2020-08-20 DIAGNOSIS — N92.0 EXCESSIVE OR FREQUENT MENSTRUATION: ICD-10-CM

## 2020-08-20 DIAGNOSIS — E55.9 VITAMIN D DEFICIENCY: Primary | ICD-10-CM

## 2020-08-20 PROCEDURE — 99214 OFFICE O/P EST MOD 30 MIN: CPT | Mod: GT | Performed by: NURSE PRACTITIONER

## 2020-08-20 RX ORDER — LEVONORGESTREL AND ETHINYL ESTRADIOL 0.15-0.03
1 KIT ORAL DAILY
Qty: 91 TABLET | Refills: 3 | Status: SHIPPED | OUTPATIENT
Start: 2020-08-20 | End: 2021-09-14

## 2020-09-29 ENCOUNTER — TRANSFERRED RECORDS (OUTPATIENT)
Dept: HEALTH INFORMATION MANAGEMENT | Facility: CLINIC | Age: 45
End: 2020-09-29

## 2020-10-07 ENCOUNTER — OFFICE VISIT (OUTPATIENT)
Dept: PEDIATRICS | Facility: CLINIC | Age: 45
End: 2020-10-07
Payer: COMMERCIAL

## 2020-10-07 VITALS
WEIGHT: 272 LBS | TEMPERATURE: 98.3 F | OXYGEN SATURATION: 96 % | RESPIRATION RATE: 16 BRPM | HEIGHT: 61 IN | HEART RATE: 69 BPM | SYSTOLIC BLOOD PRESSURE: 122 MMHG | BODY MASS INDEX: 51.35 KG/M2 | DIASTOLIC BLOOD PRESSURE: 78 MMHG

## 2020-10-07 DIAGNOSIS — N82.3 RECTAL VAGINAL FISTULA: ICD-10-CM

## 2020-10-07 DIAGNOSIS — N88.8 FRIABLE CERVIX: ICD-10-CM

## 2020-10-07 DIAGNOSIS — B37.2 YEAST DERMATITIS: ICD-10-CM

## 2020-10-07 DIAGNOSIS — Z12.4 SCREENING FOR MALIGNANT NEOPLASM OF CERVIX: Primary | ICD-10-CM

## 2020-10-07 DIAGNOSIS — K50.119 CROHN'S DISEASE OF LARGE INTESTINE WITH COMPLICATION (H): ICD-10-CM

## 2020-10-07 PROCEDURE — 87624 HPV HI-RISK TYP POOLED RSLT: CPT | Performed by: NURSE PRACTITIONER

## 2020-10-07 PROCEDURE — 99213 OFFICE O/P EST LOW 20 MIN: CPT | Performed by: NURSE PRACTITIONER

## 2020-10-07 PROCEDURE — G0145 SCR C/V CYTO,THINLAYER,RESCR: HCPCS | Performed by: NURSE PRACTITIONER

## 2020-10-07 RX ORDER — TRIAMCINOLONE ACETONIDE 1 MG/G
CREAM TOPICAL 2 TIMES DAILY
Qty: 30 G | Refills: 0 | Status: SHIPPED | OUTPATIENT
Start: 2020-10-07 | End: 2021-02-08

## 2020-10-07 RX ORDER — NYSTATIN 100000 U/G
CREAM TOPICAL 2 TIMES DAILY
Qty: 30 G | Refills: 0 | Status: SHIPPED | OUTPATIENT
Start: 2020-10-07 | End: 2021-02-08

## 2020-10-07 ASSESSMENT — MIFFLIN-ST. JEOR: SCORE: 1820.12

## 2020-10-07 NOTE — PATIENT INSTRUCTIONS
Patient Education     Why Have a Pap Test?  Early on, cervical changes don't cause symptoms. Often, the only way to know you have cervical changes is to do a Pap test. A Pap test can find these problems early, when they are easier to treat. Pap tests can also detect some infections of the cervix and vagina.  What is a Pap test?  A Pap test is a procedure that helps find changes in the cervix that may lead to cancer. The cervix is the part of the uterus that opens into the vagina. For this test, a small sample of cells is taken from the cervix. This is done in your healthcare provider s office. The cells are then analyzed in a lab. A Pap test is a safe procedure. It takes just a few minutes and causes little or no discomfort.  The HPV connection  Human papillomavirus or HPV is a family of viruses that spread through skin contact. Certain types are almost always spread through sexual contact. Some HPV types cause genital warts (condyloma). But not all types of HPV cause visible symptoms. Certain types cause cell changes (dysplasia) in the cervix that can lead to cancer. In fact, HPV infection is the most important risk factor for cervical cancer. Healthcare providers can now test for HPV. Testing for HPV is often done with the Pap test. That s why it s important to have Pap tests as recommended by your healthcare provider. This helps ensure that any abnormal cells will be found and treated before they become cancerous.  Who should have a Pap test and HPV test?  Ask your healthcare provider when to start having Pap tests, whether you should have an HPV test done at the same time, and how often to have them. Follow these guidelines from the American Cancer Society for cervical cancer screening:    A first Pap test at age 21. And then every 3 years until age 29, HPV testing is not recommended during this time, though it may be done to follow-up on an abnormal Pap test.    Starting at age 30, the preferred testing is a  Pap test done with an HPV test every 5 years. This should be done until age 65. Another option for women in this 30 to 65 age group is to have just the Pap test done every 3 years.    You may need a different screening schedule if you are at high risk for cervical cancer. Risk factors include having HIV, a weak immune system, or exposure to the medicine CHANO while your mother was pregnant with you. Talk with your healthcare provider about the best schedule for you.    If you re over 65 and have had regular screenings for the last 10 years with no abnormal results in the last 20 years, you may stop cervical cancer screening.    If you had a hysterectomy that included removing your cervix, you can stop screening unless the hysterectomy was done to treat cervical cancer or pre-cancer. If you still have your cervix after the hysterectomy, you should continue screening according to the above guidelines.    Routine testing does not need to be done each year. However, if your test is abnormal, your provider will let you know how often to be tested.     Women who have been vaccinated for HPV should still follow these guidelines.    If you have had cervical cancer, talk to your healthcare provider about the screening plan that's best for you.  Date Last Reviewed: 9/1/2017 2000-2019 [employer]. [address] [place] [address], [place] [address]. All rights reserved. This information is not intended as a substitute for professional medical care. Always follow your healthcare professional's instructions.

## 2020-10-07 NOTE — PROGRESS NOTES
"Subjective     Janelle Coelho is a 45 year old female who presents to clinic today for the following health issues:    HPI         Patient coming in for pap only.  Last pap was 10/2015: cytology and HPV negative.      Rash  Would like prescription  Onset/Duration: for months.  Has 2 separate rashes.  Description  Location: 1) groin area  2) under skin folds in stomach and breasts.  Character: blotchy  Itching: moderate  Intensity:  moderate  Progression of Symptoms:  same  Accompanying signs and symptoms:   Fever: no  Body aches or joint pain: no  Sore throat symptoms: no  Recent cold symptoms: no  History:           Previous episodes of similar rash: YES  New exposures:  None  Recent travel: no  Exposure to similar rash: no  Precipitating or alleviating factors: nONE  Therapies tried and outcome: OTC creams not helping.  Rash in groin specialist is aware as this could be due to medication for treating Crohn's.    Patient has a rectal fistula that is also being addressed by specialist.    Review of Systems   Constitutional, HEENT, cardiovascular, pulmonary, gi and gu systems are negative, except as otherwise noted.      Objective    /78 (BP Location: Right arm, Patient Position: Chair, Cuff Size: Adult Large)   Pulse 69   Temp 98.3  F (36.8  C) (Tympanic)   Resp 16   Ht 1.556 m (5' 1.25\")   Wt 123.4 kg (272 lb)   LMP  (Approximate)   SpO2 96%   Breastfeeding No   BMI 50.98 kg/m    Body mass index is 50.98 kg/m .       Physical Exam   GENERAL: healthy, alert and no distress  RESP: lungs clear to auscultation - no rales, rhonchi or wheezes  CV: regular rate and rhythm, normal S1 S2, no S3 or S4, no murmur, click or rub, no peripheral edema and peripheral pulses strong   (female): normal female external genitalia, normal urethral meatus, vaginal mucosa, normal cervix/adnexa/uterus without masses or discharge  SKIN: no suspicious lesions or rashes and maculopapular eruption - abdomen, breast and " "groin  PSYCH: mentation appears normal, affect normal/bright        Assessment & Plan     1. Screening for malignant neoplasm of cervix  Pap performed  - REVIEW OF HEALTH MAINTENANCE PROTOCOL ORDERS  - Pap imaged thin layer screen with HPV - recommended age 30 - 65 years (select HPV order below)  - HPV High Risk Types DNA Cervical    2. Yeast dermatitis  Will try topical medications if not oral has worked in the past  - nystatin (MYCOSTATIN) 161348 UNIT/GM external cream; Apply topically 2 times daily  Dispense: 30 g; Refill: 0  - triamcinolone (KENALOG) 0.1 % external cream; Apply topically 2 times daily  Dispense: 30 g; Refill: 0    3. Rectal vaginal fistula  Continue following up with specialist.    4. Friable cervix  Pap performed and sent for analysis.    5. Crohn's disease of large intestine with complication (H)  Continue seeing GI.       BMI:   Estimated body mass index is 50.98 kg/m  as calculated from the following:    Height as of this encounter: 1.556 m (5' 1.25\").    Weight as of this encounter: 123.4 kg (272 lb).   Weight management plan: Discussed healthy diet and exercise guidelines       POC  See Patient Instructions  Return in about 3 months (around 1/7/2021) for Routine preventive.    Madelin Ellington NP  [facility]    "

## 2020-10-13 LAB
COPATH REPORT: NORMAL
PAP: NORMAL

## 2020-10-14 LAB
FINAL DIAGNOSIS: NORMAL
HPV HR 12 DNA CVX QL NAA+PROBE: NEGATIVE
HPV16 DNA SPEC QL NAA+PROBE: NEGATIVE
HPV18 DNA SPEC QL NAA+PROBE: NEGATIVE
SPECIMEN DESCRIPTION: NORMAL
SPECIMEN SOURCE CVX/VAG CYTO: NORMAL

## 2020-11-12 ENCOUNTER — TRANSFERRED RECORDS (OUTPATIENT)
Dept: HEALTH INFORMATION MANAGEMENT | Facility: CLINIC | Age: 45
End: 2020-11-12

## 2020-11-20 ENCOUNTER — TRANSFERRED RECORDS (OUTPATIENT)
Dept: HEALTH INFORMATION MANAGEMENT | Facility: CLINIC | Age: 45
End: 2020-11-20

## 2020-11-20 LAB
ALT SERPL-CCNC: 17 U/L (ref 0–78)
AST SERPL-CCNC: 14 U/L (ref 0–37)

## 2020-12-01 ENCOUNTER — TRANSFERRED RECORDS (OUTPATIENT)
Dept: HEALTH INFORMATION MANAGEMENT | Facility: CLINIC | Age: 45
End: 2020-12-01

## 2020-12-01 LAB
ALT SERPL-CCNC: 20 U/L (ref 0–78)
AST SERPL-CCNC: 14 U/L (ref 0–37)

## 2020-12-07 ENCOUNTER — TRANSFERRED RECORDS (OUTPATIENT)
Dept: HEALTH INFORMATION MANAGEMENT | Facility: CLINIC | Age: 45
End: 2020-12-07

## 2020-12-09 ENCOUNTER — MYC MEDICAL ADVICE (OUTPATIENT)
Dept: PEDIATRICS | Facility: CLINIC | Age: 45
End: 2020-12-09

## 2021-01-08 ENCOUNTER — TRANSFERRED RECORDS (OUTPATIENT)
Dept: HEALTH INFORMATION MANAGEMENT | Facility: CLINIC | Age: 46
End: 2021-01-08

## 2021-01-08 LAB
ALT SERPL-CCNC: 15 U/L (ref 0–78)
AST SERPL-CCNC: 12 U/L (ref 0–37)

## 2021-01-15 ENCOUNTER — HEALTH MAINTENANCE LETTER (OUTPATIENT)
Age: 46
End: 2021-01-15

## 2021-01-24 ENCOUNTER — HEALTH MAINTENANCE LETTER (OUTPATIENT)
Age: 46
End: 2021-01-24

## 2021-02-02 ENCOUNTER — TRANSFERRED RECORDS (OUTPATIENT)
Dept: HEALTH INFORMATION MANAGEMENT | Facility: CLINIC | Age: 46
End: 2021-02-02

## 2021-02-04 ENCOUNTER — AMBULATORY - HEALTHEAST (OUTPATIENT)
Dept: SURGERY | Facility: CLINIC | Age: 46
End: 2021-02-04

## 2021-02-04 DIAGNOSIS — Z11.59 ENCOUNTER FOR SCREENING FOR OTHER VIRAL DISEASES: ICD-10-CM

## 2021-02-18 ENCOUNTER — OFFICE VISIT (OUTPATIENT)
Dept: PEDIATRICS | Facility: CLINIC | Age: 46
End: 2021-02-18
Payer: COMMERCIAL

## 2021-02-18 VITALS
RESPIRATION RATE: 20 BRPM | WEIGHT: 269.2 LBS | HEIGHT: 62 IN | HEART RATE: 64 BPM | DIASTOLIC BLOOD PRESSURE: 74 MMHG | SYSTOLIC BLOOD PRESSURE: 108 MMHG | BODY MASS INDEX: 49.54 KG/M2 | TEMPERATURE: 99 F

## 2021-02-18 DIAGNOSIS — K50.113 CROHN'S DISEASE OF LARGE INTESTINE WITH FISTULA (H): ICD-10-CM

## 2021-02-18 DIAGNOSIS — R93.3 ABNORMAL COLONOSCOPY: ICD-10-CM

## 2021-02-18 DIAGNOSIS — Z01.818 PREOPERATIVE EXAMINATION: Primary | ICD-10-CM

## 2021-02-18 PROCEDURE — 99214 OFFICE O/P EST MOD 30 MIN: CPT | Performed by: STUDENT IN AN ORGANIZED HEALTH CARE EDUCATION/TRAINING PROGRAM

## 2021-02-18 ASSESSMENT — ANXIETY QUESTIONNAIRES
IF YOU CHECKED OFF ANY PROBLEMS ON THIS QUESTIONNAIRE, HOW DIFFICULT HAVE THESE PROBLEMS MADE IT FOR YOU TO DO YOUR WORK, TAKE CARE OF THINGS AT HOME, OR GET ALONG WITH OTHER PEOPLE: NOT DIFFICULT AT ALL
7. FEELING AFRAID AS IF SOMETHING AWFUL MIGHT HAPPEN: NOT AT ALL
6. BECOMING EASILY ANNOYED OR IRRITABLE: NOT AT ALL
2. NOT BEING ABLE TO STOP OR CONTROL WORRYING: NOT AT ALL
1. FEELING NERVOUS, ANXIOUS, OR ON EDGE: NOT AT ALL
3. WORRYING TOO MUCH ABOUT DIFFERENT THINGS: NOT AT ALL
5. BEING SO RESTLESS THAT IT IS HARD TO SIT STILL: NOT AT ALL
GAD7 TOTAL SCORE: 0

## 2021-02-18 ASSESSMENT — PATIENT HEALTH QUESTIONNAIRE - PHQ9
5. POOR APPETITE OR OVEREATING: NOT AT ALL
SUM OF ALL RESPONSES TO PHQ QUESTIONS 1-9: 0

## 2021-02-18 ASSESSMENT — MIFFLIN-ST. JEOR: SCORE: 1806.39

## 2021-02-18 NOTE — PROGRESS NOTES
[facility]  [address]  [address]  [place] [address]  Phone: [phone]  Fax: [phone]  Primary Provider: Ivett Sy  Pre-op Performing Provider: LESLI YORK      PREOPERATIVE EVALUATION:  Today's date: 2/18/2021    Janelle Coelho is a 46 year old female who presents for a preoperative evaluation.    Surgical Information:  Surgery/Procedure: Colonoscopy  Surgery Location: [facility]  Surgeon: Sherlyn  Surgery Date: 02/23/2021  Time of Surgery: 7am  Where patient plans to recover: At home with family  Fax number for surgical facility: Note does not need to be faxed, will be available electronically in Epic.    Type of Anesthesia Anticipated: MAC or General    Assessment & Plan     The proposed surgical procedure is considered LOW risk.    Preoperative examination  Abnormal colonoscopy 2020  Crohn's disease of large intestine with fistula (H)  Need for yearly colonoscopy since Crohn's diagnosis in 2018.   Morbid obesity      Risks and Recommendations:  The patient has the following additional risks and recommendations for perioperative complications:   - Morbid obesity (BMI >40)    Medication Instructions:  Patient is to take all scheduled medications on the day of surgery    RECOMMENDATION:  APPROVAL GIVEN to proceed with proposed procedure, without further diagnostic evaluation.    Review of external notes as documented above         35 minutes spent on the date of the encounter doing chart review, history and exam, documentation and further activities as noted above        Subjective     HPI related to upcoming procedure:   Diagnosed with Crohn's, colon through ileal involvement complicated by rectovaginal fistula in the past. Colonoscopy last year with precancerous cecal polyp prompted yearly colonoscopy schedule. Previously had done colonoscopies at [facility] but GI requesting hospital setting for anesthesia due to her weight. Appointment moved up from  April due to ongoing loose stools, however, these have improved in the last couple weeks.     No chest pain, difficult breathing, snoring, morning headaches.     Preop Questions 2/17/2021   1. Have you ever had a heart attack or stroke? No   2. Have you ever had surgery on your heart or blood vessels, such as a stent placement, a coronary artery bypass, or surgery on an artery in your head, neck, heart, or legs? No   3. Do you have chest pain with activity? No   4. Do you have a history of  heart failure? No   5. Do you currently have a cold, bronchitis or symptoms of other infection? No   6. Do you have a cough, shortness of breath, or wheezing? No   7. Do you or anyone in your family have previous history of blood clots? YES - Mom had post-op clot while battling breast cancer    8. Do you or does anyone in your family have a serious bleeding problem such as prolonged bleeding following surgeries or cuts? No   9. Have you ever had problems with anemia or been told to take iron pills? YES - h/o gastric bypass, Hgb has improved since starting OCP for menorrhagia    10. Have you had any abnormal blood loss such as black, tarry or bloody stools, or abnormal vaginal bleeding? No   11. Have you ever had a blood transfusion? YES - >10 years ago    11a. Have you ever had a transfusion reaction? No   12. Are you willing to have a blood transfusion if it is medically needed before, during, or after your surgery? Yes   13. Have you or any of your relatives ever had problems with anesthesia? No   14. Do you have sleep apnea, excessive snoring or daytime drowsiness? No   15. Do you have any artifical heart valves or other implanted medical devices like a pacemaker, defibrillator, or continuous glucose monitor? No   16. Do you have artificial joints? No   17. Are you allergic to latex? No   18. Is there any chance that you may be pregnant? No       Health Care Directive:  Patient does not have a Health Care Directive or Living  Will: Discussed advance care planning with patient; however, patient declined at this time.    Preoperative Review of :   reviewed - no record of controlled substances prescribed.        Review of Systems  CONSTITUTIONAL: NEGATIVE for fever, chills, change in weight  INTEGUMENTARY/SKIN: NEGATIVE for worrisome rashes, moles or lesions  EYES: NEGATIVE for vision changes or irritation  ENT/MOUTH: NEGATIVE for ear, mouth and throat problems  RESP: NEGATIVE for significant cough or SOB  BREAST: NEGATIVE for masses, tenderness or discharge  CV: NEGATIVE for chest pain, palpitations or peripheral edema  GI: NEGATIVE for nausea, abdominal pain, heartburn, or change in bowel habits  : NEGATIVE for frequency, dysuria, or hematuria  MUSCULOSKELETAL: NEGATIVE for significant arthralgias or myalgia  NEURO: NEGATIVE for weakness, dizziness or paresthesias  ENDOCRINE: NEGATIVE for temperature intolerance, skin/hair changes  HEME: NEGATIVE for bleeding problems  PSYCHIATRIC: NEGATIVE for changes in mood or affect    Patient Active Problem List    Diagnosis Date Noted     Hives 06/04/2020     Priority: Medium     Crohn's disease of large intestine with complication (H) 06/01/2018     Priority: Medium     Vitamin D deficiency 04/13/2017     Priority: Medium     Mild intermittent asthma, unspecified whether complicated 10/05/2016     Priority: Medium     History of [place]-en-Y gastric bypass 10/05/2016     Priority: Medium     Insomnia, unspecified type 10/05/2016     Priority: Medium     Family history of breast cancer in mother 10/05/2016     Priority: Medium     Major depressive disorder, recurrent episode, in partial remission (H) 10/13/2015     Priority: Medium     CARDIOVASCULAR SCREENING; LDL GOAL LESS THAN 100 10/13/2015     Priority: Medium     Tobacco abuse 10/13/2015     Priority: Medium     Infected cyst of Bartholin's gland duct 10/13/2015     Priority: Medium     Generalized anxiety disorder 08/21/2014      Priority: Medium     Diagnosis updated by automated process. Provider to review and confirm.       Excessive or frequent menstruation 05/23/2014     Priority: Medium     Obesity      Priority: Medium     ASCUS with positive high risk HPV 03/23/2013     Priority: Medium     2008: ASCUS , HR HPV, dysplasia on colp per notes  7/09: NIL pap  7/10: NIL pap  9/12: ASCUS, neg HPV. Per MD pap in 1 yr.  4/2014: NIL pap. New ASCCP algorithms, cotest 3 years after ASCUS, neg pap.  2015 NIL pap, Neg HPV.   10/7/20 NIL pap, Neg HPV.        Acute posthemorrhagic anemia 10/17/2012     Priority: Medium      Past Medical History:   Diagnosis Date     ASCUS favor benign 2012    hpv neg. needs cotest in 3 years     Depressive disorder     since teens     Depressive disorder, not elsewhere classified 2/10/2014     Family history of breast cancer in mother      History of blood transfusion     few years ago, should be in my records, it was at a [place]     LSIL (low grade squamous intraepithelial lesion) on Pap smear 2008    per chart notes colp dysplasia?     Obesity      Uncomplicated asthma     since baby     Past Surgical History:   Procedure Laterality Date     BIOPSY      Breast cycst (done at [place]) many years ago     CHOLECYSTECTOMY  late 90's    during gastric bypass     COLONOSCOPY  Jan/Feb 2018, Feb 2019    Confirmed Crohn's Disease     EXCIS BARTHOLIN GLAND/CYST  2016     GASTRIC BYPASS  2000    with cholecystectomy and subsequent revision gastric bypass     GASTRIC BYPASS  2006    revision of gastric bypass     HERNIA REPAIR  12/9/16    SYMONE Zamora     wisdom teeth extraction  1995     Current Outpatient Medications   Medication Sig Dispense Refill     cyanocobalamin (VITAMIN B12) 1000 MCG/ML injection Inject 1 mL into the muscle every 30 days       hydrOXYzine (ATARAX) 50 MG tablet Take 50 mg by mouth 3 times daily as needed       InFLIXimab (REMICADE IV) Working way up to every 8 weeks       insulin  "pen needle (B-D U/F) 31G X 5 MM Use 1 daily or as directed. 100 each prn     levonorgestrel-ethinyl estradiol (QUASENSE) 0.15-0.03 MG tablet Take 1 tablet by mouth daily 91 tablet 3     nystatin (MYCOSTATIN) 572262 UNIT/GM external cream Apply topically 2 times daily 30 g 1     sertraline (ZOLOFT) 100 MG tablet TAKE 2 TABLETS(200 MG) BY MOUTH DAILY 180 tablet 1     traZODone (DESYREL) 100 MG tablet TAKE 1 TABLET(100 MG) BY MOUTH AT BEDTIME 90 tablet 1     triamcinolone (KENALOG) 0.1 % external cream Apply topically 2 times daily 30 g 1     VENTOLIN  (90 Base) MCG/ACT inhaler INHALE 2 PUFFS INTO THE LUNGS EVERY 6 HOURS 18 g 1     vitamin D3 (CHOLECALCIFEROL) 2000 units (50 mcg) tablet Take 1 tablet (2,000 Units) by mouth daily 90 tablet 3       Allergies   Allergen Reactions     Dextran Anaphylaxis        Social History     Tobacco Use     Smoking status: Former Smoker     Packs/day: 0.20     Years: 10.00     Pack years: 2.00     Types: Cigarettes     Start date: 2001     Quit date: 2018     Years since quittin.2     Smokeless tobacco: Never Used     Tobacco comment: does not want to quit at this time   Substance Use Topics     Alcohol use: Yes     Alcohol/week: 0.0 standard drinks     Comment: very rarely     Family History   Problem Relation Age of Onset     Breast Cancer Mother      Diabetes Mother      Depression Mother      Obesity Mother      Prostate Cancer Father      Breast Cancer Maternal Grandmother      Colon Cancer Maternal Grandmother      Prostate Cancer Maternal Grandfather      Breast Cancer Paternal Grandmother      Diabetes Brother      Obesity Brother      Family History Negative No family hx of      History   Drug Use No         Objective     /74   Pulse 64   Temp 99  F (37.2  C) (Oral)   Resp 20   Ht 1.562 m (5' 1.5\")   Wt 122.1 kg (269 lb 3.2 oz)   BMI 50.04 kg/m      Physical Exam    GENERAL APPEARANCE: healthy, alert and no distress     EYES: EOMI, PERRL     " HENT: nose and mouth without ulcers or lesions     NECK: no adenopathy, no asymmetry, masses     RESP: lungs clear to auscultation - no rales, rhonchi or wheezes     CV: regular rates and rhythm, normal S1 S2, no S3 or S4 and no murmur, click or rub     ABDOMEN:  soft, nontender, no HSM or masses     MS: extremities normal- no gross deformities noted, no evidence of inflammation in joints, FROM in all extremities.     SKIN: resolving lesions scattered over scalp, no drainage      NEURO: Normal gross strength and tone, sensory exam grossly normal, mentation intact and speech normal     PSYCH: mentation appears normal. and affect normal/bright     LYMPHATICS: No cervical adenopathy    Recent Labs   Lab Test 06/09/20  1207 07/08/19  1045   HGB 12.2 13.1    307        Diagnostics:  No labs were ordered during this visit.   No EKG required, no history of coronary heart disease, significant arrhythmia, peripheral arterial disease or other structural heart disease.    Revised Cardiac Risk Index (RCRI):  The patient has the following serious cardiovascular risks for perioperative complications:   - No serious cardiac risks = 0 points     RCRI Interpretation: 0 points: Class I (very low risk - 0.4% complication rate)             Signed Electronically by: Celine Wallace MD  Copy of this evaluation report is provided to requesting physician.    [facility] Guidelines    Revised Cardiac Risk Index

## 2021-02-19 ENCOUNTER — AMBULATORY - HEALTHEAST (OUTPATIENT)
Dept: LAB | Facility: CLINIC | Age: 46
End: 2021-02-19

## 2021-02-19 DIAGNOSIS — Z11.59 ENCOUNTER FOR SCREENING FOR OTHER VIRAL DISEASES: ICD-10-CM

## 2021-02-19 ASSESSMENT — ASTHMA QUESTIONNAIRES: ACT_TOTALSCORE: 24

## 2021-02-19 ASSESSMENT — MIFFLIN-ST. JEOR: SCORE: 1805.49

## 2021-02-19 ASSESSMENT — ANXIETY QUESTIONNAIRES: GAD7 TOTAL SCORE: 0

## 2021-02-20 LAB
SARS-COV-2 PCR COMMENT: NORMAL
SARS-COV-2 RNA SPEC QL NAA+PROBE: NEGATIVE
SARS-COV-2 VIRUS SPECIMEN SOURCE: NORMAL

## 2021-02-21 ENCOUNTER — COMMUNICATION - HEALTHEAST (OUTPATIENT)
Dept: SCHEDULING | Facility: CLINIC | Age: 46
End: 2021-02-21

## 2021-02-22 ENCOUNTER — ANESTHESIA - HEALTHEAST (OUTPATIENT)
Dept: SURGERY | Facility: CLINIC | Age: 46
End: 2021-02-22

## 2021-02-23 ENCOUNTER — SURGERY - HEALTHEAST (OUTPATIENT)
Dept: SURGERY | Facility: CLINIC | Age: 46
End: 2021-02-23

## 2021-03-05 ENCOUNTER — TRANSFERRED RECORDS (OUTPATIENT)
Dept: HEALTH INFORMATION MANAGEMENT | Facility: CLINIC | Age: 46
End: 2021-03-05

## 2021-03-08 ENCOUNTER — TRANSFERRED RECORDS (OUTPATIENT)
Dept: HEALTH INFORMATION MANAGEMENT | Facility: CLINIC | Age: 46
End: 2021-03-08

## 2021-03-09 ENCOUNTER — TRANSFERRED RECORDS (OUTPATIENT)
Dept: HEALTH INFORMATION MANAGEMENT | Facility: CLINIC | Age: 46
End: 2021-03-09

## 2021-03-15 ENCOUNTER — TRANSFERRED RECORDS (OUTPATIENT)
Dept: HEALTH INFORMATION MANAGEMENT | Facility: CLINIC | Age: 46
End: 2021-03-15

## 2021-03-23 ENCOUNTER — IMMUNIZATION (OUTPATIENT)
Dept: NURSING | Facility: CLINIC | Age: 46
End: 2021-03-23
Payer: COMMERCIAL

## 2021-03-23 PROCEDURE — 91300 PR COVID VAC PFIZER DIL RECON 30 MCG/0.3 ML IM: CPT

## 2021-03-23 PROCEDURE — 0001A PR COVID VAC PFIZER DIL RECON 30 MCG/0.3 ML IM: CPT

## 2021-04-11 ENCOUNTER — OFFICE VISIT (OUTPATIENT)
Dept: URGENT CARE | Facility: URGENT CARE | Age: 46
End: 2021-04-11
Payer: COMMERCIAL

## 2021-04-11 VITALS
TEMPERATURE: 98.5 F | RESPIRATION RATE: 18 BRPM | OXYGEN SATURATION: 97 % | WEIGHT: 274.9 LBS | BODY MASS INDEX: 51.1 KG/M2 | HEART RATE: 69 BPM | DIASTOLIC BLOOD PRESSURE: 70 MMHG | SYSTOLIC BLOOD PRESSURE: 110 MMHG

## 2021-04-11 DIAGNOSIS — F41.1 GENERALIZED ANXIETY DISORDER: Chronic | ICD-10-CM

## 2021-04-11 DIAGNOSIS — F33.41 MAJOR DEPRESSIVE DISORDER, RECURRENT EPISODE, IN PARTIAL REMISSION (H): ICD-10-CM

## 2021-04-11 DIAGNOSIS — M25.562 ACUTE PAIN OF LEFT KNEE: Primary | ICD-10-CM

## 2021-04-11 PROCEDURE — 99214 OFFICE O/P EST MOD 30 MIN: CPT | Performed by: PHYSICIAN ASSISTANT

## 2021-04-11 RX ORDER — HYDROCODONE BITARTRATE AND ACETAMINOPHEN 5; 325 MG/1; MG/1
1 TABLET ORAL EVERY 6 HOURS PRN
Qty: 10 TABLET | Refills: 0 | Status: SHIPPED | OUTPATIENT
Start: 2021-04-11 | End: 2021-04-14

## 2021-04-11 RX ORDER — LIDOCAINE 50 MG/G
OINTMENT TOPICAL 3 TIMES DAILY
Qty: 150 G | Refills: 0 | Status: SHIPPED | OUTPATIENT
Start: 2021-04-11 | End: 2021-06-07

## 2021-04-11 ASSESSMENT — ENCOUNTER SYMPTOMS
GASTROINTESTINAL NEGATIVE: 1
CONSTITUTIONAL NEGATIVE: 1
PSYCHIATRIC NEGATIVE: 1
CARDIOVASCULAR NEGATIVE: 1
EYES NEGATIVE: 1
NEUROLOGICAL NEGATIVE: 1
RESPIRATORY NEGATIVE: 1

## 2021-04-11 NOTE — PROGRESS NOTES
Acute pain of left knee  - HYDROcodone-acetaminophen (NORCO) 5-325 MG tablet; Take 1 tablet by mouth every 6 hours as needed for severe pain  - lidocaine (XYLOCAINE) 5 % external ointment; Apply topically 3 times daily  - Orthopedic & Spine  Referral; Future  - Knee Supplies Order for DME - ONLY FOR DME    Rest the affected area as much as possible.  Apply ice for 15-20 minutes intermittently as needed and especially after any offending activity. Hot packs are better for muscle spasms and cramping. Daily stretching as tolerated.  As pain recedes, begin normal activities slowly as tolerated.  Consider Physical Therapy after 6 weeks if symptoms not better with conservative care.      Okay to take acetaminophen 500 mg- 2 tabs (Total of 1000 mg) every 8 hrs   Okay to take ibuprofen 200 mg- 3 tabs (Total of 600 mg) every 6 hours      I would like the patient to be seen by sports medicine for further workup of knee pain if conservative therapy fails.      20 minutes spent on the date of the encounter doing chart review, history and exam, documentation and further activities per the note     See Patient Instructions  Patient Instructions     Patient Education     Knee Pain  Knee pain is very common. It s especially common in active people who put a lot of pressure on their knees, like runners. It affects women more often than men.  Your kneecap (patella) is a thick, round bone. It covers and protects the front portion of your knee joint. It moves along a groove in your thighbone (femur) as part of the patellofemoral joint. A layer of cartilage surrounds the underside of your kneecap. This layer protects it from grinding against your femur.  When this cartilage softens and breaks down, it can cause knee pain. This is partly because of repetitive stress. The stress irritates the lining of the joint. This causes pain in the underlying bone.  What causes knee pain?  Many things can cause knee pain. You may have more  than one cause. Some of these include:    Overuse of the knee joint    The kneecap doesn t line up with the tissue around it    Damage to small nerves in the area    Damage to the ligament-like structure that holds the kneecap in place (retinaculum)    Breakdown of the bone under the cartilage    Swelling in the soft tissues around the kneecap    Injury  You might be more likely to have knee pain if you:    Exercise a lot    Recently increased the intensity of your workouts    Have a body mass index (BMI) greater than 25    Have poor alignment of your kneecap    Walk with your feet turned overly outward or inward    Have weakness in surrounding muscle groups (inner quad or hip adductor muscles)    Have too much tightness in surrounding muscle groups (hamstrings or iliotibial band)    Have a recent history of injury to the area    Are female  Symptoms of knee pain  This type of knee pain is a dull, aching pain in the front of the knee in the area under and around the kneecap. This pain may start quickly or slowly. Your pain might be worse when you squat, run, or sit for a long time. Climbing stairs may be painful or hard to do. You might also sometimes feel like your knee is giving out. You may have symptoms in one or both of your knees.  Diagnosing knee pain  Your healthcare provider will ask about your medical history and your symptoms. Be sure to describe any activities that make your knee pain worse. He or she will look at your knee. This will include tests of your range of motion, strength, and areas of pain of your knee. Your knee alignment will be checked.  Your healthcare provider will need to rule out other causes of your knee pain, such as arthritis. You may need an imaging test, such as an X-ray or MRI.  Treatment for knee pain  Treatments that can help ease your symptoms may include:    Avoiding activities for a while that make your pain worse, returning to activity over time    Icing the outside of your  knee when it causes you pain    Taking over-the-counter pain medicine such as NSAIDs    Wearing a knee brace or taping your knee to support it    Compression to help prevent swelling    Wearing special shoe inserts to help keep your feet in the proper alignment    Elevating your knee    Doing special exercises to stretch and strengthen the muscles around your hip and your knee  These steps help most people manage knee pain. But some cases of knee pain need to be treated with surgery. You rarely need surgery right away. You may need it later if other treatments don t work. Your healthcare provider may refer you to an orthopedic surgeon. He or she will talk with you about your choices.  Preventing knee pain  Losing weight and correcting excess muscle tightness or muscle weakness may help lower your risk.  In some cases, you can prevent knee pain. To help prevent a flare-up of knee pain, do these things:    Regularly do all the exercises your doctor or physical therapist advises    Warm up fully before exercising    Support your knee as advised by your doctor or physical therapist    Increase training gradually, and ease up on training when needed    Have an expert check your gait for running or other sporting activities    Stretch properly before and after exercise    Replace your running shoes regularly    Lose excess weight  When to call your healthcare provider  Call your healthcare provider right away if:    Your symptoms don t get better after a few weeks of treatment    You have any new symptoms  Dami last reviewed this educational content on 6/1/2019 2000-2021 [employer]. All rights reserved. This information is not intended as a substitute for professional medical care. Always follow your healthcare professional's instructions.               Hung Abbott PA-C  [facility]    Subjective   46 year old year old who presents to clinic today for the following health  issues:    Knee Pain       HPI     Musculoskeletal problem/pain  Onset/Duration: 1.5 weeks  Description  Location: knee - left  Joint Swelling: no  Redness: no  Pain: YES  Warmth: no  Intensity:  severe  Progression of Symptoms:  worsening  Accompanying signs and symptoms:   Fevers: no  Numbness/tingling/weakness: no  History  Trauma to the area: no  Recent illness:  no  Previous similar problem: no  Previous evaluation:  no  Precipitating or alleviating factors:  Aggravating factors include: standing and climbing stairs  Therapies tried and outcome: rest/inactivity, ice and Ibuprofen      Review of Systems   Review of Systems   Constitutional: Negative.    HENT: Negative.    Eyes: Negative.    Respiratory: Negative.    Cardiovascular: Negative.    Gastrointestinal: Negative.    Genitourinary: Negative.    Neurological: Negative.    Psychiatric/Behavioral: Negative.         Objective    Temp: 98.5  F (36.9  C) Temp src: Oral BP: 110/70 Pulse: 69   Resp: 18 SpO2: 97 %       Physical Exam   Physical Exam  Constitutional:       General: She is not in acute distress.     Appearance: Normal appearance. She is normal weight. She is not ill-appearing, toxic-appearing or diaphoretic.   HENT:      Head: Normocephalic and atraumatic.   Cardiovascular:      Rate and Rhythm: Normal rate and regular rhythm.      Pulses: Normal pulses.      Heart sounds: Normal heart sounds. No murmur. No friction rub. No gallop.    Pulmonary:      Effort: Pulmonary effort is normal. No respiratory distress.      Breath sounds: Normal breath sounds.   Musculoskeletal:      Left knee: She exhibits normal range of motion, no swelling, no deformity, no erythema, normal alignment, no LCL laxity, normal patellar mobility, no bony tenderness, normal meniscus and no MCL laxity. Tenderness found. Medial joint line and MCL tenderness noted. No lateral joint line, no LCL and no patellar tendon tenderness noted.   Neurological:      General: No focal  deficit present.      Mental Status: She is alert and oriented to person, place, and time. Mental status is at baseline.      Gait: Gait normal.   Psychiatric:         Mood and Affect: Mood normal.         Behavior: Behavior normal.         Thought Content: Thought content normal.         Judgment: Judgment normal.

## 2021-04-11 NOTE — PROGRESS NOTES
"SUBJECTIVE:  Chief Complaint   Patient presents with     Knee Pain     left x 1.5 weeks getting worse no injury      Janelle Coelho is a 46 year old female who presents with a chief complaint of {RIGHT/LEFT:16} {EXTREMITY LIST:127893} {PAIN:191332}.  Symptoms began {NUMBERS 1-12:10} {TIME UNITS:9076} ago, are {SEVERITY:167993} and {TIMINGuc:224932}  Context:  Injury:{YES/NO:077655}.  Injury happened while {ACTIVITY INJURY:160466}. How: {INJURYCONTEXT:247867} {SX:222150}.   Pain exacerbated by {ALLEVIATING/PRECIPITATING FACTORS:718625} Relieved by {ALLEVIATING/PRECIPITATING FACTORS:944245}.  She treated it initially with {TREATMENT FOR INJURY:535431}. This {IS/IS NOT:961358::\"is\"} the first time this type of injury has occurred to this patient.     Past Medical History:   Diagnosis Date     ASCUS favor benign 2012    hpv neg. needs cotest in 3 years     Depressive disorder     since teens     Depressive disorder, not elsewhere classified 2/10/2014     Family history of breast cancer in mother      History of blood transfusion     few years ago, should be in my records, it was at a [place]     LSIL (low grade squamous intraepithelial lesion) on Pap smear 2008    per chart notes colp dysplasia?     Obesity      Uncomplicated asthma     since baby     Current Outpatient Medications   Medication Sig Dispense Refill     cyanocobalamin (VITAMIN B12) 1000 MCG/ML injection Inject 1 mL into the muscle every 30 days       hydrOXYzine (ATARAX) 50 MG tablet Take 50 mg by mouth 3 times daily as needed       inFLIXimab-dyyb (INFLECTRA IV)        insulin pen needle (B-D U/F) 31G X 5 MM Use 1 daily or as directed. 100 each prn     levonorgestrel-ethinyl estradiol (QUASENSE) 0.15-0.03 MG tablet Take 1 tablet by mouth daily 91 tablet 3     nystatin (MYCOSTATIN) 454448 UNIT/GM external cream Apply topically 2 times daily 30 g 1     sertraline (ZOLOFT) 100 MG tablet TAKE 2 TABLETS(200 MG) BY MOUTH DAILY 180 tablet 1     traZODone " "(DESYREL) 100 MG tablet TAKE 1 TABLET(100 MG) BY MOUTH AT BEDTIME 90 tablet 1     triamcinolone (KENALOG) 0.1 % external cream Apply topically 2 times daily 30 g 1     VENTOLIN  (90 Base) MCG/ACT inhaler INHALE 2 PUFFS INTO THE LUNGS EVERY 6 HOURS 18 g 1     vitamin D3 (CHOLECALCIFEROL) 2000 units (50 mcg) tablet Take 1 tablet (2,000 Units) by mouth daily 90 tablet 3     Social History     Tobacco Use     Smoking status: Former Smoker     Packs/day: 0.20     Years: 10.00     Pack years: 2.00     Types: Cigarettes     Start date: 2001     Quit date: 2018     Years since quittin.4     Smokeless tobacco: Never Used     Tobacco comment: does not want to quit at this time   Substance Use Topics     Alcohol use: Yes     Alcohol/week: 0.0 standard drinks     Comment: very rarely       ROS:  {ROS SHORT:093777::\"Review of systems negative except as stated below\"}    EXAM:   /70 (BP Location: Right arm, Patient Position: Chair, Cuff Size: Adult Large)   Pulse 69   Temp 98.5  F (36.9  C) (Oral)   Resp 18   Wt 124.7 kg (274 lb 14.4 oz)   SpO2 97%   Breastfeeding No   BMI 51.10 kg/m    M/S Exam:{EXTREMITY LIST:092196}{EXAM INJURY:069946}{EXAM NORMAL:860883::\"GENERAL APPEARANCE: healthy, alert and no distress\",\"EXTREMITIES: peripheral pulses normal\",\"SKIN: no suspicious lesions or rashes\",\"NEURO: Normal strength and tone, sensory exam grossly normal, mentation intact and speech normal\"}    X-RAY {WAS/WAS NOT:9033::\"was\"} done.    ASSESSMENT/PLAN:    "

## 2021-04-11 NOTE — PATIENT INSTRUCTIONS
Patient Education     Knee Pain  Knee pain is very common. It s especially common in active people who put a lot of pressure on their knees, like runners. It affects women more often than men.  Your kneecap (patella) is a thick, round bone. It covers and protects the front portion of your knee joint. It moves along a groove in your thighbone (femur) as part of the patellofemoral joint. A layer of cartilage surrounds the underside of your kneecap. This layer protects it from grinding against your femur.  When this cartilage softens and breaks down, it can cause knee pain. This is partly because of repetitive stress. The stress irritates the lining of the joint. This causes pain in the underlying bone.  What causes knee pain?  Many things can cause knee pain. You may have more than one cause. Some of these include:    Overuse of the knee joint    The kneecap doesn t line up with the tissue around it    Damage to small nerves in the area    Damage to the ligament-like structure that holds the kneecap in place (retinaculum)    Breakdown of the bone under the cartilage    Swelling in the soft tissues around the kneecap    Injury  You might be more likely to have knee pain if you:    Exercise a lot    Recently increased the intensity of your workouts    Have a body mass index (BMI) greater than 25    Have poor alignment of your kneecap    Walk with your feet turned overly outward or inward    Have weakness in surrounding muscle groups (inner quad or hip adductor muscles)    Have too much tightness in surrounding muscle groups (hamstrings or iliotibial band)    Have a recent history of injury to the area    Are female  Symptoms of knee pain  This type of knee pain is a dull, aching pain in the front of the knee in the area under and around the kneecap. This pain may start quickly or slowly. Your pain might be worse when you squat, run, or sit for a long time. Climbing stairs may be painful or hard to do. You might also  sometimes feel like your knee is giving out. You may have symptoms in one or both of your knees.  Diagnosing knee pain  Your healthcare provider will ask about your medical history and your symptoms. Be sure to describe any activities that make your knee pain worse. He or she will look at your knee. This will include tests of your range of motion, strength, and areas of pain of your knee. Your knee alignment will be checked.  Your healthcare provider will need to rule out other causes of your knee pain, such as arthritis. You may need an imaging test, such as an X-ray or MRI.  Treatment for knee pain  Treatments that can help ease your symptoms may include:    Avoiding activities for a while that make your pain worse, returning to activity over time    Icing the outside of your knee when it causes you pain    Taking over-the-counter pain medicine such as NSAIDs    Wearing a knee brace or taping your knee to support it    Compression to help prevent swelling    Wearing special shoe inserts to help keep your feet in the proper alignment    Elevating your knee    Doing special exercises to stretch and strengthen the muscles around your hip and your knee  These steps help most people manage knee pain. But some cases of knee pain need to be treated with surgery. You rarely need surgery right away. You may need it later if other treatments don t work. Your healthcare provider may refer you to an orthopedic surgeon. He or she will talk with you about your choices.  Preventing knee pain  Losing weight and correcting excess muscle tightness or muscle weakness may help lower your risk.  In some cases, you can prevent knee pain. To help prevent a flare-up of knee pain, do these things:    Regularly do all the exercises your doctor or physical therapist advises    Warm up fully before exercising    Support your knee as advised by your doctor or physical therapist    Increase training gradually, and ease up on training when  needed    Have an expert check your gait for running or other sporting activities    Stretch properly before and after exercise    Replace your running shoes regularly    Lose excess weight  When to call your healthcare provider  Call your healthcare provider right away if:    Your symptoms don t get better after a few weeks of treatment    You have any new symptoms  Dami last reviewed this educational content on 6/1/2019 2000-2021 [employer]. All rights reserved. This information is not intended as a substitute for professional medical care. Always follow your healthcare professional's instructions.

## 2021-04-13 ENCOUNTER — IMMUNIZATION (OUTPATIENT)
Dept: NURSING | Facility: CLINIC | Age: 46
End: 2021-04-13
Attending: INTERNAL MEDICINE
Payer: COMMERCIAL

## 2021-04-13 PROCEDURE — 0002A PR COVID VAC PFIZER DIL RECON 30 MCG/0.3 ML IM: CPT

## 2021-04-13 PROCEDURE — 91300 PR COVID VAC PFIZER DIL RECON 30 MCG/0.3 ML IM: CPT

## 2021-04-13 RX ORDER — SERTRALINE HYDROCHLORIDE 100 MG/1
TABLET, FILM COATED ORAL
Qty: 180 TABLET | Refills: 0 | Status: SHIPPED | OUTPATIENT
Start: 2021-04-13 | End: 2021-07-27

## 2021-04-13 NOTE — TELEPHONE ENCOUNTER
Routing refill request to provider for review/approval because:  See High alert drug interaction at refill with Trazodone

## 2021-04-30 ENCOUNTER — TRANSFERRED RECORDS (OUTPATIENT)
Dept: HEALTH INFORMATION MANAGEMENT | Facility: CLINIC | Age: 46
End: 2021-04-30

## 2021-04-30 LAB
ALT SERPL-CCNC: 17 U/L (ref 0–78)
AST SERPL-CCNC: 10 U/L (ref 0–37)

## 2021-05-06 ENCOUNTER — ANCILLARY PROCEDURE (OUTPATIENT)
Dept: GENERAL RADIOLOGY | Facility: CLINIC | Age: 46
End: 2021-05-06
Attending: STUDENT IN AN ORGANIZED HEALTH CARE EDUCATION/TRAINING PROGRAM
Payer: COMMERCIAL

## 2021-05-06 ENCOUNTER — OFFICE VISIT (OUTPATIENT)
Dept: ORTHOPEDICS | Facility: CLINIC | Age: 46
End: 2021-05-06
Payer: COMMERCIAL

## 2021-05-06 VITALS
BODY MASS INDEX: 51.54 KG/M2 | SYSTOLIC BLOOD PRESSURE: 118 MMHG | HEIGHT: 61 IN | WEIGHT: 273 LBS | DIASTOLIC BLOOD PRESSURE: 72 MMHG

## 2021-05-06 DIAGNOSIS — M25.562 ACUTE PAIN OF LEFT KNEE: ICD-10-CM

## 2021-05-06 DIAGNOSIS — M17.12 PRIMARY OSTEOARTHRITIS OF LEFT KNEE: Primary | ICD-10-CM

## 2021-05-06 DIAGNOSIS — K50.119 CROHN'S DISEASE OF LARGE INTESTINE WITH COMPLICATION (H): ICD-10-CM

## 2021-05-06 DIAGNOSIS — M25.562 LEFT KNEE PAIN: ICD-10-CM

## 2021-05-06 PROCEDURE — 73562 X-RAY EXAM OF KNEE 3: CPT | Performed by: RADIOLOGY

## 2021-05-06 PROCEDURE — 99203 OFFICE O/P NEW LOW 30 MIN: CPT | Performed by: STUDENT IN AN ORGANIZED HEALTH CARE EDUCATION/TRAINING PROGRAM

## 2021-05-06 ASSESSMENT — MIFFLIN-ST. JEOR: SCORE: 1815.7

## 2021-05-06 NOTE — LETTER
"    5/6/2021         RE: Janelle Coelho  [address] [place] [address]        Dear Colleague,    Thank you for referring your patient, Janelle Coelho, to the [facility]. Please see a copy of my visit note below.    ASSESSMENT & PLAN    1. Acute pain of left knee  2. Primary osteoarthritis of left knee  3. BMI 50.0-59.9, adult (H)  Patient with acute on chronic intermittent left knee pain.  X-rays obtained today and reviewed with patient, demonstrating minimal medial and patellofemoral degenerative changes without acute injury.  History and exam most consistent with early osteoarthritis.  We discussed the nature of his condition and options for further evaluation and treatment.  Patient elects to begin with physical therapy.  Unable to tolerate oral NSAIDs due to history of Heraclio-en-Y, recommend trial of topical Voltaren gel.  She is also well aware of the role of weight management in joint health, and I gently encouraged her continued efforts on this--glad she is established with a weight loss clinic.  She will update me on her symptoms for the next few weeks, and follow-up as needed if symptoms persist or she would like to consider an injection.    4. Crohn's disease of large intestine with complication (H)  We did discuss the possibility of an inflammatory arthritis as well as potential work-up, but agreed to defer at this time.  She will follow up to discuss this further if notices other joints becoming more bothersome to her or her knee pain fails to improve as expected.      Misael Rome MD  [facility]    -----  Chief Complaint   Patient presents with     Left Knee - Pain       SUBJECTIVE  Janelle Coelho is a/an 46 year old female who is seen in consultation at the request of  Hung Abbott PA-C for evaluation of left knee pain. She notes her knee is \"acting up\" on her. She has shooting pain, constant " "throbbing under the patella.    The patient is seen by themselves.    Date of Onset: Acute on chronic, 2 months. Reports insidious onset without acute precipitating event.  Location of Pain: right knee, medial joint line. Now pain is radiating underneath her patella and sharp shooting pain in her entire knee   Worsened by: pivoting, sitting to standing  Better with: stiffness improved with light activity  Treatments tried: Lidocaine cream   Associated symptoms: Clicking and popping, swelling    Orthopedic/Surgical history: NO  Social History/Occupation:      No family history pertinent to patient's problem today.     OBJECTIVE:  /72   Ht 1.549 m (5' 1\")   Wt 123.8 kg (273 lb)   BMI 51.58 kg/m     General: healthy, alert and in no distress  HEENT: no scleral icterus or conjunctival erythema  Skin: no visible suspicious lesions or rashes.   Resp: normal respiratory effort without conversational dyspnea   Psych: normal mood and affect  Gait: Antalgic    MSK:   Left knee without obvious deformity.  Skin intact and unremarkable.  Small effusion present  Range of motion -3 to 110 degrees  Extensor mechanism intact, with crepitus noted  Tender along medial joint line and medial patellar facet only  Negative Lachman  Negative Mario and hyperflexion  Negative apprehension  CMS intact distally      RADIOLOGY:  Xr Knee Standing Ap Bilat [place] Bilat Lat Left    Result Date: 5/6/2021  Examination:  XR KNEE STANDING AP BILAT [place] BILAT LAT LT Date:  5/6/2021 11:57 AM Clinical Information: Left knee pain. Comparison: none.     Impression: 1.  Left knee negative for fracture. Normal joint alignment. Small knee joint effusion. Mild medial and patellofemoral joint space narrowing. 2.  Minimal joint space narrowing in the patellofemoral compartment of the right knee, which is otherwise negative. MEHDI KEARNEY MD           Again, thank you for allowing me to participate in the care of your patient.  "       Sincerely,        Misael Rome MD

## 2021-05-06 NOTE — PATIENT INSTRUCTIONS
Can try voltaren gel (diclofenac), available OTC    PT will call you to schedule your first visit    Let me know how you're doing in a few weeks and if you think an injection would be helpful    If other joints become more painful we can also consider Rheumatology workup

## 2021-06-01 VITALS — WEIGHT: 168 LBS | HEIGHT: 61 IN | BODY MASS INDEX: 31.72 KG/M2

## 2021-06-03 ENCOUNTER — E-VISIT (OUTPATIENT)
Dept: PEDIATRICS | Facility: CLINIC | Age: 46
End: 2021-06-03
Payer: COMMERCIAL

## 2021-06-03 DIAGNOSIS — M72.2 PLANTAR FASCIITIS: ICD-10-CM

## 2021-06-03 PROCEDURE — 99421 OL DIG E/M SVC 5-10 MIN: CPT | Performed by: NURSE PRACTITIONER

## 2021-06-05 VITALS — BODY MASS INDEX: 49.5 KG/M2 | HEIGHT: 62 IN | WEIGHT: 269 LBS

## 2021-06-07 ENCOUNTER — OFFICE VISIT (OUTPATIENT)
Dept: PODIATRY | Facility: CLINIC | Age: 46
End: 2021-06-07
Attending: NURSE PRACTITIONER
Payer: COMMERCIAL

## 2021-06-07 VITALS
SYSTOLIC BLOOD PRESSURE: 118 MMHG | DIASTOLIC BLOOD PRESSURE: 86 MMHG | BODY MASS INDEX: 51.73 KG/M2 | HEIGHT: 61 IN | WEIGHT: 274 LBS

## 2021-06-07 DIAGNOSIS — M72.2 PLANTAR FASCIITIS: ICD-10-CM

## 2021-06-07 PROCEDURE — 20550 NJX 1 TENDON SHEATH/LIGAMENT: CPT | Performed by: PODIATRIST

## 2021-06-07 PROCEDURE — 99243 OFF/OP CNSLTJ NEW/EST LOW 30: CPT | Mod: 25 | Performed by: PODIATRIST

## 2021-06-07 ASSESSMENT — MIFFLIN-ST. JEOR: SCORE: 1820.24

## 2021-06-07 NOTE — PATIENT INSTRUCTIONS
Thank you for choosing [facility]!    DR DUBOIS'S CLINIC LOCATIONS  [facility]   [address] [facility] [address]   [place] [address] [place], [place] [id number] [phone]       SET UP SURGERY: [phone]    APPOINTMENTS: [phone]    BILLING QUESTIONS: [phone]    FAX NUMBER: [phone]        Follow up: as needed      PLANTAR FASCIITIS  Plantar fasciitis is often referred to as heel spurs or heel pain. Plantar fasciitis is a very common problem that affects people of all foot shapes, age, weight and activity level. Pain may be in the arch or on the weight-bearing surface of the heel. The pain may come on without injury or identifiable cause. Pain is generally present when first getting out of bed in the morning or up from a seated break.     CAUSES  The plantar fascia is a dense fibrous band of tissue that stretches across the bottom surface of the foot. The fascia helps support the foot muscles and arch. Plantar fasciitis is thought to be caused by mechanical strain or overload. Frequent walking without shoes or wearing unsupportive shoes is thought to cause structural overload and ultimately inflammation of the plantar fascia. Some people have heel spurs that can be seen on x-ray. The heel spur is actually a minor component of plantar fascitis and is largely ignored.       SELF TREATMENT   The easiest solution is to stop walking around your home without shoes. Plantar fasciitis is largely a shoe problem. Shoes are either not being worn often enough or your current shoes are inadequate for your weight, foot structure or activity level. The majority of shoes on the market today are not sufficient to resist development of plantar fasciitis or to promote healing. Assume that your current shoes are inadequate and will need to be replaced. Even high quality shoes wear out with 6 months to one year of frequent use. Weight  loss is another option. Losing ten pounds in the next two months may be enough to resolve the problem. Ice applied to the area of pain two to three times per day for ten minutes each session can be very helpful. Warm foot soaks in epsom salts can also relieve pain. This should continue until the problem resolves. Achilles tendon stretching is essential. Stretch multiple times daily to promote healing and to prevent recurrence in the future. Over all stretching of the body is helpful as well such as the calves, thighs and lower back. Normally when one area of the body is tight, other areas are too. Gentle Yoga can be good for this.     Over the counter topical anti inflammatories can be helpful such as biofreeze, bengay, salon pas, ect...  Oral ibuprofen or aleve is recommended as well to try to calm down inflammation.     Night splints can be helpful to gradually stretch the foot at night as a lot of pain is when you get up in the morning. Taking a towel or thera band and stretching the foot back multiple times before you get ou of bed can be beneficial as well.     MEDICAL TREATMENT  Medical treatments often include custom arch supports, cortisone injections, physical therapy, splints to be worn in bed, prescription medications and surgery. The home treatments listed above will be necessary regardless of these advanced medical treatments. Surgery is rarely needed but is very helpful in selected cases.     PROGNOSIS  Plantar fasciitis can last from one day to a lifetime. Some people get intermittent fascitis that is very short-lived. Others suffer daily for years. Excessive body weight, frequent bare foot walking, long hours on the feet, inadequate shoes, predisposing foot structures and excessive activity such as running are all potential issues that lead to chronic and/or recurring plantar fascitis. Having plantar fasciitis means that you are forever prone to this problem and will require modification of some of  the above factors. Most people seek treatment within one to four months. Healing usually requires a similar one to four month time frame. Healing time is relative to the amount of effort spent treating the problem.   Plantar fasciitis is highly recurrent. Risk factors often continue, including return to bare foot walking, inadequate shoes, excessive body weight, excessive activities, etc. Your life style and foot structure may predispose you to recurrent plantar fasciitis. A daily prevention regimen can be very helpful. Ongoing use of shoe inserts, careful attention to appropriate shoes, daily Achilles stretching, etc. may prevent recurrence. Prompt attention at the earliest warning signs of heel pain can resolve the problem in as short as a few days.     EXERCISES  Stair Exercise: Step on the stairs with the ball of your foot and hold your position for at least 15 seconds, then slowly step down with the heels of your foot. You can do this daily and as often as you want.   Picking the Towel: Sit comfortably and then pick the towel up with your toes. You can use any object other than a towel as long as the material can be soft and you can pick it up with your toes.  Rolling the Bottle: Use a small ball or frozen water bottle and then roll it around with your foot.   Flex the Toes: Sit comfortably and then flex your toes by pointing it towards the floor or towards your body. This will relax and flex your foot and exercise your plantar fascia, the calf, and the Achilles tendon. The inability of the foot to stretch often causes the bunching up of the plantar fascia area leading to the pain.  Calf/Achilles Stretching: Lay on you back and raise one foot, then point your toes towards the floor. See photo below:               Hold each stretch for 10 seconds. Stretch 10 times per set, three sets per day. Morning, afternoon and evening. If your heel pain is very severe in the morning, consider doing the first set of  stretches before you get out of bed.      OVER THE COUNTER INSERT RECOMMENDATIONS  SuperFeet   Sofsole Fit Spenco   Power Step   Walk-Fit Arch Cradles     Most of these can be found at your local [employer], sporting [employer], or online.  **A good high quality over the counter insert should cost around $40-$50      [employer] LOCATIONS  [place]  [address] [facility]  [phone]   [place] [address]  [phone] [place]  [address] [facility]  [phone]   [place]  [address]  [phone]   [place]  [address]  [phone] [place]  [address] [place] [address] [place]  [phone]   [place]  [address]  [phone]   [place]  [address] [place] [address]  [phone] [place]  [address]  [phone]       Today your received a diagnostic/therapeutic steroid injection.    We would like you to monitor 3 factors after the injection to determine what future treatment modalities would be most appropriate:    1.  Percentage improvement - while the injection will wear off and your symptoms may return, what was the highest level of improvement.  2. Location - where did you notice improvement, and conversely, where did you not see improvement?  3. Duration - how long did the improvement last for.    You do not need to schedule a follow-up visit.  When the injection wears off, and if they symptoms return to the point of needing further clinic follow up, whether a month or year has passed, schedule an appointment and we'll decide what the next step is.

## 2021-06-07 NOTE — PROGRESS NOTES
"Foot & Ankle Surgery  June 7, 2021    CC: \" Extreme pain -heel (months worse on left but some on right too\"    I was asked to see Janelle Coelho regarding the chief complaint by:  Dr Sy    HPI:  Pt is a 46 year old female who presents with above complaint.  10 to 12-month history of bilateral heel pain left more severe than right.  She is looking for \"pain relief\".  \"Shooting, throbbing, deep ache\".  Pain 10 out of 10 \"daily -constant\".  No specific exacerbating factors.  \"Foot sleeves with arch support, [place] sandals\" for treatment.  This has helped.  Pain is \"excruciating\" in the morning, continuous throughout the day.  She describes post static dyskinesia.  No injury noted.    ROS:   Pos for CC.  The patient denies current nausea, vomiting, chills, fevers, belly pain, calf pain, chest pain or SOB.  Complete remainder of ROS is otherwise neg.    VITALS:    Vitals:    06/07/21 1450   BP: 118/86   Weight: 124.3 kg (274 lb)   Height: 1.549 m (5' 1\")       PMH:    Past Medical History:   Diagnosis Date     ASCUS favor benign 2012    hpv neg. needs cotest in 3 years     Depressive disorder     since teens     Depressive disorder, not elsewhere classified 2/10/2014     Family history of breast cancer in mother      History of blood transfusion     few years ago, should be in my records, it was at a [facility]     LSIL (low grade squamous intraepithelial lesion) on Pap smear 2008    per chart notes colp dysplasia?     Obesity      Uncomplicated asthma     since baby       SXHX:    Past Surgical History:   Procedure Laterality Date     BIOPSY      Breast cycst (done at [facility]) many years ago     CHOLECYSTECTOMY  late 90's    during gastric bypass     COLONOSCOPY  Jan/Feb 2018, Feb 2019    Confirmed Crohn's Disease     EXCIS BARTHOLIN GLAND/CYST  2016     GASTRIC BYPASS  2000    with cholecystectomy and subsequent revision gastric bypass     GASTRIC BYPASS  2006    revision of gastric bypass     HERNIA REPAIR  " 12/9/16    SYMONE Zamora     wisdom teeth extraction  1995        MEDS:    Current Outpatient Medications   Medication     cyanocobalamin (VITAMIN B12) 1000 MCG/ML injection     hydrOXYzine (ATARAX) 50 MG tablet     inFLIXimab-dyyb (INFLECTRA IV)     insulin pen needle (B-D U/F) 31G X 5 MM     levonorgestrel-ethinyl estradiol (QUASENSE) 0.15-0.03 MG tablet     nystatin (MYCOSTATIN) 902977 UNIT/GM external cream     traZODone (DESYREL) 100 MG tablet     triamcinolone (KENALOG) 0.1 % external cream     VENTOLIN  (90 Base) MCG/ACT inhaler     vitamin D3 (CHOLECALCIFEROL) 2000 units (50 mcg) tablet     sertraline (ZOLOFT) 100 MG tablet     No current facility-administered medications for this visit.        ALL:     Allergies   Allergen Reactions     Dextran Anaphylaxis       FMH:    Family History   Problem Relation Age of Onset     Breast Cancer Mother      Diabetes Mother      Depression Mother      Obesity Mother      Prostate Cancer Father      Breast Cancer Maternal Grandmother      Colon Cancer Maternal Grandmother      Prostate Cancer Maternal Grandfather      Breast Cancer Paternal Grandmother      Diabetes Brother      Obesity Brother      Family History Negative No family hx of        SocHx:    Social History     Socioeconomic History     Marital status: Single     Spouse name: Not on file     Number of children: Not on file     Years of education: Not on file     Highest education level: Not on file   Occupational History     Employer: [employer]     Comment:  counter [employer]   Social Needs     Financial resource strain: Not on file     Food insecurity     Worry: Not on file     Inability: Not on file     Transportation needs     Medical: Not on file     Non-medical: Not on file   Tobacco Use     Smoking status: Former Smoker     Packs/day: 0.20     Years: 10.00     Pack years: 2.00     Types: Cigarettes     Start date: 1/1/2001     Quit date: 11/17/2018     Years since  quittin.5     Smokeless tobacco: Never Used     Tobacco comment: does not want to quit at this time   Substance and Sexual Activity     Alcohol use: Yes     Alcohol/week: 0.0 standard drinks     Comment: very rarely     Drug use: No     Frequency: 1.0 times per week     Sexual activity: Never     Partners: Male   Lifestyle     Physical activity     Days per week: Not on file     Minutes per session: Not on file     Stress: Not on file   Relationships     Social connections     Talks on phone: Not on file     Gets together: Not on file     Attends Zoroastrian service: Not on file     Active member of club or organization: Not on file     Attends meetings of clubs or organizations: Not on file     Relationship status: Not on file     Intimate partner violence     Fear of current or ex partner: Not on file     Emotionally abused: Not on file     Physically abused: Not on file     Forced sexual activity: Not on file   Other Topics Concern     Parent/sibling w/ CABG, MI or angioplasty before 65F 55M? No      Service Not Asked     Blood Transfusions Not Asked     Caffeine Concern Not Asked     Occupational Exposure Not Asked     Hobby Hazards Not Asked     Sleep Concern Not Asked     Stress Concern Not Asked     Weight Concern Not Asked     Special Diet Not Asked     Back Care Not Asked     Exercise Not Asked     Bike Helmet Yes     Seat Belt Yes     Self-Exams Not Asked   Social History Narrative     Not on file           EXAMINATION:  Gen:   No apparent distress  Neuro:   A&Ox3, no deficits  Psych:    Answering questions appropriately for age and situation with normal affect  Head:    NCAT  Eye:    Visual scanning without deficit  Ear:    Response to auditory stimuli wnl  Lung:    Non-labored breathing on RA noted  Abd:    NTND per patient report  Lymph:    Neg for pitting/non-pitting edema BLE  Vasc:    Pulses palpable, CFT minimally delayed  Neuro:    Light touch sensation intact to all sensory nerve  "distributions without paresthesias  Derm:    Neg for nodules, lesions or ulcerations  MSK:    Left lower extremity -plantar and plantar medial heel pain near the plantar fascial attachment.  \"Soreness\" along Baxters nerve and tibial nerve branches, but the tibial nerve is otherwise unremarkable.  There is minimal calcaneal or Achilles insertion pain  Calf:    Neg for redness, swelling or tenderness    Assessment:  46 year old female with left heel pain secondary to plantar fasciitis with questionable Baxters nerve/tibial nerve branch involvement      Plan:  Discussed etiologies, anatomy and options  1.  Left heel pain secondary to plantar fasciitis with questionable Baxters nerve/tibial nerve branch involvement  -I personally reviewed the patient's lower extremity history pertinent to today's visit, including imaging/labs, in preparation for initiating a treatment program.  -Regarding the heel pain, the Plantar Fasciitis handout was dispensed and discussed.  We talked about stretching, resting/activity modification, icing, NSAID/tylenol use as tolerated, inserts, supportive/comfortable shoes and minimizing shoeless walking.    -discussed Achilles, plantar fascial and hamstring stretches  -OTC insert information dispensed and discussed   -discussed tier 2 treatment options.  Elected PF steroid injection, see procedure note for details.    After obtaining written consent, the skin was prepped with alcohol.  The needle was advance to the underlying plantar fascial insertion, aspiration was done with position change.  1 1/2cc mixture of 2:1 kenalog 40:0.25% marcaine plain was injected.  The patient tolerated the procedure without complication.  Risks that were discussed included possible joint/soft tissue damage, neuritis/numbness, infection, pigment change, steroid flare.       Follow up:  Prn based on injection results or sooner with acute issues      Patient's medical history was reviewed today      Sandoval ROPER " LB Lopez FAC[facility] FACFAOM  Podiatric Foot & Ankle Surgeon  [facility]  [phone]    Disclaimer: This note consists of symbols derived from keyboarding, dictation and/or voice recognition software. As a result, there may be errors in the script that have gone undetected. Please consider this when interpreting information found in this chart.

## 2021-06-08 RX ORDER — TRIAMCINOLONE ACETONIDE 40 MG/ML
40 INJECTION, SUSPENSION INTRA-ARTICULAR; INTRAMUSCULAR ONCE
Status: DISCONTINUED | OUTPATIENT
Start: 2021-06-08 | End: 2021-08-21

## 2021-06-13 DIAGNOSIS — E55.9 VITAMIN D DEFICIENCY: ICD-10-CM

## 2021-06-15 RX ORDER — CHOLECALCIFEROL (VITAMIN D3) 50 MCG
1 TABLET ORAL DAILY
Qty: 90 TABLET | Refills: 3 | Status: SHIPPED | OUTPATIENT
Start: 2021-06-15 | End: 2021-11-11

## 2021-06-15 NOTE — ANESTHESIA PREPROCEDURE EVALUATION
Anesthesia Evaluation      Patient summary reviewed     Airway   Mallampati: II  Neck ROM: full   Pulmonary - normal exam    breath sounds clear to auscultation  (+) asthma  a smoker                         Cardiovascular - negative ROS and normal exam  Rhythm: regular  Rate: normal,         Neuro/Psych - negative ROS   (+) depression,     Endo/Other - negative ROS   (-) no obesity     Comments: BMI  48.6    GI/Hepatic/Renal - negative ROS     Comments: Gastric bypass with revision          Dental - normal exam                          Anesthesia Plan  Planned anesthetic: MAC    ASA 2     Anesthetic plan and risks discussed with: patient    Post-op plan: routine recovery

## 2021-06-15 NOTE — ANESTHESIA CARE TRANSFER NOTE
Last vitals:   Vitals:    02/09/18 1245   BP: 127/69   Pulse: 96   Resp: 14   Temp: 36.9  C (98.5  F)   SpO2: 96%     Patient's level of consciousness is drowsy  Spontaneous respirations: yes  Maintains airway independently: yes  Dentition unchanged: yes  Oropharynx: oropharynx clear of all foreign objects    QCDR Measures:  ASA# 20 - Surgical Safety Checklist: WHO surgical safety checklist completed prior to induction  PQRS# 430 - Adult PONV Prevention: 4558F - Pt received => 2 anti-emetic agents (different classes) preop & intraop  ASA# 8 - Peds PONV Prevention: NA - Not pediatric patient, not GA or 2 or more risk factors NOT present  PQRS# 424 - Zainab-op Temp Management: 4559F - At least one body temp DOCUMENTED => 35.5C or 95.9F within required timeframe  PQRS# 426 - PACU Transfer Protocol: - Transfer of care checklist used  ASA# 14 - Acute Post-op Pain: ASA14B - Patient did NOT experience pain >= 7 out of 10

## 2021-06-15 NOTE — ANESTHESIA CARE TRANSFER NOTE
Last vitals:   Vitals:    02/23/21 0822   BP: 115/55   Pulse: (!) 56   Resp: 14   Temp: 37.1  C (98.8  F)   SpO2: 100%     Patient's level of consciousness is drowsy  Spontaneous respirations: yes  Maintains airway independently: yes  Dentition unchanged: yes  Oropharynx: oropharynx clear of all foreign objects    QCDR Measures:  ASA# 20 - Surgical Safety Checklist: WHO surgical safety checklist completed prior to induction    PQRS# 430 - Adult PONV Prevention: 4558F - Pt received => 2 anti-emetic agents (different classes) preop & intraop  ASA# 8 - Peds PONV Prevention: NA - Not pediatric patient, not GA or 2 or more risk factors NOT present  PQRS# 424 - Zainab-op Temp Management: NA - MAC anesthesia or case < 60 minutes  PQRS# 426 - PACU Transfer Protocol: - Transfer of care checklist used  ASA# 14 - Acute Post-op Pain: ASA14B - Patient did NOT experience pain >= 7 out of 10

## 2021-06-15 NOTE — ANESTHESIA POSTPROCEDURE EVALUATION
Patient: Janelle Coelho  Procedure(s):  COLONOSCOPY with biopsies.  Anesthesia type: MAC    Patient location: Phase II Recovery  Last vitals:   Vitals Value Taken Time   /55 02/23/21 0822   Temp 37.1  C (98.8  F) 02/23/21 0822   Pulse 56 02/23/21 0843   Resp 14 02/23/21 0822   SpO2 100 % 02/23/21 0843   Vitals shown include unvalidated device data.  Post vital signs: stable  Level of consciousness: awake and responds to simple questions  Post-anesthesia pain: pain controlled  Post-anesthesia nausea and vomiting: no  Pulmonary: unassisted, return to baseline  Cardiovascular: stable and blood pressure at baseline  Hydration: adequate  Anesthetic events: no    QCDR Measures:  ASA# 11 - Zainab-op Cardiac Arrest: ASA11B - Patient did NOT experience unanticipated cardiac arrest  ASA# 12 - Zainab-op Mortality Rate: ASA12B - Patient did NOT die  ASA# 13 - PACU Re-Intubation Rate: NA - No ETT / LMA used for case  ASA# 10 - Composite Anes Safety: ASA10A - No serious adverse event    Additional Notes:

## 2021-06-15 NOTE — ANESTHESIA POSTPROCEDURE EVALUATION
Patient: Janelle Coelho  EXAM UNDER ANESTHESIA WITH I&D OF INTERSPHINCTERIC ABSCESS  SETON PLACEMENT   Anesthesia type: general    Patient location: PACU  Last vitals:   Vitals:    02/09/18 1400   BP: 117/58   Pulse: 93   Resp:    Temp:    SpO2: 96%     Post vital signs: stable  Level of consciousness: awake and responds to simple questions  Post-anesthesia pain: pain controlled  Post-anesthesia nausea and vomiting: no  Pulmonary: unassisted, return to baseline  Cardiovascular: stable and blood pressure at baseline  Hydration: adequate  Anesthetic events: no    QCDR Measures:  ASA# 11 - Zainab-op Cardiac Arrest: ASA11B - Patient did NOT experience unanticipated cardiac arrest  ASA# 12 - Zainab-op Mortality Rate: ASA12B - Patient did NOT die  ASA# 13 - PACU Re-Intubation Rate: ASA13B - Patient did NOT require a new airway mgmt  ASA# 10 - Composite Anes Safety: ASA10A - No serious adverse event    Additional Notes:

## 2021-06-15 NOTE — ANESTHESIA PREPROCEDURE EVALUATION
Anesthesia Evaluation      Patient summary reviewed     Airway   Mallampati: II  Neck ROM: full   Pulmonary - normal exam    breath sounds clear to auscultation  (+) asthma  a smoker                         Cardiovascular - negative ROS and normal exam  Rhythm: regular  Rate: normal,         Neuro/Psych - negative ROS   (+) depression,     Endo/Other - negative ROS   (-) no obesity     Comments: BMI  48.6    GI/Hepatic/Renal - negative ROS     Comments: Gastric bypass with revision          Dental - normal exam                        Anesthesia Plan  Planned anesthetic: general endotracheal  Glidescope  ASA 2   Induction: intravenous   Anesthetic plan and risks discussed with: patient    Post-op plan: routine recovery

## 2021-06-25 ENCOUNTER — TRANSFERRED RECORDS (OUTPATIENT)
Dept: HEALTH INFORMATION MANAGEMENT | Facility: CLINIC | Age: 46
End: 2021-06-25

## 2021-07-20 ENCOUNTER — TELEPHONE (OUTPATIENT)
Dept: PEDIATRICS | Facility: CLINIC | Age: 46
End: 2021-07-20

## 2021-07-20 NOTE — TELEPHONE ENCOUNTER
Left message to call back for: Janelle   Information to relay to patient: pt has a weight loss intake appointment on 07/27/2021, she needs to log in to my chart and fill out her forms prior to her appointment.    Dara Briones LPN  7/20/2021  11:46 AM

## 2021-07-22 NOTE — TELEPHONE ENCOUNTER
Called pt, she will fill out forms in my chart or come to clinic 30 minutes prior to her appointment to fill forms out.    Dara Briones LPN  7/22/2021  9:56 AM

## 2021-07-24 ENCOUNTER — APPOINTMENT (OUTPATIENT)
Dept: GENERAL RADIOLOGY | Facility: CLINIC | Age: 46
End: 2021-07-24
Attending: EMERGENCY MEDICINE
Payer: COMMERCIAL

## 2021-07-24 ENCOUNTER — HOSPITAL ENCOUNTER (EMERGENCY)
Facility: CLINIC | Age: 46
Discharge: HOME | End: 2021-07-24
Attending: EMERGENCY MEDICINE | Admitting: EMERGENCY MEDICINE
Payer: COMMERCIAL

## 2021-07-24 VITALS
OXYGEN SATURATION: 99 % | RESPIRATION RATE: 18 BRPM | HEART RATE: 65 BPM | TEMPERATURE: 97.5 F | DIASTOLIC BLOOD PRESSURE: 84 MMHG | SYSTOLIC BLOOD PRESSURE: 137 MMHG

## 2021-07-24 DIAGNOSIS — J45.901 EXACERBATION OF ASTHMA, UNSPECIFIED ASTHMA SEVERITY, UNSPECIFIED WHETHER PERSISTENT: ICD-10-CM

## 2021-07-24 DIAGNOSIS — K21.9 GASTROESOPHAGEAL REFLUX DISEASE WITHOUT ESOPHAGITIS: ICD-10-CM

## 2021-07-24 PROCEDURE — 71046 X-RAY EXAM CHEST 2 VIEWS: CPT

## 2021-07-24 PROCEDURE — 94640 AIRWAY INHALATION TREATMENT: CPT

## 2021-07-24 PROCEDURE — 250N000012 HC RX MED GY IP 250 OP 636 PS 637: Performed by: EMERGENCY MEDICINE

## 2021-07-24 PROCEDURE — 99284 EMERGENCY DEPT VISIT MOD MDM: CPT | Mod: 25

## 2021-07-24 PROCEDURE — 250N000009 HC RX 250: Performed by: EMERGENCY MEDICINE

## 2021-07-24 RX ORDER — PREDNISONE 20 MG/1
40 TABLET ORAL DAILY
Qty: 8 TABLET | Refills: 0 | Status: SHIPPED | OUTPATIENT
Start: 2021-07-24 | End: 2021-07-28

## 2021-07-24 RX ORDER — IPRATROPIUM BROMIDE AND ALBUTEROL SULFATE 2.5; .5 MG/3ML; MG/3ML
3 SOLUTION RESPIRATORY (INHALATION) ONCE
Status: COMPLETED | OUTPATIENT
Start: 2021-07-24 | End: 2021-07-24

## 2021-07-24 RX ORDER — PREDNISONE 20 MG/1
40 TABLET ORAL ONCE
Status: COMPLETED | OUTPATIENT
Start: 2021-07-24 | End: 2021-07-24

## 2021-07-24 RX ORDER — ALBUTEROL SULFATE 90 UG/1
2 AEROSOL, METERED RESPIRATORY (INHALATION) EVERY 6 HOURS PRN
Qty: 6.7 G | Refills: 0 | Status: SHIPPED | OUTPATIENT
Start: 2021-07-24 | End: 2022-04-29

## 2021-07-24 RX ORDER — FAMOTIDINE 20 MG/1
20 TABLET, FILM COATED ORAL 2 TIMES DAILY PRN
Qty: 30 TABLET | Refills: 0 | Status: SHIPPED | OUTPATIENT
Start: 2021-07-24 | End: 2021-08-21

## 2021-07-24 RX ADMIN — IPRATROPIUM BROMIDE AND ALBUTEROL SULFATE 3 ML: .5; 3 SOLUTION RESPIRATORY (INHALATION) at 21:36

## 2021-07-24 RX ADMIN — PREDNISONE 40 MG: 20 TABLET ORAL at 21:13

## 2021-07-24 ASSESSMENT — ENCOUNTER SYMPTOMS
COUGH: 1
APPETITE CHANGE: 0
ARTHRALGIAS: 0
CHILLS: 0
MYALGIAS: 0
ABDOMINAL PAIN: 0
FATIGUE: 0
SHORTNESS OF BREATH: 0
VOMITING: 0
FEVER: 0
ACTIVITY CHANGE: 0
NAUSEA: 0
CHEST TIGHTNESS: 1
WEAKNESS: 0

## 2021-07-24 NOTE — ED TRIAGE NOTES
Patient had been meaning to get seen sooner, but finally decided to get herself checked out since she drove her mom here just now. Acid reflux a couple weeks ago. Woke up coughing. Lungs still feel like she has a dry cough/bronchitis. Feels like it is getting worse the last few days.
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
## 2021-07-27 ENCOUNTER — OFFICE VISIT (OUTPATIENT)
Dept: FAMILY MEDICINE | Facility: CLINIC | Age: 46
End: 2021-07-27
Payer: COMMERCIAL

## 2021-07-27 VITALS
WEIGHT: 275 LBS | SYSTOLIC BLOOD PRESSURE: 132 MMHG | DIASTOLIC BLOOD PRESSURE: 80 MMHG | HEART RATE: 54 BPM | HEIGHT: 62 IN | BODY MASS INDEX: 50.61 KG/M2 | OXYGEN SATURATION: 98 % | RESPIRATION RATE: 20 BRPM

## 2021-07-27 DIAGNOSIS — Z98.84 HISTORY OF ROUX-EN-Y GASTRIC BYPASS: ICD-10-CM

## 2021-07-27 DIAGNOSIS — F41.1 GENERALIZED ANXIETY DISORDER: Chronic | ICD-10-CM

## 2021-07-27 DIAGNOSIS — E55.9 VITAMIN D DEFICIENCY: ICD-10-CM

## 2021-07-27 DIAGNOSIS — E88.819 INSULIN RESISTANCE: ICD-10-CM

## 2021-07-27 DIAGNOSIS — E66.813 CLASS 3 SEVERE OBESITY DUE TO EXCESS CALORIES WITH SERIOUS COMORBIDITY AND BODY MASS INDEX (BMI) OF 50.0 TO 59.9 IN ADULT (H): Primary | ICD-10-CM

## 2021-07-27 DIAGNOSIS — E66.01 CLASS 3 SEVERE OBESITY DUE TO EXCESS CALORIES WITH SERIOUS COMORBIDITY AND BODY MASS INDEX (BMI) OF 50.0 TO 59.9 IN ADULT (H): Primary | ICD-10-CM

## 2021-07-27 PROBLEM — K50.10: Status: ACTIVE | Noted: 2018-02-09

## 2021-07-27 PROCEDURE — 99215 OFFICE O/P EST HI 40 MIN: CPT | Performed by: FAMILY MEDICINE

## 2021-07-27 RX ORDER — CETIRIZINE HYDROCHLORIDE 10 MG/1
10 TABLET ORAL DAILY
COMMUNITY

## 2021-07-27 RX ORDER — SERTRALINE HYDROCHLORIDE 100 MG/1
100 TABLET, FILM COATED ORAL DAILY
COMMUNITY
End: 2021-10-20

## 2021-07-27 ASSESSMENT — MIFFLIN-ST. JEOR: SCORE: 1832.7

## 2021-07-27 NOTE — ASSESSMENT & PLAN NOTE
46-year-old woman with long history of obesity status post gastric Heraclio-en-Y, status post gastric Heraclio-en-Y revision struggling with weight gain over the past year.  She had been doing well with weight loss while on phentermine until she was diagnosed with Crohn's colitis.  Her Crohn's colitis is under better control with the assistance of Remicade.  She struggles with appetite and describes her self is being hungry throughout the day.  In reviewing her past laboratory results, there is some evidence of insulin resistance as shown by her Amelia-IR.  Her metabolic labs are otherwise quite normal.      Previous interventions: Phentermine has been helpful in the past.  Side effects with topiramate.  Weight gain with Contrave.  Saxenda was never fully tried as this medicine was initially prescribed when she was diagnosed with Crohn's colitis was not well tolerated well her initial treatment was being implemented.  Gastric bypass surgery as described above.    The patient and I discussed a nutritional plan focused on satiety.  I recommended that she increase her protein to 1.2 - 1.6 mg/kg (of weight of a BMI of 30).  Concurrently, I suggested that she decrease her carbohydrate consumption to 50 to 75 g of carbohydrates per day.  She will deemphasize calories.  I believe if she achieves her protein goal, she also increase her consumption.    She is overdue for post.  Check surgery labs and metabolic labs.  She will return for a fasting blood draw in the near future.    Intervention: Semaglutide fits with evidence of insulin resistance.  We will try for Wegovy with the initial dose of 0.25 mg/week for the first month and titrate as tolerated.  No personal/family history of thyroid cancer or endocrine neoplasms.  No personal history of pancreatitis.    She will also meet with a dietitian.    Follow-up should occur approximately 4 weeks after starting the new medication.  a

## 2021-07-27 NOTE — PATIENT INSTRUCTIONS
Macronutrient Goals    Minimum 3? meals per day, control daily calories   - 1400 - 1700 female   - 1600 male  Minimum of 4 palm servings of protein daily.  Begin everyday with protein    - ~100-120 gm for female   - 120 gm for male  Be mindful of net carbs 50-75 gm/day  - (Total carbs - fiber)   - Less than 5 gm of carbs with breakfast    Supplementation   - 1 multivitamin   - clear and copious urination (adequate water consumption)   - 2000 mg fish oil capsules    - 2000 IU Vitamin D (Unless checked during clinic visit I will be checking your level today and may send a prescriptionto your pharmacy if necessary)  ____________________________    Dr. Aleks Mcadams MD   - Always Hungry    Dr. Hung Mei MD   - The Obesity Code   - YouTube    Dr. Geovanna Del Real, DO.   Search for her name in Youtube with added criteria: Jignesh Ramos (DENA talk on metabolic syndrome).      Alex See, PhD   - Why We Sleep   - search for author name and podcast for a number of interviews  _______________________________    Interested articles on nutrition:    Saturated fat: Saturated Fats and Health: A Reassessment and Proposal for Food-Based Recommendations: JACC State-of-the-Art Review June (https://www.jacc.org/doi/full/10.1016/j.jacc.2020.05.077)    A Pesco-Mediterranean Diet With Intermittent Fasting: JACC Review Topic of the Week (https://www.jacc.org/doi/full/10.1016/j.jacc.2020.07.049)

## 2021-07-27 NOTE — PROGRESS NOTES
"    New Medical Weight Management Consult    PATIENT:  Janelle Coelho  MRN:         [id number]  :         1975  RASHMI:         2021    Dear Ivett Sy CNP,    I had the pleasure of seeing your patient, Janelle Coelho. Full intake/assessment was done to determine barriers to weight loss success and develop a treatment plan. Janelle Coelho is a 46 year old female interested in treatment of medical problems associated with excess weight. She has a height of 5' 1.5\", a weight of 275 lbs 0 oz, and the calculated Body mass index is 51.12 kg/m .    ASSESSMENT/PLAN:    Class 3 severe obesity due to excess calories with serious comorbidity and body mass index (BMI) of 50.0 to 59.9 in adult (H)  46-year-old woman with long history of obesity status post gastric Heraclio-en-Y, status post gastric Heraclio-en-Y revision struggling with weight gain over the past year.  She had been doing well with weight loss while on phentermine until she was diagnosed with Crohn's colitis.  Her Crohn's colitis is under better control with the assistance of Remicade.  She struggles with appetite and describes her self is being hungry throughout the day.  In reviewing her past laboratory results, there is some evidence of insulin resistance as shown by her Amelia-IR.  Her metabolic labs are otherwise quite normal.      Previous interventions: Phentermine has been helpful in the past.  Side effects with topiramate.  Weight gain with Contrave.  Saxenda was never fully tried as this medicine was initially prescribed when she was diagnosed with Crohn's colitis was not well tolerated well her initial treatment was being implemented.  Gastric bypass surgery as described above.    The patient and I discussed a nutritional plan focused on satiety.  I recommended that she increase her protein to 1.2 - 1.6 mg/kg (of weight of a BMI of 30).  Concurrently, I suggested that she decrease her carbohydrate consumption to 50 to 75 g of carbohydrates " "per day.  She will deemphasize calories.  I believe if she achieves her protein goal, she also increase her consumption.    She is overdue for post.  Check surgery labs and metabolic labs.  She will return for a fasting blood draw in the near future.    Intervention: Semaglutide fits with evidence of insulin resistance.  We will try for Wegovy with the initial dose of 0.25 mg/week for the first month and titrate as tolerated.  No personal/family history of thyroid cancer or endocrine neoplasms.  No personal history of pancreatitis.    She will also meet with a dietitian.    Follow-up should occur approximately 4 weeks after starting the new medication.  a    FOLLOW-UP:   4 weeks.    SUBJECTIVE:     She has the following co-morbidities:       7/26/2021   I have the following health issues associated with obesity: GERD (Reflux)   I have the following symptoms associated with obesity: Knee Pain, Lower Extremity Swelling, Back Pain, Fatigue, Groin Rash, Hip Pain     Narrative History:    - \"I don't know.\"    - she says that she did well on phentermine in the past.  \"That helped a lot.\"  She was on this medication she lost ~100 lbs.     - since her diagnosis of crohns she has struggled.     - \"It failed again.\"   - works as an .     - sleep has been restless.  She uses trazodone   - history of restless legs, especially when her iron is low.     - currently on burst of prednisone.     - she never really started, titrated saxenda back in 8729-1984.     Patient Goals 7/26/2021   I am interested in having a healthier weight to diminish current health problems: Yes   I am interested in having a healthier weight in order to prevent future health problems: Yes   I am interested in having a healthier weight in order to have a future surgery: No       Referring Provider 7/26/2021   Please name the provider who referred you to Medical Weight Management.  If you do not know, please answer: \"I Don't Know\". Stephany-Hiram "       Weight History 7/26/2021   How concerned are you about your weight? Very Concerned   Would you describe your weight gain as gradual? Yes   I became overweight: As a Child   The following factors have contributed to my weight gain:  Mental Health Issues, A Health Crisis, After Quitting Smoking, Eating Wrong Types of Food, Eating Too Much, Lack of Exercise, Genetic (Runs in the Family), Stress   I have tried the following methods to lose weight: Watching Portions or Calories, Weight Watchers, Medications, Weight Loss Surgery   Please list the medications you have tried.  Fentermine   My lowest weight since age 18 was: 150   My highest weight since age 18 was: 273   The most weight I have ever lost was: (lbs) 100   I have the following family history of obesity/being overweight:  My mother is overweight, One or more of my siblings are overweight   Has anyone in your family had weight loss surgery? Yes   How has your weight changed over the last year?  Gained   How many pounds? 50       Diet Recall Review with Patient 7/26/2021   Do you typically eat breakfast? Yes   If you do eat breakfast, what types of food do you eat? breakfast sandwhich when working, eggs and toast with york or waffle and sugar  free syryp, or omlette at home. Occasional bagel or english muffin.   Do you typically eat lunch? Yes   If you do eat lunch, what types of food do you typically eat?  Lean cuisine or healthy choice frozen meals, leftovers, or vending maching food   Do you typically eat supper? Yes   If you do eat supper, what types of food do you typically eat? meat with rice or potatoes. sometimes veggies (many veggies upset my stomache)   Do you typically eat snacks? Yes   If you do snack, what types of food do you typically eat? fruit, chips, gold fish   Do you like vegetables?  Yes   Do you drink water? Yes   How many glasses of juice do you drink in a typical day? 0   How many of glasses of milk do you drink in a typical day? 0    If you do drink milk, what type? N/A   How many 8oz glasses of sugar containing drinks such as Rodolfo-Aid/sweet tea do you drink in a day? 0   How many cans/bottles of sugar pop/soda/tea/sports drinks do you drink in a day? 1   How many cans/bottles of diet pop/soda/tea or sports drink do you drink in a day? 0   How often do you have a drink of alcohol? Monthly or Less   If you do drink, how many drinks might you have in a day? 1 or 2       Eating Habits 7/26/2021   Generally, my meals include foods like these: bread, pasta, rice, potatoes, corn, crackers, sweet dessert, pop, or juice. Everyday   Generally, my meals include foods like these: fried meats, brats, burgers, french fries, pizza, cheese, chips, or ice cream. A Few Times a Week   Eat fast food (like Improveit! 360s, Social IQ (Social Influence Quotient), Taco Bell). A Few Times a Week   Eat at a buffet or sit-down restaurant. Never   Eat most of my meals in front of the TV or computer. Everyday   Often skip meals, eat at random times, have no regular eating times. Almost Everyday   Rarely sit down for a meal but snack or graze throughout.  A Few Times a Week   Eat extra snacks between meals. Almost Everyday   Eat most of my food at the end of the day. Less Than Weekly   Eat in the middle of the night or wake up at night to eat. Almost Everyday   Eat extra snacks to prevent or correct low blood sugar. Once a Week   Eat to prevent acid reflux or stomach pain. Less Than Weekly   Worry about not having enough food to eat. Never   Have you been to the food shelf at least a few times this year? No   I eat when I am depressed. Less Than Weekly   I eat when I am stressed. Once a Week   I eat when I am bored. Once a Week   I eat when I am anxious. Less Than Weekly   I eat when I am happy or as a reward. Less Than Weekly   I feel hungry all the time even if I just have eaten. Everyday   Feeling full is important to me. Almost Everyday   I finish all the food on my plate even if I am already full.  Almost Everyday   I can't resist eating delicious food or walk past the good food/smell. A Few Times a Week   I eat/snack without noticing that I am eating. Once a Week   I eat when I am preparing the meal. Once a Week   I eat more than usual when I see others eating. Once a Week   I have trouble not eating sweets, ice cream, cookies, or chips if they are around the house. A Few Times a Week   I think about food all day. Almost Everyday   What foods, if any, do you crave? Chips/Crackers   Please list any other foods you crave? mostly salty snacks, but chocolate too       Amount of Food 7/26/2021   I make myself vomit what I have eaten or use laxatives to get rid of food. Never   I eat a large amount of food, like a loaf of bread, a box of cookies, a pint/quart of ice cream, all at once. Never   I eat a large amount of food even when I am not hungry. Monthly   I eat rapidly. Monthly   I eat alone because I feel embarrassed and do not want others to see how much I have eaten. Weekly   I eat until I am uncomfortably full. Monthly   I feel bad, disgusted, or guilty after I overeat. Never   I make myself vomit what I have eaten or use laxatives to get rid of food. Never       Activity/Exercise History 7/26/2021   How much of a typical 12 hour day do you spend sitting? Most of the Day   How much of a typical 12 hour day do you spend lying down? Less Than Half the Day   How much of a typical day do you spend walking/standing? Less Than Half the Day   How many hours (not including work) do you spend on the TV/Video Games/Computer/Tablet/Phone? 2-3 Hours   How many times a week are you active for the purpose of exercise? Never   What keeps you from being more active? Pain, Shortness of Breath, Too tired, Worried People Will Look At Me   How many total minutes do you spend doing some activity for the purpose of exercising when you exercise? None     PAST MEDICAL HISTORY:  Past Medical History:   Diagnosis Date     Acute  Crohn's disease (H)      Acute posthemorrhagic anemia      ASCUS favor benign 2012    hpv neg. needs cotest in 3 years     ASCUS with positive high risk HPV cervical      Depressive disorder     since teens     Depressive disorder, not elsewhere classified 2/10/2014     Excessive and frequent menstruation      Family history of breast cancer in mother      Generalized anxiety disorder      History of blood transfusion     few years ago, should be in my records, it was at [facility]     History of transfusion     many years     Infected cyst of Bartholin's gland duct      Insomnia      Intermittent asthma, uncontrolled      LSIL (low grade squamous intraepithelial lesion) on Pap smear 2008    per chart notes colp dysplasia?     Major depressive disorder in partial remission (H)      Obesity      Obesity      Tobacco abuse      Uncomplicated asthma     since baby       Work/Social History Reviewed With Patient 7/26/2021   My employment status is: Full-Time   My job is:    How much of your job is spent on the computer or phone? 100%   How many hours do you spend commuting to work daily?  5 min   What is your marital status? Single   If in a relationship, is your significant other overweight? N/A   Do you have children? No   If you have children, are they overweight? N/A   Who do you live with?  no one   Who does the food shopping?  me       Mental Health History Reviewed With Patient 7/26/2021   Have you ever been physically or sexually abused? No   If yes, do you feel that the abuse is affecting your weight? N/A   If yes, would you like to talk to a counselor about the abuse? N/A   How often in the past 2 weeks have you felt little interest or pleasure in doing things? Nearly Everyday   Over the past 2 weeks how often have you felt down, depressed, or hopeless? Not at all       Sleep History Reviewed With Patient 7/26/2021   How many hours do you sleep at night? 9   Do you think that you snore  loudly or has anybody ever heard you snore loudly (louder than talking or so loud it can be heard behind a shut door)? No   Has anyone seen or heard you stop breathing during your sleep? No   Do you often feel tired, fatigued, or sleepy during the day? Yes   Do you have a TV/Computer in your bedroom? Yes       MEDICATIONS:   Current Outpatient Medications   Medication Sig Dispense Refill     albuterol (PROAIR HFA/PROVENTIL HFA/VENTOLIN HFA) 108 (90 Base) MCG/ACT inhaler Inhale 2 puffs into the lungs every 6 hours as needed for shortness of breath / dyspnea or wheezing 6.7 g 0     cetirizine (ZYRTEC) 10 MG tablet Take 10 mg by mouth daily       cyanocobalamin (VITAMIN B12) 1000 MCG/ML injection Inject 1 mL into the muscle every 30 days       inFLIXimab (REMICADE IV) Every 8 weeks       insulin pen needle (B-D U/F) 31G X 5 MM Use 1 daily or as directed. 100 each prn     levonorgestrel-ethinyl estradiol (QUASENSE) 0.15-0.03 MG tablet Take 1 tablet by mouth daily 91 tablet 3     predniSONE (DELTASONE) 20 MG tablet Take 2 tablets (40 mg) by mouth daily for 4 days 8 tablet 0     Semaglutide,0.25 or 0.5MG/DOS, 2 MG/1.5ML SOPN Inject 0.25 mg Subcutaneous once a week 1.5 mL 0     sertraline (ZOLOFT) 100 MG tablet Take 100 mg by mouth daily       traZODone (DESYREL) 100 MG tablet TAKE 1 TABLET(100 MG) BY MOUTH AT BEDTIME 90 tablet 1     VENTOLIN  (90 Base) MCG/ACT inhaler INHALE 2 PUFFS INTO THE LUNGS EVERY 6 HOURS 18 g 1     vitamin D3 (CHOLECALCIFEROL) 50 mcg (2000 units) tablet Take 1 tablet (50 mcg) by mouth daily 90 tablet 3     famotidine (PEPCID) 20 MG tablet Take 1 tablet (20 mg) by mouth 2 times daily as needed (acid reflux/GERD) (Patient not taking: Reported on 7/27/2021) 30 tablet 0     hydrOXYzine (ATARAX) 50 MG tablet Take 50 mg by mouth 3 times daily as needed (Patient not taking: Reported on 7/27/2021)         ALLERGIES:   Allergies   Allergen Reactions     Dextran Anaphylaxis       PHYSICAL EXAM:  Gen:  Alert, pleasant, cooperative, no distress, appears stated age, patient is obese.  The patient hasgynoid body morphology.  Eyes: No conjunctival injection, no scleral icterus.  Neck: Supple, symmetrical, trachea midline.  No adenopathy.  Thyroid is without enlargement/tenderness/nodules.  Cardiac: Regular rate and rhythm, normal S1/S2, no murmurs or gallops, no edema at ankles bilaterally.  Respiratory: Clear to auscultation bilaterally.  Abdomen: Soft, nontender, no hepatosplenomegaly.  Extremities: Warm, well-perfused, no edema.     Skin: Skin, turgor, texture color is normal. Skin tags: No, striae: No, hirsutism: no, acanthosis nigricans: No.  Neurologic: Cranial nerves II through XII grossly intact.  2+ reflexes at the patella bilaterally.  Psych: Normal affect.  Normal rate of speech.  No tangentiality.      55 minutes spent on the date of the encounter doing chart review, history and exam, documentation and further activities per the note    This note has been dictated using voice recognition software. Any grammatical or context distortions are unintentional and inherent to the software    Sincerely,    Chava Baig MD

## 2021-07-30 ENCOUNTER — LAB (OUTPATIENT)
Dept: LAB | Facility: CLINIC | Age: 46
End: 2021-07-30
Payer: COMMERCIAL

## 2021-07-30 DIAGNOSIS — E66.813 CLASS 3 SEVERE OBESITY DUE TO EXCESS CALORIES WITH SERIOUS COMORBIDITY AND BODY MASS INDEX (BMI) OF 50.0 TO 59.9 IN ADULT (H): ICD-10-CM

## 2021-07-30 DIAGNOSIS — Z98.84 HISTORY OF ROUX-EN-Y GASTRIC BYPASS: Primary | ICD-10-CM

## 2021-07-30 DIAGNOSIS — E66.01 CLASS 3 SEVERE OBESITY DUE TO EXCESS CALORIES WITH SERIOUS COMORBIDITY AND BODY MASS INDEX (BMI) OF 50.0 TO 59.9 IN ADULT (H): ICD-10-CM

## 2021-07-30 DIAGNOSIS — E88.819 INSULIN RESISTANCE: ICD-10-CM

## 2021-07-30 LAB
ALBUMIN SERPL-MCNC: 3.2 G/DL (ref 3.5–5)
ALP SERPL-CCNC: 69 U/L (ref 45–120)
ALT SERPL W P-5'-P-CCNC: 15 U/L (ref 0–45)
ANION GAP SERPL CALCULATED.3IONS-SCNC: 11 MMOL/L (ref 5–18)
AST SERPL W P-5'-P-CCNC: 12 U/L (ref 0–40)
BILIRUB SERPL-MCNC: 0.4 MG/DL (ref 0–1)
BUN SERPL-MCNC: 9 MG/DL (ref 8–22)
CALCIUM SERPL-MCNC: 8.7 MG/DL (ref 8.5–10.5)
CHLORIDE BLD-SCNC: 107 MMOL/L (ref 98–107)
CHOLEST SERPL-MCNC: 187 MG/DL
CO2 SERPL-SCNC: 24 MMOL/L (ref 22–31)
CREAT SERPL-MCNC: 0.95 MG/DL (ref 0.6–1.1)
ERYTHROCYTE [DISTWIDTH] IN BLOOD BY AUTOMATED COUNT: 12.8 % (ref 10–15)
FASTING STATUS PATIENT QL REPORTED: ABNORMAL
FERRITIN SERPL-MCNC: 240 NG/ML (ref 10–130)
FOLATE SERPL-MCNC: 5.8 NG/ML
GFR SERPL CREATININE-BSD FRML MDRD: 72 ML/MIN/1.73M2
GLUCOSE BLD-MCNC: 80 MG/DL (ref 70–125)
HCT VFR BLD AUTO: 39.2 % (ref 35–47)
HDLC SERPL-MCNC: 49 MG/DL
HGB BLD-MCNC: 12.8 G/DL (ref 11.7–15.7)
LDLC SERPL CALC-MCNC: 119 MG/DL
MCH RBC QN AUTO: 28.6 PG (ref 26.5–33)
MCHC RBC AUTO-ENTMCNC: 32.7 G/DL (ref 31.5–36.5)
MCV RBC AUTO: 88 FL (ref 78–100)
PLATELET # BLD AUTO: 339 10E3/UL (ref 150–450)
POTASSIUM BLD-SCNC: 4.2 MMOL/L (ref 3.5–5)
PROT SERPL-MCNC: 6 G/DL (ref 6–8)
PTH-INTACT SERPL-MCNC: 83 PG/ML (ref 10–86)
RBC # BLD AUTO: 4.48 10E6/UL (ref 3.8–5.2)
SODIUM SERPL-SCNC: 142 MMOL/L (ref 136–145)
TRIGL SERPL-MCNC: 97 MG/DL
VIT B12 SERPL-MCNC: 262 PG/ML (ref 213–816)
WBC # BLD AUTO: 11.7 10E3/UL (ref 4–11)

## 2021-07-30 PROCEDURE — 85027 COMPLETE CBC AUTOMATED: CPT

## 2021-07-30 PROCEDURE — 83970 ASSAY OF PARATHORMONE: CPT

## 2021-07-30 PROCEDURE — 82746 ASSAY OF FOLIC ACID SERUM: CPT

## 2021-07-30 PROCEDURE — 83525 ASSAY OF INSULIN: CPT

## 2021-07-30 PROCEDURE — 80053 COMPREHEN METABOLIC PANEL: CPT

## 2021-07-30 PROCEDURE — 83540 ASSAY OF IRON: CPT

## 2021-07-30 PROCEDURE — 36415 COLL VENOUS BLD VENIPUNCTURE: CPT

## 2021-07-30 PROCEDURE — 82728 ASSAY OF FERRITIN: CPT

## 2021-07-30 PROCEDURE — 80061 LIPID PANEL: CPT

## 2021-07-30 PROCEDURE — 82607 VITAMIN B-12: CPT

## 2021-07-30 PROCEDURE — 84466 ASSAY OF TRANSFERRIN: CPT

## 2021-07-31 LAB
INSULIN SERPL-ACNC: 8 MU/L (ref 3–25)
IRON SATN MFR SERPL: 16 % (ref 20–50)
IRON SERPL-MCNC: 50 UG/DL (ref 42–175)
TIBC SERPL-MCNC: 319 UG/DL (ref 313–563)
TRANSFERRIN SERPL-MCNC: 255 MG/DL (ref 212–360)

## 2021-08-02 ENCOUNTER — ANCILLARY PROCEDURE (OUTPATIENT)
Dept: MAMMOGRAPHY | Facility: CLINIC | Age: 46
End: 2021-08-02
Attending: NURSE PRACTITIONER
Payer: COMMERCIAL

## 2021-08-02 DIAGNOSIS — Z12.31 ENCOUNTER FOR SCREENING MAMMOGRAM FOR BREAST CANCER: ICD-10-CM

## 2021-08-02 PROCEDURE — 77063 BREAST TOMOSYNTHESIS BI: CPT | Mod: TC | Performed by: RADIOLOGY

## 2021-08-02 PROCEDURE — 77067 SCR MAMMO BI INCL CAD: CPT | Mod: TC | Performed by: RADIOLOGY

## 2021-08-03 ENCOUNTER — TELEPHONE (OUTPATIENT)
Dept: FAMILY MEDICINE | Facility: CLINIC | Age: 46
End: 2021-08-03

## 2021-08-03 ENCOUNTER — VIRTUAL VISIT (OUTPATIENT)
Dept: FAMILY MEDICINE | Facility: CLINIC | Age: 46
End: 2021-08-03
Payer: COMMERCIAL

## 2021-08-03 ENCOUNTER — MYC MEDICAL ADVICE (OUTPATIENT)
Dept: PEDIATRICS | Facility: CLINIC | Age: 46
End: 2021-08-03

## 2021-08-03 DIAGNOSIS — G47.00 INSOMNIA, UNSPECIFIED TYPE: ICD-10-CM

## 2021-08-03 DIAGNOSIS — E66.813 CLASS 3 SEVERE OBESITY DUE TO EXCESS CALORIES WITH SERIOUS COMORBIDITY AND BODY MASS INDEX (BMI) OF 50.0 TO 59.9 IN ADULT (H): Primary | ICD-10-CM

## 2021-08-03 DIAGNOSIS — E66.01 CLASS 3 SEVERE OBESITY DUE TO EXCESS CALORIES WITH SERIOUS COMORBIDITY AND BODY MASS INDEX (BMI) OF 50.0 TO 59.9 IN ADULT (H): Primary | ICD-10-CM

## 2021-08-03 DIAGNOSIS — Z71.3 NUTRITIONAL COUNSELING: ICD-10-CM

## 2021-08-03 DIAGNOSIS — Z98.84 HISTORY OF ROUX-EN-Y GASTRIC BYPASS: ICD-10-CM

## 2021-08-03 PROCEDURE — 97802 MEDICAL NUTRITION INDIV IN: CPT | Mod: 95 | Performed by: DIETITIAN, REGISTERED

## 2021-08-03 PROCEDURE — 99207 PR DROP WITH A PROCEDURE: CPT | Mod: 25 | Performed by: DIETITIAN, REGISTERED

## 2021-08-03 NOTE — PROGRESS NOTES
Janelle Coelho is a 46 year old who is being evaluated via a billable video visit.      How would you like to obtain your AVS? MyChart  If the video visit is dropped, the invitation should be resent by: Send to e-mail at: [email or web address]  Will anyone else be joining your video visit? No      Video Start Time: 1:55 PM      Medical Weight Loss Initial Diet Evaluation  Assessment:  Janelle is presenting today for a new weight management nutrition consultation. Pt has had an initial appointment with Dr. Baig .  Weight loss medication: Wegovy .       Anthropometrics:  Initial Weight: 275 lbs   BMI: There is no height or weight on file to calculate BMI.   Ideal body weight: 48.9 kg (107 lb 14.6 oz)  Adjusted ideal body weight: 79.3 kg (174 lb 12 oz)    Estimated RMR (Edvin-St Casanova equation):  1837 kcals x 1.2 (sedentary) = 2204 kcals (for weight maintenance)    Recommended Protein Intake: 60-80 grams of protein/day    Medical History:  Patient Active Problem List   Diagnosis     Class 3 severe obesity due to excess calories with serious comorbidity and body mass index (BMI) of 50.0 to 59.9 in adult (H)     Generalized anxiety disorder     Major depressive disorder, recurrent episode, in partial remission (H)     Mild intermittent asthma, unspecified whether complicated     History of Heraclio-en-Y gastric bypass     Insomnia, unspecified type     Family history of breast cancer in mother     Vitamin D deficiency     Crohn's disease of large intestine with complication (H)     Hives     Crohn's disease of perianal region (H)      Diabetes: No   HbA1c:  No results found for: HGBA1C    Nutrition History:   Food allergies/intolerances/cultural or religous food customs: Yes avoid raw vegetables/fruit due to Crohn's Disease     Vitamins/Mineral Supplementation: Vitamin D, Vitamin B12     Dietary Recall:  Breakfast: Breakfast [place] or eggs and toast   Lunch: Lean Cuisine or Healthy Choice or leftovers from the night  before or Myers's Pie or Ham and Cheese [place]   Dinner: Chicken [place] with Rice a [place] or Meatloaf and Mashed Potatoes   Typical Snacks: fruit, baked Cheez Its   Overnight eating: Yes-on occasion  Eating out: almost daily     Beverages: Coffee (Black), Water, occasional Sprite with an upset stomach 1 can    Exercise: no set regimen     Nutrition Diagnosis (PES statement):   Overweight/Obesity (NC 3.3) related to overeating and poor lifestyle habits as evidenced by patient report of excessive energy intake, lack of exercise, and BMI of 51.12 kg/m2.     Nutrition Intervention  1. Food and/or Nutrient Delivery   a. Placed emphasis on importance of developing a healthy meal routine, aiming for 3 meals a day and no snacks.    2. Nutrition Education   a. Discussed with patient how to build a meal: the importance of including a lean/low fat protein at each meal, include a source of vegetables at a minimum of lunch and dinner and limiting carbohydrate intake to <25% per meal.  b. Educated on sources of lean protein, portion sizes, the amount of grams found in each source. Recommend patient to aim for 20-30g protein at each meal.  c. Discussed the importance of adequate hydration, with emphasis on drinking 64oz of water or zero calorie beverages per day.    3. Nutrition Counseling   a. Encouraged importance of developing routine exercise for health benefits and weight loss.    Goals established by patient:   1. Start tracking intake via food journal, aiming for 3596-9201 calories/day along with around 100 grams of protein, and less than 75 grams of net carbohydrates.   2. Choose snacks that are around 100-150 calories/snack, if physically hungry.     Handouts provided:  Snack Ideas    Assessment/Plan:    Pt will follow up in 1 month(s) with bariatrician and 2 month(s) with dietitian.     Video-Visit Details    Type of service:  Video Visit    Video End Time:2:22 PM     Originating Location (pt. Location):  Home    Distant Location (provider location):  [facility]     Platform used for Video Visit: Sona Garcia RD

## 2021-08-03 NOTE — TELEPHONE ENCOUNTER
Prior Authorization Request  Who s requesting:  Pharmacy  [facility]   Medication Name: Wegovy  Insurance Plan: MEDICA CHOICE  Insurance Member ID Number:  [id number]  CoverMyMeds Key: n/a  Informed patient that prior authorizations can take up to 10 business days for response:   Yes  Okay to leave a detailed message: Yes     Route to pool:  P [facility]OUR[facility]

## 2021-08-03 NOTE — TELEPHONE ENCOUNTER
"Routing refill request for trazodone.      - Tommy \"Ankur\" Sepideh RN - Patient Advocate Liason (PAL)  [facility]    "

## 2021-08-04 RX ORDER — TRAZODONE HYDROCHLORIDE 100 MG/1
TABLET ORAL
Qty: 90 TABLET | Refills: 1 | Status: SHIPPED | OUTPATIENT
Start: 2021-08-04 | End: 2021-08-21

## 2021-08-04 NOTE — TELEPHONE ENCOUNTER
Central Prior Authorization Team   Phone: [phone]    PA Initiation    Medication:   Insurance Company: [employer] - Phone [phone] Fax [phone]  Pharmacy Filling the Rx: [id number] - [place] - [facility] [address]  AT [address]  Filling Pharmacy Phone: [phone]  Filling Pharmacy Fax:    Start Date: 8/4/2021

## 2021-08-06 NOTE — TELEPHONE ENCOUNTER
Prior Authorization Approval    Authorization Effective Date: 7/5/2021  Authorization Expiration Date: 3/2/2022  Medication:   Approved Dose/Quantity:    Reference #:     Insurance Company: [employer] - Phone [phone] Fax [phone]  Expected CoPay:       CoPay Card Available:      Foundation Assistance Needed:    Which Pharmacy is filling the prescription (Not needed for infusion/clinic administered): [id number], [place] - [address] [place] [address] AT [facility] [address]  Pharmacy Notified: Yes  Patient Notified: No

## 2021-08-13 ENCOUNTER — TRANSFERRED RECORDS (OUTPATIENT)
Dept: HEALTH INFORMATION MANAGEMENT | Facility: CLINIC | Age: 46
End: 2021-08-13

## 2021-08-18 ENCOUNTER — E-VISIT (OUTPATIENT)
Dept: PEDIATRICS | Facility: CLINIC | Age: 46
End: 2021-08-18
Payer: COMMERCIAL

## 2021-08-18 DIAGNOSIS — R05.9 COUGH: Primary | ICD-10-CM

## 2021-08-18 PROCEDURE — 99207 PR NON-BILLABLE SERV PER CHARTING: CPT | Performed by: NURSE PRACTITIONER

## 2021-08-21 ENCOUNTER — OFFICE VISIT (OUTPATIENT)
Dept: URGENT CARE | Facility: URGENT CARE | Age: 46
End: 2021-08-21
Payer: COMMERCIAL

## 2021-08-21 VITALS
BODY MASS INDEX: 51.6 KG/M2 | WEIGHT: 277.6 LBS | HEART RATE: 59 BPM | TEMPERATURE: 97.8 F | DIASTOLIC BLOOD PRESSURE: 77 MMHG | SYSTOLIC BLOOD PRESSURE: 123 MMHG | OXYGEN SATURATION: 99 %

## 2021-08-21 DIAGNOSIS — R05.9 COUGH: Primary | ICD-10-CM

## 2021-08-21 PROCEDURE — U0005 INFEC AGEN DETEC AMPLI PROBE: HCPCS | Performed by: PHYSICIAN ASSISTANT

## 2021-08-21 PROCEDURE — 99214 OFFICE O/P EST MOD 30 MIN: CPT | Performed by: PHYSICIAN ASSISTANT

## 2021-08-21 PROCEDURE — U0003 INFECTIOUS AGENT DETECTION BY NUCLEIC ACID (DNA OR RNA); SEVERE ACUTE RESPIRATORY SYNDROME CORONAVIRUS 2 (SARS-COV-2) (CORONAVIRUS DISEASE [COVID-19]), AMPLIFIED PROBE TECHNIQUE, MAKING USE OF HIGH THROUGHPUT TECHNOLOGIES AS DESCRIBED BY CMS-2020-01-R: HCPCS | Performed by: PHYSICIAN ASSISTANT

## 2021-08-21 RX ORDER — PREDNISONE 20 MG/1
40 TABLET ORAL DAILY
Qty: 14 TABLET | Refills: 0 | Status: SHIPPED | OUTPATIENT
Start: 2021-08-21 | End: 2021-08-28

## 2021-08-21 NOTE — PROGRESS NOTES
SUBJECTIVE:  Janelle Coelho is a 46 year old female who presents to the clinic today with a chief complaint of cough and some tightness in chest and some wheezing for 1 month(s) on and off.  Was seen in the ER and normal chest x-ray  Has used Prednisone for 3 days and albuterol.  No antibiotic ever used.  Chest is tight and dry in nature.  No fevers  Did have COVID vaccines.  No era pain, ST, sinus pain, rashes or GI sx noted.       The patient's symptoms are moderate and stable.  Associated symptoms include negative other then stated above. The patient's symptoms are exacerbated by activity  Patient has been using inhaler  to improve symptoms.    Past Medical History:   Diagnosis Date     Acute Crohn's disease (H)      Acute posthemorrhagic anemia      ASCUS favor benign 2012    hpv neg. needs cotest in 3 years     ASCUS with positive high risk HPV cervical      Depressive disorder     since teens     Depressive disorder, not elsewhere classified 2/10/2014     Excessive and frequent menstruation      Family history of breast cancer in mother      Generalized anxiety disorder      History of blood transfusion     few years ago, should be in my records, it was at [facility]     History of transfusion     many years     Infected cyst of Bartholin's gland duct      Insomnia      Intermittent asthma, uncontrolled      LSIL (low grade squamous intraepithelial lesion) on Pap smear 2008    per chart notes colp dysplasia?     Major depressive disorder in partial remission (H)      Obesity      Obesity      Tobacco abuse      Uncomplicated asthma     since baby       Current Outpatient Medications   Medication Sig Dispense Refill     albuterol (PROAIR HFA/PROVENTIL HFA/VENTOLIN HFA) 108 (90 Base) MCG/ACT inhaler Inhale 2 puffs into the lungs every 6 hours as needed for shortness of breath / dyspnea or wheezing 6.7 g 0     cetirizine (ZYRTEC) 10 MG tablet Take 10 mg by mouth daily       cyanocobalamin (VITAMIN B12) 1000  MCG/ML injection Inject 1 mL into the muscle every 30 days       inFLIXimab (REMICADE IV) Every 8 weeks       levonorgestrel-ethinyl estradiol (QUASENSE) 0.15-0.03 MG tablet Take 1 tablet by mouth daily 91 tablet 3     sertraline (ZOLOFT) 100 MG tablet Take 100 mg by mouth daily       VENTOLIN  (90 Base) MCG/ACT inhaler INHALE 2 PUFFS INTO THE LUNGS EVERY 6 HOURS 18 g 1     vitamin D3 (CHOLECALCIFEROL) 50 mcg (2000 units) tablet Take 1 tablet (50 mcg) by mouth daily 90 tablet 3     Semaglutide,0.25 or 0.5MG/DOS, 2 MG/1.5ML SOPN Inject 0.25 mg Subcutaneous once a week 1.5 mL 0       Social History     Tobacco Use     Smoking status: Former Smoker     Packs/day: 0.20     Years: 10.00     Pack years: 2.00     Types: Cigarettes     Start date: 2001     Quit date: 2018     Years since quittin.7     Smokeless tobacco: Never Used     Tobacco comment: does not want to quit at this time   Substance Use Topics     Alcohol use: Yes     Alcohol/week: 0.0 standard drinks     Comment: very very very rarely       ROS  Review of systems negative except as stated above.    OBJECTIVE:  /77   Pulse 59   Temp 97.8  F (36.6  C)   Wt 125.9 kg (277 lb 9.6 oz)   SpO2 99%   BMI 51.60 kg/m    GENERAL APPEARANCE: healthy, alert and no distress  EYES: EOMI,  PERRL, conjunctiva clear  HENT: ear canals and TM's normal.  Nose and mouth without ulcers, erythema or lesions  NECK: supple, nontender, no lymphadenopathy  RESP: expiratory wheezes late noted throughout.  Harsh cough . No labored breathing noted  CV: regular rates and rhythm, normal S1 S2, no murmur noted  NEURO: Normal strength and tone, sensory exam grossly normal,  normal speech and mentation  SKIN: no suspicious lesions or rashes    COVID   Results pending     assessment/plan:  (R05) Cough  (primary encounter diagnosis)  Comment:   Plan: Symptomatic COVID-19 Virus (Coronavirus) by PCR        Nasopharyngeal, predniSONE (DELTASONE) 20 MG         tablet,  amoxicillin-clavulanate (AUGMENTIN)         875-125 MG tablet          Will give trial of med and Prednisone as directed due to exam and length of sx. Declines chest x-ray  OTC med for sx relief and to Follow-up with PCP as needed continue with inhaler as directed and red flag signs reviewed.

## 2021-08-22 LAB — SARS-COV-2 RNA RESP QL NAA+PROBE: NEGATIVE

## 2021-09-02 ENCOUNTER — IMMUNIZATION (OUTPATIENT)
Dept: NURSING | Facility: CLINIC | Age: 46
End: 2021-09-02
Payer: COMMERCIAL

## 2021-09-02 PROCEDURE — 91300 PR COVID VAC PFIZER DIL RECON 30 MCG/0.3 ML IM: CPT

## 2021-09-02 PROCEDURE — 0003A PR COVID VAC PFIZER DIL RECON 30 MCG/0.3 ML IM: CPT

## 2021-09-05 ENCOUNTER — HEALTH MAINTENANCE LETTER (OUTPATIENT)
Age: 46
End: 2021-09-05

## 2021-09-13 ENCOUNTER — MYC MEDICAL ADVICE (OUTPATIENT)
Dept: PEDIATRICS | Facility: CLINIC | Age: 46
End: 2021-09-13

## 2021-09-13 DIAGNOSIS — N92.0 EXCESSIVE OR FREQUENT MENSTRUATION: ICD-10-CM

## 2021-09-13 DIAGNOSIS — Z98.84 HISTORY OF ROUX-EN-Y GASTRIC BYPASS: ICD-10-CM

## 2021-09-13 DIAGNOSIS — E66.01 CLASS 3 SEVERE OBESITY DUE TO EXCESS CALORIES WITH SERIOUS COMORBIDITY AND BODY MASS INDEX (BMI) OF 50.0 TO 59.9 IN ADULT (H): ICD-10-CM

## 2021-09-13 DIAGNOSIS — E55.9 VITAMIN D DEFICIENCY: ICD-10-CM

## 2021-09-13 DIAGNOSIS — E66.813 CLASS 3 SEVERE OBESITY DUE TO EXCESS CALORIES WITH SERIOUS COMORBIDITY AND BODY MASS INDEX (BMI) OF 50.0 TO 59.9 IN ADULT (H): ICD-10-CM

## 2021-09-13 DIAGNOSIS — F41.1 GENERALIZED ANXIETY DISORDER: Chronic | ICD-10-CM

## 2021-09-14 RX ORDER — LEVONORGESTREL AND ETHINYL ESTRADIOL 0.15-0.03
1 KIT ORAL DAILY
Qty: 91 TABLET | Refills: 1 | Status: SHIPPED | OUTPATIENT
Start: 2021-09-14 | End: 2022-04-06

## 2021-09-14 RX ORDER — SEMAGLUTIDE 0.25 MG/.5ML
INJECTION, SOLUTION SUBCUTANEOUS
Qty: 2 ML | OUTPATIENT
Start: 2021-09-14

## 2021-09-14 NOTE — TELEPHONE ENCOUNTER
"Requested Prescriptions   Pending Prescriptions Disp Refills     levonorgestrel-ethinyl estradiol (QUASENSE) 0.15-0.03 MG tablet 91 tablet 3     Sig: Take 1 tablet by mouth daily   Last Written Prescription Date:  08/20/20  Last Fill Quantity: 91 tabs,  # refills: 3   Last office visit: 2/18/2021 with prescribing provider:  Ivett Sy   Future Office Visit:        There is no refill protocol information for this order          - Tommy \"Ankur\" ARIS Bunn - Patient Advocate Liason (PAL)  [facility]    "

## 2021-09-14 NOTE — TELEPHONE ENCOUNTER
I believe this individual had a delayed start to the 0.25 mg/week dose of semaglutide.  Refill should be 0.5 mg (assuming she is doing well).    Please call and confirm and I will send the 0.5 mg dose.  She has an appointment already scheduled on 9/20.

## 2021-09-14 NOTE — TELEPHONE ENCOUNTER
Routing refill request to provider for review/approval because:  Drug not on the G Refill Protocol    Last Written Prescription Date:  7/27/21  Last Fill Quantity: 1.5,  # refills: 0   Last office visit provider:  7/27/21     Requested Prescriptions   Pending Prescriptions Disp Refills     WEGOVY 0.25 MG/0.5ML SOAJ [Pharmacy Med Name: WEGOVY 0.25MG/0.5ML PF PEN INJ] 2 mL      Sig: INJECT 0.25MG UNDER THE SKIN ONCE A WEEK       There is no refill protocol information for this order          Anjail De Guzman RN 09/13/21 9:33 PM

## 2021-09-20 ENCOUNTER — VIRTUAL VISIT (OUTPATIENT)
Dept: FAMILY MEDICINE | Facility: CLINIC | Age: 46
End: 2021-09-20
Payer: COMMERCIAL

## 2021-09-20 ENCOUNTER — MYC MEDICAL ADVICE (OUTPATIENT)
Dept: FAMILY MEDICINE | Facility: CLINIC | Age: 46
End: 2021-09-20

## 2021-09-20 DIAGNOSIS — E66.01 CLASS 3 SEVERE OBESITY DUE TO EXCESS CALORIES WITH SERIOUS COMORBIDITY AND BODY MASS INDEX (BMI) OF 50.0 TO 59.9 IN ADULT (H): ICD-10-CM

## 2021-09-20 DIAGNOSIS — E66.813 CLASS 3 SEVERE OBESITY DUE TO EXCESS CALORIES WITH SERIOUS COMORBIDITY AND BODY MASS INDEX (BMI) OF 50.0 TO 59.9 IN ADULT (H): ICD-10-CM

## 2021-09-20 DIAGNOSIS — E66.01 CLASS 3 SEVERE OBESITY DUE TO EXCESS CALORIES WITH SERIOUS COMORBIDITY AND BODY MASS INDEX (BMI) OF 50.0 TO 59.9 IN ADULT (H): Primary | ICD-10-CM

## 2021-09-20 DIAGNOSIS — E66.813 CLASS 3 SEVERE OBESITY DUE TO EXCESS CALORIES WITH SERIOUS COMORBIDITY AND BODY MASS INDEX (BMI) OF 50.0 TO 59.9 IN ADULT (H): Primary | ICD-10-CM

## 2021-09-20 DIAGNOSIS — Z98.84 HISTORY OF ROUX-EN-Y GASTRIC BYPASS: ICD-10-CM

## 2021-09-20 DIAGNOSIS — D72.829 LEUKOCYTOSIS, UNSPECIFIED TYPE: ICD-10-CM

## 2021-09-20 PROCEDURE — 99213 OFFICE O/P EST LOW 20 MIN: CPT | Mod: 95 | Performed by: FAMILY MEDICINE

## 2021-09-20 RX ORDER — TRIAMCINOLONE ACETONIDE 1 MG/G
CREAM TOPICAL PRN
COMMUNITY
End: 2022-03-18

## 2021-09-20 ASSESSMENT — ASTHMA QUESTIONNAIRES
ACT_TOTALSCORE: 21
QUESTION_5 LAST FOUR WEEKS HOW WOULD YOU RATE YOUR ASTHMA CONTROL: SOMEWHAT CONTROLLED
QUESTION_2 LAST FOUR WEEKS HOW OFTEN HAVE YOU HAD SHORTNESS OF BREATH: ONCE OR TWICE A WEEK
EMERGENCY_ROOM_LAST_YEAR_TOTAL: ONE
QUESTION_3 LAST FOUR WEEKS HOW OFTEN DID YOUR ASTHMA SYMPTOMS (WHEEZING, COUGHING, SHORTNESS OF BREATH, CHEST TIGHTNESS OR PAIN) WAKE YOU UP AT NIGHT OR EARLIER THAN USUAL IN THE MORNING: NOT AT ALL
QUESTION_4 LAST FOUR WEEKS HOW OFTEN HAVE YOU USED YOUR RESCUE INHALER OR NEBULIZER MEDICATION (SUCH AS ALBUTEROL): NOT AT ALL
QUESTION_1 LAST FOUR WEEKS HOW MUCH OF THE TIME DID YOUR ASTHMA KEEP YOU FROM GETTING AS MUCH DONE AT WORK, SCHOOL OR AT HOME: A LITTLE OF THE TIME

## 2021-09-20 ASSESSMENT — PATIENT HEALTH QUESTIONNAIRE - PHQ9: SUM OF ALL RESPONSES TO PHQ QUESTIONS 1-9: 4

## 2021-09-20 NOTE — PROGRESS NOTES
"Janelle is a 46 year old who is being evaluated via a billable video visit.      How would you like to obtain your AVS? MyChart  If the video visit is dropped, the invitation should be resent by: Send to e-mail at: [email or web address]  Will anyone else be joining your video visit? No    Video Start Time: 8:54 AM    Assessment and Plan:     Class 3 severe obesity due to excess calories with serious comorbidity and body mass index (BMI) of 50.0 to 59.9 in adult (H)  46 year old year old female in clinic today to discuss treatment of the following conditions through diet and lifestyle modification and weight loss:  1. Class 3 severe obesity due to excess calories with serious comorbidity and body mass index (BMI) of 50.0 to 59.9 in adult (H)    2. History of Heraclio-en-Y gastric bypass      The patient's weight loss result since the last visit was successful based on improvement.  Decreased snacking.  Reduction in carbs/sugar.     - continue semaglutide. Tritrate.  Increase to 1.0 mg.    - no scale.     - discussed nutrition.  Follow-up in 4-6 weeks.       Return in about 6 weeks (around 11/1/2021) for Weight loss follow-up visit, (video visit).    Chief Complaint   Patient presents with     Weight Loss     No scale at home to check weight     Subjective  Patient presents for treatment of chronic, comorbid conditions using intensive therapeutic lifestyle interventions including nutrition, physical activity, and behavior management.   - successes: she started semaglutide ~3 weeks ago.  She has the 0.5mg dose at home as well.  Some mild nausea. \"Knot is my stomach.\"  Some decrease in snacking.  \"...not to snack at night. \"  She generally is not hungry in the morning.     - she met with dietician.  Trial of \"Freshly.\"     - using myfitness   - struggles: snack mid afternoon.  Nutrition at home?  Thinking about planning/prepping on weekends.      Reviewed history:  Allergies   Problems             Objective:  There were no " vitals taken for this visit.  Change from start of program:                  There is no height or weight on file to calculate BMI.  No LMP recorded. (Menstrual status: Birth Control).  Physical Exam  Nursing note reviewed.   Constitutional:       General: She is not in acute distress.     Appearance: Normal appearance. She is not ill-appearing.   HENT:      Head: Normocephalic and atraumatic.   Eyes:      Extraocular Movements: Extraocular movements intact.      Conjunctiva/sclera: Conjunctivae normal.   Pulmonary:      Effort: Pulmonary effort is normal.   Neurological:      Mental Status: She is alert and oriented to person, place, and time.   Psychiatric:         Attention and Perception: Attention normal.         Mood and Affect: Mood normal.         Speech: Speech normal.         Thought Content: Thought content normal.         This note has been dictated using voice recognition software. Any grammatical or context distortions are unintentional and inherent to the software    Video-Visit Details    Type of service:  Video Visit    Video End Time:9:10 AM    Originating Location (pt. Location): Home    Distant Location (provider location):  [facility]     Platform used for Video Visit: [employer]

## 2021-09-20 NOTE — ASSESSMENT & PLAN NOTE
46 year old year old female in clinic today to discuss treatment of the following conditions through diet and lifestyle modification and weight loss:  1. Class 3 severe obesity due to excess calories with serious comorbidity and body mass index (BMI) of 50.0 to 59.9 in adult (H)    2. History of Heraclio-en-Y gastric bypass      The patient's weight loss result since the last visit was successful based on improvement.  Decreased snacking.  Reduction in carbs/sugar.     - continue semaglutide. Tritrate.  Increase to 1.0 mg.    - no scale.     - discussed nutrition.  Follow-up in 4-6 weeks.

## 2021-09-21 ASSESSMENT — ASTHMA QUESTIONNAIRES: ACT_TOTALSCORE: 21

## 2021-09-28 ENCOUNTER — VIRTUAL VISIT (OUTPATIENT)
Dept: SURGERY | Facility: CLINIC | Age: 46
End: 2021-09-28
Payer: COMMERCIAL

## 2021-09-28 DIAGNOSIS — Z71.3 NUTRITIONAL COUNSELING: ICD-10-CM

## 2021-09-28 DIAGNOSIS — Z98.84 HISTORY OF ROUX-EN-Y GASTRIC BYPASS: ICD-10-CM

## 2021-09-28 DIAGNOSIS — E66.813 CLASS 3 SEVERE OBESITY DUE TO EXCESS CALORIES WITH SERIOUS COMORBIDITY AND BODY MASS INDEX (BMI) OF 50.0 TO 59.9 IN ADULT (H): Primary | ICD-10-CM

## 2021-09-28 DIAGNOSIS — E66.01 CLASS 3 SEVERE OBESITY DUE TO EXCESS CALORIES WITH SERIOUS COMORBIDITY AND BODY MASS INDEX (BMI) OF 50.0 TO 59.9 IN ADULT (H): Primary | ICD-10-CM

## 2021-09-28 PROCEDURE — 97803 MED NUTRITION INDIV SUBSEQ: CPT | Mod: GT | Performed by: DIETITIAN, REGISTERED

## 2021-09-28 NOTE — LETTER
9/28/2021         RE: Janelle Coelho  [address] [place] [address]        Dear Colleague,    Thank you for referring your patient, Janelle Coelho, to the [facility] SUR[facility] AND BARIATRICS CARE [place]. Please see a copy of my visit note below.    Janelle Coelho is a 46 year old who is being evaluated via a billable video visit.        How would you like to obtain your AVS? MyChart  If the video visit is dropped, the invitation should be resent by: Send to e-mail at: [email or web address]  Will anyone else be joining your video visit? No      Video Start Time: 1:57 PM      Medical  Weight Loss Follow-Up Diet Evaluation  Assessment:  Janelle is presenting today for a follow up weight management nutrition consultation. Pt has had an initial appointment with Dr. Baig  Weight loss medication: Wegovy.   Initial Weight: 275 lbs  Current Weight: 275 lbs (awaiting scale)   No flowsheet data found.  BMI: There is no height or weight on file to calculate BMI.  Ideal body weight: 48.9 kg (107 lb 14.6 oz)  Adjusted ideal body weight: 79.7 kg (175 lb 12.6 oz)    Estimated RMR ([place]-St Jeor equation):   1837 kcals x 1.2 (sedentary) = 2204 kcals (for weight maintenance)     Recommended Protein Intake: 60-80 grams of protein/day  Patient Active Problem List:  Patient Active Problem List   Diagnosis     Class 3 severe obesity due to excess calories with serious comorbidity and body mass index (BMI) of 50.0 to 59.9 in adult (H)     Generalized anxiety disorder     Major depressive disorder, recurrent episode, in partial remission (H)     Mild intermittent asthma, unspecified whether complicated     History of Heraclio-en-Y gastric bypass     Insomnia, unspecified type     Family history of breast cancer in mother     Vitamin D deficiency     Crohn's disease of large intestine with complication (H)     Hives     Crohn's disease of perianal region (H)     Diabetes: No     Progress on goals from last  visit: Did trial Freshly, however didn't care for it.  Patient reports that she has been trying to focus on high protein, low carbohydrate at this point.  Patient reports that she downloaded the Dsg.nr jose r, noting that she has not been 100% compliant with it, needs to refocus on.  Has been focusing on increased water consumption.      Nutrition Diagnosis:    Overweight/Obesity (NC 3.3) related to overeating and poor lifestyle habits as evidenced by patient's subjective statements (excessive energy intake, lack of exercise) and BMI of 51.12 kg/m2.     Intervention:  1. Food and/or nutrient delivery: balanced meals, adequate protein   2. Nutrition education: protein supplements   3. Nutrition counseling: praised patient for positive changes made thus far      Monitoring/Evaluation:    Goals:  1. Start tracking intake again via food journal, aiming for 1696-9138 calories/day, 100 grams of protein/day, and less than 75 grams of net carbs/day.     Patient to follow up in 1 month(s) with bariatrician and 2 month(s) with RD      Video-Visit Details    Type of service:  Video Visit    Video End Time:2:16 PM    Originating Location (pt. Location): Home    Distant Location (provider location):  [facility] SURGERY [facility] AND BARIATRICS CARE [place]     Platform used for Video Visit: Sona Garcia RD        Again, thank you for allowing me to participate in the care of your patient.        Sincerely,        Ashley Garcia RD

## 2021-09-28 NOTE — PROGRESS NOTES
Janelle Coelho is a 46 year old who is being evaluated via a billable video visit.        How would you like to obtain your AVS? MyChart  If the video visit is dropped, the invitation should be resent by: Send to e-mail at: [email or web address]  Will anyone else be joining your video visit? No      Video Start Time: 1:57 PM      Medical  Weight Loss Follow-Up Diet Evaluation  Assessment:  Janelle is presenting today for a follow up weight management nutrition consultation. Pt has had an initial appointment with Dr. Baig  Weight loss medication: Wegovy.   Initial Weight: 275 lbs  Current Weight: 275 lbs (awaiting scale)   No flowsheet data found.  BMI: There is no height or weight on file to calculate BMI.  Ideal body weight: 48.9 kg (107 lb 14.6 oz)  Adjusted ideal body weight: 79.7 kg (175 lb 12.6 oz)    Estimated RMR ([place]-St [place] equation):   1837 kcals x 1.2 (sedentary) = 2204 kcals (for weight maintenance)     Recommended Protein Intake: 60-80 grams of protein/day  Patient Active Problem List:  Patient Active Problem List   Diagnosis     Class 3 severe obesity due to excess calories with serious comorbidity and body mass index (BMI) of 50.0 to 59.9 in adult (H)     Generalized anxiety disorder     Major depressive disorder, recurrent episode, in partial remission (H)     Mild intermittent asthma, unspecified whether complicated     History of Heraclio-en-Y gastric bypass     Insomnia, unspecified type     Family history of breast cancer in mother     Vitamin D deficiency     Crohn's disease of large intestine with complication (H)     Hives     Crohn's disease of perianal region (H)     Diabetes: No     Progress on goals from last visit: Did trial Freshly, however didn't care for it.  Patient reports that she has been trying to focus on high protein, low carbohydrate at this point.  Patient reports that she downloaded the [employer] jose r, noting that she has not been 100% compliant with it, needs to refocus  on.  Has been focusing on increased water consumption.      Nutrition Diagnosis:    Overweight/Obesity (NC 3.3) related to overeating and poor lifestyle habits as evidenced by patient's subjective statements (excessive energy intake, lack of exercise) and BMI of 51.12 kg/m2.     Intervention:  1. Food and/or nutrient delivery: balanced meals, adequate protein   2. Nutrition education: protein supplements   3. Nutrition counseling: praised patient for positive changes made thus far      Monitoring/Evaluation:    Goals:  1. Start tracking intake again via food journal, aiming for 9135-1269 calories/day, 100 grams of protein/day, and less than 75 grams of net carbs/day.     Patient to follow up in 1 month(s) with bariatrician and 2 month(s) with RD      Video-Visit Details    Type of service:  Video Visit    Video End Time:2:16 PM    Originating Location (pt. Location): Home    Distant Location (provider location):  [facility] SURGERY CLINIC AND BARIATRICS CARE [place]     Platform used for Video Visit: Sona Garcia RD

## 2021-10-04 ENCOUNTER — TRANSFERRED RECORDS (OUTPATIENT)
Dept: HEALTH INFORMATION MANAGEMENT | Facility: CLINIC | Age: 46
End: 2021-10-04

## 2021-10-15 ENCOUNTER — TRANSFERRED RECORDS (OUTPATIENT)
Dept: HEALTH INFORMATION MANAGEMENT | Facility: CLINIC | Age: 46
End: 2021-10-15
Payer: COMMERCIAL

## 2021-10-15 LAB
ALT SERPL-CCNC: 15 IU/L (ref 0–32)
AST SERPL-CCNC: 19 IU/L (ref 0–40)

## 2021-10-20 ENCOUNTER — E-VISIT (OUTPATIENT)
Dept: FAMILY MEDICINE | Facility: CLINIC | Age: 46
End: 2021-10-20
Payer: COMMERCIAL

## 2021-10-20 DIAGNOSIS — E66.813 CLASS 3 SEVERE OBESITY DUE TO EXCESS CALORIES WITH SERIOUS COMORBIDITY AND BODY MASS INDEX (BMI) OF 50.0 TO 59.9 IN ADULT (H): Primary | ICD-10-CM

## 2021-10-20 DIAGNOSIS — E66.01 CLASS 3 SEVERE OBESITY DUE TO EXCESS CALORIES WITH SERIOUS COMORBIDITY AND BODY MASS INDEX (BMI) OF 50.0 TO 59.9 IN ADULT (H): Primary | ICD-10-CM

## 2021-10-20 NOTE — PATIENT INSTRUCTIONS
Joe Luis, please resubmit this evisit next week as Dr. Baig is out of the office this week.     I am covering his messages, so also let me know if there is any urgent matter.     Racquel Parrish MD on 10/20/2021 at 3:01 PM (for Dr. Baig)

## 2021-11-11 ENCOUNTER — VIRTUAL VISIT (OUTPATIENT)
Dept: FAMILY MEDICINE | Facility: CLINIC | Age: 46
End: 2021-11-11
Payer: COMMERCIAL

## 2021-11-11 DIAGNOSIS — Z98.84 HISTORY OF ROUX-EN-Y GASTRIC BYPASS: ICD-10-CM

## 2021-11-11 DIAGNOSIS — E66.813 CLASS 3 SEVERE OBESITY DUE TO EXCESS CALORIES WITH SERIOUS COMORBIDITY AND BODY MASS INDEX (BMI) OF 50.0 TO 59.9 IN ADULT (H): Primary | ICD-10-CM

## 2021-11-11 DIAGNOSIS — E66.01 CLASS 3 SEVERE OBESITY DUE TO EXCESS CALORIES WITH SERIOUS COMORBIDITY AND BODY MASS INDEX (BMI) OF 50.0 TO 59.9 IN ADULT (H): Primary | ICD-10-CM

## 2021-11-11 PROCEDURE — 99213 OFFICE O/P EST LOW 20 MIN: CPT | Mod: 95 | Performed by: FAMILY MEDICINE

## 2021-11-11 ASSESSMENT — ANXIETY QUESTIONNAIRES
7. FEELING AFRAID AS IF SOMETHING AWFUL MIGHT HAPPEN: NOT AT ALL
GAD7 TOTAL SCORE: 1
6. BECOMING EASILY ANNOYED OR IRRITABLE: SEVERAL DAYS
5. BEING SO RESTLESS THAT IT IS HARD TO SIT STILL: NOT AT ALL
GAD7 TOTAL SCORE: 1
GAD7 TOTAL SCORE: 1
4. TROUBLE RELAXING: NOT AT ALL
8. IF YOU CHECKED OFF ANY PROBLEMS, HOW DIFFICULT HAVE THESE MADE IT FOR YOU TO DO YOUR WORK, TAKE CARE OF THINGS AT HOME, OR GET ALONG WITH OTHER PEOPLE?: SOMEWHAT DIFFICULT
1. FEELING NERVOUS, ANXIOUS, OR ON EDGE: NOT AT ALL
3. WORRYING TOO MUCH ABOUT DIFFERENT THINGS: NOT AT ALL
2. NOT BEING ABLE TO STOP OR CONTROL WORRYING: NOT AT ALL
7. FEELING AFRAID AS IF SOMETHING AWFUL MIGHT HAPPEN: NOT AT ALL

## 2021-11-11 ASSESSMENT — PATIENT HEALTH QUESTIONNAIRE - PHQ9
SUM OF ALL RESPONSES TO PHQ QUESTIONS 1-9: 4
SUM OF ALL RESPONSES TO PHQ QUESTIONS 1-9: 4

## 2021-11-11 NOTE — ASSESSMENT & PLAN NOTE
46 year old year old female in clinic today to discuss treatment of the following conditions through diet and lifestyle modification and weight loss:  1. Class 3 severe obesity due to excess calories with serious comorbidity and body mass index (BMI) of 50.0 to 59.9 in adult (H)    2. History of Heraclio-en-Y gastric bypass      The patient's weight loss result since the last visit was successful based on weight loss.  She feels well. Discussed nutrition and looking to add more protein.  She tracks.      - Increase semaglutide to 1.7mg/week.   - add protein/fat?   - add items to menu

## 2021-11-11 NOTE — PROGRESS NOTES
"Type of service:  Video Visit    Janelle Coelho is a 46 year old female who is being evaluated via a billable video visit.      How would you like to obtain your AVS? MyChart  If dropped from the video visit, the video invitation should be resent by: Text to cell phone: [phone]   Will anyone else be joining your video visit? No    Video Start Time: 8:38 AM  Video End Time (time video stopped): 8:54 AM  Originating Location (pt. Location): Home  Distant Location (provider location):  [facility]   Platform used for Video Visit: [employer]    Assessment and Plan:     Class 3 severe obesity due to excess calories with serious comorbidity and body mass index (BMI) of 50.0 to 59.9 in adult (H)  46 year old year old female in clinic today to discuss treatment of the following conditions through diet and lifestyle modification and weight loss:  1. Class 3 severe obesity due to excess calories with serious comorbidity and body mass index (BMI) of 50.0 to 59.9 in adult (H)    2. History of Heraclio-en-Y gastric bypass      The patient's weight loss result since the last visit was successful based on weight loss.  She feels well. Discussed nutrition and looking to add more protein.  She tracks.      - Increase semaglutide to 1.7mg/week.   - add protein/fat?   - add items to menu    Return in about 3 months (around 2/11/2022) for Weight loss follow-up visit.    Chief Complaint   Patient presents with     Weight Loss     Subjective  Patient presents for treatment of chronic, comorbid conditions using intensive therapeutic lifestyle interventions including nutrition, physical activity, and behavior management.   - successes: \"I have started to lose weight.\"  She has a scale at home and reports that she is down 5-6 lbs.     - she says that she feels \"good\" except for some back pain.  A little more energy.      - tracking/journaling: yes    - nutritional plan: carb restriction.  Some time restriction. " "Adherent: yes. Deviations from plan: ?  She does rely on prepared meals.     - hunger/cravings: controlled.   - medication benefits: helpful. side effects: none.    Reviewed history:               Objective:  There were no vitals taken for this visit.  Change from start of program:         Vitals - Patient Reported  Weight (Patient Reported): 116.8 kg (257 lb 9.6 oz)  Height (Patient Reported): 156.2 cm (5' 1.5\")  BMI (Based on Pt Reported Ht/Wt): 47.88    There is no height or weight on file to calculate BMI.  No LMP recorded. (Menstrual status: Birth Control).  Physical Exam  Nursing note reviewed.   Constitutional:       General: She is not in acute distress.     Appearance: Normal appearance. She is not ill-appearing.   HENT:      Head: Normocephalic and atraumatic.   Eyes:      Extraocular Movements: Extraocular movements intact.      Conjunctiva/sclera: Conjunctivae normal.   Pulmonary:      Effort: Pulmonary effort is normal.   Neurological:      Mental Status: She is alert and oriented to person, place, and time.   Psychiatric:         Attention and Perception: Attention normal.         Mood and Affect: Mood normal.         Speech: Speech normal.         Thought Content: Thought content normal.         This note has been dictated using voice recognition software. Any grammatical or context distortions are unintentional and inherent to the software    "

## 2021-11-12 ASSESSMENT — ANXIETY QUESTIONNAIRES: GAD7 TOTAL SCORE: 1

## 2021-11-12 ASSESSMENT — PATIENT HEALTH QUESTIONNAIRE - PHQ9: SUM OF ALL RESPONSES TO PHQ QUESTIONS 1-9: 4

## 2021-11-24 ENCOUNTER — TRANSFERRED RECORDS (OUTPATIENT)
Dept: HEALTH INFORMATION MANAGEMENT | Facility: CLINIC | Age: 46
End: 2021-11-24
Payer: COMMERCIAL

## 2021-11-26 ENCOUNTER — TRANSFERRED RECORDS (OUTPATIENT)
Dept: HEALTH INFORMATION MANAGEMENT | Facility: CLINIC | Age: 46
End: 2021-11-26
Payer: COMMERCIAL

## 2021-12-01 ENCOUNTER — VIRTUAL VISIT (OUTPATIENT)
Dept: SURGERY | Facility: CLINIC | Age: 46
End: 2021-12-01
Payer: COMMERCIAL

## 2021-12-01 DIAGNOSIS — Z71.3 NUTRITIONAL COUNSELING: ICD-10-CM

## 2021-12-01 DIAGNOSIS — E66.01 CLASS 3 SEVERE OBESITY DUE TO EXCESS CALORIES WITH SERIOUS COMORBIDITY AND BODY MASS INDEX (BMI) OF 45.0 TO 49.9 IN ADULT (H): ICD-10-CM

## 2021-12-01 DIAGNOSIS — E66.813 CLASS 3 SEVERE OBESITY DUE TO EXCESS CALORIES WITH SERIOUS COMORBIDITY AND BODY MASS INDEX (BMI) OF 45.0 TO 49.9 IN ADULT (H): ICD-10-CM

## 2021-12-01 DIAGNOSIS — Z98.84 HISTORY OF ROUX-EN-Y GASTRIC BYPASS: ICD-10-CM

## 2021-12-01 PROCEDURE — 97803 MED NUTRITION INDIV SUBSEQ: CPT | Mod: GT | Performed by: DIETITIAN, REGISTERED

## 2021-12-01 NOTE — LETTER
12/1/2021         RE: Janelle Coelho  [address] [place] [address]        Dear Colleague,    Thank you for referring your patient, Janelle Coelho, to the [facility] AND BARIATRICS CARE [place]. Please see a copy of my visit note below.    Janelle Coelho is a 46 year old who is being evaluated via a billable video visit.      How would you like to obtain your AVS? MyChart  If the video visit is dropped, the invitation should be resent by: Send to e-mail at: [email or web address]  Will anyone else be joining your video visit? No      Video Start Time: 1:58 PM      Medical  Weight Loss Follow-Up Diet Evaluation  Assessment:  Janelle is presenting today for a follow up weight management nutrition consultation. Pt has had an initial appointment with Dr. Baig  Weight loss medication: Wegovy.   Pt's weight is 254 lbs   Initial weight: 275 lbs   Weight change: 21 lbs (down)    No flowsheet data found.  BMI: There is no height or weight on file to calculate BMI.  Patient weight not recorded    Estimated RMR ([place] equation):   1741 kcals x 1.2 (sedentary) = 2089 kcals (for weight maintenance)  Recommended Protein Intake: 60-80 grams of protein/day  Patient Active Problem List:  Patient Active Problem List   Diagnosis     Class 3 severe obesity due to excess calories with serious comorbidity and body mass index (BMI) of 50.0 to 59.9 in adult (H)     Generalized anxiety disorder     Major depressive disorder, recurrent episode, in partial remission (H)     Mild intermittent asthma, unspecified whether complicated     History of Heraclio-en-Y gastric bypass     Insomnia, unspecified type     Family history of breast cancer in mother     Vitamin D deficiency     Crohn's disease of large intestine with complication (H)     Hives     Crohn's disease of perianal region (H)     Diabetes: No     Progress on goals from last visit: has been tracking her intake via my[employer], noting  that she is aiming for 75 grams of carbs/day along with aiming for 120 grams of protein/day.  Patient report that she does struggle with staying under the carbs and getting in enough protein.  Continues to focus on increasing her water intake, however needs to work on improving it.      Exercise: no set regimen    Nutrition Diagnosis:    Overweight/Obesity (NC 3.3) related to overeating and poor lifestyle habits as evidenced by patient's subjective statements (excessive energy intake, lack of exercise) and BMI of 47.3 kg/m2.     Intervention:  1. Food and/or nutrient delivery: balanced meals, adequate protein   2. Nutrition education: protein supplements, seasoning your food without salt   3. Nutrition counseling: exercise regimen     Monitoring/Evaluation:    Goals:  1. Continue to track intake via food journal jose r, aiming for 0677-6732 calories/day, <75 grams of net carbs/day, and at least 100 grams of protein/day.     Patient to follow up in 2 month(s) with bariatrician and 1 month(s) with [facility]      Video-Visit Details    Type of service:  Video Visit    Video End Time:2:14 PM    Originating Location (pt. Location): Home    Distant Location (provider location):  [facility] SURGERY CLINIC AND BARIATRICS CARE [place]     Platform used for Video Visit: Sona Garcia RD        Again, thank you for allowing me to participate in the care of your patient.        Sincerely,        Ashley Garcia RD

## 2021-12-01 NOTE — PROGRESS NOTES
Janelle Coelho is a 46 year old who is being evaluated via a billable video visit.      How would you like to obtain your AVS? MyChart  If the video visit is dropped, the invitation should be resent by: Send to e-mail at: [email or web address]  Will anyone else be joining your video visit? No      Video Start Time: 1:58 PM      Medical  Weight Loss Follow-Up Diet Evaluation  Assessment:  Janelle is presenting today for a follow up weight management nutrition consultation. Pt has had an initial appointment with Dr. Baig  Weight loss medication: Wegovy.   Pt's weight is 254 lbs   Initial weight: 275 lbs   Weight change: 21 lbs (down)    No flowsheet data found.  BMI: There is no height or weight on file to calculate BMI.  Patient weight not recorded    Estimated RMR (Joon Casanova equation):   1741 kcals x 1.2 (sedentary) = 2089 kcals (for weight maintenance)  Recommended Protein Intake: 60-80 grams of protein/day  Patient Active Problem List:  Patient Active Problem List   Diagnosis     Class 3 severe obesity due to excess calories with serious comorbidity and body mass index (BMI) of 50.0 to 59.9 in adult (H)     Generalized anxiety disorder     Major depressive disorder, recurrent episode, in partial remission (H)     Mild intermittent asthma, unspecified whether complicated     History of Heraclio-en-Y gastric bypass     Insomnia, unspecified type     Family history of breast cancer in mother     Vitamin D deficiency     Crohn's disease of large intestine with complication (H)     Hives     Crohn's disease of perianal region (H)     Diabetes: No     Progress on goals from last visit: has been tracking her intake via myfitnesspal, noting that she is aiming for 75 grams of carbs/day along with aiming for 120 grams of protein/day.  Patient report that she does struggle with staying under the carbs and getting in enough protein.  Continues to focus on increasing her water intake, however needs to work on improving it.       Exercise: no set regimen    Nutrition Diagnosis:    Overweight/Obesity (NC 3.3) related to overeating and poor lifestyle habits as evidenced by patient's subjective statements (excessive energy intake, lack of exercise) and BMI of 47.3 kg/m2.     Intervention:  1. Food and/or nutrient delivery: balanced meals, adequate protein   2. Nutrition education: protein supplements, seasoning your food without salt   3. Nutrition counseling: exercise regimen     Monitoring/Evaluation:    Goals:  1. Continue to track intake via food journal jose r, aiming for 1177-3652 calories/day, <75 grams of net carbs/day, and at least 100 grams of protein/day.     Patient to follow up in 2 month(s) with bariatrician and 1 month(s) with RD      Video-Visit Details    Type of service:  Video Visit    Video End Time:2:14 PM    Originating Location (pt. Location): Home    Distant Location (provider location):  [facility] [place]     Platform used for Video Visit: Sona Garcia RD

## 2021-12-07 ENCOUNTER — MYC MEDICAL ADVICE (OUTPATIENT)
Dept: FAMILY MEDICINE | Facility: CLINIC | Age: 46
End: 2021-12-07
Payer: COMMERCIAL

## 2021-12-07 DIAGNOSIS — E66.813 CLASS 3 SEVERE OBESITY DUE TO EXCESS CALORIES WITH SERIOUS COMORBIDITY AND BODY MASS INDEX (BMI) OF 50.0 TO 59.9 IN ADULT (H): Primary | ICD-10-CM

## 2021-12-07 DIAGNOSIS — E66.01 CLASS 3 SEVERE OBESITY DUE TO EXCESS CALORIES WITH SERIOUS COMORBIDITY AND BODY MASS INDEX (BMI) OF 50.0 TO 59.9 IN ADULT (H): Primary | ICD-10-CM

## 2021-12-08 ENCOUNTER — TRANSFERRED RECORDS (OUTPATIENT)
Dept: HEALTH INFORMATION MANAGEMENT | Facility: CLINIC | Age: 46
End: 2021-12-08
Payer: COMMERCIAL

## 2021-12-24 DIAGNOSIS — F41.1 GENERALIZED ANXIETY DISORDER: ICD-10-CM

## 2021-12-27 RX ORDER — SERTRALINE HYDROCHLORIDE 100 MG/1
TABLET, FILM COATED ORAL
Qty: 90 TABLET | Refills: 0 | Status: SHIPPED | OUTPATIENT
Start: 2021-12-27 | End: 2022-04-29

## 2022-01-18 ENCOUNTER — TELEPHONE (OUTPATIENT)
Dept: FAMILY MEDICINE | Facility: CLINIC | Age: 47
End: 2022-01-18
Payer: COMMERCIAL

## 2022-01-18 NOTE — TELEPHONE ENCOUNTER
Prior Authorization Request  Who s requesting:  Pharmacy  Pharmacy Name and Location: [place] [address]  Medication Name: Wegovy  Insurance Plan: [employer]  Insurance Member ID Number:  [id number]  CoverMyMeds Key: [id number]  Informed patient that prior authorizations can take up to 10 business days for response:   NA  Okay to leave a detailed message: No     Route to pool:  BHARGAVI SOTO MED

## 2022-01-20 NOTE — TELEPHONE ENCOUNTER
PA Initiation    Medication: Wegovy 2.4MG/0.75ML auto-injectors  Insurance Company: [employer] - Phone [phone] Fax [phone]  Pharmacy Filling the Rx: [id number] - [place] - [address] [place] [address] AT [address]  Filling Pharmacy Phone: [phone]  Filling Pharmacy Fax: [phone]  Start Date: 1/20/2022

## 2022-01-21 NOTE — TELEPHONE ENCOUNTER
Prior Authorization Approval    Authorization Effective Date: 12/21/2021  Authorization Expiration Date: 8/18/2022  Medication: Wegovy 2.4MG/0.75ML auto-injectors  Approved Dose/Quantity:   Reference #: [id number]   Insurance Company: [employer] - Phone [phone] Fax [phone]  Which Pharmacy is filling the prescription (Not needed for infusion/clinic administered): [id number] [address]  Pharmacy Notified: Yes  Patient Notified: Yes

## 2022-01-25 RX ORDER — SEMAGLUTIDE 1.7 MG/.75ML
1.7 INJECTION, SOLUTION SUBCUTANEOUS WEEKLY
Qty: 3 ML | Refills: 0 | Status: SHIPPED | OUTPATIENT
Start: 2022-01-25 | End: 2022-02-18

## 2022-02-16 DIAGNOSIS — E66.01 CLASS 3 SEVERE OBESITY DUE TO EXCESS CALORIES WITH SERIOUS COMORBIDITY AND BODY MASS INDEX (BMI) OF 50.0 TO 59.9 IN ADULT (H): ICD-10-CM

## 2022-02-16 DIAGNOSIS — E66.813 CLASS 3 SEVERE OBESITY DUE TO EXCESS CALORIES WITH SERIOUS COMORBIDITY AND BODY MASS INDEX (BMI) OF 50.0 TO 59.9 IN ADULT (H): ICD-10-CM

## 2022-02-18 DIAGNOSIS — Z11.59 ENCOUNTER FOR SCREENING FOR OTHER VIRAL DISEASES: Primary | ICD-10-CM

## 2022-02-18 RX ORDER — SEMAGLUTIDE 2.4 MG/.75ML
2.4 INJECTION, SOLUTION SUBCUTANEOUS WEEKLY
Qty: 3 ML | Refills: 1 | Status: SHIPPED | OUTPATIENT
Start: 2022-02-18 | End: 2022-04-28

## 2022-02-18 NOTE — TELEPHONE ENCOUNTER
Routing refill request to provider for review/approval because:  Drug not on the [facility] refill protocol     Last Written Prescription Date:  11/11/2021  Last Fill Quantity: 3 mL,  # refills: 0   Last office visit provider:  11/11/2021 virtual visit Dr. Gutierrez     Requested Prescriptions   Pending Prescriptions Disp Refills     WEGOVY 1.7 MG/0.75ML SOAJ [Pharmacy Med Name: WEGOVY 1.7MG/0.75ML PF PEN INJ] 3 mL 0     Sig: INJECT 1.7MG SUBCUTANEOUS ONCE WEEKLY       There is no refill protocol information for this order          Hayley Choudhury RN 02/18/22 9:44 AM

## 2022-02-20 ENCOUNTER — HEALTH MAINTENANCE LETTER (OUTPATIENT)
Age: 47
End: 2022-02-20

## 2022-03-18 ENCOUNTER — OFFICE VISIT (OUTPATIENT)
Dept: FAMILY MEDICINE | Facility: CLINIC | Age: 47
End: 2022-03-18
Payer: COMMERCIAL

## 2022-03-18 ENCOUNTER — TRANSFERRED RECORDS (OUTPATIENT)
Dept: HEALTH INFORMATION MANAGEMENT | Facility: CLINIC | Age: 47
End: 2022-03-18

## 2022-03-18 VITALS
SYSTOLIC BLOOD PRESSURE: 110 MMHG | WEIGHT: 236 LBS | DIASTOLIC BLOOD PRESSURE: 70 MMHG | HEART RATE: 78 BPM | HEIGHT: 62 IN | BODY MASS INDEX: 43.43 KG/M2 | OXYGEN SATURATION: 98 %

## 2022-03-18 DIAGNOSIS — Z01.818 PREOP EXAMINATION: Primary | ICD-10-CM

## 2022-03-18 PROBLEM — L50.9 HIVES: Status: RESOLVED | Noted: 2020-06-04 | Resolved: 2022-03-18

## 2022-03-18 LAB
ANION GAP SERPL CALCULATED.3IONS-SCNC: 7 MMOL/L (ref 5–18)
BUN SERPL-MCNC: 12 MG/DL (ref 8–22)
CALCIUM SERPL-MCNC: 8.9 MG/DL (ref 8.5–10.5)
CHLORIDE BLD-SCNC: 108 MMOL/L (ref 98–107)
CO2 SERPL-SCNC: 22 MMOL/L (ref 22–31)
CREAT SERPL-MCNC: 0.77 MG/DL (ref 0.6–1.1)
GFR SERPL CREATININE-BSD FRML MDRD: >90 ML/MIN/1.73M2
GLUCOSE BLD-MCNC: 83 MG/DL (ref 70–125)
HGB BLD-MCNC: 12.3 G/DL (ref 11.7–15.7)
POTASSIUM BLD-SCNC: 4.3 MMOL/L (ref 3.5–5)
SODIUM SERPL-SCNC: 137 MMOL/L (ref 136–145)

## 2022-03-18 PROCEDURE — 80048 BASIC METABOLIC PNL TOTAL CA: CPT | Performed by: FAMILY MEDICINE

## 2022-03-18 PROCEDURE — 36415 COLL VENOUS BLD VENIPUNCTURE: CPT | Performed by: FAMILY MEDICINE

## 2022-03-18 PROCEDURE — 99214 OFFICE O/P EST MOD 30 MIN: CPT | Performed by: FAMILY MEDICINE

## 2022-03-18 PROCEDURE — 85018 HEMOGLOBIN: CPT | Performed by: FAMILY MEDICINE

## 2022-03-18 RX ORDER — INFLIXIMAB-DYYB 100 MG/10ML
INJECTION, POWDER, LYOPHILIZED, FOR SOLUTION INTRAVENOUS
COMMUNITY
Start: 2020-04-17 | End: 2023-03-03

## 2022-03-18 NOTE — PROGRESS NOTES
[facility]  [address] [address]  Phone: [phone]  Fax: [phone]  Primary Provider: Ivett Sy  Pre-op Performing Provider: ROSA ANDERS      PREOPERATIVE EVALUATION:  Today's date: 3/18/2022    Janelle Coelho is a 47 year old female who presents for a preoperative evaluation.    Surgical Information:  Surgery/Procedure: Colonoscopy   Surgery Location: [facility]   Surgeon: Chad Brown MD   Surgery Date: 3/28/22  Time of Surgery: unknown   Where patient plans to recover: At home with family  Fax number for surgical facility: Note does not need to be faxed, will be available electronically in Epic.    Type of Anesthesia Anticipated: Local with MAC    Assessment & Plan     The proposed surgical procedure is considered LOW risk.    Preop examination    - Hemoglobin; Future  - Basic metabolic panel; Future  - Hemoglobin  - Basic metabolic panel         Risks and Recommendations:  The patient has the following additional risks and recommendations for perioperative complications:   - Morbid obesity (BMI >40)    Medication Instructions:  Patient is on no chronic medications    RECOMMENDATION:  APPROVAL GIVEN to proceed with proposed procedure, without further diagnostic evaluation.    Review of external notes as documented above   Review of the result(s) of each unique test - labs                  Subjective     HPI related to upcoming procedure: Patient is here for preoperative consultation.  She is having a colonoscopy done in the hospital because of her BMI.    Preop Questions 3/17/2022   1. Have you ever had a heart attack or stroke? No   2. Have you ever had surgery on your heart or blood vessels, such as a stent placement, a coronary artery bypass, or surgery on an artery in your head, neck, heart, or legs? No   3. Do you have chest pain with activity? No   4. Do you have a history of  heart failure? No   5. Do you  currently have a cold, bronchitis or symptoms of other infection? No   6. Do you have a cough, shortness of breath, or wheezing? No   7. Do you or anyone in your family have previous history of blood clots? YES -    8. Do you or does anyone in your family have a serious bleeding problem such as prolonged bleeding following surgeries or cuts? No   9. Have you ever had problems with anemia or been told to take iron pills? YES -    10. Have you had any abnormal blood loss such as black, tarry or bloody stools, or abnormal vaginal bleeding? No   11. Have you ever had a blood transfusion? YES -    11a. Have you ever had a transfusion reaction? No   12. Are you willing to have a blood transfusion if it is medically needed before, during, or after your surgery? Yes   13. Have you or any of your relatives ever had problems with anesthesia? No   14. Do you have sleep apnea, excessive snoring or daytime drowsiness? No   15. Do you have any artifical heart valves or other implanted medical devices like a pacemaker, defibrillator, or continuous glucose monitor? No   16. Do you have artificial joints? No   17. Are you allergic to latex? No   18. Is there any chance that you may be pregnant? No       Health Care Directive:  Patient does not have a Health Care Directive or Living Will: Discussed advance care planning with patient; however, patient declined at this time.    Preoperative Review of :   reviewed - controlled substances prescribed by other outside provider(s).      Status of Chronic Conditions:  See problem list for active medical problems.  Problems all longstanding and stable, except as noted/documented.  See ROS for pertinent symptoms related to these conditions.      Review of Systems  CONSTITUTIONAL: NEGATIVE for fever, chills, change in weight  ENT/MOUTH: NEGATIVE for ear, mouth and throat problems  RESP: NEGATIVE for significant cough or SOB  CV: NEGATIVE for chest pain, palpitations or peripheral  edema    Patient Active Problem List    Diagnosis Date Noted     Hives 06/04/2020     Priority: Medium     Crohn's disease of large intestine with complication (H) 06/01/2018     Priority: Medium     Crohn's disease of perianal region (H) 02/09/2018     Priority: Medium     Vitamin D deficiency 04/13/2017     Priority: Medium     Mild intermittent asthma, unspecified whether complicated 10/05/2016     Priority: Medium     History of Heraclio-en-Y gastric bypass 10/05/2016     Priority: Medium     Insomnia, unspecified type 10/05/2016     Priority: Medium     Family history of breast cancer in mother 10/05/2016     Priority: Medium     Major depressive disorder, recurrent episode, in partial remission (H) 10/13/2015     Priority: Medium     Generalized anxiety disorder 08/21/2014     Priority: Medium     Class 3 severe obesity due to excess calories with serious comorbidity and body mass index (BMI) of 50.0 to 59.9 in adult (H)      Priority: Medium      Past Medical History:   Diagnosis Date     Acute Crohn's disease (H)      Acute posthemorrhagic anemia      ASCUS favor benign 2012    hpv neg. needs cotest in 3 years     ASCUS with positive high risk HPV cervical      Depressive disorder     since teens     Depressive disorder, not elsewhere classified 2/10/2014     Excessive and frequent menstruation      Family history of breast cancer in mother      Generalized anxiety disorder      History of blood transfusion     few years ago, should be in my records, it was at [facility]     History of transfusion     many years     Infected cyst of Bartholin's gland duct      Insomnia      Intermittent asthma, uncontrolled      LSIL (low grade squamous intraepithelial lesion) on Pap smear 2008    per chart notes colp dysplasia?     Major depressive disorder in partial remission (H)      Obesity      Obesity      Tobacco abuse      Uncomplicated asthma     since baby     Past Surgical History:   Procedure Laterality Date      BIOPSY      Breast cycst (done at [facility]) many years ago     CHOLECYSTECTOMY  late 90's    during gastric bypass     CHOLECYSTECTOMY  10/05/2016     COLONOSCOPY  Jan/Feb 2018, Feb 2019    Confirmed Crohn's Disease     EXCIS BARTHOLIN GLAND/CYST  2016     GASTRIC BYPASS  2000    with cholecystectomy and subsequent revision gastric bypass     GASTRIC BYPASS  2006    revision of gastric bypass     GASTRIC BYPASS  10/05/2016     GASTRIC BYPASS       HERNIA REPAIR  12/9/16    SYMONE Zamora     INCISION AND DRAINAGE OF WOUND N/A 2/9/2018    Procedure: EXAM UNDER ANESTHESIA WITH I&D OF INTERSPHINCTERIC ABSCESS  SETON PLACEMENT ;  Surgeon: Chad Pereira MD;  Location: [facility];  Service:      MARSUPIALIZATION BARTHOLIN CYST  09/23/2016     wisdom teeth extraction  1995     WISDOM TOOTH EXTRACTION       ZZ COLONOSCOPY W/WO BRUSH/WASH N/A 2/23/2021    Procedure: COLONOSCOPY with biopsies.;  Surgeon: Chad Plata MD;  Location: [facility];  Service: Gastroenterology     Current Outpatient Medications   Medication Sig Dispense Refill     albuterol (PROAIR HFA/PROVENTIL HFA/VENTOLIN HFA) 108 (90 Base) MCG/ACT inhaler Inhale 2 puffs into the lungs every 6 hours as needed for shortness of breath / dyspnea or wheezing 6.7 g 0     cetirizine (ZYRTEC) 10 MG tablet Take 10 mg by mouth daily       cyanocobalamin (VITAMIN B12) 1000 MCG/ML injection Inject 1 mL into the muscle every 30 days       inFLIXimab (REMICADE IV) Every 8 weeks       levonorgestrel-ethinyl estradiol (QUASENSE) 0.15-0.03 MG tablet Take 1 tablet by mouth daily 91 tablet 1     Semaglutide-Weight Management (WEGOVY) 2.4 MG/0.75ML SOAJ Inject 2.4 mg Subcutaneous once a week 3 mL 1     Semaglutide-Weight Management 1.7 MG/0.75ML SOAJ Inject 1.7 mg Subcutaneous once a week 3 mL 0     Semaglutide-Weight Management 2.4 MG/0.75ML SOAJ Inject 2.4 mg Subcutaneous once a week 9 mL 1     sertraline (ZOLOFT) 100 MG tablet TAKE 1  TABLET(100 MG) BY MOUTH DAILY 90 tablet 0     triamcinolone (KENALOG) 0.1 % external cream Apply topically as needed       VENTOLIN  (90 Base) MCG/ACT inhaler INHALE 2 PUFFS INTO THE LUNGS EVERY 6 HOURS 18 g 1       Allergies   Allergen Reactions     Dextran Anaphylaxis        Social History     Tobacco Use     Smoking status: Former Smoker     Packs/day: 0.20     Years: 10.00     Pack years: 2.00     Types: Cigarettes     Start date: 1/1/2001     Quit date: 11/17/2018     Years since quitting: 3.3     Smokeless tobacco: Never Used     Tobacco comment: does not want to quit at this time   Substance Use Topics     Alcohol use: Yes     Alcohol/week: 0.0 standard drinks     Comment: very very very rarely     Family History   Problem Relation Age of Onset     Breast Cancer Mother      Diabetes Mother      Depression Mother      Obesity Mother      Prostate Cancer Father      Breast Cancer Maternal Grandmother      Colon Cancer Maternal Grandmother      Prostate Cancer Maternal Grandfather      Breast Cancer Paternal Grandmother      Diabetes Brother      Obesity Brother      Family History Negative No family hx of      Thyroid Cancer No family hx of      History   Drug Use No         Objective     There were no vitals taken for this visit.    Physical Exam  GENERAL APPEARANCE: healthy, alert and no distress  HENT: ear canals and TM's normal and nose and mouth without ulcers or lesions  RESP: lungs clear to auscultation - no rales, rhonchi or wheezes  CV: regular rate and rhythm, normal S1 S2, no S3 or S4 and no murmur, click or rub   ABDOMEN: soft, nontender, no HSM or masses and bowel sounds normal  NEURO: Normal strength and tone, sensory exam grossly normal, mentation intact and speech normal    Recent Labs   Lab Test 07/30/21  0843 06/09/20  1207   HGB 12.8 12.2    270     --    POTASSIUM 4.2  --    CR 0.95  --         Diagnostics:  Recent Results (from the past 48 hour(s))   Hemoglobin     Collection Time: 03/18/22  1:02 PM   Result Value Ref Range    Hemoglobin 12.3 11.7 - 15.7 g/dL      No EKG required for low risk surgery (cataract, skin procedure, breast biopsy, etc).    Revised Cardiac Risk Index (RCRI):  The patient has the following serious cardiovascular risks for perioperative complications:   - No serious cardiac risks = 0 points     RCRI Interpretation: 0 points: Class I (very low risk - 0.4% complication rate)           Signed Electronically by: Pasha España MD  Copy of this evaluation report is provided to requesting physician.

## 2022-03-18 NOTE — H&P (VIEW-ONLY)
[facility]  [address] [address]  Phone: [phone]  Fax: [phone]  Primary Provider: Ivett Sy  Pre-op Performing Provider: ROSA ANDERS      PREOPERATIVE EVALUATION:  Today's date: 3/18/2022    Janelle Coelho is a 47 year old female who presents for a preoperative evaluation.    Surgical Information:  Surgery/Procedure: Colonoscopy   Surgery Location: [facility]   Surgeon: Chad Brown MD   Surgery Date: 3/28/22  Time of Surgery: unknown   Where patient plans to recover: At home with family  Fax number for surgical facility: Note does not need to be faxed, will be available electronically in Epic.    Type of Anesthesia Anticipated: Local with MAC    Assessment & Plan     The proposed surgical procedure is considered LOW risk.    Preop examination    - Hemoglobin; Future  - Basic metabolic panel; Future  - Hemoglobin  - Basic metabolic panel         Risks and Recommendations:  The patient has the following additional risks and recommendations for perioperative complications:   - Morbid obesity (BMI >40)    Medication Instructions:  Patient is on no chronic medications    RECOMMENDATION:  APPROVAL GIVEN to proceed with proposed procedure, without further diagnostic evaluation.    Review of external notes as documented above   Review of the result(s) of each unique test - labs                  Subjective     HPI related to upcoming procedure: Patient is here for preoperative consultation.  She is having a colonoscopy done in the hospital because of her BMI.    Preop Questions 3/17/2022   1. Have you ever had a heart attack or stroke? No   2. Have you ever had surgery on your heart or blood vessels, such as a stent placement, a coronary artery bypass, or surgery on an artery in your head, neck, heart, or legs? No   3. Do you have chest pain with activity? No   4. Do you have a history of  heart failure? No   5. Do you  currently have a cold, bronchitis or symptoms of other infection? No   6. Do you have a cough, shortness of breath, or wheezing? No   7. Do you or anyone in your family have previous history of blood clots? YES -    8. Do you or does anyone in your family have a serious bleeding problem such as prolonged bleeding following surgeries or cuts? No   9. Have you ever had problems with anemia or been told to take iron pills? YES -    10. Have you had any abnormal blood loss such as black, tarry or bloody stools, or abnormal vaginal bleeding? No   11. Have you ever had a blood transfusion? YES -    11a. Have you ever had a transfusion reaction? No   12. Are you willing to have a blood transfusion if it is medically needed before, during, or after your surgery? Yes   13. Have you or any of your relatives ever had problems with anesthesia? No   14. Do you have sleep apnea, excessive snoring or daytime drowsiness? No   15. Do you have any artifical heart valves or other implanted medical devices like a pacemaker, defibrillator, or continuous glucose monitor? No   16. Do you have artificial joints? No   17. Are you allergic to latex? No   18. Is there any chance that you may be pregnant? No       Health Care Directive:  Patient does not have a Health Care Directive or Living Will: Discussed advance care planning with patient; however, patient declined at this time.    Preoperative Review of :   reviewed - controlled substances prescribed by other outside provider(s).      Status of Chronic Conditions:  See problem list for active medical problems.  Problems all longstanding and stable, except as noted/documented.  See ROS for pertinent symptoms related to these conditions.      Review of Systems  CONSTITUTIONAL: NEGATIVE for fever, chills, change in weight  ENT/MOUTH: NEGATIVE for ear, mouth and throat problems  RESP: NEGATIVE for significant cough or SOB  CV: NEGATIVE for chest pain, palpitations or peripheral  edema    Patient Active Problem List    Diagnosis Date Noted     Hives 06/04/2020     Priority: Medium     Crohn's disease of large intestine with complication (H) 06/01/2018     Priority: Medium     Crohn's disease of perianal region (H) 02/09/2018     Priority: Medium     Vitamin D deficiency 04/13/2017     Priority: Medium     Mild intermittent asthma, unspecified whether complicated 10/05/2016     Priority: Medium     History of Heraclio-en-Y gastric bypass 10/05/2016     Priority: Medium     Insomnia, unspecified type 10/05/2016     Priority: Medium     Family history of breast cancer in mother 10/05/2016     Priority: Medium     Major depressive disorder, recurrent episode, in partial remission (H) 10/13/2015     Priority: Medium     Generalized anxiety disorder 08/21/2014     Priority: Medium     Class 3 severe obesity due to excess calories with serious comorbidity and body mass index (BMI) of 50.0 to 59.9 in adult (H)      Priority: Medium      Past Medical History:   Diagnosis Date     Acute Crohn's disease (H)      Acute posthemorrhagic anemia      ASCUS favor benign 2012    hpv neg. needs cotest in 3 years     ASCUS with positive high risk HPV cervical      Depressive disorder     since teens     Depressive disorder, not elsewhere classified 2/10/2014     Excessive and frequent menstruation      Family history of breast cancer in mother      Generalized anxiety disorder      History of blood transfusion     few years ago, should be in my records, it was at a [facility]     History of transfusion     many years     Infected cyst of Bartholin's gland duct      Insomnia      Intermittent asthma, uncontrolled      LSIL (low grade squamous intraepithelial lesion) on Pap smear 2008    per chart notes colp dysplasia?     Major depressive disorder in partial remission (H)      Obesity      Obesity      Tobacco abuse      Uncomplicated asthma     since baby     Past Surgical History:   Procedure Laterality Date      BIOPSY      Breast cycst (done at [facility]) many years ago     CHOLECYSTECTOMY  late 90's    during gastric bypass     CHOLECYSTECTOMY  10/05/2016     COLONOSCOPY  Jan/Feb 2018, Feb 2019    Confirmed Crohn's Disease     EXCIS BARTHOLIN GLAND/CYST  2016     GASTRIC BYPASS  2000    with cholecystectomy and subsequent revision gastric bypass     GASTRIC BYPASS  2006    revision of gastric bypass     GASTRIC BYPASS  10/05/2016     GASTRIC BYPASS       HERNIA REPAIR  12/9/16    SYMONE Zamora     INCISION AND DRAINAGE OF WOUND N/A 2/9/2018    Procedure: EXAM UNDER ANESTHESIA WITH I&D OF INTERSPHINCTERIC ABSCESS  SETON PLACEMENT ;  Surgeon: Chad Pereira MD;  Location: [facility];  Service:      MARSUPIALIZATION BARTHOLIN CYST  09/23/2016     wisdom teeth extraction  1995     WISDOM TOOTH EXTRACTION       ZZ COLONOSCOPY W/WO BRUSH/WASH N/A 2/23/2021    Procedure: COLONOSCOPY with biopsies.;  Surgeon: Chad Plata MD;  Location: [facility];  Service: Gastroenterology     Current Outpatient Medications   Medication Sig Dispense Refill     albuterol (PROAIR HFA/PROVENTIL HFA/VENTOLIN HFA) 108 (90 Base) MCG/ACT inhaler Inhale 2 puffs into the lungs every 6 hours as needed for shortness of breath / dyspnea or wheezing 6.7 g 0     cetirizine (ZYRTEC) 10 MG tablet Take 10 mg by mouth daily       cyanocobalamin (VITAMIN B12) 1000 MCG/ML injection Inject 1 mL into the muscle every 30 days       inFLIXimab (REMICADE IV) Every 8 weeks       levonorgestrel-ethinyl estradiol (QUASENSE) 0.15-0.03 MG tablet Take 1 tablet by mouth daily 91 tablet 1     Semaglutide-Weight Management (WEGOVY) 2.4 MG/0.75ML SOAJ Inject 2.4 mg Subcutaneous once a week 3 mL 1     Semaglutide-Weight Management 1.7 MG/0.75ML SOAJ Inject 1.7 mg Subcutaneous once a week 3 mL 0     Semaglutide-Weight Management 2.4 MG/0.75ML SOAJ Inject 2.4 mg Subcutaneous once a week 9 mL 1     sertraline (ZOLOFT) 100 MG tablet TAKE 1  TABLET(100 MG) BY MOUTH DAILY 90 tablet 0     triamcinolone (KENALOG) 0.1 % external cream Apply topically as needed       VENTOLIN  (90 Base) MCG/ACT inhaler INHALE 2 PUFFS INTO THE LUNGS EVERY 6 HOURS 18 g 1       Allergies   Allergen Reactions     Dextran Anaphylaxis        Social History     Tobacco Use     Smoking status: Former Smoker     Packs/day: 0.20     Years: 10.00     Pack years: 2.00     Types: Cigarettes     Start date: 1/1/2001     Quit date: 11/17/2018     Years since quitting: 3.3     Smokeless tobacco: Never Used     Tobacco comment: does not want to quit at this time   Substance Use Topics     Alcohol use: Yes     Alcohol/week: 0.0 standard drinks     Comment: very very very rarely     Family History   Problem Relation Age of Onset     Breast Cancer Mother      Diabetes Mother      Depression Mother      Obesity Mother      Prostate Cancer Father      Breast Cancer Maternal Grandmother      Colon Cancer Maternal Grandmother      Prostate Cancer Maternal Grandfather      Breast Cancer Paternal Grandmother      Diabetes Brother      Obesity Brother      Family History Negative No family hx of      Thyroid Cancer No family hx of      History   Drug Use No         Objective     There were no vitals taken for this visit.    Physical Exam  GENERAL APPEARANCE: healthy, alert and no distress  HENT: ear canals and TM's normal and nose and mouth without ulcers or lesions  RESP: lungs clear to auscultation - no rales, rhonchi or wheezes  CV: regular rate and rhythm, normal S1 S2, no S3 or S4 and no murmur, click or rub   ABDOMEN: soft, nontender, no HSM or masses and bowel sounds normal  NEURO: Normal strength and tone, sensory exam grossly normal, mentation intact and speech normal    Recent Labs   Lab Test 07/30/21  0843 06/09/20  1207   HGB 12.8 12.2    270     --    POTASSIUM 4.2  --    CR 0.95  --         Diagnostics:  Recent Results (from the past 48 hour(s))   Hemoglobin     Collection Time: 03/18/22  1:02 PM   Result Value Ref Range    Hemoglobin 12.3 11.7 - 15.7 g/dL      No EKG required for low risk surgery (cataract, skin procedure, breast biopsy, etc).    Revised Cardiac Risk Index (RCRI):  The patient has the following serious cardiovascular risks for perioperative complications:   - No serious cardiac risks = 0 points     RCRI Interpretation: 0 points: Class I (very low risk - 0.4% complication rate)           Signed Electronically by: Pasha España MD  Copy of this evaluation report is provided to requesting physician.

## 2022-03-25 ENCOUNTER — LAB (OUTPATIENT)
Dept: LAB | Facility: CLINIC | Age: 47
End: 2022-03-25
Attending: INTERNAL MEDICINE
Payer: COMMERCIAL

## 2022-03-25 DIAGNOSIS — Z11.59 ENCOUNTER FOR SCREENING FOR OTHER VIRAL DISEASES: ICD-10-CM

## 2022-03-25 PROCEDURE — U0005 INFEC AGEN DETEC AMPLI PROBE: HCPCS

## 2022-03-25 PROCEDURE — U0003 INFECTIOUS AGENT DETECTION BY NUCLEIC ACID (DNA OR RNA); SEVERE ACUTE RESPIRATORY SYNDROME CORONAVIRUS 2 (SARS-COV-2) (CORONAVIRUS DISEASE [COVID-19]), AMPLIFIED PROBE TECHNIQUE, MAKING USE OF HIGH THROUGHPUT TECHNOLOGIES AS DESCRIBED BY CMS-2020-01-R: HCPCS

## 2022-03-26 LAB — SARS-COV-2 RNA RESP QL NAA+PROBE: NEGATIVE

## 2022-03-28 ENCOUNTER — HOSPITAL ENCOUNTER (OUTPATIENT)
Facility: CLINIC | Age: 47
Discharge: HOME | End: 2022-03-28
Attending: INTERNAL MEDICINE | Admitting: INTERNAL MEDICINE
Payer: COMMERCIAL

## 2022-03-28 ENCOUNTER — ANESTHESIA EVENT (OUTPATIENT)
Dept: SURGERY | Facility: CLINIC | Age: 47
End: 2022-03-28
Payer: COMMERCIAL

## 2022-03-28 ENCOUNTER — ANESTHESIA (OUTPATIENT)
Dept: SURGERY | Facility: CLINIC | Age: 47
End: 2022-03-28
Payer: COMMERCIAL

## 2022-03-28 VITALS
WEIGHT: 232.9 LBS | DIASTOLIC BLOOD PRESSURE: 67 MMHG | HEART RATE: 63 BPM | HEIGHT: 61 IN | TEMPERATURE: 97.5 F | RESPIRATION RATE: 16 BRPM | OXYGEN SATURATION: 97 % | BODY MASS INDEX: 43.97 KG/M2 | SYSTOLIC BLOOD PRESSURE: 113 MMHG

## 2022-03-28 LAB — COLONOSCOPY: NORMAL

## 2022-03-28 PROCEDURE — 272N000001 HC OR GENERAL SUPPLY STERILE: Performed by: INTERNAL MEDICINE

## 2022-03-28 PROCEDURE — 88305 TISSUE EXAM BY PATHOLOGIST: CPT | Mod: TC | Performed by: INTERNAL MEDICINE

## 2022-03-28 PROCEDURE — 258N000003 HC RX IP 258 OP 636: Performed by: ANESTHESIOLOGY

## 2022-03-28 PROCEDURE — 360N000075 HC SURGERY LEVEL 2, PER MIN: Performed by: INTERNAL MEDICINE

## 2022-03-28 PROCEDURE — 250N000011 HC RX IP 250 OP 636: Performed by: NURSE ANESTHETIST, CERTIFIED REGISTERED

## 2022-03-28 PROCEDURE — 370N000017 HC ANESTHESIA TECHNICAL FEE, PER MIN: Performed by: INTERNAL MEDICINE

## 2022-03-28 PROCEDURE — 88305 TISSUE EXAM BY PATHOLOGIST: CPT | Mod: 26 | Performed by: PATHOLOGY

## 2022-03-28 PROCEDURE — 250N000009 HC RX 250: Performed by: NURSE ANESTHETIST, CERTIFIED REGISTERED

## 2022-03-28 PROCEDURE — 999N000141 HC STATISTIC PRE-PROCEDURE NURSING ASSESSMENT: Performed by: INTERNAL MEDICINE

## 2022-03-28 PROCEDURE — 710N000012 HC RECOVERY PHASE 2, PER MINUTE: Performed by: INTERNAL MEDICINE

## 2022-03-28 RX ORDER — MEPERIDINE HYDROCHLORIDE 25 MG/ML
12.5 INJECTION INTRAMUSCULAR; INTRAVENOUS; SUBCUTANEOUS
Status: DISCONTINUED | OUTPATIENT
Start: 2022-03-28 | End: 2022-03-28 | Stop reason: HOSPADM

## 2022-03-28 RX ORDER — HYDROMORPHONE HCL IN WATER/PF 6 MG/30 ML
0.2 PATIENT CONTROLLED ANALGESIA SYRINGE INTRAVENOUS EVERY 5 MIN PRN
Status: CANCELLED | OUTPATIENT
Start: 2022-03-28

## 2022-03-28 RX ORDER — SODIUM CHLORIDE, SODIUM LACTATE, POTASSIUM CHLORIDE, CALCIUM CHLORIDE 600; 310; 30; 20 MG/100ML; MG/100ML; MG/100ML; MG/100ML
INJECTION, SOLUTION INTRAVENOUS CONTINUOUS
Status: DISCONTINUED | OUTPATIENT
Start: 2022-03-28 | End: 2022-03-28 | Stop reason: HOSPADM

## 2022-03-28 RX ORDER — ONDANSETRON 4 MG/1
4 TABLET, ORALLY DISINTEGRATING ORAL EVERY 30 MIN PRN
Status: DISCONTINUED | OUTPATIENT
Start: 2022-03-28 | End: 2022-03-28 | Stop reason: HOSPADM

## 2022-03-28 RX ORDER — FENTANYL CITRATE 50 UG/ML
25 INJECTION, SOLUTION INTRAMUSCULAR; INTRAVENOUS
Status: CANCELLED | OUTPATIENT
Start: 2022-03-28

## 2022-03-28 RX ORDER — ONDANSETRON 2 MG/ML
4 INJECTION INTRAMUSCULAR; INTRAVENOUS EVERY 30 MIN PRN
Status: DISCONTINUED | OUTPATIENT
Start: 2022-03-28 | End: 2022-03-28 | Stop reason: HOSPADM

## 2022-03-28 RX ORDER — OXYCODONE HYDROCHLORIDE 5 MG/1
5 TABLET ORAL EVERY 4 HOURS PRN
Status: DISCONTINUED | OUTPATIENT
Start: 2022-03-28 | End: 2022-03-28 | Stop reason: HOSPADM

## 2022-03-28 RX ORDER — LIDOCAINE 40 MG/G
CREAM TOPICAL
Status: DISCONTINUED | OUTPATIENT
Start: 2022-03-28 | End: 2022-03-28 | Stop reason: HOSPADM

## 2022-03-28 RX ORDER — LIDOCAINE HYDROCHLORIDE 10 MG/ML
INJECTION, SOLUTION INFILTRATION; PERINEURAL PRN
Status: DISCONTINUED | OUTPATIENT
Start: 2022-03-28 | End: 2022-03-28

## 2022-03-28 RX ORDER — FENTANYL CITRATE 50 UG/ML
25 INJECTION, SOLUTION INTRAMUSCULAR; INTRAVENOUS EVERY 5 MIN PRN
Status: CANCELLED | OUTPATIENT
Start: 2022-03-28

## 2022-03-28 RX ORDER — PROPOFOL 10 MG/ML
INJECTION, EMULSION INTRAVENOUS CONTINUOUS PRN
Status: DISCONTINUED | OUTPATIENT
Start: 2022-03-28 | End: 2022-03-28

## 2022-03-28 RX ADMIN — LIDOCAINE HYDROCHLORIDE 3 ML: 10 INJECTION, SOLUTION INFILTRATION; PERINEURAL at 08:24

## 2022-03-28 RX ADMIN — PROPOFOL 200 MCG/KG/MIN: 10 INJECTION, EMULSION INTRAVENOUS at 08:24

## 2022-03-28 RX ADMIN — SODIUM CHLORIDE, POTASSIUM CHLORIDE, SODIUM LACTATE AND CALCIUM CHLORIDE: 600; 310; 30; 20 INJECTION, SOLUTION INTRAVENOUS at 07:13

## 2022-03-28 NOTE — ANESTHESIA CARE TRANSFER NOTE
Patient: Janelle Coelho    Procedure: Procedure(s):  COLONOSCOPY with biopsies and polypectomy       Diagnosis: Crohn's colitis (H) [K50.10]  Diagnosis Additional Information: No value filed.    Anesthesia Type:   MAC     Note:    Oropharynx: oropharynx clear of all foreign objects  Level of Consciousness: awake  Oxygen Supplementation: room air    Independent Airway: airway patency satisfactory and stable  Dentition: dentition unchanged  Vital Signs Stable: post-procedure vital signs reviewed and stable  Report to RN Given: handoff report given  Patient transferred to: Phase II    Handoff Report: Identifed the Patient, Identified the Reponsible Provider, Reviewed the pertinent medical history, Discussed the surgical course, Reviewed Intra-OP anesthesia mangement and issues during anesthesia, Set expectations for post-procedure period and Allowed opportunity for questions and acknowledgement of understanding      Vitals:  Vitals Value Taken Time   BP     Temp     Pulse     Resp     SpO2         Electronically Signed By: ISA Herron CRNA  March 28, 2022  8:59 AM

## 2022-03-28 NOTE — INTERVAL H&P NOTE
I have reviewed the surgical (or preoperative) H&P that is linked to this encounter, and examined the patient. There are no significant changes.    Chad Plata MD

## 2022-03-28 NOTE — ANESTHESIA POSTPROCEDURE EVALUATION
Patient: Janelle Coelho    Procedure: Procedure(s):  COLONOSCOPY with biopsies and polypectomy       Anesthesia Type:  MAC    Note:  Disposition: Outpatient   Postop Pain Control: Uneventful            Sign Out: Well controlled pain   PONV: No   Neuro/Psych: Uneventful            Sign Out: Acceptable/Baseline neuro status   Airway/Respiratory: Uneventful            Sign Out: Acceptable/Baseline resp. status   CV/Hemodynamics: Uneventful            Sign Out: Acceptable CV status; No obvious hypovolemia; No obvious fluid overload   Other NRE: NONE   DID A NON-ROUTINE EVENT OCCUR? No           Last vitals:  Vitals Value Taken Time   /67 03/28/22 0928   Temp 36.4  C (97.5  F) 03/28/22 0928   Pulse 63 03/28/22 0900   Resp 16 03/28/22 0928   SpO2 97 % 03/28/22 0900       Electronically Signed By: Alan Torres MD  March 28, 2022  11:04 AM

## 2022-03-28 NOTE — ANESTHESIA PREPROCEDURE EVALUATION
Anesthesia Pre-Procedure Evaluation    Patient: Janelle Coelho   MRN: [id number] : 1975        Procedure : Procedure(s):  COLONOSCOPY          Past Medical History:   Diagnosis Date     Acute Crohn's disease (H)      Acute posthemorrhagic anemia      ASCUS favor benign 2012    hpv neg. needs cotest in 3 years     ASCUS with positive high risk HPV cervical      Depressive disorder     since teens     Depressive disorder, not elsewhere classified 2/10/2014     Excessive and frequent menstruation      Family history of breast cancer in mother      Generalized anxiety disorder      History of blood transfusion     few years ago, should be in my records, it was at a [facility]     History of transfusion     many years     Infected cyst of Bartholin's gland duct      Insomnia      Intermittent asthma, uncontrolled      LSIL (low grade squamous intraepithelial lesion) on Pap smear     per chart notes colp dysplasia?     Major depressive disorder in partial remission (H)      Obesity      Obesity      Tobacco abuse      Uncomplicated asthma     since baby      Past Surgical History:   Procedure Laterality Date     BIOPSY      Breast cycst (done at [facility]) many years ago     CHOLECYSTECTOMY  late s    during gastric bypass     CHOLECYSTECTOMY  10/05/2016     COLONOSCOPY  2018, 2019    Confirmed Crohn's Disease     EXCIS BARTHOLIN GLAND/CYST  2016     GASTRIC BYPASS  2000    with cholecystectomy and subsequent revision gastric bypass     GASTRIC BYPASS      revision of gastric bypass     GASTRIC BYPASS  10/05/2016     GASTRIC BYPASS       HERNIA REPAIR  16    SYMONE Zamora     INCISION AND DRAINAGE OF WOUND N/A 2018    Procedure: EXAM UNDER ANESTHESIA WITH I&D OF INTERSPHINCTERIC ABSCESS  SETON PLACEMENT ;  Surgeon: Chad Pereira MD;  Location: [facility];  Service:      MARSUPIALIZATION BARTHOLIN CYST  2016     wisdom teeth extraction       WISDOM  TOOTH EXTRACTION       ZZHC COLONOSCOPY W/WO BRUSH/WASH N/A 2/23/2021    Procedure: COLONOSCOPY with biopsies.;  Surgeon: Chad Plata MD;  Location: [facility];  Service: Gastroenterology      Allergies   Allergen Reactions     Dextran Anaphylaxis      Social History     Tobacco Use     Smoking status: Former Smoker     Packs/day: 0.20     Years: 10.00     Pack years: 2.00     Types: Cigarettes     Start date: 1/1/2001     Quit date: 11/17/2018     Years since quitting: 3.3     Smokeless tobacco: Never Used   Substance Use Topics     Alcohol use: Yes     Alcohol/week: 0.0 standard drinks     Comment: very very very rarely      Wt Readings from Last 1 Encounters:   03/28/22 105.6 kg (232 lb 14.4 oz)        Anesthesia Evaluation   Pt has had prior anesthetic.         ROS/MED HX  ENT/Pulmonary:     (+) asthma     Neurologic:  - neg neurologic ROS     Cardiovascular:  - neg cardiovascular ROS     METS/Exercise Tolerance:     Hematologic:  - neg hematologic  ROS     Musculoskeletal:  - neg musculoskeletal ROS     GI/Hepatic:  - neg GI/hepatic ROS     Renal/Genitourinary:  - neg Renal ROS     Endo:     (+) Obesity,     Psychiatric/Substance Use:  - neg psychiatric ROS     Infectious Disease:       Malignancy:       Other:            Physical Exam    Airway  airway exam normal           Respiratory Devices and Support         Dental  no notable dental history         Cardiovascular   cardiovascular exam normal          Pulmonary   pulmonary exam normal                OUTSIDE LABS:  CBC:   Lab Results   Component Value Date    WBC 11.7 (H) 07/30/2021    WBC 5.8 06/09/2020    HGB 12.3 03/18/2022    HGB 12.8 07/30/2021    HCT 39.2 07/30/2021    HCT 38.1 06/09/2020     07/30/2021     06/09/2020     BMP:   Lab Results   Component Value Date     03/18/2022     07/30/2021    POTASSIUM 4.3 03/18/2022    POTASSIUM 4.2 07/30/2021    CHLORIDE 108 (H) 03/18/2022    CHLORIDE 107 07/30/2021     CO2 22 03/18/2022    CO2 24 07/30/2021    BUN 12 03/18/2022    BUN 9 07/30/2021    CR 0.77 03/18/2022    CR 0.95 07/30/2021    GLC 83 03/18/2022    GLC 80 07/30/2021     COAGS: No results found for: PTT, INR, FIBR  POC:   Lab Results   Component Value Date    HCG Negative 02/23/2021     HEPATIC:   Lab Results   Component Value Date    ALBUMIN 3.2 (L) 07/30/2021    PROTTOTAL 6.0 07/30/2021    ALT 15 07/30/2021    AST 12 07/30/2021    ALKPHOS 69 07/30/2021    BILITOTAL 0.4 07/30/2021     OTHER:   Lab Results   Component Value Date    A1C 5.1 03/06/2017    SAVANA 8.9 03/18/2022    PHOS 3.1 10/05/2016    MAG 2.4 (H) 10/05/2016    TSH 1.82 06/09/2020    T4 0.99 03/06/2017       Anesthesia Plan    ASA Status:  2      Anesthesia Type: MAC.              Consents    Anesthesia Plan(s) and associated risks, benefits, and realistic alternatives discussed. Questions answered and patient/representative(s) expressed understanding.    - Discussed:     - Discussed with:  Patient         Postoperative Care            Comments:                Alan Torres MD

## 2022-03-29 LAB
PATH REPORT.COMMENTS IMP SPEC: NORMAL
PATH REPORT.COMMENTS IMP SPEC: NORMAL
PATH REPORT.FINAL DX SPEC: NORMAL
PATH REPORT.GROSS SPEC: NORMAL
PATH REPORT.MICROSCOPIC SPEC OTHER STN: NORMAL
PATH REPORT.RELEVANT HX SPEC: NORMAL
PHOTO IMAGE: NORMAL

## 2022-04-08 ENCOUNTER — TRANSFERRED RECORDS (OUTPATIENT)
Dept: HEALTH INFORMATION MANAGEMENT | Facility: CLINIC | Age: 47
End: 2022-04-08
Payer: COMMERCIAL

## 2022-04-08 LAB
ALT SERPL-CCNC: 23 IU/L (ref 0–32)
AST SERPL-CCNC: 25 IU/L (ref 0–40)

## 2022-04-13 ENCOUNTER — TRANSFERRED RECORDS (OUTPATIENT)
Dept: HEALTH INFORMATION MANAGEMENT | Facility: CLINIC | Age: 47
End: 2022-04-13
Payer: COMMERCIAL

## 2022-04-25 ENCOUNTER — TRANSFERRED RECORDS (OUTPATIENT)
Dept: HEALTH INFORMATION MANAGEMENT | Facility: CLINIC | Age: 47
End: 2022-04-25
Payer: COMMERCIAL

## 2022-04-26 DIAGNOSIS — E66.01 CLASS 3 SEVERE OBESITY DUE TO EXCESS CALORIES WITH SERIOUS COMORBIDITY AND BODY MASS INDEX (BMI) OF 50.0 TO 59.9 IN ADULT (H): ICD-10-CM

## 2022-04-26 DIAGNOSIS — E66.813 CLASS 3 SEVERE OBESITY DUE TO EXCESS CALORIES WITH SERIOUS COMORBIDITY AND BODY MASS INDEX (BMI) OF 50.0 TO 59.9 IN ADULT (H): ICD-10-CM

## 2022-04-28 RX ORDER — SEMAGLUTIDE 2.4 MG/.75ML
INJECTION, SOLUTION SUBCUTANEOUS
Qty: 3 ML | Refills: 1 | Status: SHIPPED | OUTPATIENT
Start: 2022-04-28 | End: 2022-05-20

## 2022-04-28 NOTE — TELEPHONE ENCOUNTER
Routing refill request to provider for review/approval because:  Drug not on the [facility] refill protocol     Last Written Prescription Date:  2/18/2022  Last Fill Quantity: 3 ml,  # refills: 1   Last office visit provider:  3/18/2022     Requested Prescriptions   Pending Prescriptions Disp Refills     WEGOVY 2.4 MG/0.75ML SOAJ [Pharmacy Med Name: WEGOVY 2.4MG/0.75ML PF PEN INJ] 3 mL 1     Sig: INJECT 2.4 MG UNDER THE SKIN ONCE WEEKLY       There is no refill protocol information for this order          Nasreen Thomas, RN 04/28/22 2:24 PM

## 2022-04-29 ENCOUNTER — VIRTUAL VISIT (OUTPATIENT)
Dept: PEDIATRICS | Facility: CLINIC | Age: 47
End: 2022-04-29
Payer: COMMERCIAL

## 2022-04-29 DIAGNOSIS — G25.81 RESTLESS LEG SYNDROME: ICD-10-CM

## 2022-04-29 DIAGNOSIS — K50.119 CROHN'S DISEASE OF LARGE INTESTINE WITH COMPLICATION (H): ICD-10-CM

## 2022-04-29 DIAGNOSIS — F41.1 GENERALIZED ANXIETY DISORDER: ICD-10-CM

## 2022-04-29 DIAGNOSIS — E66.01 CLASS 3 SEVERE OBESITY DUE TO EXCESS CALORIES WITH SERIOUS COMORBIDITY AND BODY MASS INDEX (BMI) OF 50.0 TO 59.9 IN ADULT (H): Primary | ICD-10-CM

## 2022-04-29 DIAGNOSIS — E66.813 CLASS 3 SEVERE OBESITY DUE TO EXCESS CALORIES WITH SERIOUS COMORBIDITY AND BODY MASS INDEX (BMI) OF 50.0 TO 59.9 IN ADULT (H): Primary | ICD-10-CM

## 2022-04-29 DIAGNOSIS — J45.20 MILD INTERMITTENT ASTHMA, UNSPECIFIED WHETHER COMPLICATED: ICD-10-CM

## 2022-04-29 DIAGNOSIS — F33.41 MAJOR DEPRESSIVE DISORDER, RECURRENT EPISODE, IN PARTIAL REMISSION (H): ICD-10-CM

## 2022-04-29 PROCEDURE — 99214 OFFICE O/P EST MOD 30 MIN: CPT | Mod: 95 | Performed by: NURSE PRACTITIONER

## 2022-04-29 RX ORDER — TRAZODONE HYDROCHLORIDE 50 MG/1
25-50 TABLET, FILM COATED ORAL
COMMUNITY
End: 2022-08-16

## 2022-04-29 RX ORDER — SERTRALINE HYDROCHLORIDE 100 MG/1
100 TABLET, FILM COATED ORAL DAILY
Qty: 90 TABLET | Refills: 1 | Status: SHIPPED | OUTPATIENT
Start: 2022-04-29 | End: 2022-10-28

## 2022-04-29 RX ORDER — ALBUTEROL SULFATE 90 UG/1
2 AEROSOL, METERED RESPIRATORY (INHALATION) EVERY 6 HOURS PRN
Qty: 6.7 G | Refills: 0 | Status: SHIPPED | OUTPATIENT
Start: 2022-04-29

## 2022-04-29 RX ORDER — GABAPENTIN 300 MG/1
300 CAPSULE ORAL AT BEDTIME
Qty: 90 CAPSULE | Refills: 0 | Status: SHIPPED | OUTPATIENT
Start: 2022-04-29 | End: 2022-09-02

## 2022-04-29 ASSESSMENT — ANXIETY QUESTIONNAIRES
6. BECOMING EASILY ANNOYED OR IRRITABLE: SEVERAL DAYS
3. WORRYING TOO MUCH ABOUT DIFFERENT THINGS: NOT AT ALL
1. FEELING NERVOUS, ANXIOUS, OR ON EDGE: NOT AT ALL
7. FEELING AFRAID AS IF SOMETHING AWFUL MIGHT HAPPEN: NOT AT ALL
GAD7 TOTAL SCORE: 1
5. BEING SO RESTLESS THAT IT IS HARD TO SIT STILL: NOT AT ALL
2. NOT BEING ABLE TO STOP OR CONTROL WORRYING: NOT AT ALL

## 2022-04-29 ASSESSMENT — ASTHMA QUESTIONNAIRES: ACT_TOTALSCORE: 25

## 2022-04-29 ASSESSMENT — PATIENT HEALTH QUESTIONNAIRE - PHQ9
SUM OF ALL RESPONSES TO PHQ QUESTIONS 1-9: 1
5. POOR APPETITE OR OVEREATING: NOT AT ALL

## 2022-04-29 NOTE — PROGRESS NOTES
"Janelle is a 47 year old who is being evaluated via a billable video visit.      How would you like to obtain your AVS? MyChart  If the video visit is dropped, the invitation should be resent by:   Will anyone else be joining your video visit? No      Video Start Time: 9:53 AM    Assessment & Plan     Class 3 severe obesity due to excess calories with serious comorbidity and body mass index (BMI) of 50.0 to 59.9 in adult (H)  She has been losing weight through [facility] clinic.     Generalized anxiety disorder  Doing well on current regime. She did try to decrease zoloft dose but had increase in irritability symptoms. Will remain at this dose.   - sertraline (ZOLOFT) 100 MG tablet; Take 1 tablet (100 mg) by mouth daily    Major depressive disorder, recurrent episode, in partial remission (H)  Stable on current dose, but having sleep disruption.     Mild intermittent asthma, unspecified whether complicated  Stable, act updated.   - albuterol (PROAIR HFA/PROVENTIL HFA/VENTOLIN HFA) 108 (90 Base) MCG/ACT inhaler; Inhale 2 puffs into the lungs every 6 hours as needed for shortness of breath / dyspnea or wheezing    Restless leg syndrome  She reports using trazodone on occasion for RLS type symptoms. Discussed the use of mariah instead. Will try, and will also do sleep study. She has been sleeping enough hours at night but not feeling rested.   - gabapentin (NEURONTIN) 300 MG capsule; Take 1 capsule (300 mg) by mouth At Bedtime  - Adult Sleep Eval & Management  Referral; Future    Crohn's disease of large intestine with complication (H)  Stable, was followed by GI, no new symptoms, despite healthy diet changes.            BMI:   Estimated body mass index is 44.01 kg/m  as calculated from the following:    Height as of 3/28/22: 1.549 m (5' 1\").    Weight as of 3/28/22: 105.6 kg (232 lb 14.4 oz).   Weight management plan: Patient referred to endocrine and/or weight management specialty        Return in about 6 months " (around 10/29/2022) for Annual Preventative Exam.    Ivett Segura, APRN CNP  [facility] S[facility] Luis is a 47 year old who presents for the following health issues     HPI     Has been followed by wt nelli, has lost 48#, doing well on meds.     History of depression and anxiety, has been on zoloft notes things have been going ok. Can cause some nausea. She did try cutting them in half, increase in aggegation.     Regarding sleep, she notes she gets 9 hrs of sleep and still feels tired. She doesn't nap. Doesn't feel rested. She can fall asleep. Has history of RLS, takes trazodone on occasion (25-50%) for this. She doesn't know if she snores.     PHQ 9/20/2021 11/11/2021 4/29/2022   PHQ-9 Total Score 4 4 1   Q9: Thoughts of better off dead/self-harm past 2 weeks Not at all Not at all Not at all     EVIE-7 SCORE 2/18/2021 11/11/2021 4/29/2022   Total Score - - -   Total Score - 1 (minimal anxiety) -   Total Score 0 1 1     History of intermittent asthma, notes she used to be a smoker (10 yr pack hx). Uses albuterol only when sick (about 1-2 times per year). Takes allegra every day.     ACT Total Scores 2/18/2021 9/20/2021 4/29/2022   ACT TOTAL SCORE - - -   ASTHMA ER VISITS - - -   ASTHMA HOSPITALIZATIONS - - -   ACT TOTAL SCORE (Goal Greater than or Equal to 20) 24 21 25   In the past 12 months, how many times did you visit the emergency room for your asthma without being admitted to the hospital? 0 1 0   In the past 12 months, how many times were you hospitalized overnight because of your asthma? 0 0 0       Review of Systems   Constitutional, HEENT, cardiovascular, pulmonary, gi and gu systems are negative, except as otherwise noted.      Objective         Vitals:  No vitals were obtained today due to virtual visit.    Physical Exam   GENERAL: Healthy, alert and no distress  EYES: Eyes grossly normal to inspection.  No discharge or erythema, or obvious scleral/conjunctival  abnormalities.  RESP: No audible wheeze, cough, or visible cyanosis.  No visible retractions or increased work of breathing.    SKIN: Visible skin clear. No significant rash, abnormal pigmentation or lesions.  NEURO: Cranial nerves grossly intact.  Mentation and speech appropriate for age.  PSYCH: Mentation appears normal, affect normal/bright, judgement and insight intact, normal speech and appearance well-groomed.          Video-Visit Details    Type of service:  Video Visit    Video End Time:10:20 AM    Originating Location (pt. Location): Home    Distant Location (provider location):  [facility] S[facility]     Platform used for Video Visit: Ikro

## 2022-04-30 ASSESSMENT — ANXIETY QUESTIONNAIRES: GAD7 TOTAL SCORE: 1

## 2022-05-13 ENCOUNTER — TRANSFERRED RECORDS (OUTPATIENT)
Dept: HEALTH INFORMATION MANAGEMENT | Facility: CLINIC | Age: 47
End: 2022-05-13
Payer: COMMERCIAL

## 2022-05-20 ENCOUNTER — OFFICE VISIT (OUTPATIENT)
Dept: INTERNAL MEDICINE | Facility: CLINIC | Age: 47
End: 2022-05-20
Payer: COMMERCIAL

## 2022-05-20 VITALS
BODY MASS INDEX: 42.7 KG/M2 | HEART RATE: 68 BPM | DIASTOLIC BLOOD PRESSURE: 76 MMHG | SYSTOLIC BLOOD PRESSURE: 112 MMHG | OXYGEN SATURATION: 98 % | WEIGHT: 226 LBS | TEMPERATURE: 98.2 F

## 2022-05-20 DIAGNOSIS — M54.59 MECHANICAL LOW BACK PAIN: ICD-10-CM

## 2022-05-20 DIAGNOSIS — R29.3 POOR POSTURE: ICD-10-CM

## 2022-05-20 DIAGNOSIS — M54.12 CERVICAL RADICULOPATHY: ICD-10-CM

## 2022-05-20 DIAGNOSIS — Z23 HIGH PRIORITY FOR 2019-NCOV VACCINE: Primary | ICD-10-CM

## 2022-05-20 PROCEDURE — 99213 OFFICE O/P EST LOW 20 MIN: CPT | Mod: 25 | Performed by: INTERNAL MEDICINE

## 2022-05-20 PROCEDURE — 91305 COVID-19,PF,PFIZER (12+ YRS): CPT | Performed by: INTERNAL MEDICINE

## 2022-05-20 PROCEDURE — 0054A COVID-19,PF,PFIZER (12+ YRS): CPT | Performed by: INTERNAL MEDICINE

## 2022-05-20 RX ORDER — TRAMADOL HYDROCHLORIDE 50 MG/1
50 TABLET ORAL EVERY 6 HOURS PRN
Qty: 10 TABLET | Refills: 0 | Status: SHIPPED | OUTPATIENT
Start: 2022-05-20 | End: 2022-05-23

## 2022-05-20 NOTE — PATIENT INSTRUCTIONS
Symptom directed visit    Upper back pain, neck pain, tingling in her hands suggestive of cervical radiculopathy, low back pain--I believe most of this pain is of muscular origin, with an additional component of cervical radiculopathy (because of poor neck posture resulting in compression),  Underlying problem is weak core muscles and poor posture, and mechanical loading from breast weight and abdominal pannus    She  came for consultation before possibly starting to work with a new chiropractor in [place].  I told her I think she really needs physical therapy and implementation of a strengthening program to work on the muscles of her upper back, posterior neck, and low back.  She carries a lot of anterior mass in breast weight and also abdominal pannus weight.  She admits her posture is poor.  She works at a desk job.    We will have her see physical therapy, and she will continue to work with Dr. Baig with regards to medical bariatric management.  She continues to take Wegovy, and Dr. Baig is has her on a high-protein low carbohydrate diet.     She told me that she is desperate for a decent night sleep because of this pain, is not able to tolerate NSAIDs, and acetaminophen does not work.    I told her we could try an experiment of just a few tablets of tramadol, to see if we can break the cycle and let her get a few good nights of rest.    I told her that we do not want to let the tramadol become a long-term thing if at all possible  Continue the gabapentin 300 mg at bedtime, and I told her that could be escalated to 600 mg, and she may even experiment by taking 2.    Class 3 severe obesity (BMI) of 50.0 to 59.9     Wt Readings from Last 5 Encounters:   05/20/22 102.5 kg (226 lb)   03/28/22 105.6 kg (232 lb 14.4 oz)   03/18/22 107 kg (236 lb)   08/21/21 125.9 kg (277 lb 9.6 oz)   07/27/21 124.7 kg (275 lb)     History of Heraclio-en-Y gastric bypass   Approximately age 27    Generalized anxiety disorder; Major  depressive disorder  On sertraline 100 mg, and also trazodone at bedtime for sleep    Mild intermittent asthma    Crohn's colitis (H)    Currently working with [facility], on infliximab (Inflectra)    On oral contraceptive pill  levonorgestrel-ethinyl estradiol (SEASONALE) 0.15-0.03 MG tablet     Will administer fourth shot of Pfizer COVID-19 vaccine today May 20, 2022

## 2022-05-20 NOTE — PROGRESS NOTES
Office Visit - Follow Up   Janelle Coelho   47 year old female    Date of Visit: 5/20/2022    Chief Complaint   Patient presents with     Back Pain     Low and Upper back pain - Left hand numbness     Imm/Inj     COVID-19 VACCINE        -------------------------------------------------------------------------------------------------------------------------  Assessment and Plan    Symptom directed visit    Upper back pain, neck pain, tingling in her hands suggestive of cervical radiculopathy, low back pain--I believe most of this pain is of muscular origin, with an additional component of cervical radiculopathy (because of poor neck posture resulting in compression),  Underlying problem is weak core muscles and poor posture, and mechanical loading from breast weight and abdominal pannus    She  came for consultation before possibly starting to work with a new chiropractor in [place].  I told her I think she really needs physical therapy and implementation of a strengthening program to work on the muscles of her upper back, posterior neck, and low back.  She carries a lot of anterior mass in breast weight and also abdominal pannus weight.  She admits her posture is poor.  She works at a desk job.    We will have her see physical therapy, and she will continue to work with Dr. Baig with regards to medical bariatric management.  She continues to take Wegovy, and Dr. Baig is has her on a high-protein low carbohydrate diet.     She told me that she is desperate for a decent night sleep because of this pain, is not able to tolerate NSAIDs, and acetaminophen does not work.    I told her we could try an experiment of just a few tablets of tramadol, to see if we can break the cycle and let her get a few good nights of rest.    I told her that we do not want to let the tramadol become a long-term thing if at all possible  Continue the gabapentin 300 mg at bedtime, and I told her that could be escalated to 600 mg, and  she may even experiment by taking 2.    Class 3 severe obesity (BMI) of 50.0 to 59.9     Wt Readings from Last 5 Encounters:   05/20/22 102.5 kg (226 lb)   03/28/22 105.6 kg (232 lb 14.4 oz)   03/18/22 107 kg (236 lb)   08/21/21 125.9 kg (277 lb 9.6 oz)   07/27/21 124.7 kg (275 lb)     History of Heraclio-en-Y gastric bypass   Approximately age 27    Generalized anxiety disorder; Major depressive disorder  On sertraline 100 mg, and also trazodone at bedtime for sleep    Mild intermittent asthma    Crohn's colitis (H)    Currently working with [facility], on infliximab (Inflectra)    On oral contraceptive pill  levonorgestrel-ethinyl estradiol (SEASONALE) 0.15-0.03 MG tablet     Will administer fourth shot of Pfizer COVID-19 vaccine today May 20, 2022      --------------------------------------------------------------------------------------------------------------------------  History of Present Illness  This 47 year old old    Symptom directed visit    Upper back pain, neck pain, tingling in her hands suggestive of cervical radiculopathy, low back pain--I believe most of this pain is of muscular origin, with an additional component of cervical radiculopathy (because of poor neck posture resulting in compression),  Underlying problem is weak core muscles and poor posture, and mechanical loading from breast weight and abdominal pannus    She  came for consultation before possibly starting to work with a new chiropractor in [place].  I told her I think she really needs physical therapy and implementation of a strengthening program to work on the muscles of her upper back, posterior neck, and low back.  She carries a lot of anterior mass in breast weight and also abdominal pannus weight.  She admits her posture is poor.  She works at a desk job.    We will have her see physical therapy, and she will continue to work with Dr. Baig with regards to medical bariatric management.  She continues to take  Wegovy, and Dr. Baig is has her on a high-protein low carbohydrate diet.     She told me that she is desperate for a decent night sleep because of this pain, is not able to tolerate NSAIDs, and acetaminophen does not work.    I told her we could try an experiment of just a few tablets of tramadol, to see if we can break the cycle and let her get a few good nights of rest.    I told her that we do not want to let the tramadol become a long-term thing if at all possible  Continue the gabapentin 300 mg at bedtime, and I told her that could be escalated to 600 mg, and she may even experiment by taking 2    Wt Readings from Last 3 Encounters:   05/20/22 102.5 kg (226 lb)   03/28/22 105.6 kg (232 lb 14.4 oz)   03/18/22 107 kg (236 lb)     BP Readings from Last 3 Encounters:   05/20/22 112/76   03/28/22 113/67   03/18/22 110/70     ---------------------------------------------------------------------------------------------------------------------------    Medications, Allergies, Social, and Problem List   Current Outpatient Medications   Medication Sig Dispense Refill     albuterol (PROAIR HFA/PROVENTIL HFA/VENTOLIN HFA) 108 (90 Base) MCG/ACT inhaler Inhale 2 puffs into the lungs every 6 hours as needed for shortness of breath / dyspnea or wheezing 6.7 g 0     cetirizine (ZYRTEC) 10 MG tablet Take 10 mg by mouth daily       cyanocobalamin (VITAMIN B12) 1000 MCG/ML injection Inject 1 mL into the muscle every 30 days       gabapentin (NEURONTIN) 300 MG capsule Take 1 capsule (300 mg) by mouth At Bedtime 90 capsule 0     inFLIXimab-dyyb (INFLECTRA) 100 MG injection Administer Inflectra 10mg/kg every eight weeks, infuse by IV route       levonorgestrel-ethinyl estradiol (SEASONALE) 0.15-0.03 MG tablet TAKE 1 TABLET BY MOUTH DAILY 91 tablet 0     sertraline (ZOLOFT) 100 MG tablet Take 1 tablet (100 mg) by mouth daily 90 tablet 1     traZODone (DESYREL) 50 MG tablet Take 25-50 mg by mouth nightly as needed       WEGOVY 2.4  MG/0.75ML SOAJ INJECT 2.4 MG UNDER THE SKIN ONCE WEEKLY 3 mL 5     WEGOVY 2.4 MG/0.75ML SOAJ INJECT 2.4 MG UNDER THE SKIN ONCE WEEKLY 3 mL 1     Allergies   Allergen Reactions     Dextran Anaphylaxis     Social History     Tobacco Use     Smoking status: Former Smoker     Packs/day: 0.20     Years: 10.00     Pack years: 2.00     Types: Cigarettes     Start date: 1/1/2001     Quit date: 11/17/2018     Years since quitting: 3.5     Smokeless tobacco: Never Used   Substance Use Topics     Alcohol use: Yes     Alcohol/week: 0.0 standard drinks     Comment: very very very rarely     Drug use: No     Frequency: 1.0 times per week     Patient Active Problem List   Diagnosis     Class 3 severe obesity due to excess calories with serious comorbidity and body mass index (BMI) of 50.0 to 59.9 in adult (H)     Generalized anxiety disorder     Major depressive disorder, recurrent episode, in partial remission (H)     Mild intermittent asthma, unspecified whether complicated     History of Heraclio-en-Y gastric bypass     Insomnia, unspecified type     Family history of breast cancer in mother     Vitamin D deficiency     Crohn's disease of large intestine with complication (H)     Crohn's disease of perianal region (H)        Reviewed, reconciled and updated       Physical Exam   General Appearance:       /76 (BP Location: Right arm, Patient Position: Sitting, Cuff Size: Adult Large)   Pulse 68   Temp 98.2  F (36.8  C) (Oral)   Wt 102.5 kg (226 lb)   SpO2 98%   BMI 42.70 kg/m      She appears tired and uncomfortable, but not in any acute distress  Poor posture, shoulder slouched forward  Underdeveloped posterior neck and upper back muscles, which are slightly tender when I push  Same for lower back muscles     Additional Information        RENEA LINDSEY MD, MD    Answers for HPI/ROS submitted by the patient on 5/19/2022  Your back pain is: recurring  Where is your back pain located? : right lower back, left lower back,  right shoulder, left shoulder, right buttock, left buttock  How would you describe your back pain? : burning, gnawing  Where does your back pain spread? : right buttocks, left buttocks, right shoulder, left shoulder, right side of neck, left side of neck  Since you noticed your back pain, how has it changed? : gradually worsening  Does your back pain interfere with your job?: Yes  How many servings of fruits and vegetables do you eat daily?: 2-3  On average, how many sweetened beverages do you drink each day (Examples: soda, juice, sweet tea, etc.  Do NOT count diet or artificially sweetened beverages)?: 1  How many minutes a day do you exercise enough to make your heart beat faster?: 9 or less  How many days a week do you exercise enough to make your heart beat faster?: 3 or less  How many days per week do you miss taking your medication?: 0

## 2022-06-06 ENCOUNTER — HOSPITAL ENCOUNTER (OUTPATIENT)
Dept: PHYSICAL THERAPY | Facility: REHABILITATION | Age: 47
Discharge: HOME | End: 2022-06-06
Attending: INTERNAL MEDICINE
Payer: COMMERCIAL

## 2022-06-06 DIAGNOSIS — M54.59 MECHANICAL LOW BACK PAIN: ICD-10-CM

## 2022-06-06 DIAGNOSIS — R29.3 POOR POSTURE: ICD-10-CM

## 2022-06-06 DIAGNOSIS — M54.12 CERVICAL RADICULOPATHY: ICD-10-CM

## 2022-06-06 PROCEDURE — 97110 THERAPEUTIC EXERCISES: CPT | Mod: GP | Performed by: PHYSICAL THERAPIST

## 2022-06-06 PROCEDURE — 97162 PT EVAL MOD COMPLEX 30 MIN: CPT | Mod: GP | Performed by: PHYSICAL THERAPIST

## 2022-06-06 PROCEDURE — 97140 MANUAL THERAPY 1/> REGIONS: CPT | Mod: GP | Performed by: PHYSICAL THERAPIST

## 2022-06-06 NOTE — PROGRESS NOTES
"Subjective: Has had back and neck/shoulder pain off and on for the last couple of months with no definitive cause of the pain. Back pain seems to originate from the right SI joint and the neck pain from the left scapular region.    Assessment: Janelle presents to therapy with c/o right SI and left shoulder blade and neck pain for the last couple of months. She demonstrates good ROM throughout her lumbar and cervical spine, with pain only moving towards the side with pain. She also demonstrated a level pelvis with no SI innominates or up/down slips, though they were painful with applied pressure and had a slightly boggy end feel. She reported feeling less pain in all areas following today's session, but SI pain increased slightly again when she stood up. Janelle will benefit from PT to promote a sustained decrease in her pain.    Therapeutic Exercise    Date 6/6/22   Exercise    LTR x10 B   Open book x10 B   Scap squeezes x15   Isometric hip abd, hooklying 5\" x10   Isometric hip add, hooklying 5\" x10    Shoulder horizontal adduction stretch 2 x30\" B                               "

## 2022-06-09 ENCOUNTER — TRANSFERRED RECORDS (OUTPATIENT)
Dept: HEALTH INFORMATION MANAGEMENT | Facility: CLINIC | Age: 47
End: 2022-06-09
Payer: COMMERCIAL

## 2022-07-05 DIAGNOSIS — N92.0 EXCESSIVE OR FREQUENT MENSTRUATION: ICD-10-CM

## 2022-07-07 RX ORDER — LEVONORGESTREL AND ETHINYL ESTRADIOL 0.15-0.03
1 KIT ORAL DAILY
Qty: 91 TABLET | Refills: 0 | Status: SHIPPED | OUTPATIENT
Start: 2022-07-07 | End: 2022-10-07

## 2022-07-07 NOTE — PROGRESS NOTES
[facility]    Outpatient Physical Therapy Discharge Note  Patient: Janelle Coelho  : 1975    Beginning/End Dates of Reporting Period:  22 to 22    Referring Provider: Jeancarlos Smith MD    Therapy Diagnosis: Decreased standing tolerance, decrease sitting tolerance     Client Self Report: See note    Outcome Measures (most recent score):  UZMA: 40%  NDI:     Goals:  Goal Identifier UZMA   Goal Description Patient will demonstrate a decrease in pain and increase in function as measured by scoring </= 15% on the UZMA.   Target Date 22   Date Met      Progress (detail required for progress note): 40%     Goal Identifier NDI   Goal Description Patient will demonstrate a decrease in pain and increase in function as measured by scoring </= 5/45 on the NDI.   Target Date 22   Date Met      Progress (detail required for progress note):      Goal Identifier HEP   Goal Description Patient will be independent in their HEP in order to facilitate return to prior level of function.   Target Date 22   Date Met      Progress (detail required for progress note):         Plan:  Discharge from therapy.    Discharge:    Reason for Discharge: Patient chooses to discontinue therapy.    Discharge Plan: Patient to continue home program.

## 2022-07-07 NOTE — ADDENDUM NOTE
Encounter addended by: Oli Treadwell, PT on: 7/7/2022 7:50 AM   Actions taken: Episode resolved, Clinical Note Signed

## 2022-07-12 ENCOUNTER — TRANSFERRED RECORDS (OUTPATIENT)
Dept: HEALTH INFORMATION MANAGEMENT | Facility: CLINIC | Age: 47
End: 2022-07-12

## 2022-08-01 ASSESSMENT — SLEEP AND FATIGUE QUESTIONNAIRES
HOW LIKELY ARE YOU TO NOD OFF OR FALL ASLEEP WHILE SITTING INACTIVE IN A PUBLIC PLACE: WOULD NEVER DOZE
HOW LIKELY ARE YOU TO NOD OFF OR FALL ASLEEP IN A CAR, WHILE STOPPED FOR A FEW MINUTES IN TRAFFIC: WOULD NEVER DOZE
HOW LIKELY ARE YOU TO NOD OFF OR FALL ASLEEP WHILE SITTING QUIETLY AFTER LUNCH WITHOUT ALCOHOL: SLIGHT CHANCE OF DOZING
HOW LIKELY ARE YOU TO NOD OFF OR FALL ASLEEP WHILE SITTING AND TALKING TO SOMEONE: WOULD NEVER DOZE
HOW LIKELY ARE YOU TO NOD OFF OR FALL ASLEEP WHILE LYING DOWN TO REST IN THE AFTERNOON WHEN CIRCUMSTANCES PERMIT: HIGH CHANCE OF DOZING
HOW LIKELY ARE YOU TO NOD OFF OR FALL ASLEEP WHILE WATCHING TV: HIGH CHANCE OF DOZING
HOW LIKELY ARE YOU TO NOD OFF OR FALL ASLEEP WHILE SITTING AND READING: HIGH CHANCE OF DOZING
HOW LIKELY ARE YOU TO NOD OFF OR FALL ASLEEP WHEN YOU ARE A PASSENGER IN A CAR FOR AN HOUR WITHOUT A BREAK: SLIGHT CHANCE OF DOZING

## 2022-08-02 ENCOUNTER — VIRTUAL VISIT (OUTPATIENT)
Dept: SLEEP MEDICINE | Facility: CLINIC | Age: 47
End: 2022-08-02
Attending: NURSE PRACTITIONER
Payer: COMMERCIAL

## 2022-08-02 VITALS — BODY MASS INDEX: 39.01 KG/M2 | WEIGHT: 206.6 LBS | HEIGHT: 61 IN

## 2022-08-02 DIAGNOSIS — Z86.2 HISTORY OF IRON DEFICIENCY ANEMIA: ICD-10-CM

## 2022-08-02 DIAGNOSIS — G47.9 SLEEP DISTURBANCE: Primary | ICD-10-CM

## 2022-08-02 DIAGNOSIS — R40.0 DAYTIME SOMNOLENCE: ICD-10-CM

## 2022-08-02 DIAGNOSIS — G25.81 RESTLESS LEG SYNDROME: ICD-10-CM

## 2022-08-02 DIAGNOSIS — G47.00 INSOMNIA, UNSPECIFIED TYPE: ICD-10-CM

## 2022-08-02 DIAGNOSIS — E66.9 OBESITY (BMI 30-39.9): ICD-10-CM

## 2022-08-02 PROCEDURE — 99204 OFFICE O/P NEW MOD 45 MIN: CPT | Mod: 95 | Performed by: NURSE PRACTITIONER

## 2022-08-02 RX ORDER — ZOLPIDEM TARTRATE 5 MG/1
TABLET ORAL
Qty: 1 TABLET | Refills: 0 | Status: SHIPPED | OUTPATIENT
Start: 2022-08-02 | End: 2022-09-02

## 2022-08-02 NOTE — PROGRESS NOTES
Janelle is a 47 year old who is being evaluated via a billable video visit.      How would you like to obtain your AVS? MyChart  If the video visit is dropped, the invitation should be resent by: Send to e-mail at: [email or web address]  Will anyone else be joining your video visit? Maria Isabel  Tanisha Tompkins        Video-Visit Details    Video Start Time: 8:34 AM    Type of service:  Video Visit    Video End Time:  9:04 AM    Originating Location (pt. Location): Other work    Distant Location (provider location):  [facility]     Platform used for Video Visit: [facility]       Outpatient Sleep Medicine Consultation:      Name: Janelle Coelho MRN# [id number]   Age: 47 year old YOB: 1975     Date of Consultation: August 2, 2022  Consultation is requested by: ISA Anglin CNP  [address] [place] [address] Ivett Sy  Primary care provider: Ivett Sy       Reason for Sleep Consult:     Janelle Coelho is sent by Ivett Sy for a sleep consultation regarding possible RLS.    Patient s Reason for visit  Janelle Coelho main reason for visit: I'm always tired and no other tests have pointed to a reason why.  Patient states problem(s) started: Many years  Janelle Coelho's goals for this visit: To see if there is a way to improve           Assessment and Plan:     Summary Sleep Diagnoses:  1. Sleep disturbance  2. Restless leg syndrome  3. Daytime somnolence  4. Insomnia, unspecified type  5. History of iron deficiency anemia  6. Obesity (BMI 30-39.9)  - Adult Sleep Eval & Management  Referral  - Comprehensive Sleep Study; Future  - zolpidem (AMBIEN) 5 MG tablet; Take tablet by mouth 15 minutes prior to sleep, for Sleep Study  Dispense: 1 tablet; Refill: 0      Comorbid Diagnoses:  1.  Crohn's disease  2.  Asthma  3.  Major depression  4.  Anxiety  5.  Insomnia  6.  RLS  7.  Obesity      Summary Recommendations:  1.   Recommend further evaluation with in lab polysomnography (PSG) for possible sleep disordered breathing and restless leg syndrome/PLMD.  Patient with history of iron deficiency anemia due in part to s/p Heraclio-en-Y gastric bypass for obesity, excessive and frequent menstruation, and Crohn's disease.  Her STOP-BANG score is 2 which suggest a low risk of SHERICE, however, the patient sleeps alone so this number may be underestimated.  2.  A Rx for zolpidem 5 mg p.o. nightly x1 for the night of the sleep study was E scribed to the patient's pharmacy today.  Patient was instructed to bring this tablet with on the night of the sleep study.  3.  Consider rechecking ferritin level at follow-up visit.  4.  Follow-up in approximately 2 weeks after the sleep study to review the results and determine next steps.    Orders Placed This Encounter   Procedures     Comprehensive Sleep Study         Summary Counseling:    Sleep Testing Reviewed  Obstructive Sleep Apnea Reviewed  Complications of Untreated Sleep Apnea Reviewed  Previous recent chart notes and lab results reviewed    Medical Decision-making:   Educational materials provided in instructions    Total time spent reviewing medical records, history and physical examination, review of previous testing and interpretation as well as documentation on this date: 45 minutes    CC: Ivett Sy          History of Present Illness:     Janelle Coelho is a 47-year-old female with a PMH pertinent for Crohn's disease, asthma, major depression, anxiety, insomnia, RLS, and obesity - s/p Heraclio-en-Y gastric bypass who presents today with symptoms of RLS, daytime somnolence, report of snoring, and difficulty falling/staying asleep for several years.  She was referred by her primary care provider for further evaluation of restless leg syndrome.    Past Sleep Evaluations: No    SLEEP-WAKE SCHEDULE:     Work/School Days: Patient goes to school/work: Yes, day hours   Usually gets into bed  at 8 pm-9pm  Takes patient about 30 min - a couple hours to fall asleep  Has trouble falling asleep several times a week nights per week  Wakes up in the middle of the night 4-5 or more (my fitbit shows even more) times.  Wakes up due to Use the bathroom, Uncertain  She has trouble falling back asleep sometimes but not often times a week.   It usually takes ? to get back to sleep  Patient is usually up at 6:30 am  Uses alarm: Yes    Weekends/Non-work Days/All Other Days:  Usually gets into bed at 8pn-9pm   Takes patient about same as during the week to fall asleep  Patient is usually up at wake up but lay in bed and listen to the news, dozing on and off  Uses alarm: No    Sleep Need  Patient gets  7 hours sleep on average   Patient thinks she needs about at least 8 hours, probably more sleep    Janelle Coelho prefers to sleep in this position(s): Stomach   Patient states they do the following activities in bed: Read, Watch TV, Use phone, computer, or tablet    Naps  Patient takes a purposeful nap 2 times a week and naps are usually an hour or so in duration  She feels better after a nap: No  She dozes off unintentionally a ffew times (weekends) days per week  Patient has had a driving accident or near-miss due to sleepiness/drowsiness: No      SLEEP DISRUPTIONS:    Breathing/Snoring  Patient snores: Unknown, told snores by mother  Other people complain about her snoring: No  Patient has been told she stops breathing in her sleep: No  She has issues with the following: Heartburn or reflux at night - woke up choking x 1, Getting up to urinate more than once    Movement:  Patient gets pain, discomfort, with an urge to move:  Yes  It happens when she is resting:  Yes  It happens more at night:  Yes  Patient has been told she kicks her legs at night:  Yes     Behaviors in Sleep:  Janelle Coelho has experienced the following behaviors while sleeping: Sleep-talking, Sleepwalking, Teeth grinding, denies hypnagogy/sleep  paralysis  She has experienced sudden muscle weakness during the day: No      Is there anything else you would like your sleep provider to know: No      CAFFEINE AND OTHER SUBSTANCES:    Patient consumes caffeinated beverages per day:  2  Last caffeine use is usually: Noon  List of any prescribed or over the counter stimulants that patient takes: none  List of any prescribed or over the counter sleep medication patient takes: Trazadone  List of previous sleep medications that patient has tried: gabapentin, just for a couple days  Patient drinks alcohol to help them sleep: No  Patient drinks alcohol near bedtime: No    Alcohol use: None  Nicotine/tobacco use: None  Recreational drug use: None      Family History:  Patient has a family member been diagnosed with a sleep disorder: No            SCALES:    EPWORTH SLEEPINESS SCALE      [place] Sleepiness Scale ( LOLITA Chavez  1990-1997[facility] [place]/English - Final version - 21 Nov 07 - [facility] [place].) 8/1/2022   Sitting and reading High chance of dozing   Watching TV High chance of dozing   Sitting, inactive in a public place (e.g. a theatre or a meeting) Would never doze   As a passenger in a car for an hour without a break Slight chance of dozing   Lying down to rest in the afternoon when circumstances permit High chance of dozing   Sitting and talking to someone Would never doze   Sitting quietly after a lunch without alcohol Slight chance of dozing   In a car, while stopped for a few minutes in traffic Would never doze   [place] Score (MC) 11   [place] Score (Sleep) 11         INSOMNIA SEVERITY INDEX (FLORENTIN)      Insomnia Severity Index (FLORENTIN) 8/1/2022   Difficulty falling asleep 3   Difficulty staying asleep 2   Problems waking up too early 2   How SATISFIED/DISSATISFIED are you with your CURRENT sleep pattern? 3   How NOTICEABLE to others do you think your sleep problem is in terms of impairing the quality of your life? 2   How WORRIED/DISTRESSED are you about  your current sleep problem? 1   To what extent do you consider your sleep problem to INTERFERE with your daily functioning (e.g. daytime fatigue, mood, ability to function at work/daily chores, concentration, memory, mood, etc.) CURRENTLY? 2   FLORENTIN Total Score 15       Guidelines for Scoring/Interpretation:  Total score categories:  0-7 = No clinically significant insomnia   8-14 = Subthreshold insomnia   15-21 = Clinical insomnia (moderate severity)  22-28 = Clinical insomnia (severe)  Used via courtesy of www.Chip Estimateth.va.gov with permission from Alfonso Crooks PhD., [facility]      STOP BANG     STOP BANG Questionnaire (  2008, the American Society of Anesthesiologists, Inc. [employer] 8/2/2022   1. Snoring - Do you snore loudly (louder than talking or loud enough to be heard through closed doors)? -   2. Tired - Do you often feel tired, fatigued, or sleepy during daytime? -   3. Observed - Has anyone observed you stop breathing during your sleep? -   4. Blood pressure - Do you have or are you being treated for high blood pressure? -   5. BMI - BMI more than 35 kg/m2? -   6. Age - Age over 50 yr old? -   7. Neck circumference - Neck circumference greater than 40 cm? -   8. Gender - Gender male? -   STOP BANG Score (MC): -   B/P Clinic: -   BMI Clinic: 39.04         GAD7    EVIE-7  4/29/2022   1. Feeling nervous, anxious, or on edge 0   2. Not being able to stop or control worrying 0   3. Worrying too much about different things 0   4. Trouble relaxing 0   5. Being so restless that it is hard to sit still 0   6. Becoming easily annoyed or irritable 1   7. Feeling afraid, as if something awful might happen 0   EVIE-7 Total Score 1   If you checked any problems, how difficult have they made it for you to do your work, take care of things at home, or get along with other people? -         CAGE-AID    No flowsheet data found.    CAGE-AID reprinted with permission from the [place]  "Medical Journal, CRISTOPHER Abraham. and GLORY Ramirez, \"Conjoint screening questionnaires for alcohol and drug abuse\" [place] [employer] Journal 94: 135-140, 1995.      PATIENT HEALTH QUESTIONNAIRE-9 (PHQ - 9)    PHQ-9 ([employer]) 4/29/2022   No Interest In Doing Things -   Feeling Depressed -   Trouble Sleeping -   Tired / No Energy -   No appetite or Over-Eating -   Feeling Bad about Self -   Trouble Concentrating -   Moving Slow or Restless -   Suicidal Thoughts -   Total Score -   1.  Little interest or pleasure in doing things 0   2.  Feeling down, depressed, or hopeless 0   3.  Trouble falling or staying asleep, or sleeping too much 0   4.  Feeling tired or having little energy 1   5.  Poor appetite or overeating 0   6.  Feeling bad about yourself 0   7.  Trouble concentrating 0   8.  Moving slowly or restless 0   9.  Suicidal or self-harm thoughts 0   PHQ-9 Total Score 1   Difficulty at work, home, or with people -   1.  Little interest or pleasure in doing things -   2.  Feeling down, depressed, or hopeless -   3.  Trouble falling or staying asleep, or sleeping too much -   4.  Feeling tired or having little energy -   5.  Poor appetite or overeating -   6.  Feeling bad about yourself -   7.  Trouble concentrating -   8.  Moving slowly or restless -   9.  Suicidal or self-harm thoughts -   PHQ-9 via [facility] TOTAL SCORE-----> -   Difficulty at work, home, or with people -       Developed by Sukhdev Marcano, Steph Costa, Francisco Oswald and colleagues, with an educational rin from [employer]. No permission required to reproduce, translate, display or distribute.        Allergies:    Allergies   Allergen Reactions     Dextran Anaphylaxis       Medications:    Current Outpatient Medications   Medication Sig Dispense Refill     albuterol (PROAIR HFA/PROVENTIL HFA/VENTOLIN HFA) 108 (90 Base) MCG/ACT inhaler Inhale 2 puffs into the lungs every 6 hours as needed for shortness of breath / dyspnea or wheezing 6.7 g 0 "     cetirizine (ZYRTEC) 10 MG tablet Take 10 mg by mouth daily       cyanocobalamin (VITAMIN B12) 1000 MCG/ML injection Inject 1 mL into the muscle every 30 days       inFLIXimab-dyyb (INFLECTRA) 100 MG injection Administer Inflectra 10mg/kg every eight weeks, infuse by IV route       levonorgestrel-ethinyl estradiol (SEASONALE) 0.15-0.03 MG tablet Take 1 tablet by mouth daily 91 tablet 0     sertraline (ZOLOFT) 100 MG tablet Take 1 tablet (100 mg) by mouth daily 90 tablet 1     traZODone (DESYREL) 50 MG tablet Take 25-50 mg by mouth nightly as needed       WEGOVY 2.4 MG/0.75ML SOAJ INJECT 2.4 MG UNDER THE SKIN ONCE WEEKLY 3 mL 5     zolpidem (AMBIEN) 5 MG tablet Take tablet by mouth 15 minutes prior to sleep, for Sleep Study 1 tablet 0     gabapentin (NEURONTIN) 300 MG capsule Take 1 capsule (300 mg) by mouth At Bedtime (Patient not taking: Reported on 8/2/2022) 90 capsule 0       Problem List:  Patient Active Problem List    Diagnosis Date Noted     Mechanical low back pain 06/06/2022     Priority: Medium     Poor posture 06/06/2022     Priority: Medium     Cervical radiculopathy 06/06/2022     Priority: Medium     Crohn's disease of large intestine with complication (H) 06/01/2018     Priority: Medium     Crohn's disease of perianal region (H) 02/09/2018     Priority: Medium     Vitamin D deficiency 04/13/2017     Priority: Medium     Mild intermittent asthma, unspecified whether complicated 10/05/2016     Priority: Medium     History of Heraclio-en-Y gastric bypass 10/05/2016     Priority: Medium     Insomnia, unspecified type 10/05/2016     Priority: Medium     Family history of breast cancer in mother 10/05/2016     Priority: Medium     Major depressive disorder, recurrent episode, in partial remission (H) 10/13/2015     Priority: Medium     Generalized anxiety disorder 08/21/2014     Priority: Medium     Class 3 severe obesity due to excess calories with serious comorbidity and body mass index (BMI) of 50.0 to  59.9 in adult (H)      Priority: Medium        Past Medical/Surgical History:  Past Medical History:   Diagnosis Date     Acute Crohn's disease (H)      Acute posthemorrhagic anemia      ASCUS favor benign 2012    hpv neg. needs cotest in 3 years     ASCUS with positive high risk HPV cervical      Depressive disorder     since teens     Depressive disorder, not elsewhere classified 2/10/2014     Excessive and frequent menstruation      Family history of breast cancer in mother      Generalized anxiety disorder      History of blood transfusion     few years ago, should be in my records, it was at a [facility]     History of transfusion     many years     Infected cyst of Bartholin's gland duct      Insomnia      Intermittent asthma, uncontrolled      LSIL (low grade squamous intraepithelial lesion) on Pap smear 2008    per chart notes colp dysplasia?     Major depressive disorder in partial remission (H)      Obesity      Obesity      Tobacco abuse      Uncomplicated asthma     since baby     Past Surgical History:   Procedure Laterality Date     BIOPSY      Breast cycst (done at [facility]) many years ago     CHOLECYSTECTOMY  late 90's    during gastric bypass     CHOLECYSTECTOMY  10/05/2016     COLONOSCOPY  Jan/Feb 2018, Feb 2019    Confirmed Crohn's Disease     COLONOSCOPY N/A 3/28/2022    Procedure: COLONOSCOPY with biopsies and polypectomy;  Surgeon: Chad Brown MD;  Location: [facility]     EXCIS BARTHOLIN GLAND/CYST  2016     GASTRIC BYPASS  2000    with cholecystectomy and subsequent revision gastric bypass     GASTRIC BYPASS  2006    revision of gastric bypass     GASTRIC BYPASS  10/05/2016     GASTRIC BYPASS       HERNIA REPAIR  12/9/16    SYMONE Zamora     INCISION AND DRAINAGE OF WOUND N/A 2/9/2018    Procedure: EXAM UNDER ANESTHESIA WITH I&D OF INTERSPHINCTERIC ABSCESS  SETON PLACEMENT ;  Surgeon: Chad Preeira MD;  Location: [facility];  Service:       MARSUPIALIZATION BARTHOLIN CYST  09/23/2016     wisdom teeth extraction  1995     WISDOM TOOTH EXTRACTION       ZZHC COLONOSCOPY W/WO BRUSH/WASH N/A 2/23/2021    Procedure: COLONOSCOPY with biopsies.;  Surgeon: Chad Plata MD;  Location: [facility] Main OR;  Service: Gastroenterology       Social History:  Social History     Socioeconomic History     Marital status: Single     Spouse name: Not on file     Number of children: Not on file     Years of education: Not on file     Highest education level: Not on file   Occupational History     Employer: [employer]     Comment:  counter [employer]   Tobacco Use     Smoking status: Former Smoker     Packs/day: 0.20     Years: 10.00     Pack years: 2.00     Types: Cigarettes     Start date: 1/1/2001     Quit date: 11/17/2018     Years since quitting: 3.7     Smokeless tobacco: Never Used   Substance and Sexual Activity     Alcohol use: Yes     Alcohol/week: 0.0 standard drinks     Comment: very very very rarely     Drug use: No     Frequency: 1.0 times per week     Sexual activity: Not Currently     Partners: Male     Birth control/protection: Pill     Comment: only on pill to control menstral bleeding.   Other Topics Concern     Parent/sibling w/ CABG, MI or angioplasty before 65F 55M? No      Service Not Asked     Blood Transfusions Not Asked     Caffeine Concern Not Asked     Occupational Exposure Not Asked     Hobby Hazards Not Asked     Sleep Concern Not Asked     Stress Concern Not Asked     Weight Concern Not Asked     Special Diet Not Asked     Back Care Not Asked     Exercise Not Asked     Bike Helmet Yes     Seat Belt Yes     Self-Exams Not Asked   Social History Narrative     Not on file     Social Determinants of Health     Financial Resource Strain: Not on file   Food Insecurity: Not on file   Transportation Needs: Not on file   Physical Activity: Not on file   Stress: Not on file   Social Connections: Not on file   Intimate Partner  "Violence: Not on file   Housing Stability: Not on file       Family History:  Family History   Problem Relation Age of Onset     Breast Cancer Mother      Diabetes Mother      Depression Mother      Obesity Mother      Prostate Cancer Father      Breast Cancer Maternal Grandmother      Colon Cancer Maternal Grandmother      Prostate Cancer Maternal Grandfather      Breast Cancer Paternal Grandmother      Diabetes Brother      Obesity Brother      Family History Negative No family hx of      Thyroid Cancer No family hx of        Review of Systems:  A complete review of systems reviewed by me is negative with the exeption of what has been mentioned in the history of present illness.  In the last TWO WEEKS have you experienced any of the following symptoms?  Fevers: No  Night Sweats: Yes  Weight Gain: No  Pain at Night: No  Double Vision: No  Changes in Vision: No  Difficulty Breathing through Nose: No  Sore Throat in Morning: No  Dry Mouth in the Morning: No  Shortness of Breath Lying Flat: No  Shortness of Breath With Activity: Yes  Awakening with Shortness of Breath: No  Increased Cough: No  Heart Racing at Night: No  Swelling in Feet or Legs: No  Diarrhea at Night: Yes  Heartburn at Night: Yes  Urinating More than Once at Night: Yes  Losing Control of Urine at Night: No  Joint Pains at Night: No  Headaches in Morning: Yes  Weakness in Arms or Legs: No  Depressed Mood: Yes  Anxiety: Yes     Physical Examination:  Vitals: Ht 1.549 m (5' 1\")   Wt 93.7 kg (206 lb 9.6 oz)   BMI 39.04 kg/m    BMI= Body mass index is 39.04 kg/m .           GENERAL APPEARANCE: healthy, alert, no distress and cooperative  EYES: Eyes grossly normal to inspection  RESP: Unlabored, easy breathing with normal conversational speech  CV: color normal  NEURO: Alert and oriented x3, mentation intact and speech normal  PSYCH: mentation appears normal and affect normal/bright  Mallampati Class: Not examined  Tonsillar Stage: Not examined         " Data: All pertinent previous laboratory data reviewed     Recent Labs   Lab Test 03/18/22  1302 07/30/21  0843    142   POTASSIUM 4.3 4.2   CHLORIDE 108* 107   CO2 22 24   ANIONGAP 7 11   GLC 83 80   BUN 12 9   CR 0.77 0.95   SAVANA 8.9 8.7       Recent Labs   Lab Test 03/18/22  1302 07/30/21  0843   WBC  --  11.7*   RBC  --  4.48   HGB 12.3 12.8   HCT  --  39.2   MCV  --  88   MCH  --  28.6   MCHC  --  32.7   RDW  --  12.8   PLT  --  339       Recent Labs   Lab Test 07/30/21  0843   PROTTOTAL 6.0   ALBUMIN 3.2*   BILITOTAL 0.4   ALKPHOS 69   AST 12   ALT 15       TSH (mU/L)   Date Value   06/09/2020 1.82   10/22/2018 1.50       No results found for: UAMP, UBARB, BENZODIAZEUR, UCANN, UCOC, OPIT, UPCP    Iron Saturation Index   Date/Time Value Ref Range Status   06/09/2020 12:07 PM 17 15 - 46 % Final     Ferritin   Date/Time Value Ref Range Status   07/30/2021 09:40  (H) 10 - 130 ng/mL Final   10/10/2017 03:19 PM 17 12 - 150 ng/mL Final       No results found for: PH, PHARTERIAL, PO2, [id number], SAT, PCO2, HCO3, BASEEXCESS, VICTOR HUGO, BEB    @LABRCNTIPR(phv:4,pco2v:4,po2v:4,hco3v:4,kaia:4,o2per:4)@    Echocardiology: No results found for this or any previous visit (from the past 4320 hour(s)).    Chest x-ray: No results found for this or any previous visit from the past 365 days.      Chest CT: No results found for this or any previous visit from the past 365 days.      PFT: Most Recent [facility] Pulmonary Function Testing    No results found for: 20001  No results found for: 20002  No results found for: 20003  No results found for: 20015  No results found for: 20016  No results found for: 20027  No results found for: 20028  No results found for: 20029  No results found for: 20079  No results found for: 20080  No results found for: 20081  No results found for: 20335  No results found for: 20105  No results found for: 20053  No results found for: 20054  No results found for: 20055      ISA Casey CNP  8/2/2022   Sleep Medicine      This note was written with the assistance of the Dragon voice-dictation technology software. The final document, although reviewed, may contain errors. For corrections, please contact the office.

## 2022-08-02 NOTE — PATIENT INSTRUCTIONS
"      MY TREATMENT INFORMATION FOR SLEEP APNEA-  Janelle Coelho    DOCTOR : ISA Casey CNP    Am I having a sleep study at a sleep center?  --->Due to normal delays, you will be contacted within 2-4 weeks to schedule    Am I having a home sleep study?  --->Watch the video for the device you are using:    -/drop off device-   https://www.[employer] Store[employer].com/watch?v=yGGFBdELGhk    -Disposable device sent out require phone/computer application-   https://www.View[employer].com/watch?v=BCce_vbiwxE      Frequently asked questions:  1. What is Obstructive Sleep Apnea (SHERICE)? SHERICE is the most common type of sleep apnea. Apnea means, \"without breath.\"  Apnea is most often caused by narrowing or collapse of the upper airway as muscles relax during sleep.   Almost everyone has occasional apneas. Most people with sleep apnea have had brief interruptions at night frequently for many years.  The severity of sleep apnea is related to how frequent and severe the events are.   2. What are the consequences of SHERICE? Symptoms include: feeling sleepy during the day, snoring loudly, gasping or stopping of breathing, trouble sleeping, and occasionally morning headaches or heartburn at night.  Sleepiness can be serious and even increase the risk of falling asleep while driving. Other health consequences may include development of high blood pressure and other cardiovascular disease in persons who are susceptible. Untreated SHERICE  can contribute to heart disease, stroke and diabetes.   3. What are the treatment options? In most situations, sleep apnea is a lifelong disease that must be managed with daily therapy. Medications are not effective for sleep apnea and surgery is generally not considered until other therapies have been tried. Your treatment is your choice . Continuous Positive Airway (CPAP) works right away and is the therapy that is effective in nearly everyone. An oral device to hold your jaw forward is usually the next most " reliable option. Other options include postioning devices (to keep you off your back), weight loss, and surgery including a tongue pacing device. There is more detail about some of these options below.  4. Are my sleep studies covered by insurance? Although we will request verification of coverage, we advise you also check in advance of the study to ensure there is coverage.    Important tips for those choosing CPAP and similar devices   Know your equipment:  CPAP is continuous positive airway pressure that prevents obstructive sleep apnea by keeping the throat from collapsing while you are sleeping. In most cases, the device is  smart  and can slowly self-adjusts if your throat collapses and keeps a record every day of how well you are treated-this information is available to you and your care team.  BPAP is bilevel positive airway pressure that keeps your throat open and also assists each breath with a pressure boost to maintain adequate breathing.  Special kinds of BPAP are used in patients who have inadequate breathing from lung or heart disease. In most cases, the device is  smart  and can slowly self-adjusts to assist breathing. Like CPAP, the device keeps a record of how well you are treated.  Your mask is your connection to the device. You get to choose what feels most comfortable and the staff will help to make sure if fits. Here: are some examples of the different masks that are available:       Key points to remember on your journey with sleep apnea:  Sleep study.  PAP devices often need to be adjusted during a sleep study to show that they are effective and adjusted right.  Good tips to remember: Try wearing just the mask during a quiet time during the day so your body adapts to wearing it. A humidifier is recommended for comfort in most cases to prevent drying of your nose and throat. Allergy medication from your provider may help you if you are having nasal congestion.  Getting settled-in. It takes  more than one night for most of us to get used to wearing a mask. Try wearing just the mask during a quiet time during the day so your body adapts to wearing it. A humidifier is recommended for comfort in most cases. Our team will work with you carefully on the first day and will be in contact within 4 days and again at 2 and 4 weeks for advice and remote device adjustments. Your therapy is evaluated by the device each day.   Use it every night. The more you are able to sleep naturally for 7-8 hours, the more likely you will have good sleep and to prevent health risks or symptoms from sleep apnea. Even if you use it 4 hours it helps. Occasionally all of us are unable to use a medical therapy, in sleep apnea, it is not dangerous to miss one night.   Communicate. Call our skilled team on the number provided on the first day if your visit for problems that make it difficult to wear the device. Over 2 out of 3 patients can learn to wear the device long-term with help from our team. Remember to call our team or your sleep providers if you are unable to wear the device as we may have other solutions for those who cannot adapt to mask CPAP therapy. It is recommended that you sleep your sleep provider within the first 3 months and yearly after that if you are not having problems.   Use it for your health. We encourage use of CPAP masks during daytime quiet periods to allow your face and brain to adapt to the sensation of CPAP so that it will be a more natural sensation to awaken to at night or during naps. This can be very useful during the first few weeks or months of adapting to CPAP though it does not help medically to wear CPAP during wakefulness and  should not be used as a strategy just to meet guidelines.  Take care of your equipment. Make sure you clean your mask and tubing using directions every day and that your filter and mask are replaced as recommended or if they are not working.     BESIDES CPAP, WHAT OTHER  THERAPIES ARE THERE?    Positioning Device  Positioning devices are generally used when sleep apnea is mild and only occurs on your back.This example shows a pillow that straps around the waist. It may be appropriate for those whose sleep study shows milder sleep apnea that occurs primarily when lying flat on one's back. Preliminary studies have shown benefit but effectiveness at home may need to be verified by a home sleep test. These devices are generally not covered by medical insurance.  Examples of devices that maintain sleeping on the back to prevent snoring and mild sleep apnea.    Belt type body positioner  http://[employer].Motribe/    Electronic reminder  http://nightshifttherapy.com/  http://www.[employer].Motribe.au/      Oral Appliance  What is oral appliance therapy?  An oral appliance device fits on your teeth at night like a retainer used after having braces. The device is made by a specialized dentist and requires several visits over 1-2 months before a manufactured device is made to fit your teeth and is adjusted to prevent your sleep apnea. Once an oral device is working properly, snoring should be improved. A home sleep test may be recommended at that time if to determine whether the sleep apnea is adequately treated.       Some things to remember:  -Oral devices are often, but not always, covered by your medical insurance. Be sure to check with your insurance provider.   -If you are referred for oral therapy, you will be given a list of specialized dentists to consider or you may choose to visit the Web site of the American Academy of Dental Sleep Medicine  -Oral devices are less likely to work if you have severe sleep apnea or are extremely overweight.     More detailed information  An oral appliance is a small acrylic device that fits over the upper and lower teeth  (similar to a retainer or a mouth guard). This device slightly moves jaw forward, which moves the base of the tongue forward, opens the  airway, improves breathing for effective treat snoring and obstructive sleep apnea in perhaps 7 out of 10 people .  The best working devices are custom-made by a dental device  after a mold is made of the teeth 1, 2, 3.  When is an oral appliance indicated?  Oral appliance therapy is recommended as a first-line treatment for patients with primary snoring, mild sleep apnea, and for patients with moderate sleep apnea who prefer appliance therapy to use of CPAP4, 5. Severity of sleep apnea is determined by sleep testing and is based on the number of respiratory events per hour of sleep.   How successful is oral appliance therapy?  The success rate of oral appliance therapy in patients with mild sleep apnea is 75-80% while in patients with moderate sleep apnea it is 50-70%. The chance of success in patients with severe sleep apnea is 40-50%. The research also shows that oral appliances have a beneficial effect on the cardiovascular health of SHERICE patients at the same magnitude as CPAP therapy7.  Oral appliances should be a second-line treatment in cases of severe sleep apnea, but if not completely successful then a combination therapy utilizing CPAP plus oral appliance therapy may be effective. Oral appliances tend to be effective in a broad range of patients although studies show that the patients who have the highest success are females, younger patients, those with milder disease, and less severe obesity. 3, 6.   Finding a dentist that practices dental sleep medicine  Specific training is available through the American Academy of Dental Sleep Medicine for dentists interested in working in the field of sleep. To find a dentist who is educated in the field of sleep and the use of oral appliances, near you, visit the Web site of the American Academy of Dental Sleep Medicine.    References  1. tammy Frye al. Objectively measured vs self-reported compliance during oral appliance therapy for sleep-disordered  breathing. Chest 2013; 144(5): 1648-9820.  2. Forrest, et al. Objective measurement of compliance during oral appliance therapy for sleep-disordered breathing. Thorax 2013; 68(1): 91-96.  3. Nurys et al. Mandibular advancement devices in 620 men and women with SHERICE and snoring: tolerability and predictors of treatment success. Chest 2004; 125: 4479-6194.  4. Liang et al. Oral appliances for snoring and SHERICE: a review. Sleep 2006; 29: 244-262.  5. Luciano et al. Oral appliance treatment for SHERICE: an update. J Clin Sleep Med 2014; 10(2): 215-227.  6. Kip et al. Predictors of OSAH treatment outcome. J Dent Res 2007; 86: 7158-5184.      Weight Loss:    Weight loss is a long-term strategy that may improve sleep apnea in some patients.    Weight management is a personal decision and the decision should be based on your interest and the potential benefits.  If you are interested in exploring weight loss strategies, the following discussion covers the impact on weight loss on sleep apnea and the approaches that may be successful.    Being overweight does not necessarily mean you will have health consequences.  Those who have BMI over 35 or over 27 with existing medical conditions carries greater risk.   Weight loss decreases severity of sleep apnea in most people with obesity. For those with mild obesity who have developed snoring with weight gain, even 15-30 pound weight loss can improve and occasionally eliminate sleep apnea.  Structured and life-long dietary and health habits are necessary to lose weight and keep healthier weight levels.     Though there may be significant health benefits from weight loss, long-term weight loss is very difficult to achieve- studies show success with dietary management in less than 10% of people. In addition, substantial weight loss may require years of dietary control and may be difficult if patients have severe obesity. In these cases, surgical management may be  considered.  Finally, older individuals who have tolerated obesity without health complications may be less likely to benefit from weight loss strategies.      Your BMI is Body mass index is 39.04 kg/m .    What is BMI?  Body mass index (BMI) is one way to tell whether you are at a healthy weight, overweight, or obese. It measures your weight in relation to your height.  A BMI of 18.5 to 24.9 is in the healthy range. A person with a BMI of 25 to 29.9 is considered overweight, and someone with a BMI of 30 or greater is considered obese.  Another way to find out if you are at risk for health problems caused by overweight and obesity is to measure your waist. If you are a woman and your waist is more than 35 inches, or if you are a man and your waist is more than 40 inches, your risk of disease may be higher.  More than two-thirds of American adults are considered overweight or obese. Being overweight or obese increases the risk for further weight gain.  Excess weight may lead to heart disease and diabetes. Creating and following plans for healthy eating and physical activity may help you improve your health.    Methods for maintaining or losing weight.  Weight control is part of healthy lifestyle and includes exercise, emotional health, and healthy eating habits.  Careful eating habits lifelong is the mainstay of weight control.  Though there are significant health benefits from weight loss, long-term weight loss with diet alone may be very difficult to achieve- studies show long-term success with dietary management in less than 10% of people. Attaining a healthy weight may be especially difficult to achieve in those with severe obesity. In some cases, medications, devices and surgical management might be considered.    What can you do?  If you are overweight or obese and are interested in methods for weight loss, you should discuss this with your provider. In addition, we recommend that you review healthy life styles  and methods for weight loss available through the National Institutes of Health patient information sites:   http://win.niddk.nih.gov/publications/index.htm    Surgery:    Surgery for obstructive sleep apnea is considered generally only when other therapies fail to work. Surgery may be discussed with you if you are having a difficult time tolerating CPAP and or when there is an abnormal structure that requires surgical correction.  Nose and throat surgeries often enlarge the airway to prevent collapse.  Most of these surgeries create pain for 1-2 weeks and up to half of the most common surgeries are not effective throughout life.  You should carefully discuss the benefits and drawbacks to surgery with your sleep provider and surgeon to determine if it is the best solution for you.   More information  Surgery for SHERICE is directed at areas that are responsible for narrowing or complete obstruction of the airway during sleep.  There are a wide range of procedures available to enlarge and/or stabilize the airway to prevent blockage of breathing in the three major areas where it can occur: the palate, tongue, and nasal regions.  Successful surgical treatment depends on the accurate identification of the factors responsible for obstructive sleep apnea in each person.  A personalized approach is required because there is no single treatment that works well for everyone.  Because of anatomic variation, consultation with an examination by a sleep surgeon is a critical first step in determining what surgical options are best for each patient.  In some cases, examination during sedation may be recommended in order to guide the selection of procedures.  Patients will be counseled about risks and benefits as well as the typical recovery course after surgery. Surgery is typically not a cure for a person s SHERICE.  However, surgery will often significantly improve one s SHERICE severity (termed  success rate ).  Even in the absence of a  cure, surgery will decrease the cardiovascular risk associated with OSA7; improve overall quality of life8 (sleepiness, functionality, sleep quality, etc).      Palate Procedures:  Patients with SHERICE often have narrowing of their airway in the region of their tonsils and uvula.  The goals of palate procedures are to widen the airway in this region as well as to help the tissues resist collapse.  Modern palate procedure techniques focus on tissue conservation and soft tissue rearrangement, rather than tissue removal.  Often the uvula is preserved in this procedure. Residual sleep apnea is common in patient after pharyngoplasty with an average reduction in sleep apnea events of 33%2.      Tongue Procedures:  ExamWhile patients are awake, the muscles that surround the throat are active and keep this region open for breathing. These muscles relax during sleep, allowing the tongue and other structures to collapse and block breathing.  There are several different tongue procedures available.  Selection of a tongue base procedure depends on characteristics seen on physical exam.  Generally, procedures are aimed at removing bulky tissues in this area or preventing the back of the tongue from falling back during sleep.  Success rates for tongue surgery range from 50-62%3.    Hypoglossal Nerve Stimulation:  Hypoglossal nerve stimulation has recently received approval from the [place] Food and Drug Administration for the treatment of obstructive sleep apnea.  This is based on research showing that the system was safe and effective in treating sleep apnea6.  Results showed that the median AHI score decreased 68%, from 29.3 to 9.0. This therapy uses an implant system that senses breathing patterns and delivers mild stimulation to airway muscles, which keeps the airway open during sleep.  The system consists of three fully implanted components: a small generator (similar in size to a pacemaker), a breathing sensor, and a  stimulation lead.  Using a small handheld remote, a patient turns the therapy on before bed and off upon awakening.    Candidates for this device must be greater than 22 years of age, have moderate to severe SHERICE (AHI between 20-65), BMI less than 32, have tried CPAP/oral appliance without success, and have appropriate upper airway anatomy (determined by a sleep endoscopy performed by Dr. Herndon).    Hypoglossal Nerve Stimulation Pathway:    The sleep surgeon s office will work with the patient through the insurance prior-authorization process (including communications and appeals).    Nasal Procedures:  Nasal obstruction can interfere with nasal breathing during the day and night.  Studies have shown that relief of nasal obstruction can improve the ability of some patients to tolerate positive airway pressure therapy for obstructive sleep apnea1.  Treatment options include medications such as nasal saline, topical corticosteroid and antihistamine sprays, and oral medications such as antihistamines or decongestants. Non-surgical treatments can include external nasal dilators for selected patients. If these are not successful by themselves, surgery can improve the nasal airway either alone or in combination with these other options.      Combination Procedures:  Combination of surgical procedures and other treatments may be recommended, particularly if patients have more than one area of narrowing or persistent positional disease.  The success rate of combination surgery ranges from 66-80%2,3.    References  Nayana MILLER. The Role of the Nose in Snoring and Obstructive Sleep Apnoea: An Update.  Eur Arch Otorhinolaryngol. 2011; 268: 1365-73.   Jaron SM; Donald JA; Florian JR; Pallanch JF; Kaylynn MB; Mindy SG; Hoang MEDELLIN. Surgical modifications of the upper airway for obstructive sleep apnea in adults: a systematic review and meta-analysis. SLEEP 2010;33(10):0958-7562. Loc HERMAN. Hypopharyngeal surgery in obstructive  sleep apnea: an evidence-based medicine review.  Arch Otolaryngol Head Neck Surg. 2006 Feb;132(2):206-13.  Lawrence YH1, Caro Y, Luis GIL. The efficacy of anatomically based multilevel surgery for obstructive sleep apnea. Otolaryngol Head Neck Surg. 2003 Oct;129(4):327-35.  Loc HERMAN, Goldberg A. Hypopharyngeal Surgery in Obstructive Sleep Apnea: An Evidence-Based Medicine Review. Arch Otolaryngol Head Neck Surg. 2006 Feb;132(2):206-13.  Nikhil SILVESTRE et al. Upper-Airway Stimulation for Obstructive Sleep Apnea.  N Engl J Med. 2014 Jan 9;370(2):139-49.  Angie Y et al. Increased Incidence of Cardiovascular Disease in Middle-aged Men with Obstructive Sleep Apnea. Am J Respir Crit Care Med; 2002 166: 159-165  Mendietajoss SIMMONS et al. Studying Life Effects and Effectiveness of Palatopharyngoplasty (SLEEP) study: Subjective Outcomes of Isolated Uvulopalatopharyngoplasty. Otolaryngol Head Neck Surg. 2011; 144: 623-631.        WHAT IF I ONLY HAVE SNORING?    Mandibular advancement devices, lateral sleep positioning, long-term weight loss and treatment of nasal allergies have been shown to improve snoring.  Exercising tongue muscles with a game (https://www.ncbi.nlm.nih.gov/pubmed/76641755) or stimulating the tongue during the day with a device (https://doi.org/10.3390/sgm67301171) have improved snoring in some individuals.    Remember to Drive Safe... Drive Alive     Sleep health profoundly affects your health, mood, and your safety.  Thirty three percent of the population (one in three of us) is not getting enough sleep and many have a sleep disorder. Not getting enough sleep or having an untreated / undertreated sleep condition may make us sleepy without even knowing it. In fact, our driving could be dramatically impaired due to our sleep health. As your provider, here are some things I would like you to know about driving:     Here are some warning signs for impairment and dangerous drowsy driving:              -Having been awake  more than 16 hours               -Looking tired               -Eyelid drooping              -Head nodding (it could be too late at this point)              -Driving for more than 30 minutes     Some things you could do to make the driving safer if you are experiencing some drowsiness:              -Stop driving and rest              -Call for transportation              -Make sure your sleep disorder is adequately treated     Some things that have been shown NOT to work when experiencing drowsiness while driving:              -Turning on the radio              -Opening windows              -Eating any  distracting  /  entertaining  foods (e.g., sunflower seeds, candy, or any other)              -Talking on the phone      Your decision may not only impact your life, but also the life of others. Please, remember to drive safe for yourself and all of us.

## 2022-08-10 ENCOUNTER — ANCILLARY PROCEDURE (OUTPATIENT)
Dept: MAMMOGRAPHY | Facility: CLINIC | Age: 47
End: 2022-08-10
Attending: NURSE PRACTITIONER
Payer: COMMERCIAL

## 2022-08-10 DIAGNOSIS — Z12.31 VISIT FOR SCREENING MAMMOGRAM: ICD-10-CM

## 2022-08-10 PROCEDURE — 77067 SCR MAMMO BI INCL CAD: CPT | Mod: TC | Performed by: RADIOLOGY

## 2022-08-10 PROCEDURE — 77063 BREAST TOMOSYNTHESIS BI: CPT | Mod: TC | Performed by: RADIOLOGY

## 2022-08-16 DIAGNOSIS — G47.00 INSOMNIA, UNSPECIFIED TYPE: Primary | ICD-10-CM

## 2022-08-16 RX ORDER — TRAZODONE HYDROCHLORIDE 50 MG/1
25-50 TABLET, FILM COATED ORAL
Status: CANCELLED | OUTPATIENT
Start: 2022-08-16

## 2022-08-19 ENCOUNTER — VIRTUAL VISIT (OUTPATIENT)
Dept: SURGERY | Facility: CLINIC | Age: 47
End: 2022-08-19
Payer: COMMERCIAL

## 2022-08-19 DIAGNOSIS — E66.09 CLASS 2 OBESITY DUE TO EXCESS CALORIES WITHOUT SERIOUS COMORBIDITY WITH BODY MASS INDEX (BMI) OF 39.0 TO 39.9 IN ADULT: Primary | ICD-10-CM

## 2022-08-19 DIAGNOSIS — E66.812 CLASS 2 OBESITY DUE TO EXCESS CALORIES WITHOUT SERIOUS COMORBIDITY WITH BODY MASS INDEX (BMI) OF 39.0 TO 39.9 IN ADULT: Primary | ICD-10-CM

## 2022-08-19 DIAGNOSIS — Z71.3 NUTRITIONAL COUNSELING: ICD-10-CM

## 2022-08-19 DIAGNOSIS — Z98.84 HISTORY OF ROUX-EN-Y GASTRIC BYPASS: ICD-10-CM

## 2022-08-19 PROCEDURE — 97803 MED NUTRITION INDIV SUBSEQ: CPT | Mod: GT | Performed by: DIETITIAN, REGISTERED

## 2022-08-19 NOTE — PROGRESS NOTES
Janelle Coelho is a 47 year old who is being evaluated via a billable video visit.      How would you like to obtain your AVS? MyChart  If the video visit is dropped, the invitation should be resent by: Send to e-mail at: [email or web address]  Will anyone else be joining your video visit? No      Video Start Time: 10:14 AM      Medical  Weight Loss Follow-Up Diet Evaluation  Assessment:  Janelle is presenting today for a follow up weight management nutrition consultation. Pt has had an initial appointment with Dr. Baig  Weight loss medication: Wegovy.   Pt's weight is 206 lbs   Initial weight: 275 lbs  Weight change: 69 lbs (down)-stuck at a plateau    No flowsheet data found.  BMI: There is no height or weight on file to calculate BMI.  Ideal body weight: 47.8 kg (105 lb 6.1 oz)  Adjusted ideal body weight: 66.2 kg (145 lb 13.9 oz)    Estimated RMR (Edvin-St Casanova equation):   1511 kcals x 1.2 (sedentary) = 1813 kcals (for weight maintenance)     Recommended Protein Intake: 60-80 grams of protein/day  Patient Active Problem List:  Patient Active Problem List   Diagnosis     Class 3 severe obesity due to excess calories with serious comorbidity and body mass index (BMI) of 50.0 to 59.9 in adult (H)     Generalized anxiety disorder     Major depressive disorder, recurrent episode, in partial remission (H)     Mild intermittent asthma, unspecified whether complicated     History of Heraclio-en-Y gastric bypass     Insomnia, unspecified type     Family history of breast cancer in mother     Vitamin D deficiency     Crohn's disease of large intestine with complication (H)     Crohn's disease of perianal region (H)     Mechanical low back pain     Poor posture     Cervical radiculopathy     Diabetes: No    Progress on goals from last visit: Has been stuck at a plateau for the last month or so in terms of weight loss progression.  Patient looking for tips in terms of getting past this most recent plateau.     Dietary  Recall:  Breakfast: Mendez Abhishek [place] Eggwich  Lunch:Deli Meat or soup and sandwich (when in a meeting)  Dinner:Meat or Fish, veggies (broccoli or cauliflower and carrots, green beans)  Typical snacks: Ensure Max Protein   Beverages: Water, Coffee in the AM, Decaf in the PM, Gatorade Zero with Protein  Exercise: trying to increase steps (1840-1498 steps), however no set regimen    Nutrition Diagnosis:    Overweight/Obesity (NC 3.3) related to overeating and poor lifestyle habits as evidenced by patient's subjective statements (h/o excessive energy intake, lack of exercise) and BMI of 39 kg/m2.     Intervention:  1. Food and/or nutrient delivery: balanced meals, adequate protein   2. Nutrition education: water consumption, fiber intake  3. Nutrition counseling: exercise regimen    Monitoring/Evaluation:    Goals:  1. Start tracking intake again to look at nutrients consumed.   2. Focus on increased fiber consumption, aiming for 25 grams/day.   3. Increase water consumption, aiming for at least 64 oz/day.   4. Establish an exercise regimen, aiming for 3 times/week.    Patient to follow up in 2 week(s) with bariatrician and 1 month(s) with RD      Video-Visit Details    Type of service:  Video Visit    Video End Time:10:28 AM    Originating Location (pt. Location): Home    Distant Location (provider location):  [facility] [place]     Platform used for Video Visit: Sona Garcia RD

## 2022-08-19 NOTE — LETTER
8/19/2022         RE: Janelle Coelho  [address] [place] [address]        Dear Colleague,    Thank you for referring your patient, Janelle Coelho, to the [facility] BARIATR[facility] CARE [place]. Please see a copy of my visit note below.    Janelle Coelho is a 47 year old who is being evaluated via a billable video visit.      How would you like to obtain your AVS? MyChart  If the video visit is dropped, the invitation should be resent by: Send to e-mail at: [email or web address]  Will anyone else be joining your video visit? No      Video Start Time: 10:14 AM      Medical  Weight Loss Follow-Up Diet Evaluation  Assessment:  Janelle is presenting today for a follow up weight management nutrition consultation. Pt has had an initial appointment with Dr. Baig  Weight loss medication: Wegovy.   Pt's weight is 206 lbs   Initial weight: 275 lbs  Weight change: 69 lbs (down)-stuck at a plateau    No flowsheet data found.  BMI: There is no height or weight on file to calculate BMI.  Ideal body weight: 47.8 kg (105 lb 6.1 oz)  Adjusted ideal body weight: 66.2 kg (145 lb 13.9 oz)    Estimated RMR (Wirt-St Jamesor equation):   1511 kcals x 1.2 (sedentary) = 1813 kcals (for weight maintenance)     Recommended Protein Intake: 60-80 grams of protein/day  Patient Active Problem List:  Patient Active Problem List   Diagnosis     Class 3 severe obesity due to excess calories with serious comorbidity and body mass index (BMI) of 50.0 to 59.9 in adult (H)     Generalized anxiety disorder     Major depressive disorder, recurrent episode, in partial remission (H)     Mild intermittent asthma, unspecified whether complicated     History of Heraclio-en-Y gastric bypass     Insomnia, unspecified type     Family history of breast cancer in mother     Vitamin D deficiency     Crohn's disease of large intestine with complication (H)     Crohn's disease of perianal region (H)     Mechanical low back pain      Poor posture     Cervical radiculopathy     Diabetes: No    Progress on goals from last visit: Has been stuck at a plateau for the last month or so in terms of weight loss progression.  Patient looking for tips in terms of getting past this most recent plateau.     Dietary Recall:  Breakfast: Mendez Abhishek [place]  Lunch:Deli Meat or soup and sandwich (when in a meeting)  Dinner:Meat or Fish, veggies (broccoli or cauliflower and carrots, green beans)  Typical snacks: Ensure Max Protein   Beverages: Water, Coffee in the AM, Decaf in the PM, Gatorade Zero with Protein  Exercise: trying to increase steps (1169-7665 steps), however no set regimen    Nutrition Diagnosis:    Overweight/Obesity (NC 3.3) related to overeating and poor lifestyle habits as evidenced by patient's subjective statements (h/o excessive energy intake, lack of exercise) and BMI of 39 kg/m2.     Intervention:  1. Food and/or nutrient delivery: balanced meals, adequate protein   2. Nutrition education: water consumption, fiber intake  3. Nutrition counseling: exercise regimen    Monitoring/Evaluation:    Goals:  1. Start tracking intake again to look at nutrients consumed.   2. Focus on increased fiber consumption, aiming for 25 grams/day.   3. Increase water consumption, aiming for at least 64 oz/day.   4. Establish an exercise regimen, aiming for 3 times/week.    Patient to follow up in 2 week(s) with bariatrician and 1 month(s) with TITO      Video-Visit Details    Type of service:  Video Visit    Video End Time:10:28 AM    Originating Location (pt. Location): Home    Distant Location (provider location):  [facility] CL[facility] AND BAR[facility]ATRICS C[facility] [place]     Platform used for Video Visit: Sona Garcia RD        Again, thank you for allowing me to participate in the care of your patient.        Sincerely,        Ashley Garcia RD

## 2022-08-24 ENCOUNTER — TELEPHONE (OUTPATIENT)
Dept: FAMILY MEDICINE | Facility: CLINIC | Age: 47
End: 2022-08-24

## 2022-08-24 NOTE — TELEPHONE ENCOUNTER
Prior Authorization Request  Who s requesting:  Pharmacy  Pharmacy Name and Location: [place] [address]  Medication Name: WEGOVY 2.4 MG/0.75ML SOAJ  Insurance Plan: [employer]  Insurance Member ID Number:  [id number]  CoverMyMeds Key: [id number]  Informed patient that prior authorizations can take up to 10 business days for response:   NA  Okay to leave a detailed message: No     Route to pool:  BHARGAVI SOTO MED

## 2022-08-24 NOTE — TELEPHONE ENCOUNTER
Central Prior Authorization Team   Phone: [phone]    PA Initiation    Medication: Wegovy 2.4MG/0.75ML auto-injectors  Insurance Company: [employer] - Phone [phone] Fax [phone]  Pharmacy Filling the Rx: [id number] [place], [place] - [address] [place] [address] AT [address]  Filling Pharmacy Phone: [phone]  Filling Pharmacy Fax:    Start Date: 8/24/2022

## 2022-08-26 NOTE — TELEPHONE ENCOUNTER
Prior Authorization Approval    Authorization Effective Date: 7/25/2022  Authorization Expiration Date: 8/24/2023  Medication: Wegovy 2.4MG/0.75ML auto-injectors  Approved Dose/Quantity:    Reference #:     Insurance Company: [employer] - Phone [phone] Fax [phone]  Expected CoPay:       CoPay Card Available:      Foundation Assistance Needed:    Which Pharmacy is filling the prescription (Not needed for infusion/clinic administered): [id number] - [place], [place] - [address] [place], [place] [address] [address]  Pharmacy Notified: Yes  Patient Notified: Yes

## 2022-08-31 ASSESSMENT — ASTHMA QUESTIONNAIRES
ACT_TOTALSCORE: 25
ACT_TOTALSCORE: 25
QUESTION_1 LAST FOUR WEEKS HOW MUCH OF THE TIME DID YOUR ASTHMA KEEP YOU FROM GETTING AS MUCH DONE AT WORK, SCHOOL OR AT HOME: NONE OF THE TIME
QUESTION_2 LAST FOUR WEEKS HOW OFTEN HAVE YOU HAD SHORTNESS OF BREATH: NOT AT ALL
QUESTION_4 LAST FOUR WEEKS HOW OFTEN HAVE YOU USED YOUR RESCUE INHALER OR NEBULIZER MEDICATION (SUCH AS ALBUTEROL): NOT AT ALL
QUESTION_5 LAST FOUR WEEKS HOW WOULD YOU RATE YOUR ASTHMA CONTROL: COMPLETELY CONTROLLED
QUESTION_3 LAST FOUR WEEKS HOW OFTEN DID YOUR ASTHMA SYMPTOMS (WHEEZING, COUGHING, SHORTNESS OF BREATH, CHEST TIGHTNESS OR PAIN) WAKE YOU UP AT NIGHT OR EARLIER THAN USUAL IN THE MORNING: NOT AT ALL

## 2022-09-02 ENCOUNTER — OFFICE VISIT (OUTPATIENT)
Dept: FAMILY MEDICINE | Facility: CLINIC | Age: 47
End: 2022-09-02
Payer: COMMERCIAL

## 2022-09-02 VITALS
HEIGHT: 62 IN | RESPIRATION RATE: 16 BRPM | SYSTOLIC BLOOD PRESSURE: 102 MMHG | HEART RATE: 76 BPM | WEIGHT: 203.44 LBS | TEMPERATURE: 98.3 F | DIASTOLIC BLOOD PRESSURE: 66 MMHG | BODY MASS INDEX: 37.44 KG/M2

## 2022-09-02 DIAGNOSIS — E66.01 CLASS 2 SEVERE OBESITY DUE TO EXCESS CALORIES WITH SERIOUS COMORBIDITY AND BODY MASS INDEX (BMI) OF 37.0 TO 37.9 IN ADULT (H): Primary | ICD-10-CM

## 2022-09-02 DIAGNOSIS — Z98.84 HISTORY OF ROUX-EN-Y GASTRIC BYPASS: ICD-10-CM

## 2022-09-02 DIAGNOSIS — E66.01 CLASS 3 SEVERE OBESITY DUE TO EXCESS CALORIES WITH SERIOUS COMORBIDITY AND BODY MASS INDEX (BMI) OF 50.0 TO 59.9 IN ADULT (H): ICD-10-CM

## 2022-09-02 DIAGNOSIS — E66.813 CLASS 3 SEVERE OBESITY DUE TO EXCESS CALORIES WITH SERIOUS COMORBIDITY AND BODY MASS INDEX (BMI) OF 50.0 TO 59.9 IN ADULT (H): ICD-10-CM

## 2022-09-02 DIAGNOSIS — E66.812 CLASS 2 SEVERE OBESITY DUE TO EXCESS CALORIES WITH SERIOUS COMORBIDITY AND BODY MASS INDEX (BMI) OF 37.0 TO 37.9 IN ADULT (H): Primary | ICD-10-CM

## 2022-09-02 PROCEDURE — 99213 OFFICE O/P EST LOW 20 MIN: CPT | Performed by: FAMILY MEDICINE

## 2022-09-02 RX ORDER — SEMAGLUTIDE 2.4 MG/.75ML
2.4 INJECTION, SOLUTION SUBCUTANEOUS WEEKLY
Qty: 3 ML | Refills: 11 | Status: SHIPPED | OUTPATIENT
Start: 2022-09-02 | End: 2023-09-11

## 2022-09-02 ASSESSMENT — ENCOUNTER SYMPTOMS: CONSTITUTIONAL NEGATIVE: 1

## 2022-09-02 NOTE — PROGRESS NOTES
"  Problem List Items Addressed This Visit     Class 2 severe obesity due to excess calories with serious comorbidity and body mass index (BMI) of 37.0 to 37.9 in adult (H) - Primary     47 year old year old female in clinic today to discuss treatment of the following conditions through diet and lifestyle modification and weight loss:  1. Class 2 severe obesity due to excess calories with serious comorbidity and body mass index (BMI) of 37.0 to 37.9 in adult (H)    2. History of Heraclio-en-Y gastric bypass      The patient's weight loss result since the last visit was successful.  She continues to eat well.  Discussed exercise and muscle.  She will focus on protein.     - refill sent of wegovy. Discussed stability   - recheck in 6 months.            Relevant Medications    Semaglutide-Weight Management (WEGOVY) 2.4 MG/0.75ML SOAJ    History of Heraclio-en-Y gastric bypass      Other Visit Diagnoses     Class 3 severe obesity due to excess calories with serious comorbidity and body mass index (BMI) of 50.0 to 59.9 in adult (H)        Relevant Medications    Semaglutide-Weight Management (WEGOVY) 2.4 MG/0.75ML SOAJ         Follow-up Visit   Expected date:  Mar 02, 2023 (Approximate)      Follow Up Appointment Details:     Follow-up with whom?: Me    Follow-Up for what?: Chronic Disease f/u    Chronic Disease f/u: General (Other)    Additional Details: Weight loss visit    How?: In Person    Is this an as-needed follow-up?: No                     Subjective   Janelle is a 47 year old who presents for the following health issues   Chief Complaint   Patient presents with     Weight Loss     Follow-up        Patient presents for treatment of chronic, comorbid conditions using intensive therapeutic lifestyle interventions including nutrition, physical activity, and behavior management.   - successes: consistent for the past year. Still continuing to improve.   - struggles: supply of medication.  \"I wish there was a step-up.\"  " "Weight not moving for the past month.  \"Struggling to get protein.\"     - exercise plan: \"not enough.\" Take a walk after work.\" Limited by back pain.  Recent PT effort.  One session.  \"I did not go back.\"  Working to stretch.     - tracking/journaling: ad garcía.  Aware of carb totals.  Breakfast and lunch are late.  Convenience/prepared foods for breakfast and lunch.     - following nutritional plan: carb restriction.  Deviations from plan: infrequent.  Sprite to settle stomach.    - hunger: some increase of cravings.     - medication benefits: helpful.  side effects: none.      History of Present Illness       Reason for visit:  Follow-Up    She eats 0-1 servings of fruits and vegetables daily.She consumes 0 sweetened beverage(s) daily.She exercises with enough effort to increase her heart rate 9 or less minutes per day.  She exercises with enough effort to increase her heart rate 3 or less days per week.   She is taking medications regularly.       Review of Systems   Constitutional: Negative.    All other systems reviewed and are negative.           Objective    /66 (BP Location: Left arm, Patient Position: Sitting, Cuff Size: Adult Large)   Pulse 76   Temp 98.3  F (36.8  C) (Oral)   Resp 16   Ht 1.567 m (5' 1.7\")   Wt 92.3 kg (203 lb 7 oz)   BMI 37.57 kg/m    Body mass index is 37.57 kg/m .     Start: 277.  Down ~26%.     Physical Exam  Nursing note reviewed.   Constitutional:       General: She is not in acute distress.     Appearance: Normal appearance. She is not ill-appearing.   HENT:      Head: Normocephalic and atraumatic.   Eyes:      Extraocular Movements: Extraocular movements intact.      Conjunctiva/sclera: Conjunctivae normal.   Pulmonary:      Effort: Pulmonary effort is normal.   Neurological:      Mental Status: She is alert and oriented to person, place, and time.   Psychiatric:         Attention and Perception: Attention normal.         Mood and Affect: Mood normal.         Speech: " Speech normal.         Thought Content: Thought content normal.              This note has been dictated using voice recognition software. Any grammatical or context distortions are unintentional and inherent to the software

## 2022-09-02 NOTE — ASSESSMENT & PLAN NOTE
47 year old year old female in clinic today to discuss treatment of the following conditions through diet and lifestyle modification and weight loss:  1. Class 2 severe obesity due to excess calories with serious comorbidity and body mass index (BMI) of 37.0 to 37.9 in adult (H)    2. History of Heraclio-en-Y gastric bypass      The patient's weight loss result since the last visit was successful.  She continues to eat well.  Discussed exercise and muscle.  She will focus on protein.     - refill sent of wegovy. Discussed stability   - recheck in 6 months.

## 2022-09-09 ENCOUNTER — TRANSFERRED RECORDS (OUTPATIENT)
Dept: HEALTH INFORMATION MANAGEMENT | Facility: CLINIC | Age: 47
End: 2022-09-09

## 2022-09-09 LAB
ALT SERPL-CCNC: 12 IU/L (ref 0–32)
AST SERPL-CCNC: 12 IU/L (ref 0–40)

## 2022-09-30 ENCOUNTER — VIRTUAL VISIT (OUTPATIENT)
Dept: SURGERY | Facility: CLINIC | Age: 47
End: 2022-09-30
Payer: COMMERCIAL

## 2022-09-30 DIAGNOSIS — Z71.3 NUTRITIONAL COUNSELING: ICD-10-CM

## 2022-09-30 DIAGNOSIS — Z98.84 HISTORY OF ROUX-EN-Y GASTRIC BYPASS: Primary | ICD-10-CM

## 2022-09-30 DIAGNOSIS — E66.812 CLASS 2 OBESITY DUE TO EXCESS CALORIES WITHOUT SERIOUS COMORBIDITY WITH BODY MASS INDEX (BMI) OF 36.0 TO 36.9 IN ADULT: ICD-10-CM

## 2022-09-30 DIAGNOSIS — E66.09 CLASS 2 OBESITY DUE TO EXCESS CALORIES WITHOUT SERIOUS COMORBIDITY WITH BODY MASS INDEX (BMI) OF 36.0 TO 36.9 IN ADULT: ICD-10-CM

## 2022-09-30 PROCEDURE — 97803 MED NUTRITION INDIV SUBSEQ: CPT | Mod: GT | Performed by: DIETITIAN, REGISTERED

## 2022-09-30 NOTE — PROGRESS NOTES
Janelle Coelho is a 47 year old who is being evaluated via a billable video visit.      How would you like to obtain your AVS? MyChart  If the video visit is dropped, the invitation should be resent by: Send to e-mail at: [email or web address]  Will anyone else be joining your video visit? No      Video Start Time: 9:03 am      Medical  Weight Loss Follow-Up Diet Evaluation  Assessment:  Janelle is presenting today for a follow up weight management nutrition consultation. Pt has had an initial appointment with Dr. Baig  Weight loss medication: Wegovy.   Pt's weight is 198 lbs   Initial weight: 275 lbs   Weight change: 77 lbs (down)     No flowsheet data found.  BMI: There is no height or weight on file to calculate BMI.  Ideal body weight: 49.4 kg (108 lb 14.9 oz)  Adjusted ideal body weight: 66.6 kg (146 lb 11.7 oz)    Estimated RMR ([place]-St Casanova equation):   1485 kcals x 1.3 (light active) = 1930 kcals (for weight maintenance)     Recommended Protein Intake: 60-80 grams of protein/day  Patient Active Problem List:  Patient Active Problem List   Diagnosis     Class 2 severe obesity due to excess calories with serious comorbidity and body mass index (BMI) of 37.0 to 37.9 in adult (H)     Generalized anxiety disorder     Major depressive disorder, recurrent episode, in partial remission (H)     Mild intermittent asthma, unspecified whether complicated     History of Heraclio-en-Y gastric bypass     Insomnia, unspecified type     Family history of breast cancer in mother     Vitamin D deficiency     Crohn's disease of large intestine with complication (H)     Crohn's disease of perianal region (H)     Mechanical low back pain     Poor posture     Cervical radiculopathy     Diabetes: No      Progress on goals from last visit: patient reports that she is working on intermittent fasting and has found by incorporating a protein shake in during the afternoon as a snack, this has helped especially pertaining to weight loss.   Patient reports that she continues to focus on protein first at meals and does admit that she has not really been focusing on increased fiber consumption at this time.  Patient reports that she has been struggling with some fatigue and is scheduled for a sleep study in Oct, hoping that this will shed some light on things.  Patient otherwise continues to take all of her vitamins as recommended post RNY.      Beverages: Water, Decaf Coffee, Coffee in the AM, on occasion-mini can of Sprite, Gatorade Zero with Protein  Exercise: no set regimen, trying to increase movement throughout the day, increased fatigue    Nutrition Diagnosis:    Overweight/Obesity (NC 3.3) related to overeating and poor lifestyle habits as evidenced by patient's subjective statements (h/o excessive energy intake, lack of exercise) and BMI of 36.7 kg/m2.     Intervention:  1. Food and/or nutrient delivery: balanced meals, adequate protein   2. Nutrition education: fiber food sources   3. Nutrition counseling: exercise regimen       Monitoring/Evaluation:    Goals:  1. Work on increased fiber consumption, aiming for 25 grams/day.   2. Work on increased movement throughout the day to help increase overall activity/exercise.     Patient to follow up in 2 month(s) with TITO      Video-Visit Details    Type of service:  Video Visit    Video End Time:9:17 am    Originating Location (pt. Location): Home    Distant Location (provider location):  [facility]ERY CLINIC AND BARIATRICS C[facility] [place]     Platform used for Video Visit: Sona Garcia RD

## 2022-09-30 NOTE — LETTER
9/30/2022         RE: Janelle Coelho  [address] [place] [address]        Dear Colleague,    Thank you for referring your patient, Janelle Coelho, to the [facility] CL[facility]ATRICS [facility] [place]. Please see a copy of my visit note below.    Janelle Coelho is a 47 year old who is being evaluated via a billable video visit.      How would you like to obtain your AVS? MyChart  If the video visit is dropped, the invitation should be resent by: Send to e-mail at: [email or web address]  Will anyone else be joining your video visit? No      Video Start Time: 9:03 am      Medical  Weight Loss Follow-Up Diet Evaluation  Assessment:  Janelle is presenting today for a follow up weight management nutrition consultation. Pt has had an initial appointment with Dr. Baig  Weight loss medication: Wegovy.   Pt's weight is 198 lbs   Initial weight: 275 lbs   Weight change: 77 lbs (down)     No flowsheet data found.  BMI: There is no height or weight on file to calculate BMI.  Ideal body weight: 49.4 kg (108 lb 14.9 oz)  Adjusted ideal body weight: 66.6 kg (146 lb 11.7 oz)    Estimated RMR (Glynn-St Jamesor equation):   1485 kcals x 1.3 (light active) = 1930 kcals (for weight maintenance)     Recommended Protein Intake: 60-80 grams of protein/day  Patient Active Problem List:  Patient Active Problem List   Diagnosis     Class 2 severe obesity due to excess calories with serious comorbidity and body mass index (BMI) of 37.0 to 37.9 in adult (H)     Generalized anxiety disorder     Major depressive disorder, recurrent episode, in partial remission (H)     Mild intermittent asthma, unspecified whether complicated     History of Heraclio-en-Y gastric bypass     Insomnia, unspecified type     Family history of breast cancer in mother     Vitamin D deficiency     Crohn's disease of large intestine with complication (H)     Crohn's disease of perianal region (H)     Mechanical low back pain     Poor posture      Cervical radiculopathy     Diabetes: No      Progress on goals from last visit: patient reports that she is working on intermittent fasting and has found by incorporating a protein shake in during the afternoon as a snack, this has helped especially pertaining to weight loss.  Patient reports that she continues to focus on protein first at meals and does admit that she has not really been focusing on increased fiber consumption at this time.  Patient reports that she has been struggling with some fatigue and is scheduled for a sleep study in Oct, hoping that this will shed some light on things.  Patient otherwise continues to take all of her vitamins as recommended post RNY.      Beverages: Water, Decaf Coffee, Coffee in the AM, on occasion-mini can of Sprite, Gatorade Zero with Protein  Exercise: no set regimen, trying to increase movement throughout the day, increased fatigue    Nutrition Diagnosis:    Overweight/Obesity (NC 3.3) related to overeating and poor lifestyle habits as evidenced by patient's subjective statements (h/o excessive energy intake, lack of exercise) and BMI of 36.7 kg/m2.     Intervention:  1. Food and/or nutrient delivery: balanced meals, adequate protein   2. Nutrition education: fiber food sources   3. Nutrition counseling: exercise regimen       Monitoring/Evaluation:    Goals:  1. Work on increased fiber consumption, aiming for 25 grams/day.   2. Work on increased movement throughout the day to help increase overall activity/exercise.     Patient to follow up in 2 month(s) with RD      Video-Visit Details    Type of service:  Video Visit    Video End Time:9:17 am    Originating Location (pt. Location): Home    Distant Location (provider location):  [facility]ATR[facility]     Platform used for Video Visit: Sona Garcia RD        Again, thank you for allowing me to participate in the care of your patient.         Sincerely,        Ashley Garcia, RD

## 2022-10-06 DIAGNOSIS — N92.0 EXCESSIVE OR FREQUENT MENSTRUATION: ICD-10-CM

## 2022-10-07 RX ORDER — LEVONORGESTREL AND ETHINYL ESTRADIOL 0.15-0.03
1 KIT ORAL DAILY
Qty: 91 TABLET | Refills: 0 | Status: SHIPPED | OUTPATIENT
Start: 2022-10-07 | End: 2022-10-28

## 2022-10-07 NOTE — TELEPHONE ENCOUNTER
Routing refill request to provider for review/approval because:  Patient needs to be seen because:  Due for physical.     Jazmin Khanna RN

## 2022-10-19 NOTE — PROGRESS NOTES
Erroneous encounter.     Soraida LONGORIA RN   Patient Advocate Liaison (PAL)  [facility]

## 2022-10-23 ENCOUNTER — HEALTH MAINTENANCE LETTER (OUTPATIENT)
Age: 47
End: 2022-10-23

## 2022-10-28 ENCOUNTER — VIRTUAL VISIT (OUTPATIENT)
Dept: PEDIATRICS | Facility: CLINIC | Age: 47
End: 2022-10-28
Payer: COMMERCIAL

## 2022-10-28 DIAGNOSIS — J45.20 MILD INTERMITTENT ASTHMA, UNSPECIFIED WHETHER COMPLICATED: ICD-10-CM

## 2022-10-28 DIAGNOSIS — N92.0 EXCESSIVE OR FREQUENT MENSTRUATION: ICD-10-CM

## 2022-10-28 DIAGNOSIS — E66.812 CLASS 2 SEVERE OBESITY DUE TO EXCESS CALORIES WITH SERIOUS COMORBIDITY AND BODY MASS INDEX (BMI) OF 37.0 TO 37.9 IN ADULT (H): ICD-10-CM

## 2022-10-28 DIAGNOSIS — E66.01 CLASS 2 SEVERE OBESITY DUE TO EXCESS CALORIES WITH SERIOUS COMORBIDITY AND BODY MASS INDEX (BMI) OF 37.0 TO 37.9 IN ADULT (H): ICD-10-CM

## 2022-10-28 DIAGNOSIS — G47.9 SLEEP DISTURBANCE: ICD-10-CM

## 2022-10-28 DIAGNOSIS — F41.1 GENERALIZED ANXIETY DISORDER: Primary | Chronic | ICD-10-CM

## 2022-10-28 PROCEDURE — 99214 OFFICE O/P EST MOD 30 MIN: CPT | Mod: 95 | Performed by: NURSE PRACTITIONER

## 2022-10-28 RX ORDER — SERTRALINE HYDROCHLORIDE 100 MG/1
100 TABLET, FILM COATED ORAL DAILY
Qty: 90 TABLET | Refills: 1 | Status: SHIPPED | OUTPATIENT
Start: 2022-10-28 | End: 2023-02-02

## 2022-10-28 RX ORDER — TRAZODONE HYDROCHLORIDE 50 MG/1
25-50 TABLET, FILM COATED ORAL
Qty: 90 TABLET | Refills: 1 | Status: SHIPPED | OUTPATIENT
Start: 2022-10-28 | End: 2023-02-02

## 2022-10-28 RX ORDER — LEVONORGESTREL AND ETHINYL ESTRADIOL 0.15-0.03
1 KIT ORAL DAILY
Qty: 91 TABLET | Status: SHIPPED | OUTPATIENT
Start: 2022-10-28 | End: 2023-02-02

## 2022-10-28 NOTE — PROGRESS NOTES
"Janelle is a 47 year old who is being evaluated via a billable video visit.      How would you like to obtain your AVS? MyChart  If the video visit is dropped, the invitation should be resent by:   Will anyone else be joining your video visit? No          Assessment & Plan     Generalized anxiety disorder  Doing well on current regime. No concerns  - traZODone (DESYREL) 50 MG tablet; Take 0.5-1 tablets (25-50 mg) by mouth nightly as needed for sleep  - sertraline (ZOLOFT) 100 MG tablet; Take 1 tablet (100 mg) by mouth daily    Class 2 severe obesity due to excess calories with serious comorbidity and body mass index (BMI) of 37.0 to 37.9 in adult (H)  Reviewed notes from [facility] clinic. Has been losing weight through diet and exercise. No concerns    Sleep disturbance  Has a sleep study this wkend and a follow-up appointment in november    Excessive or frequent menstruation  Stable on curren regime  - levonorgestrel-ethinyl estradiol (SEASONALE) 0.15-0.03 MG tablet; Take 1 tablet by mouth daily    Mild intermittent asthma, unspecified whether complicated  No symptoms of concern               BMI:   Estimated body mass index is 37.57 kg/m  as calculated from the following:    Height as of 9/2/22: 1.567 m (5' 1.7\").    Weight as of 9/2/22: 92.3 kg (203 lb 7 oz).           Return in about 6 months (around 4/28/2023) for Annual Preventative Exam.    Ivett Segura, ISA [facility] SUSAN    Galileo Luis is a 47 year old, presenting for the following health issues:  No chief complaint on file.      HPI     Has been following [facility], has lost 80# so far.     Has a sleep study this weekend. Takes trazodone as needed.     History of anxiety. Is on zoloft. Symptoms well managed.     EVIE-7 SCORE 2/18/2021 11/11/2021 4/29/2022   Total Score - - -   Total Score - 1 (minimal anxiety) -   Total Score 0 1 1     History of intermittent asthma, uses albuterol infrequently.   ACT Total Scores " 9/20/2021 4/29/2022 8/31/2022   ACT TOTAL SCORE - - -   ASTHMA ER VISITS - - -   ASTHMA HOSPITALIZATIONS - - -   ACT TOTAL SCORE (Goal Greater than or Equal to 20) 21 25 25   In the past 12 months, how many times did you visit the emergency room for your asthma without being admitted to the hospital? 1 0 0   In the past 12 months, how many times were you hospitalized overnight because of your asthma? 0 0 0         Review of Systems         Objective           Vitals:  No vitals were obtained today due to virtual visit.    Physical Exam   GENERAL: Healthy, alert and no distress  EYES: Eyes grossly normal to inspection.  No discharge or erythema, or obvious scleral/conjunctival abnormalities.  RESP: No audible wheeze, cough, or visible cyanosis.  No visible retractions or increased work of breathing.    SKIN: Visible skin clear. No significant rash, abnormal pigmentation or lesions.  NEURO: Cranial nerves grossly intact.  Mentation and speech appropriate for age.  PSYCH: Mentation appears normal, affect normal/bright, judgement and insight intact, normal speech and appearance well-groomed.                Video-Visit Details    Video Start Time: 8:41 AM    Type of service:  Video Visit    Video End Time:8:54 AM    Originating Location (pt. Location): Home        Distant Location (provider location):  On-site    Platform used for Video Visit: Sona

## 2022-10-30 ENCOUNTER — THERAPY VISIT (OUTPATIENT)
Dept: SLEEP MEDICINE | Facility: CLINIC | Age: 47
End: 2022-10-30
Payer: COMMERCIAL

## 2022-10-30 DIAGNOSIS — G25.81 RESTLESS LEG SYNDROME: ICD-10-CM

## 2022-10-30 DIAGNOSIS — Z86.2 HISTORY OF IRON DEFICIENCY ANEMIA: ICD-10-CM

## 2022-10-30 DIAGNOSIS — G47.9 SLEEP DISTURBANCE: ICD-10-CM

## 2022-10-30 DIAGNOSIS — E66.9 OBESITY (BMI 30-39.9): ICD-10-CM

## 2022-10-30 DIAGNOSIS — G47.00 INSOMNIA, UNSPECIFIED TYPE: ICD-10-CM

## 2022-10-30 DIAGNOSIS — R40.0 DAYTIME SOMNOLENCE: ICD-10-CM

## 2022-10-30 PROCEDURE — 95810 POLYSOM 6/> YRS 4/> PARAM: CPT | Performed by: INTERNAL MEDICINE

## 2022-10-31 NOTE — PATIENT INSTRUCTIONS
[facility]    1. Your sleep study will be reviewed by a sleep physician within the next few days.     2. Please follow up in the sleep clinic as scheduled, or, make an appointment with your sleep provider to be seen within two weeks to discuss the results of the sleep study.    3. If you have any questions or problems with your treatment plan, please contact your sleep clinic provider at [phone] to further manage your condition.    4. Please review your attached medication list, and, at your follow-up appointment advise your sleep clinic provider about any changes.    5. Go to [email or web address] for more information about your sleep problems.    Michel Jackson, IRINA, RPSGT  October 31, 2022

## 2022-11-01 ENCOUNTER — ALLIED HEALTH/NURSE VISIT (OUTPATIENT)
Dept: PEDIATRICS | Facility: CLINIC | Age: 47
End: 2022-11-01
Payer: COMMERCIAL

## 2022-11-01 ENCOUNTER — TELEPHONE (OUTPATIENT)
Dept: PEDIATRICS | Facility: CLINIC | Age: 47
End: 2022-11-01

## 2022-11-01 DIAGNOSIS — K50.119 CROHN'S DISEASE OF LARGE INTESTINE WITH COMPLICATION (H): Primary | ICD-10-CM

## 2022-11-01 NOTE — PROGRESS NOTES
Pt was not seen due to no orders for labs or shingles vaccine and waiting on adacel. See phone message. Cari Swanson LPN

## 2022-11-01 NOTE — TELEPHONE ENCOUNTER
Pt came in for a nurse only appointment tonight. She was told there would be orders in epic. No orders in epic, no orders in health maintenance, no orders in last visit. Please put orders in. She also would like a shingles vaccine, explained she needs to be 50 to get shingles. She states that her GI doctor wants her to get a shingles vaccine due to her having Crohns. Please advise on this also. Once we have orders we need to call pt to come in for nurse only.  Cari Swanson LPN

## 2022-11-02 NOTE — TELEPHONE ENCOUNTER
"Called and spoke with patient. Patient states she was told by PCP that labs were going to be placed. Patient is not sure what lab work was going to be checked, patient notes \"I know she said my Cholesterol looked good, there was no need to run that.\" RN apologized for confusion. Patient plans to go to pharmacy to receive shingles vaccine. Patient plans to follow up w/ PCP regarding labs in March.     Tian DEAN RN 11/2/2022 at 1:07 PM     "

## 2022-11-02 NOTE — TELEPHONE ENCOUNTER
Can you call pt to clarify? Unfortunately i'm not sure what labs she is due for. I can see MNINDIO did labs recently in September, and her last labs I did looked normal and didn't need specific follow-up.     In regards to shingles vaccine, I am ok with this, but she may want to verify from insurance about coverage before going forward. Let me know and I can place orders

## 2022-11-04 ENCOUNTER — TRANSFERRED RECORDS (OUTPATIENT)
Dept: HEALTH INFORMATION MANAGEMENT | Facility: CLINIC | Age: 47
End: 2022-11-04

## 2022-11-05 NOTE — PROCEDURES
" SLEEP STUDY INTERPRETATION  DIAGNOSTIC POLYSOMNOGRAPHY REPORT      Patient: RUBEN JACOBS  YOB: 1975  Study Date: 10/30/2022  MRN: [id number]  Referring Provider: Ivett Sy APRN, CNP  Ordering Provider: Ac Gonsalez APRN, CNP    Indications for Polysomnography: The patient is a 47 year old Female who is 5' 1\" and weighs 206.5 lbs. Her BMI is 39.5, [place] sleepiness scale 11 and neck circumference is 32 cm. A diagnostic polysomnogram was performed to evaluate for sleep apnea/PLMS.    Polysomnogram Data: A full night polysomnogram recorded the standard physiologic parameters including EEG, EOG, EMG, ECG, nasal and oral airflow. Respiratory parameters of chest and abdominal movements were recorded with respiratory inductance plethysmography. Oxygen saturation was recorded by pulse oximetry. Hypopnea scoring rule used: 1B 4%.    Sleep Architecture: Fragmented sleep.   The total recording time of the polysomnogram was 474.4 minutes. The total sleep time was 424.0 minutes. Sleep latency was normal at 8.3 minutes with the use of a sleep aid (Zolpidem 5 mg). REM latency was 128.0 minutes. Arousal index was increased at 20.7 arousals per hour. Sleep efficiency was normal at 89.4%. Wake after sleep onset was 42.0 minutes. The patient spent 7.9% of total sleep time in Stage N1, 62.1% in Stage N2, 10.0% in Stage N3, and 19.9% in REM. Time in REM supine was - minutes.    Respiration: Negative for sleep apnea.     Events ? The polysomnogram revealed a presence of - obstructive, - central, and - mixed apneas resulting in an apnea index of - events per hour. There were 11 obstructive hypopneas and - central hypopneas resulting in an obstructive hypopnea index of 1.6 and central hypopnea index of - events per hour. The combined apnea/hypopnea index was 1.6 events per hour (central apnea/hypopnea index was - events per hour). The REM AHI was - events per hour. The supine AHI was 2.0 events per hour. " The RERA index was 0.3 events per hour.  The RDI was 1.8 events per hour.    Snoring - was reported as intermittent.    Respiratory rate and pattern - was notable for normal respiratory rate and pattern.    Sustained Sleep Associated Hypoventilation - Transcutaneous carbon dioxide monitoring was not used, however significant hypoventilation was not suggested by oximetry.    Sleep Associated Hypoxemia - (Greater than 5 minutes O2 sat at or below 88%) was not present. Baseline oxygen saturation was 92.9%. Lowest oxygen saturation was 87.0%. Time spent less than or equal to 88% was 0.3 minutes. Time spent less than or equal to 89% was 0.8 minutes.    Movement Activity: Mildly increased periodic limb movements of sleep, without a significant degree of associated arousals.     Periodic Limb Activity - There were 147 PLMs during the entire study. The PLM index was 20.8 movements per hour. The PLM Arousal Index was 6.1 per hour.    REM EMG Activity - Excessive transient/sustained muscle activity was not present.    Nocturnal Behavior - Abnormal sleep related behaviors were not noted during/arising out of NREM / REM sleep.     Bruxism - None apparent.    Cardiac Summary: Sinus rhythm.   The average pulse rate was 70.2 bpm. The minimum pulse rate was 55.0 bpm while the maximum pulse rate was 91.0 bpm.  Arrhythmias were not noted.    Assessment:     This sleep study is negative for sleep apnea or hypoxemia. Comprehensiveness of testing is limited by limited supine sleep and lack of supine REM.     A mildly increased frequency of periodic limb movements of sleep was noted However, majority of the movements were not associated with cortical arousals.     Muscle atonia in REM was preserved.     Sleep architecture was characterized by mildly increased arousal frequency and mildly reduced stage N3 sleep.     Recommendations:    Clinical management of insomnia.    Pharmacologic therapy should be used for management of restless legs  syndrome only if present and clinically indicated and not based on the presence of periodic limb movements alone.    Diagnostic Codes:   Repetitive Intrusions Into Sleep F51.8    10/30/2022 [facility] Study (206.5 lbs) - AHI 1.6, RDI 1.8, Supine AHI 2.0, REM AHI -, Low O2 87.0%, Time Spent ?88% 0.3 minutes / Time Spent ?89% 0.8 minutes.     _____________________________________   Electronically Signed By: Marvel Bundy MD 11/05/2022

## 2022-11-07 ENCOUNTER — TRANSFERRED RECORDS (OUTPATIENT)
Dept: HEALTH INFORMATION MANAGEMENT | Facility: CLINIC | Age: 47
End: 2022-11-07

## 2022-11-07 LAB — SLPCOMP: NORMAL

## 2022-11-28 ENCOUNTER — VIRTUAL VISIT (OUTPATIENT)
Dept: SURGERY | Facility: CLINIC | Age: 47
End: 2022-11-28
Payer: COMMERCIAL

## 2022-11-28 DIAGNOSIS — E66.812 CLASS 2 OBESITY DUE TO EXCESS CALORIES WITHOUT SERIOUS COMORBIDITY WITH BODY MASS INDEX (BMI) OF 35.0 TO 35.9 IN ADULT: ICD-10-CM

## 2022-11-28 DIAGNOSIS — Z71.3 NUTRITIONAL COUNSELING: ICD-10-CM

## 2022-11-28 DIAGNOSIS — E66.09 CLASS 2 OBESITY DUE TO EXCESS CALORIES WITHOUT SERIOUS COMORBIDITY WITH BODY MASS INDEX (BMI) OF 35.0 TO 35.9 IN ADULT: ICD-10-CM

## 2022-11-28 DIAGNOSIS — Z98.84 HISTORY OF ROUX-EN-Y GASTRIC BYPASS: Primary | ICD-10-CM

## 2022-11-28 PROCEDURE — 97803 MED NUTRITION INDIV SUBSEQ: CPT | Mod: GT | Performed by: DIETITIAN, REGISTERED

## 2022-11-28 ASSESSMENT — SLEEP AND FATIGUE QUESTIONNAIRES
HOW LIKELY ARE YOU TO NOD OFF OR FALL ASLEEP WHILE LYING DOWN TO REST IN THE AFTERNOON WHEN CIRCUMSTANCES PERMIT: MODERATE CHANCE OF DOZING
HOW LIKELY ARE YOU TO NOD OFF OR FALL ASLEEP WHILE SITTING AND READING: MODERATE CHANCE OF DOZING
HOW LIKELY ARE YOU TO NOD OFF OR FALL ASLEEP WHILE SITTING AND TALKING TO SOMEONE: WOULD NEVER DOZE
HOW LIKELY ARE YOU TO NOD OFF OR FALL ASLEEP WHILE WATCHING TV: MODERATE CHANCE OF DOZING
HOW LIKELY ARE YOU TO NOD OFF OR FALL ASLEEP WHILE SITTING INACTIVE IN A PUBLIC PLACE: SLIGHT CHANCE OF DOZING
HOW LIKELY ARE YOU TO NOD OFF OR FALL ASLEEP IN A CAR, WHILE STOPPED FOR A FEW MINUTES IN TRAFFIC: WOULD NEVER DOZE
HOW LIKELY ARE YOU TO NOD OFF OR FALL ASLEEP WHEN YOU ARE A PASSENGER IN A CAR FOR AN HOUR WITHOUT A BREAK: MODERATE CHANCE OF DOZING
HOW LIKELY ARE YOU TO NOD OFF OR FALL ASLEEP WHILE SITTING QUIETLY AFTER LUNCH WITHOUT ALCOHOL: SLIGHT CHANCE OF DOZING

## 2022-11-28 NOTE — LETTER
11/28/2022         RE: Janelle Coelho  [address] [place] [address]        Dear Colleague,    Thank you for referring your patient, Janelle Coelho, to the [facility] [place]. Please see a copy of my visit note below.    Janelle Coelho is a 47 year old who is being evaluated via a billable video visit.      How would you like to obtain your AVS? MyChart  If the video visit is dropped, the invitation should be resent by: Send to e-mail at: [email or web address]  Will anyone else be joining your video visit? No        Medical  Weight Loss Follow-Up Diet Evaluation  Assessment:  Janelle is presenting today for a follow up weight management nutrition consultation. Pt has had an initial appointment with Dr. Baig  Weight loss medication: Wegovy.   Pt's weight is 192 lbs   Initial weight: 275 lbs  Weight change: 83 lbs (down)     No flowsheet data found.  BMI: There is no height or weight on file to calculate BMI.  Ideal body weight: 49.4 kg (108 lb 14.9 oz)  Adjusted ideal body weight: 66.6 kg (146 lb 11.7 oz)    Estimated RMR ([place] Jeor equation):   1458 kcals x 1.3 (light active) = 1895 kcals (for weight maintenance)     Recommended Protein Intake: 60-80 grams of protein/day  Patient Active Problem List:  Patient Active Problem List   Diagnosis     Class 2 severe obesity due to excess calories with serious comorbidity and body mass index (BMI) of 37.0 to 37.9 in adult (H)     Generalized anxiety disorder     Major depressive disorder, recurrent episode, in partial remission (H)     Mild intermittent asthma, unspecified whether complicated     History of Heraclio-en-Y gastric bypass     Insomnia, unspecified type     Family history of breast cancer in mother     Vitamin D deficiency     Crohn's disease of large intestine with complication (H)     Crohn's disease of perianal region (H)     Mechanical low back pain     Poor posture     Cervical radiculopathy      Progress on goals from last visit: continues to focus on protein throughout the day, noting that this seems to help with her hunger.  Patient reports that she has fallen off track more recently with taking her vitamins on a regular basis, noting that she knows that she needs to get back on track with this.     Dietary Recall:  Breakfast: Mendez Mckeon (starts at 10 am)   Lunch:Carb Free Wrap with Meat and Cheese   Dinner:chicken or fish or beef, vegetable with occasional cream sauce, high fiber pasta (between 5-6 pm)  Typical snacks: Ensure or Pure Protein (PM Snack)   Beverages: Water, Decaf Coffee in the PM, Coffee in the AM (1 cup/day), occasional Sprite if she has an upset stomach  Exercise: 2 week Wellness program at work, Elliptical 3 times/week for a few minutes    Nutrition Diagnosis:    Overweight/Obesity (NC 3.3) related to overeating and poor lifestyle habits as evidenced by patient's subjective statements (h/o excessive energy intake, lack of exercise) and BMI of 35.5 kg/m2.    Intervention:  1. Food and/or nutrient delivery: balanced meals, adequate protein   2. Nutrition education: vitamins  3. Nutrition counseling: provided support and encouragement    Monitoring/Evaluation:    Goals:  1. Continue to work on increased exercise throughout the day.  2. Restart taking vitamins per recommendations post Bariatric Surgery.     Patient to follow up in 3 month(s) with RD        Video-Visit Details    Type of service:  Video Visit    Video Start Time (time video started): 12:48 pm    Video End Time (time video stopped): 12:58 pm    Originating Location (pt. Location): Home        Distant Location (provider location):  Off-site    Mode of Communication:  Video Conference via [facility]    Physician has received verbal consent for a Video Visit from the patient? Yes      Ashley Garcia RD            Again, thank you for allowing me to participate in the care of your patient.         Sincerely,        Ashley Garcia, RD

## 2022-11-28 NOTE — PROGRESS NOTES
Janelle Coelho is a 47 year old who is being evaluated via a billable video visit.      How would you like to obtain your AVS? MyChart  If the video visit is dropped, the invitation should be resent by: Send to e-mail at: [email or web address]  Will anyone else be joining your video visit? No        Medical  Weight Loss Follow-Up Diet Evaluation  Assessment:  Janelle is presenting today for a follow up weight management nutrition consultation. Pt has had an initial appointment with Dr. Baig  Weight loss medication: Wegovy.   Pt's weight is 192 lbs   Initial weight: 275 lbs  Weight change: 83 lbs (down)     No flowsheet data found.  BMI: There is no height or weight on file to calculate BMI.  Ideal body weight: 49.4 kg (108 lb 14.9 oz)  Adjusted ideal body weight: 66.6 kg (146 lb 11.7 oz)    Estimated RMR ([place] equation):   1458 kcals x 1.3 (light active) = 1895 kcals (for weight maintenance)     Recommended Protein Intake: 60-80 grams of protein/day  Patient Active Problem List:  Patient Active Problem List   Diagnosis     Class 2 severe obesity due to excess calories with serious comorbidity and body mass index (BMI) of 37.0 to 37.9 in adult (H)     Generalized anxiety disorder     Major depressive disorder, recurrent episode, in partial remission (H)     Mild intermittent asthma, unspecified whether complicated     History of Heraclio-en-Y gastric bypass     Insomnia, unspecified type     Family history of breast cancer in mother     Vitamin D deficiency     Crohn's disease of large intestine with complication (H)     Crohn's disease of perianal region (H)     Mechanical low back pain     Poor posture     Cervical radiculopathy     Progress on goals from last visit: continues to focus on protein throughout the day, noting that this seems to help with her hunger.  Patient reports that she has fallen off track more recently with taking her vitamins on a regular basis, noting that she knows that she needs to  get back on track with this.     Dietary Recall:  Breakfast: Mendez Mckeon (starts at 10 am)   Lunch:Carb Free Wrap with Meat and Cheese   Dinner:chicken or fish or beef, vegetable with occasional cream sauce, high fiber pasta (between 5-6 pm)  Typical snacks: Ensure or Pure Protein (PM Snack)   Beverages: Water, Decaf Coffee in the PM, Coffee in the AM (1 cup/day), occasional Sprite if she has an upset stomach  Exercise: 2 week Wellness program at work, Elliptical 3 times/week for a few minutes    Nutrition Diagnosis:    Overweight/Obesity (NC 3.3) related to overeating and poor lifestyle habits as evidenced by patient's subjective statements (h/o excessive energy intake, lack of exercise) and BMI of 35.5 kg/m2.    Intervention:  1. Food and/or nutrient delivery: balanced meals, adequate protein   2. Nutrition education: vitamins  3. Nutrition counseling: provided support and encouragement    Monitoring/Evaluation:    Goals:  1. Continue to work on increased exercise throughout the day.  2. Restart taking vitamins per recommendations post Bariatric Surgery.     Patient to follow up in 3 month(s) with RD        Video-Visit Details    Type of service:  Video Visit    Video Start Time (time video started): 12:48 pm    Video End Time (time video stopped): 12:58 pm    Originating Location (pt. Location): Home        Distant Location (provider location):  Off-site    Mode of Communication:  Video Conference via [facility]    Physician has received verbal consent for a Video Visit from the patient? Yes      Ashley Garcia RD

## 2022-11-29 ENCOUNTER — VIRTUAL VISIT (OUTPATIENT)
Dept: SLEEP MEDICINE | Facility: CLINIC | Age: 47
End: 2022-11-29
Payer: COMMERCIAL

## 2022-11-29 VITALS — WEIGHT: 192 LBS | HEIGHT: 61 IN | BODY MASS INDEX: 36.25 KG/M2

## 2022-11-29 DIAGNOSIS — G47.00 INSOMNIA, UNSPECIFIED TYPE: ICD-10-CM

## 2022-11-29 DIAGNOSIS — Z86.2 HISTORY OF IRON DEFICIENCY ANEMIA: ICD-10-CM

## 2022-11-29 DIAGNOSIS — G25.81 RESTLESS LEG SYNDROME: ICD-10-CM

## 2022-11-29 DIAGNOSIS — R06.83 SNORING: Primary | ICD-10-CM

## 2022-11-29 PROCEDURE — 99215 OFFICE O/P EST HI 40 MIN: CPT | Mod: 95 | Performed by: NURSE PRACTITIONER

## 2022-11-29 RX ORDER — ZOLPIDEM TARTRATE 5 MG/1
5 TABLET ORAL
Qty: 15 TABLET | Refills: 3 | Status: SHIPPED | OUTPATIENT
Start: 2022-11-29 | End: 2023-08-31

## 2022-11-29 ASSESSMENT — PAIN SCALES - GENERAL: PAINLEVEL: NO PAIN (0)

## 2022-11-29 NOTE — PROGRESS NOTES
Janelle is a 47 year old who is being evaluated via a billable video visit.      How would you like to obtain your AVS? MyChart  If the video visit is dropped, the invitation should be resent by: Send to e-mail at: [email or web address]  Will anyone else be joining your video visit? Maria Isabel Kiddbenjamin España      Video-Visit Details    Video Start Time: 9:33 AM    Type of service:  Video Visit    Video End Time:9:54 AM    Originating Location (pt. Location): Other Work        Distant Location (provider location):  On-site    Platform used for Video Visit: [facility]       Sleep Study Follow-Up Visit:    Date on this visit: 11/29/2022    Janelle Coelho is a 47-year-old female with a PMH pertinent for Crohn's disease, asthma, major depression, anxiety, insomnia, RLS, and obesity - s/p Heraclio-en-Y gastric bypass who presents today for follow-up of her sleep study done on 10/30/2022 at the [facility] for possible sleep apnea and PLMs.    Sleep latency 8.3 minutes with Ambien 5mg.  REM achieved.   REM latency 128.0 minutes.  Sleep efficiency 89.4%. Total sleep time 424.0 minutes.    Snoring - was reported as intermittent    Sleep architecture:  Stage 1, 7.9% (5%), stage 2, 62.1% (45-55%), stage 3, 10.0% (15-20%), stage REM, 19.9% (20-25%).  AHI was 1.6, with desaturations down to 87%. RDI 1.8.  REM RDI -, consistent with - REM SHERICE.  Supine RDI 2.0, consistent with no SUPINE SHERICE.  Periodic Limb Movement Index 20.8/hour.  The PLM Arousal Index was 6.1 per hour.     10/30/2022 [facility] Study (206.5 lbs) - AHI 1.6, RDI 1.8, Supine AHI 2.0, REM AHI -, Low O2 87.0%, Time Spent ?88% 0.3 minutes / Time Spent ?89% 0.8 minutes.    These findings were reviewed with patient.     Past medical/surgical history, family history, social history, medications and allergies were reviewed.      Problem List:  Patient Active Problem List    Diagnosis Date Noted     Mechanical low back pain 06/06/2022     Priority:  "Medium     Poor posture 06/06/2022     Priority: Medium     Cervical radiculopathy 06/06/2022     Priority: Medium     Crohn's disease of large intestine with complication (H) 06/01/2018     Priority: Medium     Crohn's disease of perianal region (H) 02/09/2018     Priority: Medium     Vitamin D deficiency 04/13/2017     Priority: Medium     Mild intermittent asthma, unspecified whether complicated 10/05/2016     Priority: Medium     History of Heraclio-en-Y gastric bypass 10/05/2016     Priority: Medium     Insomnia, unspecified type 10/05/2016     Priority: Medium     Family history of breast cancer in mother 10/05/2016     Priority: Medium     Major depressive disorder, recurrent episode, in partial remission (H) 10/13/2015     Priority: Medium     Generalized anxiety disorder 08/21/2014     Priority: Medium     Class 2 severe obesity due to excess calories with serious comorbidity and body mass index (BMI) of 37.0 to 37.9 in adult (H)      Priority: Medium      Current Outpatient Medications   Medication     albuterol (PROAIR HFA/PROVENTIL HFA/VENTOLIN HFA) 108 (90 Base) MCG/ACT inhaler     cetirizine (ZYRTEC) 10 MG tablet     cyanocobalamin (VITAMIN B12) 1000 MCG/ML injection     inFLIXimab-dyyb (INFLECTRA) 100 MG injection     levonorgestrel-ethinyl estradiol (SEASONALE) 0.15-0.03 MG tablet     Semaglutide-Weight Management (WEGOVY) 2.4 MG/0.75ML SOAJ     sertraline (ZOLOFT) 100 MG tablet     traZODone (DESYREL) 50 MG tablet     zolpidem (AMBIEN) 5 MG tablet     No current facility-administered medications for this visit.     Ht 1.549 m (5' 1\")   Wt 87.1 kg (192 lb)   BMI 36.28 kg/m      Impression/Plan:  Snoring  - Negative for clinically significant Obstructive Sleep Apnea.   - Sleep associated hypoxemia was not present.  Insomnia, unspecified type  - zolpidem (AMBIEN) 5 MG tablet; Take 1 tablet (5 mg) by mouth nightly as needed for sleep  Dispense: 15 tablet; Refill: 3  -Patient instructed not to use " trazodone with zolpidem and zolpidem as needed use only.  Restless leg syndrome  - Ferritin; Future  - Iron and Iron Binding Capacity; Future  History of iron deficiency anemia  - Ferritin; Future  - Iron and Iron Binding Capacity; Future    She will follow up with me in about 3 month(s).  Lab results to be provided via Snipi message.    Forty minutes spent with patient, all of which were spent face-to-face counseling, consulting, chart review/documentation, and coordinating plan of care on the date of the encounter.      ISA Casey CNP  Sleep Medicine      CC: Ivett Sy     This note was written with the assistance of the Dragon voice-dictation technology software. The final document, although reviewed, may contain errors. For corrections, please contact the office.

## 2022-11-29 NOTE — PATIENT INSTRUCTIONS

## 2022-12-14 ENCOUNTER — TRANSFERRED RECORDS (OUTPATIENT)
Dept: HEALTH INFORMATION MANAGEMENT | Facility: CLINIC | Age: 47
End: 2022-12-14

## 2023-01-03 ENCOUNTER — TRANSFERRED RECORDS (OUTPATIENT)
Dept: HEALTH INFORMATION MANAGEMENT | Facility: CLINIC | Age: 48
End: 2023-01-03
Payer: COMMERCIAL

## 2023-01-26 SDOH — ECONOMIC STABILITY: INCOME INSECURITY: IN THE LAST 12 MONTHS, WAS THERE A TIME WHEN YOU WERE NOT ABLE TO PAY THE MORTGAGE OR RENT ON TIME?: NO

## 2023-01-26 SDOH — HEALTH STABILITY: PHYSICAL HEALTH: ON AVERAGE, HOW MANY MINUTES DO YOU ENGAGE IN EXERCISE AT THIS LEVEL?: 0 MIN

## 2023-01-26 SDOH — ECONOMIC STABILITY: INCOME INSECURITY: HOW HARD IS IT FOR YOU TO PAY FOR THE VERY BASICS LIKE FOOD, HOUSING, MEDICAL CARE, AND HEATING?: NOT HARD AT ALL

## 2023-01-26 SDOH — ECONOMIC STABILITY: FOOD INSECURITY: WITHIN THE PAST 12 MONTHS, THE FOOD YOU BOUGHT JUST DIDN'T LAST AND YOU DIDN'T HAVE MONEY TO GET MORE.: NEVER TRUE

## 2023-01-26 SDOH — ECONOMIC STABILITY: TRANSPORTATION INSECURITY
IN THE PAST 12 MONTHS, HAS THE LACK OF TRANSPORTATION KEPT YOU FROM MEDICAL APPOINTMENTS OR FROM GETTING MEDICATIONS?: NO

## 2023-01-26 SDOH — ECONOMIC STABILITY: FOOD INSECURITY: WITHIN THE PAST 12 MONTHS, YOU WORRIED THAT YOUR FOOD WOULD RUN OUT BEFORE YOU GOT MONEY TO BUY MORE.: NEVER TRUE

## 2023-01-26 SDOH — ECONOMIC STABILITY: TRANSPORTATION INSECURITY
IN THE PAST 12 MONTHS, HAS LACK OF TRANSPORTATION KEPT YOU FROM MEETINGS, WORK, OR FROM GETTING THINGS NEEDED FOR DAILY LIVING?: NO

## 2023-01-26 SDOH — HEALTH STABILITY: PHYSICAL HEALTH: ON AVERAGE, HOW MANY DAYS PER WEEK DO YOU ENGAGE IN MODERATE TO STRENUOUS EXERCISE (LIKE A BRISK WALK)?: 0 DAYS

## 2023-01-26 ASSESSMENT — LIFESTYLE VARIABLES
HOW OFTEN DO YOU HAVE A DRINK CONTAINING ALCOHOL: MONTHLY OR LESS
HOW OFTEN DO YOU HAVE SIX OR MORE DRINKS ON ONE OCCASION: LESS THAN MONTHLY
SKIP TO QUESTIONS 9-10: 0
HOW MANY STANDARD DRINKS CONTAINING ALCOHOL DO YOU HAVE ON A TYPICAL DAY: 3 OR 4
AUDIT-C TOTAL SCORE: 3

## 2023-01-26 ASSESSMENT — ENCOUNTER SYMPTOMS
HEARTBURN: 0
MYALGIAS: 0
ARTHRALGIAS: 0
BREAST MASS: 0
PARESTHESIAS: 0
DIARRHEA: 1
SORE THROAT: 0
SHORTNESS OF BREATH: 0
HEADACHES: 1
PALPITATIONS: 0
EYE PAIN: 0
NERVOUS/ANXIOUS: 0
CHILLS: 0
CONSTIPATION: 0
ABDOMINAL PAIN: 0
FREQUENCY: 0
COUGH: 0
JOINT SWELLING: 0
FEVER: 0
HEMATOCHEZIA: 0
WEAKNESS: 0
HEMATURIA: 0
DIZZINESS: 0
NAUSEA: 0
DYSURIA: 0

## 2023-01-26 ASSESSMENT — SOCIAL DETERMINANTS OF HEALTH (SDOH)
IN A TYPICAL WEEK, HOW MANY TIMES DO YOU TALK ON THE PHONE WITH FAMILY, FRIENDS, OR NEIGHBORS?: ONCE A WEEK
ARE YOU MARRIED, WIDOWED, DIVORCED, SEPARATED, NEVER MARRIED, OR LIVING WITH A PARTNER?: NEVER MARRIED
HOW OFTEN DO YOU GET TOGETHER WITH FRIENDS OR RELATIVES?: ONCE A WEEK
DO YOU BELONG TO ANY CLUBS OR ORGANIZATIONS SUCH AS CHURCH GROUPS UNIONS, FRATERNAL OR ATHLETIC GROUPS, OR SCHOOL GROUPS?: YES
HOW OFTEN DO YOU ATTEND CHURCH OR RELIGIOUS SERVICES?: MORE THAN 4 TIMES PER YEAR

## 2023-02-02 ENCOUNTER — OFFICE VISIT (OUTPATIENT)
Dept: PEDIATRICS | Facility: CLINIC | Age: 48
End: 2023-02-02
Payer: COMMERCIAL

## 2023-02-02 VITALS
WEIGHT: 192.5 LBS | BODY MASS INDEX: 35.43 KG/M2 | OXYGEN SATURATION: 97 % | RESPIRATION RATE: 12 BRPM | HEIGHT: 62 IN | HEART RATE: 71 BPM | DIASTOLIC BLOOD PRESSURE: 72 MMHG | SYSTOLIC BLOOD PRESSURE: 98 MMHG | TEMPERATURE: 98.8 F

## 2023-02-02 DIAGNOSIS — E66.01 MORBID OBESITY, UNSPECIFIED OBESITY TYPE (H): ICD-10-CM

## 2023-02-02 DIAGNOSIS — Z98.84 HISTORY OF ROUX-EN-Y GASTRIC BYPASS: ICD-10-CM

## 2023-02-02 DIAGNOSIS — Z71.84 TRAVEL ADVICE ENCOUNTER: ICD-10-CM

## 2023-02-02 DIAGNOSIS — F33.41 MAJOR DEPRESSIVE DISORDER, RECURRENT EPISODE, IN PARTIAL REMISSION (H): Chronic | ICD-10-CM

## 2023-02-02 DIAGNOSIS — F41.1 GENERALIZED ANXIETY DISORDER: Chronic | ICD-10-CM

## 2023-02-02 DIAGNOSIS — E66.812 CLASS 2 SEVERE OBESITY DUE TO EXCESS CALORIES WITH SERIOUS COMORBIDITY AND BODY MASS INDEX (BMI) OF 37.0 TO 37.9 IN ADULT (H): ICD-10-CM

## 2023-02-02 DIAGNOSIS — E66.01 CLASS 2 SEVERE OBESITY DUE TO EXCESS CALORIES WITH SERIOUS COMORBIDITY AND BODY MASS INDEX (BMI) OF 37.0 TO 37.9 IN ADULT (H): ICD-10-CM

## 2023-02-02 DIAGNOSIS — K50.119 CROHN'S DISEASE OF LARGE INTESTINE WITH COMPLICATION (H): ICD-10-CM

## 2023-02-02 DIAGNOSIS — Z86.2 HISTORY OF IRON DEFICIENCY ANEMIA: ICD-10-CM

## 2023-02-02 DIAGNOSIS — Z00.00 ROUTINE GENERAL MEDICAL EXAMINATION AT A HEALTH CARE FACILITY: Primary | ICD-10-CM

## 2023-02-02 DIAGNOSIS — Z11.59 NEED FOR HEPATITIS C SCREENING TEST: ICD-10-CM

## 2023-02-02 DIAGNOSIS — N92.0 EXCESSIVE OR FREQUENT MENSTRUATION: ICD-10-CM

## 2023-02-02 DIAGNOSIS — J45.20 MILD INTERMITTENT ASTHMA, UNSPECIFIED WHETHER COMPLICATED: ICD-10-CM

## 2023-02-02 DIAGNOSIS — G47.00 INSOMNIA, UNSPECIFIED TYPE: ICD-10-CM

## 2023-02-02 DIAGNOSIS — M54.12 CERVICAL RADICULOPATHY: ICD-10-CM

## 2023-02-02 DIAGNOSIS — M54.59 MECHANICAL LOW BACK PAIN: ICD-10-CM

## 2023-02-02 LAB
FERRITIN SERPL-MCNC: 269 NG/ML (ref 6–175)
IRON BINDING CAPACITY (ROCHE): 299 UG/DL (ref 240–430)
IRON SATN MFR SERPL: 22 % (ref 15–46)
IRON SERPL-MCNC: 66 UG/DL (ref 37–145)

## 2023-02-02 PROCEDURE — 99396 PREV VISIT EST AGE 40-64: CPT | Mod: 25 | Performed by: NURSE PRACTITIONER

## 2023-02-02 PROCEDURE — 86803 HEPATITIS C AB TEST: CPT | Performed by: NURSE PRACTITIONER

## 2023-02-02 PROCEDURE — 82728 ASSAY OF FERRITIN: CPT | Performed by: NURSE PRACTITIONER

## 2023-02-02 PROCEDURE — 90715 TDAP VACCINE 7 YRS/> IM: CPT | Performed by: NURSE PRACTITIONER

## 2023-02-02 PROCEDURE — 83540 ASSAY OF IRON: CPT | Performed by: NURSE PRACTITIONER

## 2023-02-02 PROCEDURE — 90471 IMMUNIZATION ADMIN: CPT | Performed by: NURSE PRACTITIONER

## 2023-02-02 PROCEDURE — 82306 VITAMIN D 25 HYDROXY: CPT | Performed by: NURSE PRACTITIONER

## 2023-02-02 PROCEDURE — 99214 OFFICE O/P EST MOD 30 MIN: CPT | Mod: 25 | Performed by: NURSE PRACTITIONER

## 2023-02-02 PROCEDURE — 83550 IRON BINDING TEST: CPT | Performed by: NURSE PRACTITIONER

## 2023-02-02 PROCEDURE — 36415 COLL VENOUS BLD VENIPUNCTURE: CPT | Performed by: NURSE PRACTITIONER

## 2023-02-02 RX ORDER — LEVONORGESTREL AND ETHINYL ESTRADIOL 0.15-0.03
1 KIT ORAL DAILY
Qty: 91 TABLET | Status: SHIPPED | OUTPATIENT
Start: 2023-02-02 | End: 2024-04-02

## 2023-02-02 RX ORDER — CIPROFLOXACIN 500 MG/1
500 TABLET, FILM COATED ORAL 2 TIMES DAILY
Qty: 20 TABLET | Refills: 0 | Status: SHIPPED | OUTPATIENT
Start: 2023-02-02 | End: 2023-02-12

## 2023-02-02 RX ORDER — PHENTERMINE HYDROCHLORIDE 37.5 MG/1
37.5 TABLET ORAL
Qty: 32 TABLET | Refills: 0 | Status: SHIPPED | OUTPATIENT
Start: 2023-02-02 | End: 2023-04-10 | Stop reason: DRUGHIGH

## 2023-02-02 RX ORDER — TRAZODONE HYDROCHLORIDE 50 MG/1
25-50 TABLET, FILM COATED ORAL
Qty: 90 TABLET | Refills: 1 | Status: SHIPPED | OUTPATIENT
Start: 2023-02-02 | End: 2024-06-13

## 2023-02-02 RX ORDER — SERTRALINE HYDROCHLORIDE 100 MG/1
100 TABLET, FILM COATED ORAL DAILY
Qty: 90 TABLET | Refills: 1 | Status: SHIPPED | OUTPATIENT
Start: 2023-02-02 | End: 2023-08-31

## 2023-02-02 ASSESSMENT — ENCOUNTER SYMPTOMS
SHORTNESS OF BREATH: 0
FREQUENCY: 0
CHILLS: 0
PALPITATIONS: 0
FEVER: 0
NERVOUS/ANXIOUS: 0
DYSURIA: 0
HEARTBURN: 0
CONSTIPATION: 0
NAUSEA: 0
HEADACHES: 1
JOINT SWELLING: 0
HEMATURIA: 0
BREAST MASS: 0
DIZZINESS: 0
PARESTHESIAS: 0
MYALGIAS: 0
COUGH: 0
WEAKNESS: 0
SORE THROAT: 0
HEMATOCHEZIA: 0
ARTHRALGIAS: 0
ABDOMINAL PAIN: 0
EYE PAIN: 0
DIARRHEA: 1

## 2023-02-02 ASSESSMENT — PATIENT HEALTH QUESTIONNAIRE - PHQ9
SUM OF ALL RESPONSES TO PHQ QUESTIONS 1-9: 6
10. IF YOU CHECKED OFF ANY PROBLEMS, HOW DIFFICULT HAVE THESE PROBLEMS MADE IT FOR YOU TO DO YOUR WORK, TAKE CARE OF THINGS AT HOME, OR GET ALONG WITH OTHER PEOPLE: SOMEWHAT DIFFICULT
SUM OF ALL RESPONSES TO PHQ QUESTIONS 1-9: 6

## 2023-02-02 ASSESSMENT — ASTHMA QUESTIONNAIRES: ACT_TOTALSCORE: 25

## 2023-02-02 ASSESSMENT — PAIN SCALES - GENERAL: PAINLEVEL: NO PAIN (0)

## 2023-02-02 NOTE — PROGRESS NOTES
SUBJECTIVE:   CC: Janelle is an 48 year old who presents for preventive health visit.     Patient has been advised of split billing requirements and indicates understanding: Yes  Healthy Habits:     Getting at least 3 servings of Calcium per day:  NO    Bi-annual eye exam:  Yes    Dental care twice a year:  Yes    Sleep apnea or symptoms of sleep apnea:  Daytime drowsiness    Diet:  Other    Frequency of exercise:  1 day/week    Duration of exercise:  N/A    Taking medications regularly:  Yes    Barriers to taking medications:  None    Medication side effects:  None    PHQ-2 Total Score: 1    Additional concerns today:  Yes  1) Has back pain, was referred to PT, doing home exercises. likley due to the hanging skin after weight loss. Has a follow-up appointment with wt loss clinic in spring (march). Has really focused on diet and moves more in summer. Had been on phenbtarmine in the past with se of dry mouth.     Rash of L arm described burning and started 4 days ago.     History of depression and anxiety and is on zoloft. Is on insomnia, is on ambien and trazodone. She had a sleep study which was normal. She'll fall asleep easily, comes in spurts. Feels tired.      [facility] Follow-up to PHQ 11/11/2021 4/29/2022 2/2/2023   PHQ-9 9. Suicide Ideation past 2 weeks Not at all Not at all Not at all     PHQ 11/11/2021 4/29/2022 2/2/2023   PHQ-9 Total Score 4 1 6   Q9: Thoughts of better off dead/self-harm past 2 weeks Not at all Not at all Not at all     Is traveling to [place] next wk.    Today's PHQ-2 Score:   PHQ-2 ( 1999 Pfizer) 1/26/2023   Q1: Little interest or pleasure in doing things 1   Q2: Feeling down, depressed or hopeless 0   PHQ-2 Score 1   PHQ-2 Total Score (12-17 Years)- Positive if 3 or more points; Administer PHQ-A if positive -   Q1: Little interest or pleasure in doing things Several days   Q2: Feeling down, depressed or hopeless Not at all   PHQ-2 Score 1     History of asthma, intermittent, well  contorlled.   ACT Total Scores 2022   ACT TOTAL SCORE - - -   ASTHMA ER VISITS - - -   ASTHMA HOSPITALIZATIONS - - -   ACT TOTAL SCORE (Goal Greater than or Equal to 20) 25 25 25   In the past 12 months, how many times did you visit the emergency room for your asthma without being admitted to the hospital? 0 0 0   In the past 12 months, how many times were you hospitalized overnight because of your asthma? 0 0 0           Social History     Tobacco Use     Smoking status: Former     Packs/day: 0.20     Years: 10.00     Pack years: 2.00     Types: Cigarettes     Start date: 2001     Quit date: 2018     Years since quittin.2     Smokeless tobacco: Never   Substance Use Topics     Alcohol use: Yes     Alcohol/week: 0.0 standard drinks     Comment: very very very rarely     If you drink alcohol do you typically have >3 drinks per day or >7 drinks per week? No    Alcohol Use 2023   Prescreen: >3 drinks/day or >7 drinks/week? -   Prescreen: >3 drinks/day or >7 drinks/week? No       Reviewed orders with patient.  Reviewed health maintenance and updated orders accordingly - Yes  Lab work is in process    Breast Cancer Screening:    FHS-7:   Breast CA Risk Assessment (FHS-7) 2021 3/17/2022 8/10/2022 8/10/2022 2023   Did any of your first-degree relatives have breast or ovarian cancer? Yes Yes Yes Yes Yes   Did any of your relatives have bilateral breast cancer? No Unknown No No No   Did any man in your family have breast cancer? No No No No No   Did any woman in your family have breast and ovarian cancer? No No No No Yes   Did any woman in your family have breast cancer before age 50 y? No No No No No   Do you have 2 or more relatives with breast and/or ovarian cancer? No Yes No No Yes   Do you have 2 or more relatives with breast and/or bowel cancer? No Yes Yes Yes Yes     Mammogram Screening: Recommended annual mammography  Pertinent mammograms are reviewed under the  "imaging tab.    History of abnormal Pap smear: NO - age 30-65 PAP every 5 years with negative HPV co-testing recommended  PAP / HPV Latest Ref Rng & Units 10/7/2020 10/13/2015 4/15/2014   PAP (Historical) - NIL NIL NIL   HPV16 NEG:Negative Negative Negative -   HPV18 NEG:Negative Negative Negative -   HRHPV NEG:Negative Negative Negative -     Reviewed and updated as needed this visit by clinical staff   Tobacco  Allergies  Meds              Reviewed and updated as needed this visit by Provider     Meds                 Review of Systems   Constitutional: Negative for chills and fever.   HENT: Negative for congestion, ear pain, hearing loss and sore throat.    Eyes: Positive for visual disturbance. Negative for pain.   Respiratory: Negative for cough and shortness of breath.    Cardiovascular: Negative for chest pain, palpitations and peripheral edema.   Gastrointestinal: Positive for diarrhea. Negative for abdominal pain, constipation, heartburn, hematochezia and nausea.   Breasts:  Negative for tenderness, breast mass and discharge.   Genitourinary: Negative for dysuria, frequency, genital sores, hematuria, pelvic pain, urgency, vaginal bleeding and vaginal discharge.   Musculoskeletal: Negative for arthralgias, joint swelling and myalgias.   Skin: Negative for rash.   Neurological: Positive for headaches. Negative for dizziness, weakness and paresthesias.   Psychiatric/Behavioral: Positive for mood changes. The patient is not nervous/anxious.         OBJECTIVE:   BP 98/72 (BP Location: Right arm, Patient Position: Chair, Cuff Size: Child)   Pulse 71   Temp 98.8  F (37.1  C) (Tympanic)   Resp 12   Ht 1.562 m (5' 1.5\")   Wt 87.3 kg (192 lb 8 oz)   LMP 01/05/2023 (Approximate)   SpO2 97%   BMI 35.78 kg/m    Physical Exam  GENERAL: healthy, alert and no distress  EYES: Eyes grossly normal to inspection, PERRL and conjunctivae and sclerae normal  HENT: ear canals and TM's normal, nose and mouth without " ulcers or lesions  NECK: no adenopathy, no asymmetry, masses, or scars and thyroid normal to palpation  RESP: lungs clear to auscultation - no rales, rhonchi or wheezes  CV: regular rate and rhythm, normal S1 S2, no S3 or S4, no murmur, click or rub, no peripheral edema and peripheral pulses strong  ABDOMEN: soft, nontender, no hepatosplenomegaly, no masses and bowel sounds normal  MS: no gross musculoskeletal defects noted, no edema  PSYCH: mentation appears normal, affect normal/bright      ASSESSMENT/PLAN:   (Z00.00) Routine general medical examination at a health care facility  (primary encounter diagnosis)  Comment:   Plan: Vitamin D Deficiency          (N92.0) Excessive or frequent menstruation  Comment: stable on ocp  Plan: levonorgestrel-ethinyl estradiol (SEASONALE)         0.15-0.03 MG tablet          (F41.1) Generalized anxiety disorder  Comment: mood has overall been good. She still suffers tiredness during he day with negative work up from sleep. Will re-add phentarmine to help with stgnation of her wt loss and could also help with her tiredness. Also disucssed abdominoplasty and she will explore insurance coverage if it's an option  Plan: sertraline (ZOLOFT) 100 MG tablet, traZODone         (DESYREL) 50 MG tablet          (Z11.59) Need for hepatitis C screening test  Comment:   Plan: Hepatitis C Screen Reflex to HCV RNA Quant and         Genotype          (E66.01,  Z68.37) Class 2 severe obesity due to excess calories with serious comorbidity and body mass index (BMI) of 37.0 to 37.9 in adult (H)  Comment: she does have follow-up appointment with obesity med in spring, will consider abdominoplasty, start phentarmine, which has helped her in the past with weight loss, along with diet and exercise. Will do evisit follow-up in 1 mo  Plan:     (J45.20) Mild intermittent asthma, unspecified whether complicated  Comment: stable  Plan:     (F33.41) Major depressive disorder, recurrent episode, in partial  "remission (H)  Comment: stable on current regime, has problems with energy and hypersomnia.   Plan:     (K50.119) Crohn's disease of large intestine with complication (H)  Comment: has follow-up with gi, no concerns at this time  Plan:     (G47.00) Insomnia, unspecified type  Comment: stable  Plan:     (Z71.84) Travel advice encounter  Comment: going to [place], will prescription cipro for potential traverls diarrhea.  Plan: ciprofloxacin (CIPRO) 500 MG tablet          (M54.12) Cervical radiculopathy  Comment: Worsened with excess skin, did see PT and doing PT exercises. Encouraged going back  Plan:     (M54.59) Mechanical low back pain  Comment: doing PT exercises  Plan:     (Z86.2) History of iron deficiency anemia  Comment: will recheck levels today  Plan:     (E66.01) Morbid obesity, unspecified obesity type (H)  Comment: per above  Plan: phentermine (ADIPEX-P) 37.5 MG tablet          (Z68.42) BMI 45.0-49.9, adult (H)  Comment: per above  Plan: phentermine (ADIPEX-P) 37.5 MG tablet          (Z98.84) History of Heraclio-en-Y gastric bypass  Comment: per above  Plan: phentermine (ADIPEX-P) 37.5 MG tablet      COUNSELING:  Reviewed preventive health counseling, as reflected in patient instructions  Special attention given to:        Regular exercise       Healthy diet/nutrition      BMI:   Estimated body mass index is 35.78 kg/m  as calculated from the following:    Height as of this encounter: 1.562 m (5' 1.5\").    Weight as of this encounter: 87.3 kg (192 lb 8 oz).   Weight management plan: Discussed healthy diet and exercise guidelines      She reports that she quit smoking about 4 years ago. Her smoking use included cigarettes. She started smoking about 22 years ago. She has a 2.00 pack-year smoking history. She has never used smokeless tobacco.          Ivett Segura, ISA [facility] SUSAN  Answers for HPI/ROS submitted by the patient on 2/2/2023  If you checked off any problems, how " difficult have these problems made it for you to do your work, take care of things at home, or get along with other people?: Somewhat difficult  PHQ9 TOTAL SCORE: 6

## 2023-02-02 NOTE — PATIENT INSTRUCTIONS
Let's restart the phentamine to help with the wt loss plateau and the tiredness. Do an evisit in a month to let me know how that's going.       Preventive Health Recommendations  Female Ages 40 to 49    Yearly exam:   See your health care provider every year in order to  Review health changes.   Discuss preventive care.    Review your medicines if your doctor prescribed any.    Get a Pap test every three years (unless you have an abnormal result and your provider advises testing more often).    If you get Pap tests with HPV test, you only need to test every 5 years, unless you have an abnormal result. You do not need a Pap test if your uterus was removed (hysterectomy) and you have not had cancer.    You should be tested each year for STDs (sexually transmitted diseases), if you're at risk.   Ask your doctor if you should have a mammogram.    Have a colonoscopy (test for colon cancer) if someone in your family has had colon cancer or polyps before age 50.     Have a cholesterol test every 5 years.     Have a diabetes test (fasting glucose) after age 45. If you are at risk for diabetes, you should have this test every 3 years.    Shots: Get a flu shot each year. Get a tetanus shot every 10 years.     Nutrition:   Eat at least 5 servings of fruits and vegetables each day.  Eat whole-grain bread, whole-wheat pasta and brown rice instead of white grains and rice.  Get adequate Calcium and Vitamin D.      Lifestyle  Exercise at least 150 minutes a week (an average of 30 minutes a day, 5 days a week). This will help you control your weight and prevent disease.  Limit alcohol to one drink per day.  No smoking.   Wear sunscreen to prevent skin cancer.  See your dentist every six months for an exam and cleaning.

## 2023-02-03 LAB
DEPRECATED CALCIDIOL+CALCIFEROL SERPL-MC: 24 UG/L (ref 20–75)
HCV AB SERPL QL IA: NONREACTIVE

## 2023-02-06 ENCOUNTER — TRANSFERRED RECORDS (OUTPATIENT)
Dept: HEALTH INFORMATION MANAGEMENT | Facility: CLINIC | Age: 48
End: 2023-02-06

## 2023-02-20 ENCOUNTER — VIRTUAL VISIT (OUTPATIENT)
Dept: SURGERY | Facility: CLINIC | Age: 48
End: 2023-02-20
Payer: COMMERCIAL

## 2023-02-20 DIAGNOSIS — E66.812 CLASS 2 OBESITY DUE TO EXCESS CALORIES WITHOUT SERIOUS COMORBIDITY WITH BODY MASS INDEX (BMI) OF 35.0 TO 35.9 IN ADULT: ICD-10-CM

## 2023-02-20 DIAGNOSIS — Z98.84 HISTORY OF ROUX-EN-Y GASTRIC BYPASS: Primary | ICD-10-CM

## 2023-02-20 DIAGNOSIS — Z71.3 NUTRITIONAL COUNSELING: ICD-10-CM

## 2023-02-20 DIAGNOSIS — E66.09 CLASS 2 OBESITY DUE TO EXCESS CALORIES WITHOUT SERIOUS COMORBIDITY WITH BODY MASS INDEX (BMI) OF 35.0 TO 35.9 IN ADULT: ICD-10-CM

## 2023-02-20 PROCEDURE — 97803 MED NUTRITION INDIV SUBSEQ: CPT | Mod: VID | Performed by: DIETITIAN, REGISTERED

## 2023-02-20 NOTE — PROGRESS NOTES
Janelle Coelho is a 48 year old who is being evaluated via a billable video visit.      How would you like to obtain your AVS? MyChart  If the video visit is dropped, the invitation should be resent by: Send to e-mail at: [email or web address]  Will anyone else be joining your video visit? No        Medical  Weight Loss Follow-Up Diet Evaluation  Assessment:  Janelle is presenting today for a follow up weight management nutrition consultation. Pt has had an initial appointment with Dr. Baig  Weight loss medication: Wegovy. Phentermine  Pt's weight is 190 lbs  Initial weight: 275 lbs  Weight change: 85 lbs (down)    No flowsheet data found.  BMI: There is no height or weight on file to calculate BMI.  Ideal body weight: 48.9 kg (107 lb 14.6 oz)  Adjusted ideal body weight: 64.3 kg (141 lb 12 oz)    Estimated RMR (Freestone-St Casanova equation):   1441 kcals x 1.3 (light active) = 1873 kcals (for weight maintenance)     Recommended Protein Intake: 60-80 grams of protein/day  Patient Active Problem List:  Patient Active Problem List   Diagnosis     Class 2 severe obesity due to excess calories with serious comorbidity and body mass index (BMI) of 37.0 to 37.9 in adult (H)     Generalized anxiety disorder     Major depressive disorder, recurrent episode, in partial remission (H)     Mild intermittent asthma, unspecified whether complicated     History of Heraclio-en-Y gastric bypass     Insomnia, unspecified type     Family history of breast cancer in mother     Vitamin D deficiency     Crohn's disease of large intestine with complication (H)     Crohn's disease of perianal region (H)     Mechanical low back pain     Poor posture     Cervical radiculopathy     Progress on goals from last visit: Patient reports that she continues to follow a high protein, low carbohydrate diet to help manage her weight along with intermittent fasting.  Patient reports that she will occasionally have a protein shake in the afternoon, which she  notices help with appetite control and is thinking about doing it more routinely.  Patient reports that she continues to work on increased water consumption, noting that she knows she could do better.  Patient reports that she is working on being consistent with taking a MVI daily along with Calcium supplementation.     Exercise: no set regimen-plans on re-starting an exercise regimen this coming month now that she is back from vacation.     Nutrition Diagnosis:    Overweight/Obesity (NC 3.3) related to overeating and poor lifestyle habits as evidenced by patient's subjective statements (h/o excessive energy intake, lack of exercise) and BMI of 35.4 kg/m2.     Intervention:  1. Food and/or nutrient delivery: balanced meals, adequate protein   2. Nutrition education: none  3. Nutrition counseling: provided encouragement and support in regards to nutrition and exercise.    Monitoring/Evaluation:    Goals:  1. Work on establishing an exercise regimen.   2. Continue to focus on protein first at each meal, aiming for 60-80 grams of protein/day.     Patient to follow up in 1 month(s) with bariatrician and 2 month(s) with TITO      Video-Visit Details    Type of service:  Video Visit    Video Start Time (time video started): 9:53 am     Video End Time (time video stopped): 10:00 am     Originating Location (pt. Location): Home        Distant Location (provider location):  Off-site    Mode of Communication:  Video Conference via [facility]    Physician has received verbal consent for a Video Visit from the patient? Yes      Ashley Garcia RD

## 2023-02-20 NOTE — LETTER
2/20/2023         RE: Janelle Coelho  [address] [place] [address]        Dear Colleague,    Thank you for referring your patient, Janelle Coelho, to the [facility] [place]. Please see a copy of my visit note below.    Janelle Coelho is a 48 year old who is being evaluated via a billable video visit.      How would you like to obtain your AVS? MyChart  If the video visit is dropped, the invitation should be resent by: Send to e-mail at: [email or web address]  Will anyone else be joining your video visit? No        Medical  Weight Loss Follow-Up Diet Evaluation  Assessment:  Janelle is presenting today for a follow up weight management nutrition consultation. Pt has had an initial appointment with Dr. Baig  Weight loss medication: Wegovy. Phentermine  Pt's weight is 190 lbs  Initial weight: 275 lbs  Weight change: 85 lbs (down)    No flowsheet data found.  BMI: There is no height or weight on file to calculate BMI.  Ideal body weight: 48.9 kg (107 lb 14.6 oz)  Adjusted ideal body weight: 64.3 kg (141 lb 12 oz)    Estimated RMR (Gonzales-St Jeor equation):   1441 kcals x 1.3 (light active) = 1873 kcals (for weight maintenance)     Recommended Protein Intake: 60-80 grams of protein/day  Patient Active Problem List:  Patient Active Problem List   Diagnosis     Class 2 severe obesity due to excess calories with serious comorbidity and body mass index (BMI) of 37.0 to 37.9 in adult (H)     Generalized anxiety disorder     Major depressive disorder, recurrent episode, in partial remission (H)     Mild intermittent asthma, unspecified whether complicated     History of Heraclio-en-Y gastric bypass     Insomnia, unspecified type     Family history of breast cancer in mother     Vitamin D deficiency     Crohn's disease of large intestine with complication (H)     Crohn's disease of perianal region (H)     Mechanical low back pain     Poor posture     Cervical  radiculopathy     Progress on goals from last visit: Patient reports that she continues to follow a high protein, low carbohydrate diet to help manage her weight along with intermittent fasting.  Patient reports that she will occasionally have a protein shake in the afternoon, which she notices help with appetite control and is thinking about doing it more routinely.  Patient reports that she continues to work on increased water consumption, noting that she knows she could do better.  Patient reports that she is working on being consistent with taking a MVI daily along with Calcium supplementation.     Exercise: no set regimen-plans on re-starting an exercise regimen this coming month now that she is back from vacation.     Nutrition Diagnosis:    Overweight/Obesity (NC 3.3) related to overeating and poor lifestyle habits as evidenced by patient's subjective statements (h/o excessive energy intake, lack of exercise) and BMI of 35.4 kg/m2.     Intervention:  1. Food and/or nutrient delivery: balanced meals, adequate protein   2. Nutrition education: none  3. Nutrition counseling: provided encouragement and support in regards to nutrition and exercise.    Monitoring/Evaluation:    Goals:  1. Work on establishing an exercise regimen.   2. Continue to focus on protein first at each meal, aiming for 60-80 grams of protein/day.     Patient to follow up in 1 month(s) with bariatrician and 2 month(s) with RD      Video-Visit Details    Type of service:  Video Visit    Video Start Time (time video started): 9:53 am     Video End Time (time video stopped): 10:00 am     Originating Location (pt. Location): Home        Distant Location (provider location):  Off-site    Mode of Communication:  Video Conference via [facility]    Physician has received verbal consent for a Video Visit from the patient? Yes      Ashley Garcia RD            Again, thank you for allowing me to participate in the care of your patient.         Sincerely,        Ashley Garcia, RD

## 2023-03-01 ASSESSMENT — ASTHMA QUESTIONNAIRES
ACT_TOTALSCORE: 25
QUESTION_4 LAST FOUR WEEKS HOW OFTEN HAVE YOU USED YOUR RESCUE INHALER OR NEBULIZER MEDICATION (SUCH AS ALBUTEROL): NOT AT ALL
QUESTION_5 LAST FOUR WEEKS HOW WOULD YOU RATE YOUR ASTHMA CONTROL: COMPLETELY CONTROLLED
QUESTION_2 LAST FOUR WEEKS HOW OFTEN HAVE YOU HAD SHORTNESS OF BREATH: NOT AT ALL
ACT_TOTALSCORE: 25
QUESTION_1 LAST FOUR WEEKS HOW MUCH OF THE TIME DID YOUR ASTHMA KEEP YOU FROM GETTING AS MUCH DONE AT WORK, SCHOOL OR AT HOME: NONE OF THE TIME
QUESTION_3 LAST FOUR WEEKS HOW OFTEN DID YOUR ASTHMA SYMPTOMS (WHEEZING, COUGHING, SHORTNESS OF BREATH, CHEST TIGHTNESS OR PAIN) WAKE YOU UP AT NIGHT OR EARLIER THAN USUAL IN THE MORNING: NOT AT ALL

## 2023-03-03 ENCOUNTER — TRANSFERRED RECORDS (OUTPATIENT)
Dept: HEALTH INFORMATION MANAGEMENT | Facility: CLINIC | Age: 48
End: 2023-03-03

## 2023-03-03 ENCOUNTER — OFFICE VISIT (OUTPATIENT)
Dept: FAMILY MEDICINE | Facility: CLINIC | Age: 48
End: 2023-03-03
Attending: FAMILY MEDICINE
Payer: COMMERCIAL

## 2023-03-03 VITALS
SYSTOLIC BLOOD PRESSURE: 109 MMHG | DIASTOLIC BLOOD PRESSURE: 78 MMHG | TEMPERATURE: 98.4 F | BODY MASS INDEX: 34.6 KG/M2 | WEIGHT: 188 LBS | HEART RATE: 74 BPM | HEIGHT: 62 IN | OXYGEN SATURATION: 99 % | RESPIRATION RATE: 16 BRPM

## 2023-03-03 DIAGNOSIS — Z98.84 HISTORY OF ROUX-EN-Y GASTRIC BYPASS: ICD-10-CM

## 2023-03-03 DIAGNOSIS — E66.09 CLASS 1 OBESITY DUE TO EXCESS CALORIES WITH SERIOUS COMORBIDITY AND BODY MASS INDEX (BMI) OF 34.0 TO 34.9 IN ADULT: Primary | ICD-10-CM

## 2023-03-03 DIAGNOSIS — E66.811 CLASS 1 OBESITY DUE TO EXCESS CALORIES WITH SERIOUS COMORBIDITY AND BODY MASS INDEX (BMI) OF 34.0 TO 34.9 IN ADULT: Primary | ICD-10-CM

## 2023-03-03 DIAGNOSIS — E55.9 VITAMIN D DEFICIENCY: ICD-10-CM

## 2023-03-03 LAB
ALT SERPL-CCNC: 11 IU/L (ref 0–32)
AST SERPL-CCNC: 14 IU/L (ref 0–40)

## 2023-03-03 PROCEDURE — 99214 OFFICE O/P EST MOD 30 MIN: CPT | Performed by: FAMILY MEDICINE

## 2023-03-03 ASSESSMENT — PATIENT HEALTH QUESTIONNAIRE - PHQ9
SUM OF ALL RESPONSES TO PHQ QUESTIONS 1-9: 6
SUM OF ALL RESPONSES TO PHQ QUESTIONS 1-9: 6
10. IF YOU CHECKED OFF ANY PROBLEMS, HOW DIFFICULT HAVE THESE PROBLEMS MADE IT FOR YOU TO DO YOUR WORK, TAKE CARE OF THINGS AT HOME, OR GET ALONG WITH OTHER PEOPLE: SOMEWHAT DIFFICULT

## 2023-03-03 ASSESSMENT — ENCOUNTER SYMPTOMS: CONSTITUTIONAL NEGATIVE: 1

## 2023-03-03 NOTE — PROGRESS NOTES
Problem List Items Addressed This Visit     Class 1 obesity due to excess calories with serious comorbidity and body mass index (BMI) of 34.0 to 34.9 in adult - Primary     48 year old year old female in clinic today to discuss treatment of the following conditions through diet and lifestyle modification and weight loss:  1. Class 1 obesity due to excess calories with serious comorbidity and body mass index (BMI) of 34.0 to 34.9 in adult    2. Vitamin D deficiency    3. History of Heraclio-en-Y gastric bypass      The patient's weight loss result since the last visit was successful based on weight loss and maintenance she is describing some symptoms of increased hunger in the afternoon.  We did a 24-hour nutrition review and I am concerned that she is actually under eating.  In some ways, it does not surprise me that she is hungry in the afternoon given the minimal amount of intake earlier in the day.  We discussed adequate protein.  I challenged her to consume 90 g/day.  I am not opposed to phentermine but I wonder if she might get a similar effect with 8 mg of phentermine in the mid afternoon.  The Lomaira formulation of phentermine was sent to the pharmacy.  In addition, we will continue semaglutide at 2.4 mg/week.  Of asked this patient to come back in 3 to 6 months.  Overall, I believe she has done well.  Her achievements while on semaglutide are as follows:  - Charting weight from July-September - September 2021 277 pounds.  - Weight today: 188 lbs  -  Down 32% since starting wegovy.           Relevant Medications    phentermine (LOMAIRA) 8 MG tablet    History of Heraclio-en-Y gastric bypass    Relevant Medications    phentermine (LOMAIRA) 8 MG tablet    Vitamin D deficiency    Relevant Medications    phentermine (LOMAIRA) 8 MG tablet          Galileo Luis is a 48 year old who presents for the following health issues   Chief Complaint   Patient presents with     Weight Loss     Follow-up        Patient  "presents for treatment of chronic, comorbid conditions using intensive therapeutic lifestyle interventions including nutrition, physical activity, and behavior management.   - successes: maintained weight.     - struggles: tired today. New infusion this morning. Hungry at dinner time.  Dinner is around 5pm.   - exercise plan: works at desk.     - tracking/journaling: ad garcía.  \"eggwhiches\" for breakfast.  Lunch is low carb tortillas with meat and cheese sprinkles.  2 of these.    - PCP added phentermine tablets 37.5 mg.  Suppressed appetite.        History of Present Illness       Reason for visit:  Follow Up    She eats 0-1 servings of fruits and vegetables daily.She consumes 1 sweetened beverage(s) daily.She exercises with enough effort to increase her heart rate 9 or less minutes per day.  She exercises with enough effort to increase her heart rate 3 or less days per week. She is missing 1 dose(s) of medications per week.  She is not taking prescribed medications regularly due to remembering to take.    Today's PHQ-9         PHQ-9 Total Score: 6    PHQ-9 Q9 Thoughts of better off dead/self-harm past 2 weeks :   Not at all    How difficult have these problems made it for you to do your work, take care of things at home, or get along with other people: Somewhat difficult       Review of Systems   Constitutional: Negative.    All other systems reviewed and are negative.           Objective    /78 (BP Location: Left arm, Patient Position: Sitting, Cuff Size: Adult Large)   Pulse 74   Temp 98.4  F (36.9  C) (Oral)   Resp 16   Ht 1.562 m (5' 1.5\")   Wt 85.3 kg (188 lb)   LMP  (LMP Unknown)   SpO2 99%   BMI 34.95 kg/m    Body mass index is 34.95 kg/m .  Physical Exam  Nursing note reviewed.   Constitutional:       General: She is not in acute distress.     Appearance: Normal appearance. She is not ill-appearing.   HENT:      Head: Normocephalic and atraumatic.   Eyes:      Extraocular Movements: Extraocular " movements intact.      Conjunctiva/sclera: Conjunctivae normal.   Pulmonary:      Effort: Pulmonary effort is normal.   Neurological:      Mental Status: She is alert and oriented to person, place, and time.   Psychiatric:         Attention and Perception: Attention normal.         Mood and Affect: Mood normal.         Speech: Speech normal.         Thought Content: Thought content normal.               This note has been dictated using voice recognition software. Any grammatical or context distortions are unintentional and inherent to the software

## 2023-03-03 NOTE — ASSESSMENT & PLAN NOTE
48 year old year old female in clinic today to discuss treatment of the following conditions through diet and lifestyle modification and weight loss:  1. Class 1 obesity due to excess calories with serious comorbidity and body mass index (BMI) of 34.0 to 34.9 in adult    2. Vitamin D deficiency    3. History of Heraclio-en-Y gastric bypass      The patient's weight loss result since the last visit was successful based on weight loss and maintenance she is describing some symptoms of increased hunger in the afternoon.  We did a 24-hour nutrition review and I am concerned that she is actually under eating.  In some ways, it does not surprise me that she is hungry in the afternoon given the minimal amount of intake earlier in the day.  We discussed adequate protein.  I challenged her to consume 90 g/day.  I am not opposed to phentermine but I wonder if she might get a similar effect with 8 mg of phentermine in the mid afternoon.  The Lomaira formulation of phentermine was sent to the pharmacy.  In addition, we will continue semaglutide at 2.4 mg/week.  Of asked this patient to come back in 3 to 6 months.  Overall, I believe she has done well.  Her achievements while on semaglutide are as follows:  - Charting weight from July-September - September 2021 277 pounds.  - Weight today: 188 lbs  -  Down 32% since starting wegovy.

## 2023-03-08 ENCOUNTER — TRANSFERRED RECORDS (OUTPATIENT)
Dept: HEALTH INFORMATION MANAGEMENT | Facility: CLINIC | Age: 48
End: 2023-03-08

## 2023-04-03 ENCOUNTER — TRANSFERRED RECORDS (OUTPATIENT)
Dept: HEALTH INFORMATION MANAGEMENT | Facility: CLINIC | Age: 48
End: 2023-04-03
Payer: COMMERCIAL

## 2023-04-10 ENCOUNTER — OFFICE VISIT (OUTPATIENT)
Dept: FAMILY MEDICINE | Facility: CLINIC | Age: 48
End: 2023-04-10
Payer: COMMERCIAL

## 2023-04-10 VITALS
DIASTOLIC BLOOD PRESSURE: 72 MMHG | HEIGHT: 61 IN | OXYGEN SATURATION: 97 % | TEMPERATURE: 98.8 F | BODY MASS INDEX: 36.63 KG/M2 | RESPIRATION RATE: 15 BRPM | HEART RATE: 75 BPM | WEIGHT: 194 LBS | SYSTOLIC BLOOD PRESSURE: 108 MMHG

## 2023-04-10 DIAGNOSIS — Z01.818 PREOP GENERAL PHYSICAL EXAM: Primary | ICD-10-CM

## 2023-04-10 LAB — HGB BLD-MCNC: 11.8 G/DL (ref 11.7–15.7)

## 2023-04-10 PROCEDURE — 36415 COLL VENOUS BLD VENIPUNCTURE: CPT | Performed by: NURSE PRACTITIONER

## 2023-04-10 PROCEDURE — 85018 HEMOGLOBIN: CPT | Performed by: NURSE PRACTITIONER

## 2023-04-10 PROCEDURE — 99214 OFFICE O/P EST MOD 30 MIN: CPT | Performed by: NURSE PRACTITIONER

## 2023-04-10 ASSESSMENT — PATIENT HEALTH QUESTIONNAIRE - PHQ9
10. IF YOU CHECKED OFF ANY PROBLEMS, HOW DIFFICULT HAVE THESE PROBLEMS MADE IT FOR YOU TO DO YOUR WORK, TAKE CARE OF THINGS AT HOME, OR GET ALONG WITH OTHER PEOPLE: NOT DIFFICULT AT ALL
SUM OF ALL RESPONSES TO PHQ QUESTIONS 1-9: 3
SUM OF ALL RESPONSES TO PHQ QUESTIONS 1-9: 3

## 2023-04-10 ASSESSMENT — PAIN SCALES - GENERAL: PAINLEVEL: NO PAIN (0)

## 2023-04-10 NOTE — PROGRESS NOTES
[facility]  [address] [facility] [address]  Phone: [phone]  Primary Provider: Ivett Sy  Pre-op Performing Provider: SHARON SANTIAGO      PREOPERATIVE EVALUATION:  Today's date: 4/10/2023    Janelle Coelho is a 48 year old female who presents for a preoperative evaluation.      4/10/2023     3:52 PM   Additional Questions   Roomed by Flores VALERO LPN     Surgical Information:  Surgery/Procedure:   Anorectal sugery    Surgery Location: [facility]  ([address] [place], [place] [address]. [address]  [address] [facility] [address])   Surgeon: Shane Ramey MD   Surgery Date: 4/12/2023  Time of Surgery: 1:25 pm  Where patient plans to recover: At home with family  Fax number for surgical facility: [phone]     Assessment & Plan     The proposed surgical procedure is considered INTERMEDIATE risk.      ICD-10-CM    1. Preop general physical exam  Z01.818 Hemoglobin     Hemoglobin            Risks and Recommendations:  The patient has the following additional risks and recommendations for perioperative complications:  Anemia/Bleeding/Clotting:    - Anemia and does not require treatment prior to surgery. Monitor hemoglobin postoperatively    Medication Instructions:  Phentermine is on hold, pt to continue to hold until after surgery   - GLP-1 Injectable (exenitide, liraglutide, semaglutide, dulaglutide, etc.): HOLD day of surgery  Hold weekly semaglutide until day after surgery    RECOMMENDATION:  APPROVAL GIVEN to proceed with proposed procedure, without further diagnostic evaluation.  [id number]}    Subjective     HPI related to upcoming procedure: Crohn's disease of perianal region with fistula formation.  Fistulas present since 2018.  Needs procedure to better evaluate fistula status.        4/6/2023    11:54 AM   Preop Questions   1. Have you ever had a heart attack or stroke? No   2. Have you ever had surgery on your heart or blood vessels, such as a stent placement, a coronary  artery bypass, or surgery on an artery in your head, neck, heart, or legs? No   3. Do you have chest pain with activity? No   4. Do you have a history of  heart failure? No   5. Do you currently have a cold, bronchitis or symptoms of other infection? No   6. Do you have a cough, shortness of breath, or wheezing? No   7. Do you or anyone in your family have previous history of blood clots? YES - Family hx  Mother DVT post op mastectomy   8. Do you or does anyone in your family have a serious bleeding problem such as prolonged bleeding following surgeries or cuts? No   9. Have you ever had problems with anemia or been told to take iron pills? YES - previous hx but has been normal for the past 18 months   10. Have you had any abnormal blood loss such as black, tarry or bloody stools, or abnormal vaginal bleeding? No   11. Have you ever had a blood transfusion? YES - 10 years ago related to iron deficiency anemia   11a. Have you ever had a transfusion reaction? No   12. Are you willing to have a blood transfusion if it is medically needed before, during, or after your surgery? Yes   13. Have you or any of your relatives ever had problems with anesthesia? No   14. Do you have sleep apnea, excessive snoring or daytime drowsiness? No   15. Do you have any artifical heart valves or other implanted medical devices like a pacemaker, defibrillator, or continuous glucose monitor? No   16. Do you have artificial joints? No   17. Are you allergic to latex? No   18. Is there any chance that you may be pregnant? No       Health Care Directive:  Patient does not have a Health Care Directive or Living Will: Discussed advance care planning with patient; however, patient declined at this time.    Preoperative Review of :   reviewed - controlled substances reflected in medication list.      Status of Chronic Conditions:  ANEMIA - Patient has a  history of moderate-severe anemia, which has not been symptomatic. Work up to date has  revealed iron deficiency anemia. Treatment has been helpful and has had normal Hgb for the last 18 months.     ASTHMA - Patient has a longstanding history of mild intermittent  Asthma . Patient has been doing well overall noting NO SYMPTOMS and continues on medication regimen consisting of prn albuterol inhaler without adverse reactions or side effects.     SLEEP PROBLEM - Patient has a longstanding history of insomnia with restless legs symptoms.. Patient has tried ambien 5 mg and trazodone 25-50 mg as needed for sleep with success.         Review of Systems  CONSTITUTIONAL: NEGATIVE for fever, chills, change in weight  CONSTITUTIONAL:is working on weight loss with wegovy and phentermine with weight loss specialist  INTEGUMENTARY/SKIN: NEGATIVE for worrisome rashes, moles or lesions  EYES: NEGATIVE for vision changes or irritation  ENT/MOUTH: NEGATIVE for ear, mouth and throat problems  RESP: NEGATIVE for significant cough or SOB  CV: NEGATIVE for chest pain, palpitations or peripheral edema  GI: NEGATIVE for nausea, abdominal pain, heartburn, or change in bowel habits  GI: NEGATIVE for nausea, abdominal pain, heartburn, or change in bowel habits and chronic symptoms of recto-vaginal fistula  : NEGATIVE for frequency, dysuria, or hematuria  MUSCULOSKELETAL: NEGATIVE for significant arthralgias or myalgia  NEURO: NEGATIVE for weakness, dizziness or paresthesias  ENDOCRINE: NEGATIVE for temperature intolerance, skin/hair changes  HEME: NEGATIVE for bleeding problems  PSYCHIATRIC: NEGATIVE for changes in mood or affect    Patient Active Problem List    Diagnosis Date Noted     Mechanical low back pain 06/06/2022     Priority: Medium     Poor posture 06/06/2022     Priority: Medium     Cervical radiculopathy 06/06/2022     Priority: Medium     Crohn's disease of large intestine with complication (H) 06/01/2018     Priority: Medium     Crohn's disease of perianal region (H) 02/09/2018     Priority: Medium     Vitamin  D deficiency 04/13/2017     Priority: Medium     Mild intermittent asthma, unspecified whether complicated 10/05/2016     Priority: Medium     History of Heraclio-en-Y gastric bypass 10/05/2016     Priority: Medium     Insomnia, unspecified type 10/05/2016     Priority: Medium     Family history of breast cancer in mother 10/05/2016     Priority: Medium     Major depressive disorder, recurrent episode, in partial remission (H) 10/13/2015     Priority: Medium     Generalized anxiety disorder 08/21/2014     Priority: Medium     Class 1 obesity due to excess calories with serious comorbidity and body mass index (BMI) of 34.0 to 34.9 in adult      Priority: Medium      Past Medical History:   Diagnosis Date     Acute Crohn's disease (H)      Acute posthemorrhagic anemia      ASCUS favor benign 2012    hpv neg. needs cotest in 3 years     ASCUS with positive high risk HPV cervical      Depressive disorder     since teens     Depressive disorder, not elsewhere classified 2/10/2014     Excessive and frequent menstruation      Family history of breast cancer in mother      Generalized anxiety disorder      History of blood transfusion     few years ago, should be in my records, it was at a [place]     History of transfusion     many years     Infected cyst of Bartholin's gland duct      Insomnia      Intermittent asthma, uncontrolled      LSIL (low grade squamous intraepithelial lesion) on Pap smear 2008    per chart notes colp dysplasia?     Major depressive disorder in partial remission (H)      Obesity      Obesity      Tobacco abuse      Uncomplicated asthma     since baby     Past Surgical History:   Procedure Laterality Date     BIOPSY      Breast cycst (done at [place]) many years ago     CHOLECYSTECTOMY  late 90's    during gastric bypass     CHOLECYSTECTOMY  10/05/2016     COLONOSCOPY  Jan/Feb 2018, Feb 2019    Confirmed Crohn's Disease     COLONOSCOPY N/A 3/28/2022    Procedure: COLONOSCOPY with biopsies and  polypectomy;  Surgeon: Chad Brown MD;  Location: [facility] OR     EXCIS BARTHOLIN GLAND/CYST  2016     GASTRIC BYPASS  2000    with cholecystectomy and subsequent revision gastric bypass     GASTRIC BYPASS  2006    revision of gastric bypass     GASTRIC BYPASS  10/05/2016     GASTRIC BYPASS       HERNIA REPAIR  12/9/16    SYMONE Zamora     INCISION AND DRAINAGE OF WOUND N/A 2/9/2018    Procedure: EXAM UNDER ANESTHESIA WITH I&D OF INTERSPHINCTERIC ABSCESS  SETON PLACEMENT ;  Surgeon: Chad Pereira MD;  Location: [facility];  Service:      MARSUPIALIZATION BARTHOLIN CYST  09/23/2016     wisdom teeth extraction  1995     WISDOM TOOTH EXTRACTION       ZZHC COLONOSCOPY W/WO BRUSH/WASH N/A 2/23/2021    Procedure: COLONOSCOPY with biopsies.;  Surgeon: Chad Plata MD;  Location: [facility];  Service: Gastroenterology     Current Outpatient Medications   Medication Sig Dispense Refill     albuterol (PROAIR HFA/PROVENTIL HFA/VENTOLIN HFA) 108 (90 Base) MCG/ACT inhaler Inhale 2 puffs into the lungs every 6 hours as needed for shortness of breath / dyspnea or wheezing 6.7 g 0     cetirizine (ZYRTEC) 10 MG tablet Take 10 mg by mouth daily       cyanocobalamin (VITAMIN B12) 1000 MCG/ML injection Inject 1 mL into the muscle every 30 days       levonorgestrel-ethinyl estradiol (SEASONALE) 0.15-0.03 MG tablet Take 1 tablet by mouth daily 91 tablet prn     phentermine (LOMAIRA) 8 MG tablet Take 1 tablet (8 mg) by mouth daily as needed (excessive hunger) 30 tablet 0     Semaglutide-Weight Management (WEGOVY) 2.4 MG/0.75ML SOAJ Inject 2.4 mg Subcutaneous once a week 3 mL 11     sertraline (ZOLOFT) 100 MG tablet Take 1 tablet (100 mg) by mouth daily 90 tablet 1     traZODone (DESYREL) 50 MG tablet Take 0.5-1 tablets (25-50 mg) by mouth nightly as needed for sleep 90 tablet 1     zolpidem (AMBIEN) 5 MG tablet Take 1 tablet (5 mg) by mouth nightly as needed for sleep 15 tablet  "3       Allergies   Allergen Reactions     Dextran Anaphylaxis        Social History     Tobacco Use     Smoking status: Former     Packs/day: 0.20     Years: 10.00     Pack years: 2.00     Types: Cigarettes     Start date: 2001     Quit date: 2018     Years since quittin.4     Smokeless tobacco: Never   Vaping Use     Vaping status: Never Used   Substance Use Topics     Alcohol use: Yes     Alcohol/week: 0.0 standard drinks of alcohol     Comment: very very very rarely       History   Drug Use No         Objective     /72   Pulse 75   Temp 98.8  F (37.1  C) (Oral)   Resp 15   Ht 1.549 m (5' 1\")   Wt 88 kg (194 lb)   LMP  (LMP Unknown)   SpO2 97%   BMI 36.66 kg/m      Physical Exam    GENERAL APPEARANCE: healthy, alert and no distress     EYES: EOMI, PERRL     HENT: ear canals and TM's normal and nose and mouth without ulcers or lesions     NECK: no adenopathy, no asymmetry, masses, or scars and thyroid normal to palpation     RESP: lungs clear to auscultation - no rales, rhonchi or wheezes     CV: regular rates and rhythm, normal S1 S2, no S3 or S4 and no murmur, click or rub     ABDOMEN:  soft, nontender, no HSM or masses and bowel sounds normal     MS: extremities normal- no gross deformities noted, no evidence of inflammation in joints, FROM in all extremities.     SKIN: no suspicious lesions or rashes     NEURO: Normal strength and tone, sensory exam grossly normal, mentation intact and speech normal     PSYCH: mentation appears normal. and affect normal/bright     LYMPHATICS: No cervical adenopathy    Recent Labs   Lab Test 22  1302 21  0843   HGB 12.3 12.8   PLT  --  339    142   POTASSIUM 4.3 4.2   CR 0.77 0.95        Diagnostics:  Hemoglobin   Date Value Ref Range Status   04/10/2023 11.8 11.7 - 15.7 g/dL Final   2020 12.2 11.7 - 15.7 g/dL Final   ]   No EKG required, no history of coronary heart disease, significant arrhythmia, peripheral arterial disease " or other structural heart disease.    Revised Cardiac Risk Index (RCRI):  The patient has the following serious cardiovascular risks for perioperative complications:   - No serious cardiac risks = 0 points     RCRI Interpretation: 0 points: Class I (very low risk - 0.4% complication rate)           Signed Electronically by: ISA Tejada CNP  Copy of this evaluation report is provided to requesting physician.      Answers for HPI/ROS submitted by the patient on 4/10/2023  If you checked off any problems, how difficult have these problems made it for you to do your work, take care of things at home, or get along with other people?: Not difficult at all  PHQ9 TOTAL SCORE: 3

## 2023-04-10 NOTE — PATIENT INSTRUCTIONS
For informational purposes only. Not to replace the advice of your health care provider. Copyright   2003,  [facility]. All rights reserved. Clinically reviewed by Shelby Hopson MD. [place] [id number] - REV .  Preparing for Your Surgery  Getting started  A nurse will call you to review your health history and instructions. They will give you an arrival time based on your scheduled surgery time. Please be ready to share:    Your doctor's clinic name and phone number    Your medical, surgical, and anesthesia history    A list of allergies and sensitivities    A list of medicines, including herbal treatments and over-the-counter drugs    Whether the patient has a legal guardian (ask how to send us the papers in advance)  Please tell us if you're pregnant--or if there's any chance you might be pregnant. Some surgeries may injure a fetus (unborn baby), so they require a pregnancy test. Surgeries that are safe for a fetus don't always need a test, and you can choose whether to have one.   If you have a child who's having surgery, please ask for a copy of Preparing for Your Child's Surgery.    Preparing for surgery    Within 10 to 30 days of surgery: Have a pre-op exam (sometimes called an H&P, or History and Physical). This can be done at a clinic or pre-operative center.  ? If you're having a , you may not need this exam. Talk to your care team.    At your pre-op exam, talk to your care team about all medicines you take. If you need to stop any medicines before surgery, ask when to start taking them again.  ? We do this for your safety. Many medicines can make you bleed too much during surgery. Some change how well surgery (anesthesia) drugs work.    Call your insurance company to let them know you're having surgery. (If you don't have insurance, call [phone].)    Call your clinic if there's any change in your health. This includes signs of a cold or flu (sore throat, runny nose,  cough, rash, fever). It also includes a scrape or scratch near the surgery site.    If you have questions on the day of surgery, call your hospital or surgery center.  Eating and drinking guidelines  For your safety: Unless your surgeon tells you otherwise, follow the guidelines below.    Eat and drink as usual until 8 hours before you arrive for surgery. After that, no food or milk.    Drink clear liquids until 2 hours before you arrive. These are liquids you can see through, like water, Gatorade, and Propel Water. They also include plain black coffee and tea (no cream or milk), candy, and breath mints. You can spit out gum when you arrive.    If you drink alcohol: Stop drinking it the night before surgery.    If your care team tells you to take medicine on the morning of surgery, it's okay to take it with a sip of water.  Preventing infection    Shower or bathe the night before and morning of your surgery. Follow the instructions your clinic gave you. (If no instructions, use regular soap.)    Don't shave or clip hair near your surgery site. We'll remove the hair if needed.    Don't smoke or vape the morning of surgery. You may chew nicotine gum up to 2 hours before surgery. A nicotine patch is okay.  ? Note: Some surgeries require you to completely quit smoking and nicotine. Check with your surgeon.    Your care team will make every effort to keep you safe from infection. We will:  ? Clean our hands often with soap and water (or an alcohol-based hand rub).  ? Clean the skin at your surgery site with a special soap that kills germs.  ? Give you a special gown to keep you warm. (Cold raises the risk of infection.)  ? Wear special hair covers, masks, gowns and gloves during surgery.  ? Give antibiotic medicine, if prescribed. Not all surgeries need antibiotics.  What to bring on the day of surgery    Photo ID and insurance card    Copy of your health care directive, if you have one    Glasses and hearing aids (bring  cases)  ? You can't wear contacts during surgery    Inhaler and eye drops, if you use them (tell us about these when you arrive)    CPAP machine or breathing device, if you use them    A few personal items, if spending the night    If you have . . .  ? A pacemaker, ICD (cardiac defibrillator) or other implant: Bring the ID card.  ? An implanted stimulator: Bring the remote control.  ? A legal guardian: Bring a copy of the certified (court-stamped) guardianship papers.  Please remove any jewelry, including body piercings. Leave jewelry and other valuables at home.  If you're going home the day of surgery    You must have a responsible adult drive you home. They should stay with you overnight as well.    If you don't have someone to stay with you, and you aren't safe to go home alone, we may keep you overnight. Insurance often won't pay for this.  After surgery  If it's hard to control your pain or you need more pain medicine, please call your surgeon's office.  Questions?   If you have any questions for your care team, list them here: _________________________________________________________________________________________________________________________________________________________________________ ____________________________________ ____________________________________ ____________________________________

## 2023-04-28 ENCOUNTER — TRANSFERRED RECORDS (OUTPATIENT)
Dept: HEALTH INFORMATION MANAGEMENT | Facility: CLINIC | Age: 48
End: 2023-04-28
Payer: COMMERCIAL

## 2023-05-17 ENCOUNTER — OFFICE VISIT (OUTPATIENT)
Dept: FAMILY MEDICINE | Facility: CLINIC | Age: 48
End: 2023-05-17
Payer: COMMERCIAL

## 2023-05-17 VITALS
HEIGHT: 61 IN | TEMPERATURE: 98.1 F | OXYGEN SATURATION: 97 % | HEART RATE: 65 BPM | BODY MASS INDEX: 36.59 KG/M2 | WEIGHT: 193.8 LBS | DIASTOLIC BLOOD PRESSURE: 81 MMHG | RESPIRATION RATE: 18 BRPM | SYSTOLIC BLOOD PRESSURE: 114 MMHG

## 2023-05-17 DIAGNOSIS — Z01.818 PREOP GENERAL PHYSICAL EXAM: Primary | ICD-10-CM

## 2023-05-17 DIAGNOSIS — N82.3 RECTOVAGINAL FISTULA: ICD-10-CM

## 2023-05-17 PROCEDURE — 99214 OFFICE O/P EST MOD 30 MIN: CPT | Performed by: PHYSICIAN ASSISTANT

## 2023-05-17 ASSESSMENT — ANXIETY QUESTIONNAIRES
5. BEING SO RESTLESS THAT IT IS HARD TO SIT STILL: NOT AT ALL
8. IF YOU CHECKED OFF ANY PROBLEMS, HOW DIFFICULT HAVE THESE MADE IT FOR YOU TO DO YOUR WORK, TAKE CARE OF THINGS AT HOME, OR GET ALONG WITH OTHER PEOPLE?: NOT DIFFICULT AT ALL
4. TROUBLE RELAXING: NOT AT ALL
6. BECOMING EASILY ANNOYED OR IRRITABLE: SEVERAL DAYS
7. FEELING AFRAID AS IF SOMETHING AWFUL MIGHT HAPPEN: NOT AT ALL
2. NOT BEING ABLE TO STOP OR CONTROL WORRYING: NOT AT ALL
3. WORRYING TOO MUCH ABOUT DIFFERENT THINGS: NOT AT ALL
7. FEELING AFRAID AS IF SOMETHING AWFUL MIGHT HAPPEN: NOT AT ALL
IF YOU CHECKED OFF ANY PROBLEMS ON THIS QUESTIONNAIRE, HOW DIFFICULT HAVE THESE PROBLEMS MADE IT FOR YOU TO DO YOUR WORK, TAKE CARE OF THINGS AT HOME, OR GET ALONG WITH OTHER PEOPLE: NOT DIFFICULT AT ALL
GAD7 TOTAL SCORE: 1
1. FEELING NERVOUS, ANXIOUS, OR ON EDGE: NOT AT ALL
GAD7 TOTAL SCORE: 1

## 2023-05-17 ASSESSMENT — PAIN SCALES - GENERAL: PAINLEVEL: NO PAIN (0)

## 2023-05-17 NOTE — PATIENT INSTRUCTIONS
For informational purposes only. Not to replace the advice of your health care provider. Copyright   2003,  [facility]. All rights reserved. Clinically reviewed by Shelby Hopson MD. [employer] [id number] - REV .  Preparing for Your Surgery  Getting started  A nurse will call you to review your health history and instructions. They will give you an arrival time based on your scheduled surgery time. Please be ready to share:  Your doctor's clinic name and phone number  Your medical, surgical, and anesthesia history  A list of allergies and sensitivities  A list of medicines, including herbal treatments and over-the-counter drugs  Whether the patient has a legal guardian (ask how to send us the papers in advance)  Please tell us if you're pregnant--or if there's any chance you might be pregnant. Some surgeries may injure a fetus (unborn baby), so they require a pregnancy test. Surgeries that are safe for a fetus don't always need a test, and you can choose whether to have one.   If you have a child who's having surgery, please ask for a copy of Preparing for Your Child's Surgery.    Preparing for surgery  Within 10 to 30 days of surgery: Have a pre-op exam (sometimes called an H&P, or History and Physical). This can be done at a clinic or pre-operative center.  If you're having a , you may not need this exam. Talk to your care team.  At your pre-op exam, talk to your care team about all medicines you take. If you need to stop any medicines before surgery, ask when to start taking them again.  We do this for your safety. Many medicines can make you bleed too much during surgery. Some change how well surgery (anesthesia) drugs work.  Call your insurance company to let them know you're having surgery. (If you don't have insurance, call [phone].)  Call your clinic if there's any change in your health. This includes signs of a cold or flu (sore throat, runny nose, cough, rash, fever). It  also includes a scrape or scratch near the surgery site.  If you have questions on the day of surgery, call your hospital or surgery center.  Eating and drinking guidelines  For your safety: Unless your surgeon tells you otherwise, follow the guidelines below.  Eat and drink as usual until 8 hours before you arrive for surgery. After that, no food or milk.  Drink clear liquids until 2 hours before you arrive. These are liquids you can see through, like water, Gatorade, and Propel Water. They also include plain black coffee and tea (no cream or milk), candy, and breath mints. You can spit out gum when you arrive.  If you drink alcohol: Stop drinking it the night before surgery.  If your care team tells you to take medicine on the morning of surgery, it's okay to take it with a sip of water.  Preventing infection  Shower or bathe the night before and morning of your surgery. Follow the instructions your clinic gave you. (If no instructions, use regular soap.)  Don't shave or clip hair near your surgery site. We'll remove the hair if needed.  Don't smoke or vape the morning of surgery. You may chew nicotine gum up to 2 hours before surgery. A nicotine patch is okay.  Note: Some surgeries require you to completely quit smoking and nicotine. Check with your surgeon.  Your care team will make every effort to keep you safe from infection. We will:  Clean our hands often with soap and water (or an alcohol-based hand rub).  Clean the skin at your surgery site with a special soap that kills germs.  Give you a special gown to keep you warm. (Cold raises the risk of infection.)  Wear special hair covers, masks, gowns and gloves during surgery.  Give antibiotic medicine, if prescribed. Not all surgeries need antibiotics.  What to bring on the day of surgery  Photo ID and insurance card  Copy of your health care directive, if you have one  Glasses and hearing aids (bring cases)  You can't wear contacts during surgery  Inhaler and  eye drops, if you use them (tell us about these when you arrive)  CPAP machine or breathing device, if you use them  A few personal items, if spending the night  If you have . . .  A pacemaker, ICD (cardiac defibrillator) or other implant: Bring the ID card.  An implanted stimulator: Bring the remote control.  A legal guardian: Bring a copy of the certified (court-stamped) guardianship papers.  Please remove any jewelry, including body piercings. Leave jewelry and other valuables at home.  If you're going home the day of surgery  You must have a responsible adult drive you home. They should stay with you overnight as well.  If you don't have someone to stay with you, and you aren't safe to go home alone, we may keep you overnight. Insurance often won't pay for this.  After surgery  If it's hard to control your pain or you need more pain medicine, please call your surgeon's office.  Questions?   If you have any questions for your care team, list them here: _________________________________________________________________________________________________________________________________________________________________________ ____________________________________ ____________________________________ ____________________________________    How to Take Your Medication Before Surgery  - Take all of your medications before surgery as usual

## 2023-05-17 NOTE — PROGRESS NOTES
[facility]  [address] [address]  Phone: [phone]  Primary Provider: Ivett Sy  Pre-op Performing Provider: VI CARNEY      PREOPERATIVE EVALUATION:  Today's date: 5/17/2023    Janelle Coelho is a 48 year old female who presents for a preoperative evaluation.      5/17/2023     8:19 AM   Additional Questions   Roomed by Chinyere FONSECA CMA   Accompanied by MARGIE         5/17/2023     8:19 AM   Patient Reported Additional Medications   Patient reports taking the following new medications None     Surgical Information:  Surgery/Procedure: Rectal Vaginal Fistula Repair   Surgery Location: [facility]   Surgeon: Dr. Shane Ramey ([facility] )  Surgery Date: 5/30/2023  Time of Surgery: 11 AM    Where patient plans to recover: At home with family  Fax number for surgical facility: [phone]    Assessment & Plan     The proposed surgical procedure is considered INTERMEDIATE risk.    Preop general physical exam  OK for procedure.    Rectovaginal fistula  Follow with surgeon              - No identified additional risk factors other than previously addressed    Antiplatelet or Anticoagulation Medication Instructions:   - Patient is on no antiplatelet or anticoagulation medications.    Additional Medication Instructions:  Patient is to take all scheduled medications on the day of surgery    RECOMMENDATION:  APPROVAL GIVEN to proceed with proposed procedure, without further diagnostic evaluation.        Subjective       HPI related to upcoming procedure: patient here for pre op exam prior to having surgery for a rectovaginal fistula.        5/16/2023    12:53 PM   Preop Questions   1. Have you ever had a heart attack or stroke? No   2. Have you ever had surgery on your heart or blood vessels, such as a stent placement, a coronary artery bypass, or surgery on an artery in your head, neck, heart, or legs? No   3. Do you have chest pain  with activity? No   4. Do you have a history of  heart failure? No   5. Do you currently have a cold, bronchitis or symptoms of other infection? No   6. Do you have a cough, shortness of breath, or wheezing? No   7. Do you or anyone in your family have previous history of blood clots? YES - mom DVT post op   8. Do you or does anyone in your family have a serious bleeding problem such as prolonged bleeding following surgeries or cuts? No   9. Have you ever had problems with anemia or been told to take iron pills? YES    10. Have you had any abnormal blood loss such as black, tarry or bloody stools, or abnormal vaginal bleeding? No   11. Have you ever had a blood transfusion? YES - years ago patient notes due to low iron   11a. Have you ever had a transfusion reaction? No   12. Are you willing to have a blood transfusion if it is medically needed before, during, or after your surgery? Yes   13. Have you or any of your relatives ever had problems with anesthesia? No   14. Do you have sleep apnea, excessive snoring or daytime drowsiness? No   15. Do you have any artifical heart valves or other implanted medical devices like a pacemaker, defibrillator, or continuous glucose monitor? No   16. Do you have artificial joints? No   17. Are you allergic to latex? No   18. Is there any chance that you may be pregnant? No       Health Care Directive:  Patient does not have a Health Care Directive or Living Will: Discussed advance care planning with patient; information given to patient to review.    Preoperative Review of :   reviewed - controlled substances reflected in medication list.      Status of Chronic Conditions:  ANEMIA - Patient has a past history of moderate anemia, which has been symptomatic. Work up to date has revealed iron def. Treatment has been oral iron and transfusion.     ASTHMA - Patient has a longstanding history of moderate-severe Asthma . Patient has been doing well overall noting NO SYMPTOMS and  continues on medication regimen consisting of albuterol prn without adverse reactions or side effects.     DEPRESSION - Patient has a long history of Depression of moderate severity requiring medication for control with recent symptoms being stable..Current symptoms of depression include none at this time.       Review of Systems  Constitutional, neuro, ENT, endocrine, pulmonary, cardiac, gastrointestinal, genitourinary, musculoskeletal, integument and psychiatric systems are negative, except as otherwise noted.    Patient Active Problem List    Diagnosis Date Noted     Mechanical low back pain 06/06/2022     Priority: Medium     Poor posture 06/06/2022     Priority: Medium     Cervical radiculopathy 06/06/2022     Priority: Medium     Crohn's disease of large intestine with complication (H) 06/01/2018     Priority: Medium     Crohn's disease of perianal region (H) 02/09/2018     Priority: Medium     Vitamin D deficiency 04/13/2017     Priority: Medium     Mild intermittent asthma, unspecified whether complicated 10/05/2016     Priority: Medium     History of Heraclio-en-Y gastric bypass 10/05/2016     Priority: Medium     Insomnia, unspecified type 10/05/2016     Priority: Medium     Family history of breast cancer in mother 10/05/2016     Priority: Medium     Major depressive disorder, recurrent episode, in partial remission (H) 10/13/2015     Priority: Medium     Generalized anxiety disorder 08/21/2014     Priority: Medium     Class 1 obesity due to excess calories with serious comorbidity and body mass index (BMI) of 34.0 to 34.9 in adult      Priority: Medium      Past Medical History:   Diagnosis Date     Acute Crohn's disease (H)      Acute posthemorrhagic anemia      ASCUS favor benign 2012    hpv neg. needs cotest in 3 years     ASCUS with positive high risk HPV cervical      Depressive disorder     since teens     Depressive disorder, not elsewhere classified 2/10/2014     Excessive and frequent menstruation       Family history of breast cancer in mother      Generalized anxiety disorder      History of blood transfusion     few years ago, should be in my records, it was at a [facility]     History of transfusion     many years     Infected cyst of Bartholin's gland duct      Insomnia      Intermittent asthma, uncontrolled      LSIL (low grade squamous intraepithelial lesion) on Pap smear 2008    per chart notes colp dysplasia?     Major depressive disorder in partial remission (H)      Obesity      Obesity      Tobacco abuse      Uncomplicated asthma     since baby     Past Surgical History:   Procedure Laterality Date     BIOPSY      Breast cycst (done at [place]) many years ago     CHOLECYSTECTOMY  late 90's    during gastric bypass     CHOLECYSTECTOMY  10/05/2016     COLONOSCOPY  Jan/Feb 2018, Feb 2019    Confirmed Crohn's Disease     COLONOSCOPY N/A 3/28/2022    Procedure: COLONOSCOPY with biopsies and polypectomy;  Surgeon: Chad Brown MD;  Location: [facility]     EXCIS BARTHOLIN GLAND/CYST  2016     GASTRIC BYPASS  2000    with cholecystectomy and subsequent revision gastric bypass     GASTRIC BYPASS  2006    revision of gastric bypass     GASTRIC BYPASS  10/05/2016     GASTRIC BYPASS       HERNIA REPAIR  12/9/16    [facility] - SYMONE BALES     INCISION AND DRAINAGE OF WOUND N/A 2/9/2018    Procedure: EXAM UNDER ANESTHESIA WITH I&D OF INTERSPHINCTERIC ABSCESS  SETON PLACEMENT ;  Surgeon: Chad Pereira MD;  Location: [facility];  Service:      MARSUPIALIZATION BARTHOLIN CYST  09/23/2016     wisdom teeth extraction  1995     WISDOM TOOTH EXTRACTION       ZZ COLONOSCOPY W/WO BRUSH/WASH N/A 2/23/2021    Procedure: COLONOSCOPY with biopsies.;  Surgeon: Chad Plata MD;  Location: [facility];  Service: Gastroenterology     Current Outpatient Medications   Medication Sig Dispense Refill     albuterol (PROAIR HFA/PROVENTIL HFA/VENTOLIN HFA) 108 (90 Base) MCG/ACT  "inhaler Inhale 2 puffs into the lungs every 6 hours as needed for shortness of breath / dyspnea or wheezing 6.7 g 0     cetirizine (ZYRTEC) 10 MG tablet Take 10 mg by mouth daily       cyanocobalamin (VITAMIN B12) 1000 MCG/ML injection Inject 1 mL into the muscle every 30 days       levonorgestrel-ethinyl estradiol (SEASONALE) 0.15-0.03 MG tablet Take 1 tablet by mouth daily 91 tablet prn     phentermine (LOMAIRA) 8 MG tablet Take 1 tablet (8 mg) by mouth daily as needed (excessive hunger) 30 tablet 0     Semaglutide-Weight Management (WEGOVY) 2.4 MG/0.75ML SOAJ Inject 2.4 mg Subcutaneous once a week 3 mL 11     sertraline (ZOLOFT) 100 MG tablet Take 1 tablet (100 mg) by mouth daily 90 tablet 1     traZODone (DESYREL) 50 MG tablet Take 0.5-1 tablets (25-50 mg) by mouth nightly as needed for sleep 90 tablet 1     zolpidem (AMBIEN) 5 MG tablet Take 1 tablet (5 mg) by mouth nightly as needed for sleep 15 tablet 3       Allergies   Allergen Reactions     Dextran Anaphylaxis        Social History     Tobacco Use     Smoking status: Former     Packs/day: 0.20     Years: 10.00     Pack years: 2.00     Types: Cigarettes     Start date: 2001     Quit date: 2018     Years since quittin.4     Smokeless tobacco: Never   Vaping Use     Vaping status: Never Used   Substance Use Topics     Alcohol use: Yes     Alcohol/week: 0.0 standard drinks of alcohol     Comment: very very very rarely        History   Drug Use No         Objective     /81 (BP Location: Right arm, Patient Position: Sitting, Cuff Size: Adult Regular)   Pulse 65   Temp 98.1  F (36.7  C) (Oral)   Resp 18   Ht 1.549 m (5' 1\")   Wt 87.9 kg (193 lb 12.8 oz)   SpO2 97%   BMI 36.62 kg/m      Physical Exam    GENERAL APPEARANCE: healthy, alert and no distress     EYES: EOMI, PERRL     HENT: ear canals and TM's normal and nose and mouth without ulcers or lesions     NECK: no adenopathy, no asymmetry, masses, or scars and thyroid normal to " palpation     RESP: lungs clear to auscultation - no rales, rhonchi or wheezes     CV: regular rates and rhythm, normal S1 S2, no S3 or S4 and no murmur, click or rub     MS: extremities normal- no gross deformities noted, no evidence of inflammation in joints, FROM in all extremities.     SKIN: no suspicious lesions or rashes     NEURO: Normal strength and tone, sensory exam grossly normal, mentation intact and speech normal     PSYCH: mentation appears normal. and affect normal/bright     LYMPHATICS: No cervical adenopathy    Recent Labs   Lab Test 04/10/23  1642 03/18/22  1302 07/30/21  0843   HGB 11.8 12.3 12.8   PLT  --   --  339   NA  --  137 142   POTASSIUM  --  4.3 4.2   CR  --  0.77 0.95        Diagnostics:  No labs were ordered during this visit.   No EKG required, no history of coronary heart disease, significant arrhythmia, peripheral arterial disease or other structural heart disease.    Revised Cardiac Risk Index (RCRI):  The patient has the following serious cardiovascular risks for perioperative complications:   - No serious cardiac risks = 0 points     RCRI Interpretation: 0 points: Class I (very low risk - 0.4% complication rate)           Signed Electronically by: Mannie Reece PA-C  Copy of this evaluation report is provided to requesting physician.      Answers for HPI/ROS submitted by the patient on 5/17/2023  EVIE 7 TOTAL SCORE: 1

## 2023-06-05 ENCOUNTER — VIRTUAL VISIT (OUTPATIENT)
Dept: SURGERY | Facility: CLINIC | Age: 48
End: 2023-06-05
Payer: COMMERCIAL

## 2023-06-05 DIAGNOSIS — Z71.3 NUTRITIONAL COUNSELING: ICD-10-CM

## 2023-06-05 DIAGNOSIS — E66.09 CLASS 2 OBESITY DUE TO EXCESS CALORIES WITHOUT SERIOUS COMORBIDITY WITH BODY MASS INDEX (BMI) OF 36.0 TO 36.9 IN ADULT: ICD-10-CM

## 2023-06-05 DIAGNOSIS — E66.812 CLASS 2 OBESITY DUE TO EXCESS CALORIES WITHOUT SERIOUS COMORBIDITY WITH BODY MASS INDEX (BMI) OF 36.0 TO 36.9 IN ADULT: ICD-10-CM

## 2023-06-05 DIAGNOSIS — Z98.84 HISTORY OF ROUX-EN-Y GASTRIC BYPASS: Primary | ICD-10-CM

## 2023-06-05 PROCEDURE — 97803 MED NUTRITION INDIV SUBSEQ: CPT | Mod: VID | Performed by: DIETITIAN, REGISTERED

## 2023-06-05 NOTE — PROGRESS NOTES
Janelle Coelho is a 48 year old who is being evaluated via a billable video visit.      How would you like to obtain your AVS? MyChart  If the video visit is dropped, the invitation should be resent by: Send to e-mail at: [email or web address]  Will anyone else be joining your video visit? No        Medical  Weight Loss Follow-Up Diet Evaluation  Assessment:  Janelle is presenting today for a follow up weight management nutrition consultation.  This patient has had an initial appointment and was referred by Dr. Baig for MNT as treatment for Obesity   Weight loss medication: Wegovy, Phentermine.-hasn't been taking the Phentermine  Pt's weight is 191 lbs   Initial weight: 275 lbs   Weight change: up 1 lb since February         View : No data to display.              BMI: There is no height or weight on file to calculate BMI.  Ideal body weight: 47.8 kg (105 lb 6.1 oz)  Adjusted ideal body weight: 63.8 kg (140 lb 12 oz)    Estimated RMR (Edvin-St Casanova equation):   1438 kcals x 1.3 (light active) = 1869 kcals (for weight maintenance)     Recommended Protein Intake: 60-80 grams of protein/day  Patient Active Problem List:  Patient Active Problem List   Diagnosis     Class 1 obesity due to excess calories with serious comorbidity and body mass index (BMI) of 34.0 to 34.9 in adult     Generalized anxiety disorder     Major depressive disorder, recurrent episode, in partial remission (H)     Mild intermittent asthma, unspecified whether complicated     History of Heraclio-en-Y gastric bypass     Insomnia, unspecified type     Family history of breast cancer in mother     Vitamin D deficiency     Crohn's disease of large intestine with complication (H)     Crohn's disease of perianal region (H)     Mechanical low back pain     Poor posture     Cervical radiculopathy       Progress on goals from last visit: Has been dealing with a lot of stressors, which has been leading to overeating. Patient has not been consistent with  taking vitamins at this point either.  Patient notes that she had been doing intermittent fasting up until her colon surgery and plans to go back, pending the surgeon approval.   Patient reports that she has been eating more carbohydrates and focusing less of protein, noting that she feels she has the tools, just needs to get back on track with diet and exercise.     Exercise: started to do some walking-working on being more consistent with    Nutrition Diagnosis:    Obesity (NC 3.3) related to overeating and poor lifestyle habits as evidenced by patient's subjective statements and BMI of 36.2 kg/m2.     Intervention:  1. Food and/or nutrient delivery: balanced meals, adequate protein   2. Nutrition education: 50 Things to do Instead of Snacking  3. Nutrition counseling: provided support and encouragement    Monitoring/Evaluation:    Goals:  1. Get back on track with diet, focusing on protein first and working on reduced carbohydrate consumption.   2. Start taking vitamins again per post surgery recommendations.   3. Work on consistency of setting up an exercise regimen via walking.     Patient to follow up in 1 month(s) with TITO        Video-Visit Details    Type of service:  Video Visit    Video Start Time (time video started): 8:54 am     Video End Time (time video stopped): 9:05 am     Originating Location (pt. Location): Home        Distant Location (provider location):  Off-site    Mode of Communication:  Video Conference via [facility]    Physician has received verbal consent for a Video Visit from the patient? Yes      Ashley Garcia RD

## 2023-06-05 NOTE — LETTER
6/5/2023         RE: Janelle Coelho  [address] [place] [address]        Dear Colleague,    Thank you for referring your patient, Janelle Coelho, to the [facility]. Please see a copy of my visit note below.    Janelle Coelho is a 48 year old who is being evaluated via a billable video visit.      How would you like to obtain your AVS? MyChart  If the video visit is dropped, the invitation should be resent by: Send to e-mail at: [email or web address]  Will anyone else be joining your video visit? No        Medical  Weight Loss Follow-Up Diet Evaluation  Assessment:  Janelle is presenting today for a follow up weight management nutrition consultation.  This patient has had an initial appointment and was referred by Dr. Baig for MNT as treatment for Obesity   Weight loss medication: Wegovy, Phentermine.-hasn't been taking the Phentermine  Pt's weight is 191 lbs   Initial weight: 275 lbs   Weight change: up 1 lb since February         View : No data to display.              BMI: There is no height or weight on file to calculate BMI.  Ideal body weight: 47.8 kg (105 lb 6.1 oz)  Adjusted ideal body weight: 63.8 kg (140 lb 12 oz)    Estimated RMR (Moody-St Jeor equation):   1438 kcals x 1.3 (light active) = 1869 kcals (for weight maintenance)     Recommended Protein Intake: 60-80 grams of protein/day  Patient Active Problem List:  Patient Active Problem List   Diagnosis     Class 1 obesity due to excess calories with serious comorbidity and body mass index (BMI) of 34.0 to 34.9 in adult     Generalized anxiety disorder     Major depressive disorder, recurrent episode, in partial remission (H)     Mild intermittent asthma, unspecified whether complicated     History of Heraclio-en-Y gastric bypass     Insomnia, unspecified type     Family history of breast cancer in mother     Vitamin D deficiency     Crohn's disease of large intestine with complication  (H)     Crohn's disease of perianal region (H)     Mechanical low back pain     Poor posture     Cervical radiculopathy       Progress on goals from last visit: Has been dealing with a lot of stressors, which has been leading to overeating. Patient has not been consistent with taking vitamins at this point either.  Patient notes that she had been doing intermittent fasting up until her colon surgery and plans to go back, pending the surgeon approval.   Patient reports that she has been eating more carbohydrates and focusing less of protein, noting that she feels she has the tools, just needs to get back on track with diet and exercise.     Exercise: started to do some walking-working on being more consistent with    Nutrition Diagnosis:    Obesity (NC 3.3) related to overeating and poor lifestyle habits as evidenced by patient's subjective statements and BMI of 36.2 kg/m2.     Intervention:  1. Food and/or nutrient delivery: balanced meals, adequate protein   2. Nutrition education: 50 Things to do Instead of Snacking  3. Nutrition counseling: provided support and encouragement    Monitoring/Evaluation:    Goals:  1. Get back on track with diet, focusing on protein first and working on reduced carbohydrate consumption.   2. Start taking vitamins again per post surgery recommendations.   3. Work on consistency of setting up an exercise regimen via walking.     Patient to follow up in 1 month(s) with RD        Video-Visit Details    Type of service:  Video Visit    Video Start Time (time video started): 8:54 am     Video End Time (time video stopped): 9:05 am     Originating Location (pt. Location): Home        Distant Location (provider location):  Off-site    Mode of Communication:  Video Conference via [facility]    Physician has received verbal consent for a Video Visit from the patient? Yes      Ashley Garcia RD            Again, thank you for allowing me to participate in the care of your patient.         Sincerely,        Ashley Garcia, RD

## 2023-06-27 ENCOUNTER — TRANSFERRED RECORDS (OUTPATIENT)
Dept: HEALTH INFORMATION MANAGEMENT | Facility: CLINIC | Age: 48
End: 2023-06-27
Payer: COMMERCIAL

## 2023-06-30 ENCOUNTER — TRANSFERRED RECORDS (OUTPATIENT)
Dept: HEALTH INFORMATION MANAGEMENT | Facility: CLINIC | Age: 48
End: 2023-06-30
Payer: COMMERCIAL

## 2023-07-11 ENCOUNTER — PATIENT OUTREACH (OUTPATIENT)
Dept: CARE COORDINATION | Facility: CLINIC | Age: 48
End: 2023-07-11
Payer: COMMERCIAL

## 2023-07-28 ENCOUNTER — OFFICE VISIT (OUTPATIENT)
Dept: FAMILY MEDICINE | Facility: CLINIC | Age: 48
End: 2023-07-28
Payer: COMMERCIAL

## 2023-07-28 VITALS
BODY MASS INDEX: 35.98 KG/M2 | WEIGHT: 190.56 LBS | HEIGHT: 61 IN | HEART RATE: 69 BPM | DIASTOLIC BLOOD PRESSURE: 68 MMHG | OXYGEN SATURATION: 98 % | TEMPERATURE: 99 F | SYSTOLIC BLOOD PRESSURE: 96 MMHG | RESPIRATION RATE: 16 BRPM

## 2023-07-28 DIAGNOSIS — E66.812 CLASS 2 SEVERE OBESITY DUE TO EXCESS CALORIES WITH SERIOUS COMORBIDITY AND BODY MASS INDEX (BMI) OF 36.0 TO 36.9 IN ADULT (H): Primary | ICD-10-CM

## 2023-07-28 DIAGNOSIS — E66.01 CLASS 2 SEVERE OBESITY DUE TO EXCESS CALORIES WITH SERIOUS COMORBIDITY AND BODY MASS INDEX (BMI) OF 36.0 TO 36.9 IN ADULT (H): Primary | ICD-10-CM

## 2023-07-28 DIAGNOSIS — E55.9 VITAMIN D DEFICIENCY: ICD-10-CM

## 2023-07-28 DIAGNOSIS — Z98.84 HISTORY OF ROUX-EN-Y GASTRIC BYPASS: ICD-10-CM

## 2023-07-28 PROCEDURE — 99213 OFFICE O/P EST LOW 20 MIN: CPT | Performed by: FAMILY MEDICINE

## 2023-07-28 ASSESSMENT — ENCOUNTER SYMPTOMS: CONSTITUTIONAL NEGATIVE: 1

## 2023-07-28 NOTE — PROGRESS NOTES
Assessment & Plan   Problem List Items Addressed This Visit       Class 2 severe obesity due to excess calories with serious comorbidity and body mass index (BMI) of 36.0 to 36.9 in adult (H) - Primary     48 year old year old female in clinic today to discuss treatment of the following conditions through diet and lifestyle modification and weight loss:  1. Class 2 severe obesity due to excess calories with serious comorbidity and body mass index (BMI) of 36.0 to 36.9 in adult (H)    2. Vitamin D deficiency    3. History of Heraclio-en-Y gastric bypass    The patient's weight loss result since the last visit was mixed based on stability of weight with sense of worsening hunger.  The patient is struggling to maintain her weight.  Her energy remains poor.  We reviewed nutrition; specifically the role that protein has and maintaining lean mass and promoting satiety.  I wonder if she adds a protein supplement if she will have better control of hunger.  I also wonder if she moves more of her nutrition into the early morning whether or not she will have better control of hunger.  Overall, she has achieved 30+ percent weight loss since starting a GLP-1 receptor agonist approximately 2 years ago.  - Reviewed potential benefits of skin removal surgery.  Unclear if she will get insurance coverage.  Consider referral at some point.  - Barrier to weight loss: Upcoming gastrointestinal surgery with likely ostomy placement.  Unclear how this will change her nutrition.  - Continue semaglutide.  Continue phentermine used as needed.  - Follow-up recommended 3-6 months.         Relevant Medications    phentermine (LOMAIRA) 8 MG tablet    Continuous Blood Gluc Sensor (FREESTYLE ERIC 2 SENSOR) MISC    History of Heraclio-en-Y gastric bypass    Relevant Medications    phentermine (LOMAIRA) 8 MG tablet    Continuous Blood Gluc Sensor (FREESTYLE ERIC 2 SENSOR) MISC    Vitamin D deficiency    Relevant Medications    phentermine (LOMAIRA) 8 MG  "tablet        BMI:   Estimated body mass index is 36.01 kg/m  as calculated from the following:    Height as of this encounter: 1.549 m (5' 1\").    Weight as of this encounter: 86.4 kg (190 lb 9 oz).   Weight management plan: Specific weight management program called [facility] discussed      Chava Baig MD  [facility] BRIAN Luis is a 48 year old, presenting for the following health issues:  Weight Loss (Follow-up/)        7/28/2023    10:23 AM   Additional Questions   Roomed by sac   Accompanied by self         7/28/2023    10:23 AM   Patient Reported Additional Medications   Patient reports taking the following new medications no       Patient presents for treatment of chronic, comorbid conditions using intensive therapeutic lifestyle interventions including nutrition, physical activity, and behavior management.   - successes: ?   - struggles: more hunger.  Gained some weight.  Down over this week.  She was hungry yesterday.  Drinking more water. Skin \"drooping.\"  No infections.  Makes it challenging to exercise.  Energy is poor.  Working 45 hours per week.     - exercise plan: \"Not as much as I should be.\"     - tracking/journaling: trying to add more protein and low carb.  On average    - following nutritional plan: yes.  Deviations from plan: infrequent   - hunger: worse.   - medication benefits: less helpful. side effects: none.            Review of Systems   Constitutional: Negative.    All other systems reviewed and are negative.         Objective    BP 96/68 (BP Location: Left arm, Patient Position: Sitting, Cuff Size: Adult Large)   Pulse 69   Temp 99  F (37.2  C) (Oral)   Resp 16   Ht 1.549 m (5' 1\")   Wt 86.4 kg (190 lb 9 oz)   LMP  (LMP Unknown)   SpO2 98%   BMI 36.01 kg/m    Body mass index is 36.01 kg/m .  Physical Exam  Nursing note reviewed.   Constitutional:       General: She is not in acute distress.     Appearance: Normal " appearance. She is not ill-appearing.   HENT:      Head: Normocephalic and atraumatic.   Eyes:      Extraocular Movements: Extraocular movements intact.      Conjunctiva/sclera: Conjunctivae normal.   Pulmonary:      Effort: Pulmonary effort is normal.   Neurological:      Mental Status: She is alert and oriented to person, place, and time.   Psychiatric:         Attention and Perception: Attention normal.         Mood and Affect: Mood normal.         Speech: Speech normal.         Thought Content: Thought content normal.

## 2023-07-28 NOTE — ASSESSMENT & PLAN NOTE
48 year old year old female in clinic today to discuss treatment of the following conditions through diet and lifestyle modification and weight loss:  1. Class 2 severe obesity due to excess calories with serious comorbidity and body mass index (BMI) of 36.0 to 36.9 in adult (H)    2. Vitamin D deficiency    3. History of Heraclio-en-Y gastric bypass      The patient's weight loss result since the last visit was mixed based on stability of weight with sense of worsening hunger.  The patient is struggling to maintain her weight.  Her energy remains poor.  We reviewed nutrition; specifically the role that protein has and maintaining lean mass and promoting satiety.  I wonder if she adds a protein supplement if she will have better control of hunger.  I also wonder if she moves more of her nutrition into the early morning whether or not she will have better control of hunger.  Overall, she has achieved 30+ percent weight loss since starting a GLP-1 receptor agonist approximately 2 years ago.  - Reviewed potential benefits of skin removal surgery.  Unclear if she will get insurance coverage.  Consider referral at some point.  - Barrier to weight loss: Upcoming gastrointestinal surgery with likely ostomy placement.  Unclear how this will change her nutrition.  - Continue semaglutide.  Continue phentermine used as needed.  - Follow-up recommended 3-6 months.

## 2023-08-08 ENCOUNTER — PATIENT OUTREACH (OUTPATIENT)
Dept: CARE COORDINATION | Facility: CLINIC | Age: 48
End: 2023-08-08
Payer: COMMERCIAL

## 2023-08-08 ENCOUNTER — TRANSFERRED RECORDS (OUTPATIENT)
Dept: HEALTH INFORMATION MANAGEMENT | Facility: CLINIC | Age: 48
End: 2023-08-08
Payer: COMMERCIAL

## 2023-08-18 ENCOUNTER — ANCILLARY PROCEDURE (OUTPATIENT)
Dept: MAMMOGRAPHY | Facility: CLINIC | Age: 48
End: 2023-08-18
Attending: NURSE PRACTITIONER
Payer: COMMERCIAL

## 2023-08-18 DIAGNOSIS — Z12.31 VISIT FOR SCREENING MAMMOGRAM: ICD-10-CM

## 2023-08-18 PROCEDURE — 77063 BREAST TOMOSYNTHESIS BI: CPT | Mod: TC | Performed by: RADIOLOGY

## 2023-08-18 PROCEDURE — 77067 SCR MAMMO BI INCL CAD: CPT | Mod: TC | Performed by: RADIOLOGY

## 2023-08-31 ENCOUNTER — OFFICE VISIT (OUTPATIENT)
Dept: PEDIATRICS | Facility: CLINIC | Age: 48
End: 2023-08-31
Payer: COMMERCIAL

## 2023-08-31 VITALS
HEIGHT: 61 IN | DIASTOLIC BLOOD PRESSURE: 66 MMHG | TEMPERATURE: 99 F | HEART RATE: 72 BPM | WEIGHT: 200.9 LBS | SYSTOLIC BLOOD PRESSURE: 108 MMHG | RESPIRATION RATE: 18 BRPM | BODY MASS INDEX: 37.93 KG/M2 | OXYGEN SATURATION: 98 %

## 2023-08-31 DIAGNOSIS — F41.1 GENERALIZED ANXIETY DISORDER: Chronic | ICD-10-CM

## 2023-08-31 DIAGNOSIS — Z12.4 CERVICAL CANCER SCREENING: ICD-10-CM

## 2023-08-31 DIAGNOSIS — F33.41 MAJOR DEPRESSIVE DISORDER, RECURRENT EPISODE, IN PARTIAL REMISSION (H): Primary | Chronic | ICD-10-CM

## 2023-08-31 DIAGNOSIS — G47.00 INSOMNIA, UNSPECIFIED TYPE: ICD-10-CM

## 2023-08-31 PROCEDURE — G0145 SCR C/V CYTO,THINLAYER,RESCR: HCPCS | Performed by: NURSE PRACTITIONER

## 2023-08-31 PROCEDURE — G0124 SCREEN C/V THIN LAYER BY MD: HCPCS | Performed by: PATHOLOGY

## 2023-08-31 PROCEDURE — 99214 OFFICE O/P EST MOD 30 MIN: CPT | Performed by: NURSE PRACTITIONER

## 2023-08-31 PROCEDURE — 87624 HPV HI-RISK TYP POOLED RSLT: CPT | Performed by: NURSE PRACTITIONER

## 2023-08-31 RX ORDER — AMITRIPTYLINE HYDROCHLORIDE 10 MG/1
10-20 TABLET ORAL AT BEDTIME
Qty: 30 TABLET | Refills: 0 | Status: SHIPPED | OUTPATIENT
Start: 2023-08-31 | End: 2024-06-13

## 2023-08-31 RX ORDER — ZOLPIDEM TARTRATE 5 MG/1
5 TABLET ORAL
Qty: 30 TABLET | Refills: 0 | Status: SHIPPED | OUTPATIENT
Start: 2023-08-31 | End: 2024-08-09

## 2023-08-31 RX ORDER — SERTRALINE HYDROCHLORIDE 100 MG/1
100 TABLET, FILM COATED ORAL DAILY
Qty: 90 TABLET | Refills: 1 | Status: SHIPPED | OUTPATIENT
Start: 2023-08-31 | End: 2024-04-06

## 2023-08-31 ASSESSMENT — ASTHMA QUESTIONNAIRES
QUESTION_4 LAST FOUR WEEKS HOW OFTEN HAVE YOU USED YOUR RESCUE INHALER OR NEBULIZER MEDICATION (SUCH AS ALBUTEROL): NOT AT ALL
ACT_TOTALSCORE: 25
ACT_TOTALSCORE: 25
QUESTION_5 LAST FOUR WEEKS HOW WOULD YOU RATE YOUR ASTHMA CONTROL: COMPLETELY CONTROLLED
QUESTION_3 LAST FOUR WEEKS HOW OFTEN DID YOUR ASTHMA SYMPTOMS (WHEEZING, COUGHING, SHORTNESS OF BREATH, CHEST TIGHTNESS OR PAIN) WAKE YOU UP AT NIGHT OR EARLIER THAN USUAL IN THE MORNING: NOT AT ALL
QUESTION_1 LAST FOUR WEEKS HOW MUCH OF THE TIME DID YOUR ASTHMA KEEP YOU FROM GETTING AS MUCH DONE AT WORK, SCHOOL OR AT HOME: NONE OF THE TIME
QUESTION_2 LAST FOUR WEEKS HOW OFTEN HAVE YOU HAD SHORTNESS OF BREATH: NOT AT ALL

## 2023-08-31 ASSESSMENT — PAIN SCALES - GENERAL: PAINLEVEL: NO PAIN (0)

## 2023-08-31 NOTE — PROGRESS NOTES
Assessment & Plan     Insomnia, unspecified type  She takes sleep aids on occasion about 2 times per wk. Discussed risks of ambien and willling to try alterantive. Had se with trazodone. Will try amitriptyline. If that doesn't work, could consider seroquel. Will refill small amount of ambien in the case this plan doesn't work. Discussed nonpharmacologic interventions fo rinsomnia as well.   - zolpidem (AMBIEN) 5 MG tablet; Take 1 tablet (5 mg) by mouth nightly as needed for sleep  - amitriptyline (ELAVIL) 10 MG tablet; Take 1-2 tablets (10-20 mg) by mouth At Bedtime    Major depressive disorder, recurrent episode, in partial remission (H)  Stable at this time, despite increase in work stress    Generalized anxiety disorder  Stable, declines changing zoloft dosing  - sertraline (ZOLOFT) 100 MG tablet; Take 1 tablet (100 mg) by mouth daily    Cervical cancer screening    - Pap screen with HPV - recommended age 30 - 65 years                 ISA Tavera [facility] SUSAN Luis is a 48 year old, presenting for the following health issues:  Gyn Exam        8/31/2023     3:28 PM   Additional Questions   Roomed by Siri Keith CMA       History of Present Illness       Reason for visit:  Pap    She eats 0-1 servings of fruits and vegetables daily.She consumes 1 sweetened beverage(s) daily.She exercises with enough effort to increase her heart rate 9 or less minutes per day.  She exercises with enough effort to increase her heart rate 3 or less days per week. She is missing 1 dose(s) of medications per week.  She is not taking prescribed medications regularly due to remembering to take.       Medication Followup of Ambien  Taking Medication as prescribed: yes  Side Effects:  None  Medication Helping Symptoms:  yes      History of depression and anxiety, iso n zoloft, notes with insomnia, used ambien and trazodone, gets hngover effect with trazodone. She typically  "takes ambien once or twice per wk. She willl sometimes take trazodone, estimates about 5 nights per month. Mood has been ok, has had some stress at work, but in general ok.     Is on wegovy through [facility] along with phentermine.     Wt Readings from Last 4 Encounters:   08/31/23 91.1 kg (200 lb 14.4 oz)   07/28/23 86.4 kg (190 lb 9 oz)   05/17/23 87.9 kg (193 lb 12.8 oz)   04/10/23 88 kg (194 lb)         Review of Systems   Constitutional, HEENT, cardiovascular, pulmonary, gi and gu systems are negative, except as otherwise noted.      Objective    /66 (BP Location: Right arm, Cuff Size: Adult Large)   Pulse 72   Temp 99  F (37.2  C) (Tympanic)   Resp 18   Ht 1.556 m (5' 1.25\")   Wt 91.1 kg (200 lb 14.4 oz)   LMP  (LMP Unknown)   SpO2 98%   BMI 37.65 kg/m    Body mass index is 37.65 kg/m .  Physical Exam   GENERAL: healthy, alert and no distress   (female): normal female external genitalia, normal urethral meatus, vaginal mucosa, normal cervix/adnexa/uterus without masses or discharge  PSYCH: mentation appears normal, affect normal/bright                      "

## 2023-08-31 NOTE — PATIENT INSTRUCTIONS
Try the amitriptyline instead of ambien to see if it helps. If so, let's drop ambien. If it does NOT help, mychart me and we can try seroquel.

## 2023-09-01 ENCOUNTER — TRANSFERRED RECORDS (OUTPATIENT)
Dept: HEALTH INFORMATION MANAGEMENT | Facility: CLINIC | Age: 48
End: 2023-09-01
Payer: COMMERCIAL

## 2023-09-01 LAB
ALT SERPL-CCNC: 11 IU/L (ref 0–32)
AST SERPL-CCNC: 14 IU/L (ref 0–40)

## 2023-09-06 ENCOUNTER — MYC MEDICAL ADVICE (OUTPATIENT)
Dept: PEDIATRICS | Facility: CLINIC | Age: 48
End: 2023-09-06
Payer: COMMERCIAL

## 2023-09-06 ENCOUNTER — TELEPHONE (OUTPATIENT)
Dept: FAMILY MEDICINE | Facility: CLINIC | Age: 48
End: 2023-09-06
Payer: COMMERCIAL

## 2023-09-06 LAB
BKR LAB AP GYN ADEQUACY: ABNORMAL
BKR LAB AP GYN INTERPRETATION: ABNORMAL
BKR LAB AP HPV REFLEX: ABNORMAL
BKR LAB AP PREVIOUS ABNORMAL: ABNORMAL
PATH REPORT.COMMENTS IMP SPEC: ABNORMAL
PATH REPORT.COMMENTS IMP SPEC: ABNORMAL
PATH REPORT.RELEVANT HX SPEC: ABNORMAL

## 2023-09-06 NOTE — TELEPHONE ENCOUNTER
Prior Authorization Request  Who s requesting:  Pharmacy  Pharmacy Name and Location: [place] [address]  Medication Name: Semaglutide-Weight Management (WEGOVY) 2.4 MG/0.75ML SOAJ   Insurance Plan: [employer]  Insurance Member ID Number:  [id number]   CoverMyMeds Key: [id number]  Informed patient that prior authorizations can take up to 10 business days for response:   NA  Okay to leave a detailed message: No     Route to pool:  BHARGAVI SOTO MED

## 2023-09-07 LAB
HUMAN PAPILLOMA VIRUS 16 DNA: NEGATIVE
HUMAN PAPILLOMA VIRUS 18 DNA: NEGATIVE
HUMAN PAPILLOMA VIRUS FINAL DIAGNOSIS: NORMAL
HUMAN PAPILLOMA VIRUS OTHER HR: NEGATIVE

## 2023-09-07 NOTE — TELEPHONE ENCOUNTER
Central Prior Authorization Team   Phone: [phone]    PA Initiation    Medication:   Insurance Company: [employer] Non-Specialty PA's - Phone [phone] Fax [phone]  Pharmacy Filling the Rx: [id number] [place], [place] - [address] [place] [address] AT [address]  Filling Pharmacy Phone: [phone]  Filling Pharmacy Fax:    Start Date:

## 2023-09-08 DIAGNOSIS — E66.813 CLASS 3 SEVERE OBESITY DUE TO EXCESS CALORIES WITH SERIOUS COMORBIDITY AND BODY MASS INDEX (BMI) OF 50.0 TO 59.9 IN ADULT (H): ICD-10-CM

## 2023-09-08 DIAGNOSIS — E66.01 CLASS 3 SEVERE OBESITY DUE TO EXCESS CALORIES WITH SERIOUS COMORBIDITY AND BODY MASS INDEX (BMI) OF 50.0 TO 59.9 IN ADULT (H): ICD-10-CM

## 2023-09-08 NOTE — TELEPHONE ENCOUNTER
Prior Authorization Approval    Authorization Effective Date: 8/8/2023  Authorization Expiration Date: 9/6/2024  Medication: Wegovy  Approved Dose/Quantity:    Reference #:     Insurance Company: [employer] Non-Specialty PA's - Phone [phone] Fax [phone]  Expected CoPay:       CoPay Card Available:      Foundation Assistance Needed:    Which Pharmacy is filling the prescription (Not needed for infusion/clinic administered): [id number] [address] AT [facility] [address]  Pharmacy Notified: Yes  Patient Notified: Yes

## 2023-09-11 ENCOUNTER — MYC MEDICAL ADVICE (OUTPATIENT)
Dept: FAMILY MEDICINE | Facility: CLINIC | Age: 48
End: 2023-09-11
Payer: COMMERCIAL

## 2023-09-11 DIAGNOSIS — E66.813 CLASS 3 SEVERE OBESITY DUE TO EXCESS CALORIES WITH SERIOUS COMORBIDITY AND BODY MASS INDEX (BMI) OF 50.0 TO 59.9 IN ADULT (H): ICD-10-CM

## 2023-09-11 DIAGNOSIS — E66.01 CLASS 3 SEVERE OBESITY DUE TO EXCESS CALORIES WITH SERIOUS COMORBIDITY AND BODY MASS INDEX (BMI) OF 50.0 TO 59.9 IN ADULT (H): ICD-10-CM

## 2023-09-11 RX ORDER — SEMAGLUTIDE 2.4 MG/.75ML
INJECTION, SOLUTION SUBCUTANEOUS
Qty: 3 ML | Refills: 11 | OUTPATIENT
Start: 2023-09-11

## 2023-09-11 RX ORDER — SEMAGLUTIDE 2.4 MG/.75ML
2.4 INJECTION, SOLUTION SUBCUTANEOUS WEEKLY
Qty: 3 ML | Refills: 11 | Status: SHIPPED | OUTPATIENT
Start: 2023-09-11 | End: 2024-08-09

## 2023-10-10 ENCOUNTER — E-VISIT (OUTPATIENT)
Dept: PEDIATRICS | Facility: CLINIC | Age: 48
End: 2023-10-10
Payer: COMMERCIAL

## 2023-10-10 DIAGNOSIS — J01.10 ACUTE NON-RECURRENT FRONTAL SINUSITIS: Primary | ICD-10-CM

## 2023-10-10 PROCEDURE — 99421 OL DIG E/M SVC 5-10 MIN: CPT | Performed by: NURSE PRACTITIONER

## 2023-10-13 ENCOUNTER — VIRTUAL VISIT (OUTPATIENT)
Dept: FAMILY MEDICINE | Facility: CLINIC | Age: 48
End: 2023-10-13
Attending: FAMILY MEDICINE
Payer: COMMERCIAL

## 2023-10-13 ENCOUNTER — TELEPHONE (OUTPATIENT)
Dept: FAMILY MEDICINE | Facility: CLINIC | Age: 48
End: 2023-10-13

## 2023-10-13 DIAGNOSIS — E66.01 CLASS 2 SEVERE OBESITY DUE TO EXCESS CALORIES WITH SERIOUS COMORBIDITY AND BODY MASS INDEX (BMI) OF 36.0 TO 36.9 IN ADULT (H): Primary | ICD-10-CM

## 2023-10-13 DIAGNOSIS — R73.9 HYPERGLYCEMIA: ICD-10-CM

## 2023-10-13 DIAGNOSIS — Z13.228 SCREENING FOR ENDOCRINE, NUTRITIONAL, METABOLIC AND IMMUNITY DISORDER: ICD-10-CM

## 2023-10-13 DIAGNOSIS — Z13.0 SCREENING FOR ENDOCRINE, NUTRITIONAL, METABOLIC AND IMMUNITY DISORDER: ICD-10-CM

## 2023-10-13 DIAGNOSIS — E66.812 CLASS 2 SEVERE OBESITY DUE TO EXCESS CALORIES WITH SERIOUS COMORBIDITY AND BODY MASS INDEX (BMI) OF 36.0 TO 36.9 IN ADULT (H): Primary | ICD-10-CM

## 2023-10-13 DIAGNOSIS — E78.5 DYSLIPIDEMIA: ICD-10-CM

## 2023-10-13 DIAGNOSIS — Z13.1 SCREENING FOR DIABETES MELLITUS: ICD-10-CM

## 2023-10-13 DIAGNOSIS — Z13.21 SCREENING FOR ENDOCRINE, NUTRITIONAL, METABOLIC AND IMMUNITY DISORDER: ICD-10-CM

## 2023-10-13 DIAGNOSIS — Z13.0 SCREENING FOR DEFICIENCY ANEMIA: ICD-10-CM

## 2023-10-13 DIAGNOSIS — Z13.220 SCREENING FOR LIPID DISORDERS: ICD-10-CM

## 2023-10-13 DIAGNOSIS — Z13.29 SCREENING FOR ENDOCRINE, NUTRITIONAL, METABOLIC AND IMMUNITY DISORDER: ICD-10-CM

## 2023-10-13 DIAGNOSIS — Z98.84 HISTORY OF ROUX-EN-Y GASTRIC BYPASS: ICD-10-CM

## 2023-10-13 PROCEDURE — 99214 OFFICE O/P EST MOD 30 MIN: CPT | Mod: VID | Performed by: FAMILY MEDICINE

## 2023-10-13 ASSESSMENT — PATIENT HEALTH QUESTIONNAIRE - PHQ9: SUM OF ALL RESPONSES TO PHQ QUESTIONS 1-9: 0

## 2023-10-13 ASSESSMENT — ENCOUNTER SYMPTOMS: CONSTITUTIONAL NEGATIVE: 1

## 2023-10-13 NOTE — TELEPHONE ENCOUNTER
Left message to call back for: Patient  Information to relay to patient: Patient will need to schedule lab visit at one the locations that offers 3hr GTT, [place], [place], [place] or [facility]. Please give contact numbers for clinic she prefers to schedule. Patient will need to fast 8 hrs prior to testing remain in clinic for the entirety of the test.

## 2023-10-13 NOTE — Clinical Note
Are you able to assist patient in scheduling lab only appointment for 3hr GTT test at one of these labs-  [place], [place], [place] or [facility]

## 2023-10-13 NOTE — PROGRESS NOTES
Janelle is a 48 year old who is being evaluated via a billable video visit.      How would you like to obtain your AVS? MyChart  If the video visit is dropped, the invitation should be resent by: Send to e-mail at: [email or web address]  Will anyone else be joining your video visit? No    Assessment & Plan   Problem List Items Addressed This Visit       Class 2 severe obesity due to excess calories with serious comorbidity and body mass index (BMI) of 36.0 to 36.9 in adult (H) - Primary     48 year old year old female in clinic today to discuss treatment of the following conditions through diet and lifestyle modification and weight loss:  1. Class 2 severe obesity due to excess calories with serious comorbidity and body mass index (BMI) of 36.0 to 36.9 in adult (H)    2. History of Heraclio-en-Y gastric bypass    3. Dyslipidemia    4. Hyperglycemia    5. Screening for lipid disorders    6. Screening for diabetes mellitus    7. Screening for endocrine, nutritional, metabolic and immunity disorder    8. Screening for deficiency anemia    The patient's weight loss result since the last visit was successful based on weight loss maintenance.   Overall, she has done an amazing job at maintaining her weight.  She continues to struggle with energy and has had limited exercise.  Currently struggling with a sinus infection.  She was told by her employer/insurance plan that Wegovy will not be covered starting with the next insurance cycle.  She wonders what her options might be.  Interestingly, she describes some of her CGM data is showing extreme fluctuations.  Her blood sugar will decline and be as low as 50 mg/dL.  This is approximately 60 minutes after it peaks at 250 mg/dL.  She has consistently screened negative for diabetes in the past but this makes me wonder if there is more variability than we previously have appreciated.  I have recommended a glucose tolerance test.  We will make this a 3-hour G TT.  If she has a peak  greater than 200 mg/dL.  This is diagnostic for diabetes and I would transition her to Mounjaro using a diagnosis of diabetes (on at all the other appropriate cares including aspirin, statin etc.).    Current therapies:    Medications: Yes - will need alternative if wegovy is not covered   - Starting weight for GLP1 receptor agonist: 277   - Total weight loss: 82 lbs (29.6%)    Nutritional goals/strategies: Yes.   - caloric restriction: Yes   - time restriction: NO   - diet (macronutrient) framework: high protein/low carbohydrate    Movement:   - sedentary    Sleep:    - struggles with tiredness    Follow-up: 2-3 months TBD.            Relevant Orders    Glucose tolerance, gest std 100 gm, 3 hr    Lipid panel reflex to direct LDL Fasting    Basic metabolic panel  (Ca, Cl, CO2, Creat, Gluc, K, Na, BUN)    ALT    CBC with platelets    History of Heraclio-en-Y gastric bypass     Other Visit Diagnoses       Dyslipidemia        Relevant Orders    Lipid panel reflex to direct LDL Fasting    Hyperglycemia        Relevant Orders    Glucose tolerance, gest std 100 gm, 3 hr    Screening for lipid disorders        Relevant Orders    Lipid panel reflex to direct LDL Fasting    ALT    Screening for diabetes mellitus        Relevant Orders    Basic metabolic panel  (Ca, Cl, CO2, Creat, Gluc, K, Na, BUN)    Screening for endocrine, nutritional, metabolic and immunity disorder        Relevant Orders    Basic metabolic panel  (Ca, Cl, CO2, Creat, Gluc, K, Na, BUN)    ALT    Screening for deficiency anemia        Relevant Orders    CBC with platelets           Chava Baig MD  [facility] BRIAN Luis is a 48 year old, presenting for the following health issues:  Weight Loss (Follow-up/)        10/13/2023     8:01 AM   Additional Questions   Roomed by xl       Patient presents for treatment of chronic, comorbid conditions using intensive therapeutic lifestyle interventions including nutrition,  physical activity, and behavior management.   - using CGM.  AM fasting is normal.  She can spike to as high as 248.  Carbs also can cause a spike. She does better if she    - insurance will NOT cover wegovy going forward.     - weight has been around 195.  She has been on 8 mg phentermine as well.    - successes: continues to eat well.     - she has gone as low as 50 mg/dL   - she can feel hypoglycemia.     - continues to work to add exercise.  Energy remains a barrier.     History of Present Illness       Reason for visit:  Weight Management    She eats 0-1 servings of fruits and vegetables daily.She consumes 0 sweetened beverage(s) daily.She exercises with enough effort to increase her heart rate 9 or less minutes per day.  She exercises with enough effort to increase her heart rate 3 or less days per week. She is missing 1 dose(s) of medications per week.  She is not taking prescribed medications regularly due to remembering to take.    Review of Systems   Constitutional: Negative.    All other systems reviewed and are negative.          Objective    Vitals - Patient Reported  Weight (Patient Reported): 88.5 kg (195 lb)      Vitals:  No vitals were obtained today due to virtual visit.    Physical Exam  Nursing note reviewed.   Constitutional:       General: She is not in acute distress.     Appearance: Normal appearance. She is not ill-appearing.   HENT:      Head: Normocephalic and atraumatic.   Eyes:      Extraocular Movements: Extraocular movements intact.      Conjunctiva/sclera: Conjunctivae normal.   Pulmonary:      Effort: Pulmonary effort is normal.   Neurological:      Mental Status: She is alert and oriented to person, place, and time.   Psychiatric:         Attention and Perception: Attention normal.         Mood and Affect: Mood normal.         Speech: Speech normal.         Thought Content: Thought content normal.              Video-Visit Details    Type of service:  Video Visit     Originating  Location (pt. Location): Home    Distant Location (provider location):  On-site  Platform used for Video Visit: Sona

## 2023-10-13 NOTE — ASSESSMENT & PLAN NOTE
48 year old year old female in clinic today to discuss treatment of the following conditions through diet and lifestyle modification and weight loss:  1. Class 2 severe obesity due to excess calories with serious comorbidity and body mass index (BMI) of 36.0 to 36.9 in adult (H)    2. History of Heraclio-en-Y gastric bypass    3. Dyslipidemia    4. Hyperglycemia    5. Screening for lipid disorders    6. Screening for diabetes mellitus    7. Screening for endocrine, nutritional, metabolic and immunity disorder    8. Screening for deficiency anemia      The patient's weight loss result since the last visit was successful based on weight loss maintenance.   Overall, she has done an amazing job at maintaining her weight.  She continues to struggle with energy and has had limited exercise.  Currently struggling with a sinus infection.  She was told by her employer/insurance plan that Wegovy will not be covered starting with the next insurance cycle.  She wonders what her options might be.  Interestingly, she describes some of her CGM data is showing extreme fluctuations.  Her blood sugar will decline and be as low as 50 mg/dL.  This is approximately 60 minutes after it peaks at 250 mg/dL.  She has consistently screened negative for diabetes in the past but this makes me wonder if there is more variability than we previously have appreciated.  I have recommended a glucose tolerance test.  We will make this a 3-hour G TT.  If she has a peak greater than 200 mg/dL.  This is diagnostic for diabetes and I would transition her to Mounjaro using a diagnosis of diabetes (on at all the other appropriate cares including aspirin, statin etc.).    Current therapies:    Medications: Yes - will need alternative if wegovy is not covered   - Starting weight for GLP1 receptor agonist: 277   - Total weight loss: 82 lbs (29.6%)    Nutritional goals/strategies: Yes.   - caloric restriction: Yes   - time restriction: NO   - diet (macronutrient)  framework: high protein/low carbohydrate    Movement:   - sedentary    Sleep:    - struggles with tiredness    Follow-up: 2-3 months TBD.

## 2023-10-13 NOTE — PROGRESS NOTES
[place] [facility] clinic labs do 3hr GTT. Patient will need to remain in clinic for the entirety of the test

## 2023-10-13 NOTE — PROGRESS NOTES
She will need for fast 8 hours prior to testing and plan to spend the entire 3 hours in the lab for the testing

## 2023-11-03 ENCOUNTER — TRANSFERRED RECORDS (OUTPATIENT)
Dept: HEALTH INFORMATION MANAGEMENT | Facility: CLINIC | Age: 48
End: 2023-11-03
Payer: COMMERCIAL

## 2023-11-07 ENCOUNTER — LAB (OUTPATIENT)
Dept: LAB | Facility: CLINIC | Age: 48
End: 2023-11-07
Payer: COMMERCIAL

## 2023-11-07 DIAGNOSIS — E66.812 CLASS 2 SEVERE OBESITY DUE TO EXCESS CALORIES WITH SERIOUS COMORBIDITY AND BODY MASS INDEX (BMI) OF 36.0 TO 36.9 IN ADULT (H): ICD-10-CM

## 2023-11-07 DIAGNOSIS — Z13.220 SCREENING FOR LIPID DISORDERS: ICD-10-CM

## 2023-11-07 DIAGNOSIS — E78.5 DYSLIPIDEMIA: ICD-10-CM

## 2023-11-07 DIAGNOSIS — Z13.1 SCREENING FOR DIABETES MELLITUS: ICD-10-CM

## 2023-11-07 DIAGNOSIS — Z13.21 SCREENING FOR ENDOCRINE, NUTRITIONAL, METABOLIC AND IMMUNITY DISORDER: ICD-10-CM

## 2023-11-07 DIAGNOSIS — E66.01 CLASS 2 SEVERE OBESITY DUE TO EXCESS CALORIES WITH SERIOUS COMORBIDITY AND BODY MASS INDEX (BMI) OF 36.0 TO 36.9 IN ADULT (H): ICD-10-CM

## 2023-11-07 DIAGNOSIS — Z13.0 SCREENING FOR DEFICIENCY ANEMIA: ICD-10-CM

## 2023-11-07 DIAGNOSIS — Z13.228 SCREENING FOR ENDOCRINE, NUTRITIONAL, METABOLIC AND IMMUNITY DISORDER: ICD-10-CM

## 2023-11-07 DIAGNOSIS — Z13.0 SCREENING FOR ENDOCRINE, NUTRITIONAL, METABOLIC AND IMMUNITY DISORDER: ICD-10-CM

## 2023-11-07 DIAGNOSIS — Z13.29 SCREENING FOR ENDOCRINE, NUTRITIONAL, METABOLIC AND IMMUNITY DISORDER: ICD-10-CM

## 2023-11-07 LAB
ALT SERPL W P-5'-P-CCNC: 10 U/L (ref 0–50)
ANION GAP SERPL CALCULATED.3IONS-SCNC: 10 MMOL/L (ref 7–15)
BUN SERPL-MCNC: 14 MG/DL (ref 6–20)
CALCIUM SERPL-MCNC: 8.9 MG/DL (ref 8.6–10)
CHLORIDE SERPL-SCNC: 108 MMOL/L (ref 98–107)
CHOLEST SERPL-MCNC: 205 MG/DL
CREAT SERPL-MCNC: 0.79 MG/DL (ref 0.51–0.95)
DEPRECATED HCO3 PLAS-SCNC: 21 MMOL/L (ref 22–29)
EGFRCR SERPLBLD CKD-EPI 2021: >90 ML/MIN/1.73M2
ERYTHROCYTE [DISTWIDTH] IN BLOOD BY AUTOMATED COUNT: 13 % (ref 10–15)
GLUCOSE SERPL-MCNC: 87 MG/DL (ref 70–99)
HCT VFR BLD AUTO: 36.2 % (ref 35–47)
HDLC SERPL-MCNC: 73 MG/DL
HGB BLD-MCNC: 11.8 G/DL (ref 11.7–15.7)
LDLC SERPL CALC-MCNC: 114 MG/DL
MCH RBC QN AUTO: 29.4 PG (ref 26.5–33)
MCHC RBC AUTO-ENTMCNC: 32.6 G/DL (ref 31.5–36.5)
MCV RBC AUTO: 90 FL (ref 78–100)
NON GESTATIONAL GTT 2 HR POST DOSE: 60 MG/DL (ref 60–199)
NON GESTATIONAL GTT FASTING: 90 MG/DL (ref 60–125)
NONHDLC SERPL-MCNC: 132 MG/DL
PLATELET # BLD AUTO: 313 10E3/UL (ref 150–450)
POTASSIUM SERPL-SCNC: 4.4 MMOL/L (ref 3.4–5.3)
RBC # BLD AUTO: 4.01 10E6/UL (ref 3.8–5.2)
SODIUM SERPL-SCNC: 139 MMOL/L (ref 135–145)
TRIGL SERPL-MCNC: 92 MG/DL
WBC # BLD AUTO: 7.1 10E3/UL (ref 4–11)

## 2023-11-07 PROCEDURE — 36415 COLL VENOUS BLD VENIPUNCTURE: CPT

## 2023-11-07 PROCEDURE — 82950 GLUCOSE TEST: CPT

## 2023-11-07 PROCEDURE — 80061 LIPID PANEL: CPT

## 2023-11-07 PROCEDURE — 80048 BASIC METABOLIC PNL TOTAL CA: CPT

## 2023-11-07 PROCEDURE — 84460 ALANINE AMINO (ALT) (SGPT): CPT

## 2023-11-07 PROCEDURE — 85027 COMPLETE CBC AUTOMATED: CPT

## 2023-12-21 ENCOUNTER — TRANSFERRED RECORDS (OUTPATIENT)
Dept: HEALTH INFORMATION MANAGEMENT | Facility: CLINIC | Age: 48
End: 2023-12-21
Payer: COMMERCIAL

## 2023-12-22 ENCOUNTER — VIRTUAL VISIT (OUTPATIENT)
Dept: FAMILY MEDICINE | Facility: CLINIC | Age: 48
End: 2023-12-22
Payer: COMMERCIAL

## 2023-12-22 DIAGNOSIS — E66.01 CLASS 2 SEVERE OBESITY DUE TO EXCESS CALORIES WITH SERIOUS COMORBIDITY AND BODY MASS INDEX (BMI) OF 36.0 TO 36.9 IN ADULT (H): Primary | ICD-10-CM

## 2023-12-22 DIAGNOSIS — E66.812 CLASS 2 SEVERE OBESITY DUE TO EXCESS CALORIES WITH SERIOUS COMORBIDITY AND BODY MASS INDEX (BMI) OF 36.0 TO 36.9 IN ADULT (H): Primary | ICD-10-CM

## 2023-12-22 DIAGNOSIS — E78.00 HYPERCHOLESTEREMIA: ICD-10-CM

## 2023-12-22 DIAGNOSIS — Z98.84 HISTORY OF ROUX-EN-Y GASTRIC BYPASS: ICD-10-CM

## 2023-12-22 PROCEDURE — 99214 OFFICE O/P EST MOD 30 MIN: CPT | Mod: VID | Performed by: FAMILY MEDICINE

## 2023-12-22 ASSESSMENT — ANXIETY QUESTIONNAIRES
7. FEELING AFRAID AS IF SOMETHING AWFUL MIGHT HAPPEN: NOT AT ALL
4. TROUBLE RELAXING: NOT AT ALL
GAD7 TOTAL SCORE: 5
3. WORRYING TOO MUCH ABOUT DIFFERENT THINGS: SEVERAL DAYS
1. FEELING NERVOUS, ANXIOUS, OR ON EDGE: MORE THAN HALF THE DAYS
GAD7 TOTAL SCORE: 5
5. BEING SO RESTLESS THAT IT IS HARD TO SIT STILL: NOT AT ALL
IF YOU CHECKED OFF ANY PROBLEMS ON THIS QUESTIONNAIRE, HOW DIFFICULT HAVE THESE PROBLEMS MADE IT FOR YOU TO DO YOUR WORK, TAKE CARE OF THINGS AT HOME, OR GET ALONG WITH OTHER PEOPLE: SOMEWHAT DIFFICULT
6. BECOMING EASILY ANNOYED OR IRRITABLE: MORE THAN HALF THE DAYS
2. NOT BEING ABLE TO STOP OR CONTROL WORRYING: NOT AT ALL

## 2023-12-22 NOTE — ASSESSMENT & PLAN NOTE
48 year old year old female in clinic today to discuss treatment of the following conditions through diet and lifestyle modification and weight loss:  1. Class 2 severe obesity due to excess calories with serious comorbidity and body mass index (BMI) of 36.0 to 36.9 in adult (H)    2. History of Heraclio-en-Y gastric bypass    3. Hypercholesteremia      The patient's weight loss result since the last visit was mixed based on weight loss maintenance but a sense of frustration that she is struggling to keep her weight from increasing.  She received notice from her insurance plan but after her current approval, they will no longer cover GLP-1 receptor agonist therapies intended for weight loss such as Saxenda or Wegovy.  She was told that they would cover Mounjaro under a diagnosis of diabetes.  She regained some weight when she was off of Wegovy incidentally and has yet to lose that weight again.  Given this patient's long history of struggling with weight, I anticipate she will regain her weight if she is off therapy.  She is high risk for diabetes.  She currently has symptoms of fluctuating blood sugar and hypoglycemia despite eating a carbohydrate restricted diet and use of Wegovy.  I think these factors will cause her to have unwieldy cravings.  Her family history is profound for diabetes.  Her brother is blind as result of diabetes.  She herself needs to lose more weight in order to successfully undergo a fistula repair related to her Crohn's colitis.  She is also found that when she is actively losing weight her Crohn's tends to be more quiescent.  We reviewed options going forward for her to stay on a GLP-1 receptor agonist.  We decided on the following:  - For now, continue Wegovy.  She has coverage into the spring.  - In January we will submit for Mounjaro using a diagnosis of metabolic syndrome, obesity and insulin resistance as well as hypoglycemia.  Anticipate that will be rejected on grounds that she does not  have diabetes.  From there I will write a letter of appeal describing the weight loss that she achieved while on a GLP-1 receptor agonist and her risk factors for weight regain and diabetes.    -If we get a denial I will prescribe Zepbound and titrate.  She will use the manufactures coupon from the 's website (currently the coupon would reduce the cost out-of-pocket to about $500 per month).  She did not commit to funding this plan but this would get her into 2025.  - She will continue to work to execute a calorically restricted, carbohydrate restricted, protein rich and vegetable rich diet to the best of her ability.  - Add physical activity as able.

## 2023-12-22 NOTE — PROGRESS NOTES
Janelle is a 48 year old who is being evaluated via a billable video visit.      How would you like to obtain your AVS? MyChart  If the video visit is dropped, the invitation should be resent by: Send to e-mail at: [email or web address]  Will anyone else be joining your video visit? No    Assessment & Plan   Problem List Items Addressed This Visit       Class 2 severe obesity due to excess calories with serious comorbidity and body mass index (BMI) of 36.0 to 36.9 in adult (H) - Primary     48 year old year old female in clinic today to discuss treatment of the following conditions through diet and lifestyle modification and weight loss:  1. Class 2 severe obesity due to excess calories with serious comorbidity and body mass index (BMI) of 36.0 to 36.9 in adult (H)    2. History of Heraclio-en-Y gastric bypass    3. Hypercholesteremia    The patient's weight loss result since the last visit was mixed based on weight loss maintenance but a sense of frustration that she is struggling to keep her weight from increasing.  She received notice from her insurance plan but after her current approval, they will no longer cover GLP-1 receptor agonist therapies intended for weight loss such as Saxenda or Wegovy.  She was told that they would cover Mounjaro under a diagnosis of diabetes.  She regained some weight when she was off of Wegovy incidentally and has yet to lose that weight again.  Given this patient's long history of struggling with weight, I anticipate she will regain her weight if she is off therapy.  She is high risk for diabetes.  She currently has symptoms of fluctuating blood sugar and hypoglycemia despite eating a carbohydrate restricted diet and use of Wegovy.  I think these factors will cause her to have unwieldy cravings.  Her family history is profound for diabetes.  Her brother is blind as result of diabetes.  She herself needs to lose more weight in order to successfully undergo a fistula repair related to her  Crohn's colitis.  She is also found that when she is actively losing weight her Crohn's tends to be more quiescent.  We reviewed options going forward for her to stay on a GLP-1 receptor agonist.  We decided on the following:  - For now, continue Wegovy.  She has coverage into the spring.  - In January we will submit for Mounjaro using a diagnosis of metabolic syndrome, obesity and insulin resistance as well as hypoglycemia.  Anticipate that will be rejected on grounds that she does not have diabetes.  From there I will write a letter of appeal describing the weight loss that she achieved while on a GLP-1 receptor agonist and her risk factors for weight regain and diabetes.    -If we get a denial I will prescribe Zepbound and titrate.  She will use the manufactures coupon from the 's website (currently the coupon would reduce the cost out-of-pocket to about $500 per month).  She did not commit to funding this plan but this would get her into 2025.  - She will continue to work to execute a calorically restricted, carbohydrate restricted, protein rich and vegetable rich diet to the best of her ability.  - Add physical activity as able.         History of Heraclio-en-Y gastric bypass    Hypercholesteremia      31 minutes spent by me on the date of the encounter doing chart review, history and exam, documentation and further activities per the note    Chava Baig MD  [facility]    Galileo Luis is a 48 year old, presenting for the following health issues:  Weight Loss (Follow-up/)        12/22/2023    11:39 AM   Additional Questions   Roomed by xl       Patient presents for treatment of chronic, comorbid conditions using intensive therapeutic lifestyle interventions including nutrition, physical activity, and behavior management.   - weight has now plateau.  She is frustrated by some weight regain.     - her insurance coverage will change.  No longer covering medical weight  loss.  She has coverage through April.  Interested in Zebound.    - needs to lose weight for fistula.     - medication benefits: helpful for maintenance side effects: none / minimal         Review of Systems       Objective    Vitals - Patient Reported  Weight (Patient Reported): 93 kg (205 lb)        Physical Exam  Nursing note reviewed.   Constitutional:       General: She is not in acute distress.     Appearance: Normal appearance. She is not ill-appearing.   HENT:      Head: Normocephalic and atraumatic.   Eyes:      Extraocular Movements: Extraocular movements intact.      Conjunctiva/sclera: Conjunctivae normal.   Pulmonary:      Effort: Pulmonary effort is normal.   Neurological:      Mental Status: She is alert and oriented to person, place, and time.   Psychiatric:         Attention and Perception: Attention normal.         Mood and Affect: Mood normal.         Speech: Speech normal.         Thought Content: Thought content normal.                Video-Visit Details    Type of service:  Video Visit     Originating Location (pt. Location): Home    Distant Location (provider location):  Off-site  Platform used for Video Visit: Sona

## 2024-01-04 ENCOUNTER — TRANSFERRED RECORDS (OUTPATIENT)
Dept: HEALTH INFORMATION MANAGEMENT | Facility: CLINIC | Age: 49
End: 2024-01-04
Payer: COMMERCIAL

## 2024-01-29 ENCOUNTER — ALLIED HEALTH/NURSE VISIT (OUTPATIENT)
Dept: FAMILY MEDICINE | Facility: CLINIC | Age: 49
End: 2024-01-29
Payer: COMMERCIAL

## 2024-01-29 VITALS — BODY MASS INDEX: 39.12 KG/M2 | HEIGHT: 62 IN | WEIGHT: 212.6 LBS

## 2024-01-29 DIAGNOSIS — E66.812 CLASS 2 SEVERE OBESITY DUE TO EXCESS CALORIES WITH SERIOUS COMORBIDITY AND BODY MASS INDEX (BMI) OF 36.0 TO 36.9 IN ADULT (H): Primary | ICD-10-CM

## 2024-01-29 DIAGNOSIS — E88.819 INSULIN RESISTANCE: ICD-10-CM

## 2024-01-29 DIAGNOSIS — E16.2 HYPOGLYCEMIA: ICD-10-CM

## 2024-01-29 DIAGNOSIS — E66.01 CLASS 2 SEVERE OBESITY DUE TO EXCESS CALORIES WITH SERIOUS COMORBIDITY AND BODY MASS INDEX (BMI) OF 36.0 TO 36.9 IN ADULT (H): Primary | ICD-10-CM

## 2024-01-29 DIAGNOSIS — E88.810 METABOLIC SYNDROME X: ICD-10-CM

## 2024-01-29 PROCEDURE — 99207 PR NO CHARGE NURSE ONLY: CPT

## 2024-01-29 RX ORDER — TIRZEPATIDE 2.5 MG/.5ML
2.5 INJECTION, SOLUTION SUBCUTANEOUS WEEKLY
Qty: 2 ML | Refills: 0 | Status: SHIPPED | OUTPATIENT
Start: 2024-01-29 | End: 2024-02-20

## 2024-01-29 RX ORDER — TIRZEPATIDE 7.5 MG/.5ML
7.5 INJECTION, SOLUTION SUBCUTANEOUS WEEKLY
Qty: 2 ML | Refills: 0 | Status: SHIPPED | OUTPATIENT
Start: 2024-01-29 | End: 2024-02-20

## 2024-01-30 ENCOUNTER — TELEPHONE (OUTPATIENT)
Dept: FAMILY MEDICINE | Facility: CLINIC | Age: 49
End: 2024-01-30

## 2024-01-30 NOTE — TELEPHONE ENCOUNTER
Prior Authorization Request      Who s requesting: Pharmacy     Pharmacy Name and Location: [place] [address]    Medication Name:   tirzepatide (MOUNJARO) 2.5 MG/0.5ML pen   tirzepatide (MOUNJARO) 7.5 MG/0.5ML pen     Insurance Plan: [employer]    Insurance Member ID Number:  [id number]     CoverMyMeds Key: Yes [id number]    Informed patient that prior authorizations can take up to 10 business days for response:   NA    Okay to leave a detailed message: No           Route to pool:  [facility] MED

## 2024-02-06 ENCOUNTER — TRANSFERRED RECORDS (OUTPATIENT)
Dept: HEALTH INFORMATION MANAGEMENT | Facility: CLINIC | Age: 49
End: 2024-02-06
Payer: COMMERCIAL

## 2024-02-07 ENCOUNTER — MYC MEDICAL ADVICE (OUTPATIENT)
Dept: FAMILY MEDICINE | Facility: CLINIC | Age: 49
End: 2024-02-07
Payer: COMMERCIAL

## 2024-02-07 ENCOUNTER — MYC MEDICAL ADVICE (OUTPATIENT)
Dept: PEDIATRICS | Facility: CLINIC | Age: 49
End: 2024-02-07
Payer: COMMERCIAL

## 2024-02-10 NOTE — TELEPHONE ENCOUNTER
Retail Pharmacy Prior Authorization Team   Phone: [phone]    PA Initiation    Medication: MOUNJARO 2.5 MG/0.5ML SC SOPN  Insurance Company: [employer] Non-Specialty PA's - Phone [phone] Fax [phone]  [facility] the [id number] [place], [place] - [address] [place] [address] AT [facility] [address]  Filling Pharmacy Phone: [phone]  Filling Pharmacy Fax:    Start Date: 2/10/2024

## 2024-02-11 ENCOUNTER — TRANSFERRED RECORDS (OUTPATIENT)
Dept: HEALTH INFORMATION MANAGEMENT | Facility: CLINIC | Age: 49
End: 2024-02-11
Payer: COMMERCIAL

## 2024-02-12 NOTE — TELEPHONE ENCOUNTER
PRIOR AUTHORIZATION DENIED    Medication: MOUNJARO 2.5 MG/0.5ML SC SOPN  Insurance Company: [employer] Non-Specialty PA's - Phone [phone] Fax [phone]  Denial Date: 2/10/2024  Denial Reason(s):           Appeal Information:         Patient Notified: No

## 2024-02-13 ENCOUNTER — MYC MEDICAL ADVICE (OUTPATIENT)
Dept: FAMILY MEDICINE | Facility: CLINIC | Age: 49
End: 2024-02-13
Payer: COMMERCIAL

## 2024-02-13 NOTE — LETTER
February 14, 2024      Janelle BROWN [facility] [id number]        To Whom It May Concern,     The purpose of this letter is to appeal the change of coverage for GLP-1 receptor agonist therapy directed towards medical weight loss.  This individual has been on Wegovy since August 2021.  During the 2+ years she has been on this medicine she lost a maximum of 90 pounds or 31% of her starting body weight.  Any abnormalities of metabolic health and laboratory testing resolved during the time that she was on this medicine.  This individual is a long history of struggles with weight loss.  She underwent bariatric surgery in 2016 with subsequent weight regain.  Semaglutide (specifically Wegovy) has been incredibly powerful in helping her maintain her weight and achieve a more healthy lifestyle.  I am concerned that without this medicine she will regain the weight that she has lost over the past couple of years despite her efforts to eat well.  Given her propensity to gain weight, I am not confident that other medicines for weight loss such as phentermine, phentermine/topiramate, bupropion/naltrexone will be effective in helping her to maintain her weight.  Long-term, this individual is at risk for metabolic conditions including metabolic syndrome and diabetes.        Please reconsider coverage for this medication or class of medications given this information.  Obesity is recognized as a disease.  This medication (and class of medication) is the treatment for this disease when combined with caloric restriction and increased physical activity.  If possible, I would welcome the opportunity to discuss this patient with any representative the insurance plan.    Sincerely,    Chava Baig MD  Diplomate - American Board of Obesity Medicine

## 2024-02-14 NOTE — TELEPHONE ENCOUNTER
Medication Appeal Initiation    Medication: MOUNJARO 2.5 MG/0.5ML SC SOPN  Appeal Start Date:  2/14/2024  Comments:   Appeal and denial letter faxed

## 2024-02-15 NOTE — TELEPHONE ENCOUNTER
MEDICATION APPEAL DENIED    Medication: MOUNJARO 2.5 MG/0.5ML SC SOPN  Insurance Company:   Denial Date: 2/15/2024  Denial Reason(s):           Second Level Appeal Information:         Patient Notified: No  Central Prior Authorization Team ONLY: Second level appeals will be managed by the clinic staff and provider. Please contact the [employer] Prior Authorization Team if additional information about the denial is needed.

## 2024-02-16 ENCOUNTER — MYC MEDICAL ADVICE (OUTPATIENT)
Dept: FAMILY MEDICINE | Facility: CLINIC | Age: 49
End: 2024-02-16
Payer: COMMERCIAL

## 2024-02-19 NOTE — TELEPHONE ENCOUNTER
Left message to call back for: Patient  Information to relay to patient: Please help patient reschedule to sooner appointment; visit type 2332 should have availability in the next 2 weeks. If no slots available, ok to use reserved slots.

## 2024-02-20 ASSESSMENT — ASTHMA QUESTIONNAIRES
ACT_TOTALSCORE: 25
QUESTION_2 LAST FOUR WEEKS HOW OFTEN HAVE YOU HAD SHORTNESS OF BREATH: NOT AT ALL
ACT_TOTALSCORE: 25
QUESTION_5 LAST FOUR WEEKS HOW WOULD YOU RATE YOUR ASTHMA CONTROL: COMPLETELY CONTROLLED
QUESTION_4 LAST FOUR WEEKS HOW OFTEN HAVE YOU USED YOUR RESCUE INHALER OR NEBULIZER MEDICATION (SUCH AS ALBUTEROL): NOT AT ALL
QUESTION_3 LAST FOUR WEEKS HOW OFTEN DID YOUR ASTHMA SYMPTOMS (WHEEZING, COUGHING, SHORTNESS OF BREATH, CHEST TIGHTNESS OR PAIN) WAKE YOU UP AT NIGHT OR EARLIER THAN USUAL IN THE MORNING: NOT AT ALL
QUESTION_1 LAST FOUR WEEKS HOW MUCH OF THE TIME DID YOUR ASTHMA KEEP YOU FROM GETTING AS MUCH DONE AT WORK, SCHOOL OR AT HOME: NONE OF THE TIME

## 2024-02-21 ENCOUNTER — TRANSFERRED RECORDS (OUTPATIENT)
Dept: HEALTH INFORMATION MANAGEMENT | Facility: CLINIC | Age: 49
End: 2024-02-21
Payer: COMMERCIAL

## 2024-02-27 ENCOUNTER — VIRTUAL VISIT (OUTPATIENT)
Dept: FAMILY MEDICINE | Facility: CLINIC | Age: 49
End: 2024-02-27
Attending: FAMILY MEDICINE
Payer: COMMERCIAL

## 2024-02-27 DIAGNOSIS — E66.812 CLASS 2 SEVERE OBESITY DUE TO EXCESS CALORIES WITH SERIOUS COMORBIDITY AND BODY MASS INDEX (BMI) OF 36.0 TO 36.9 IN ADULT (H): Primary | ICD-10-CM

## 2024-02-27 DIAGNOSIS — E78.00 HYPERCHOLESTEREMIA: ICD-10-CM

## 2024-02-27 DIAGNOSIS — E66.01 CLASS 2 SEVERE OBESITY DUE TO EXCESS CALORIES WITH SERIOUS COMORBIDITY AND BODY MASS INDEX (BMI) OF 36.0 TO 36.9 IN ADULT (H): Primary | ICD-10-CM

## 2024-02-27 DIAGNOSIS — Z98.84 HISTORY OF ROUX-EN-Y GASTRIC BYPASS: ICD-10-CM

## 2024-02-27 DIAGNOSIS — F33.41 MAJOR DEPRESSIVE DISORDER, RECURRENT EPISODE, IN PARTIAL REMISSION (H): Chronic | ICD-10-CM

## 2024-02-27 PROCEDURE — 99214 OFFICE O/P EST MOD 30 MIN: CPT | Mod: 95 | Performed by: FAMILY MEDICINE

## 2024-02-27 RX ORDER — PHENTERMINE HYDROCHLORIDE 30 MG/1
30 CAPSULE ORAL EVERY MORNING
Qty: 30 CAPSULE | Refills: 0 | Status: SHIPPED | OUTPATIENT
Start: 2024-02-27 | End: 2024-08-09

## 2024-02-27 RX ORDER — VANCOMYCIN HYDROCHLORIDE 125 MG/1
CAPSULE ORAL
COMMUNITY
Start: 2024-02-25 | End: 2024-08-09

## 2024-02-27 NOTE — PROGRESS NOTES
Janelle is a 49 year old who is being evaluated via a billable video visit.      How would you like to obtain your AVS? MyChart  If the video visit is dropped, the invitation should be resent by: Send to e-mail at: [email or web address]  Will anyone else be joining your video visit? No    Assessment & Plan   Problem List Items Addressed This Visit       Major depressive disorder, recurrent episode, in partial remission (H24) (Chronic)     Discussed mood implications of phentermine.          Class 2 severe obesity due to excess calories with serious comorbidity and body mass index (BMI) of 36.0 to 36.9 in adult (H) - Primary     49 year old year old female in clinic today to discuss treatment of the following conditions through diet and lifestyle modification and weight loss:  1. Class 2 severe obesity due to excess calories with serious comorbidity and body mass index (BMI) of 36.0 to 36.9 in adult (H)    2. History of Heraclio-en-Y gastric bypass    3. Hypercholesteremia    4. Major depressive disorder, recurrent episode, in partial remission (H24)    The patient's weight loss result since the last visit was mixed based on weight stability.  She has a couple months left on Wegovy before her insurance will no longer cover the medicine.  She finds it helpful.  She is actually more interested in being on tirzepatide but this has not been covered by insurance.  Reviewed pharmacologic options.  She has been on Contrave in the past and reported weight gain.  She is been on phentermine in the past with out side effects but without durable weight loss.  We discussed how phentermine could be part of a diet and lifestyle plan and that it does blunt some of the cravings.  There is no contraindication.  Given that she is previously tolerated the medicine I think it is worthwhile to add this back in at least immediately after coming off of Wegovy if not longer.  30 mg capsule sent to pharmacy.  Recheck in approximately 3 months.       "   Relevant Medications    phentermine 30 MG capsule    History of Heraclio-en-Y gastric bypass    Relevant Medications    phentermine 30 MG capsule    Hypercholesteremia    Relevant Medications    phentermine 30 MG capsule       BMI  Estimated body mass index is 39.32 kg/m  as calculated from the following:    Height as of 1/29/24: 1.566 m (5' 1.65\").    Weight as of 1/29/24: 96.4 kg (212 lb 9.6 oz).   Weight management plan: Specific weight management program called [facility] discussed  Subjective   Janelle is a 49 year old, presenting for the following health issues:  Weight Loss (Follow-up. Pt is not at home and unable to check weight today./)        2/27/2024    12:26 PM   Additional Questions   Roomed by xl     Patient presents for treatment of chronic, comorbid conditions using intensive therapeutic lifestyle interventions including nutrition, physical activity, and behavior management.   - insurance will not cover GLP1-rheumatoid arthritis    - interested in alternatives.     History of Present Illness       Reason for visit:  Obesity    She eats 0-1 servings of fruits and vegetables daily.She consumes 1 sweetened beverage(s) daily.She exercises with enough effort to increase her heart rate 9 or less minutes per day.  She exercises with enough effort to increase her heart rate 3 or less days per week. She is missing 1 dose(s) of medications per week.  She is not taking prescribed medications regularly due to remembering to take.         Objective           Vitals:  No vitals were obtained today due to virtual visit.    Physical Exam   GENERAL: alert and no distress  EYES: Eyes grossly normal to inspection.  No discharge or erythema, or obvious scleral/conjunctival abnormalities.  RESP: No audible wheeze, cough, or visible cyanosis.    SKIN: Visible skin clear. No significant rash, abnormal pigmentation or lesions.  NEURO: Cranial nerves grossly intact.  Mentation and speech appropriate for " age.  PSYCH: Appropriate affect, tone, and pace of words          Video-Visit Details    Type of service:  Video Visit     Originating Location (pt. Location): Home    Distant Location (provider location):  On-site  Platform used for Video Visit: Sona  Signed Electronically by: Chava Baig MD

## 2024-02-27 NOTE — ASSESSMENT & PLAN NOTE
49 year old year old female in clinic today to discuss treatment of the following conditions through diet and lifestyle modification and weight loss:  1. Class 2 severe obesity due to excess calories with serious comorbidity and body mass index (BMI) of 36.0 to 36.9 in adult (H)    2. History of Heraclio-en-Y gastric bypass    3. Hypercholesteremia    4. Major depressive disorder, recurrent episode, in partial remission (H24)      The patient's weight loss result since the last visit was mixed based on weight stability.  She has a couple months left on Wegovy before her insurance will no longer cover the medicine.  She finds it helpful.  She is actually more interested in being on tirzepatide but this has not been covered by insurance.  Reviewed pharmacologic options.  She has been on Contrave in the past and reported weight gain.  She is been on phentermine in the past with out side effects but without durable weight loss.  We discussed how phentermine could be part of a diet and lifestyle plan and that it does blunt some of the cravings.  There is no contraindication.  Given that she is previously tolerated the medicine I think it is worthwhile to add this back in at least immediately after coming off of Wegovy if not longer.  30 mg capsule sent to pharmacy.  Recheck in approximately 3 months.

## 2024-02-28 ENCOUNTER — TELEPHONE (OUTPATIENT)
Dept: FAMILY MEDICINE | Facility: CLINIC | Age: 49
End: 2024-02-28

## 2024-02-28 NOTE — TELEPHONE ENCOUNTER
Prior Authorization Request  Who s requesting:  Pharmacy  Pharmacy Name and Location: [place] [address]  Medication Name: phentermine 30 MG capsule   Insurance Plan: [employer]  Insurance Member ID Number:  [id number]   CoverMyMeds Key: [id number]  Informed patient that prior authorizations can take up to 10 business days for response:   NA  Okay to leave a detailed message: No

## 2024-03-08 ENCOUNTER — TRANSFERRED RECORDS (OUTPATIENT)
Dept: HEALTH INFORMATION MANAGEMENT | Facility: CLINIC | Age: 49
End: 2024-03-08
Payer: COMMERCIAL

## 2024-03-08 LAB
ALT SERPL-CCNC: 13 IU/L (ref 0–32)
AST SERPL-CCNC: 19 IU/L (ref 0–40)

## 2024-03-12 NOTE — TELEPHONE ENCOUNTER
Prior Authorization Approval    Medication: PHENTERMINE HCL 30 MG PO CAPS  Authorization Effective Date: 2/11/2024  Authorization Expiration Date: 6/10/2024  Approved Dose/Quantity:   Reference #:     Insurance Company:    Expected CoPay: $    CoPay Card Available:      Financial Assistance Needed: No  Which Pharmacy is filling the prescription: [id number] - [place], [place] - [address] [facility] AT [facility] [address]  Pharmacy Notified: Yes  Patient Notified: Pharmacy will notify patient.

## 2024-03-24 ENCOUNTER — HEALTH MAINTENANCE LETTER (OUTPATIENT)
Age: 49
End: 2024-03-24

## 2024-04-02 DIAGNOSIS — N92.0 EXCESSIVE OR FREQUENT MENSTRUATION: ICD-10-CM

## 2024-04-02 RX ORDER — LEVONORGESTREL AND ETHINYL ESTRADIOL 0.15-0.03
1 KIT ORAL DAILY
Qty: 91 TABLET | Refills: 0 | Status: SHIPPED | OUTPATIENT
Start: 2024-04-02 | End: 2024-06-13

## 2024-04-06 DIAGNOSIS — F41.1 GENERALIZED ANXIETY DISORDER: Chronic | ICD-10-CM

## 2024-04-08 RX ORDER — SERTRALINE HYDROCHLORIDE 100 MG/1
100 TABLET, FILM COATED ORAL DAILY
Qty: 90 TABLET | Refills: 0 | Status: SHIPPED | OUTPATIENT
Start: 2024-04-08 | End: 2024-06-13

## 2024-04-15 ENCOUNTER — TRANSFERRED RECORDS (OUTPATIENT)
Dept: HEALTH INFORMATION MANAGEMENT | Facility: CLINIC | Age: 49
End: 2024-04-15
Payer: COMMERCIAL

## 2024-05-03 ENCOUNTER — TRANSFERRED RECORDS (OUTPATIENT)
Dept: HEALTH INFORMATION MANAGEMENT | Facility: CLINIC | Age: 49
End: 2024-05-03
Payer: COMMERCIAL

## 2024-05-07 ENCOUNTER — TRANSFERRED RECORDS (OUTPATIENT)
Dept: HEALTH INFORMATION MANAGEMENT | Facility: CLINIC | Age: 49
End: 2024-05-07
Payer: COMMERCIAL

## 2024-05-12 ENCOUNTER — TRANSFERRED RECORDS (OUTPATIENT)
Dept: HEALTH INFORMATION MANAGEMENT | Facility: CLINIC | Age: 49
End: 2024-05-12
Payer: COMMERCIAL

## 2024-05-22 ENCOUNTER — TRANSFERRED RECORDS (OUTPATIENT)
Dept: HEALTH INFORMATION MANAGEMENT | Facility: CLINIC | Age: 49
End: 2024-05-22
Payer: COMMERCIAL

## 2024-06-10 SDOH — HEALTH STABILITY: PHYSICAL HEALTH: ON AVERAGE, HOW MANY DAYS PER WEEK DO YOU ENGAGE IN MODERATE TO STRENUOUS EXERCISE (LIKE A BRISK WALK)?: 0 DAYS

## 2024-06-10 SDOH — HEALTH STABILITY: PHYSICAL HEALTH: ON AVERAGE, HOW MANY MINUTES DO YOU ENGAGE IN EXERCISE AT THIS LEVEL?: 0 MIN

## 2024-06-10 ASSESSMENT — SOCIAL DETERMINANTS OF HEALTH (SDOH): HOW OFTEN DO YOU GET TOGETHER WITH FRIENDS OR RELATIVES?: ONCE A WEEK

## 2024-06-13 ENCOUNTER — OFFICE VISIT (OUTPATIENT)
Dept: PEDIATRICS | Facility: CLINIC | Age: 49
End: 2024-06-13
Attending: NURSE PRACTITIONER
Payer: COMMERCIAL

## 2024-06-13 VITALS
WEIGHT: 230.9 LBS | BODY MASS INDEX: 43.59 KG/M2 | RESPIRATION RATE: 18 BRPM | TEMPERATURE: 97.4 F | SYSTOLIC BLOOD PRESSURE: 116 MMHG | HEART RATE: 71 BPM | OXYGEN SATURATION: 97 % | HEIGHT: 61 IN | DIASTOLIC BLOOD PRESSURE: 80 MMHG

## 2024-06-13 DIAGNOSIS — Z00.00 ROUTINE GENERAL MEDICAL EXAMINATION AT A HEALTH CARE FACILITY: Primary | ICD-10-CM

## 2024-06-13 DIAGNOSIS — F33.41 MAJOR DEPRESSIVE DISORDER, RECURRENT EPISODE, IN PARTIAL REMISSION (H): Chronic | ICD-10-CM

## 2024-06-13 DIAGNOSIS — J45.20 MILD INTERMITTENT ASTHMA, UNSPECIFIED WHETHER COMPLICATED: ICD-10-CM

## 2024-06-13 DIAGNOSIS — F41.1 GENERALIZED ANXIETY DISORDER: Chronic | ICD-10-CM

## 2024-06-13 DIAGNOSIS — N92.0 EXCESSIVE OR FREQUENT MENSTRUATION: ICD-10-CM

## 2024-06-13 DIAGNOSIS — K50.119 CROHN'S DISEASE OF LARGE INTESTINE WITH COMPLICATION (H): ICD-10-CM

## 2024-06-13 DIAGNOSIS — E66.813 CLASS 3 SEVERE OBESITY DUE TO EXCESS CALORIES WITH SERIOUS COMORBIDITY AND BODY MASS INDEX (BMI) OF 40.0 TO 44.9 IN ADULT (H): ICD-10-CM

## 2024-06-13 DIAGNOSIS — E66.01 CLASS 3 SEVERE OBESITY DUE TO EXCESS CALORIES WITH SERIOUS COMORBIDITY AND BODY MASS INDEX (BMI) OF 40.0 TO 44.9 IN ADULT (H): ICD-10-CM

## 2024-06-13 PROCEDURE — 99396 PREV VISIT EST AGE 40-64: CPT | Performed by: NURSE PRACTITIONER

## 2024-06-13 PROCEDURE — 96127 BRIEF EMOTIONAL/BEHAV ASSMT: CPT | Performed by: NURSE PRACTITIONER

## 2024-06-13 PROCEDURE — 99214 OFFICE O/P EST MOD 30 MIN: CPT | Mod: 25 | Performed by: NURSE PRACTITIONER

## 2024-06-13 RX ORDER — CHOLECALCIFEROL (VITAMIN D3) 50 MCG
1 TABLET ORAL DAILY
COMMUNITY

## 2024-06-13 RX ORDER — SERTRALINE HYDROCHLORIDE 100 MG/1
150 TABLET, FILM COATED ORAL DAILY
Qty: 135 TABLET | Refills: 1 | Status: SHIPPED | OUTPATIENT
Start: 2024-06-13 | End: 2024-06-14

## 2024-06-13 RX ORDER — LEVONORGESTREL AND ETHINYL ESTRADIOL 0.15-0.03
1 KIT ORAL DAILY
Qty: 91 TABLET | Refills: 4 | Status: SHIPPED | OUTPATIENT
Start: 2024-06-13 | End: 2024-06-14

## 2024-06-13 RX ORDER — TRAZODONE HYDROCHLORIDE 50 MG/1
25-50 TABLET, FILM COATED ORAL
Qty: 90 TABLET | Refills: 11 | Status: SHIPPED | OUTPATIENT
Start: 2024-06-13

## 2024-06-13 ASSESSMENT — ANXIETY QUESTIONNAIRES
GAD7 TOTAL SCORE: 3
3. WORRYING TOO MUCH ABOUT DIFFERENT THINGS: NOT AT ALL
8. IF YOU CHECKED OFF ANY PROBLEMS, HOW DIFFICULT HAVE THESE MADE IT FOR YOU TO DO YOUR WORK, TAKE CARE OF THINGS AT HOME, OR GET ALONG WITH OTHER PEOPLE?: SOMEWHAT DIFFICULT
4. TROUBLE RELAXING: NOT AT ALL
2. NOT BEING ABLE TO STOP OR CONTROL WORRYING: NOT AT ALL
GAD7 TOTAL SCORE: 3
7. FEELING AFRAID AS IF SOMETHING AWFUL MIGHT HAPPEN: NOT AT ALL
GAD7 TOTAL SCORE: 3
1. FEELING NERVOUS, ANXIOUS, OR ON EDGE: SEVERAL DAYS
6. BECOMING EASILY ANNOYED OR IRRITABLE: MORE THAN HALF THE DAYS
IF YOU CHECKED OFF ANY PROBLEMS ON THIS QUESTIONNAIRE, HOW DIFFICULT HAVE THESE PROBLEMS MADE IT FOR YOU TO DO YOUR WORK, TAKE CARE OF THINGS AT HOME, OR GET ALONG WITH OTHER PEOPLE: SOMEWHAT DIFFICULT
7. FEELING AFRAID AS IF SOMETHING AWFUL MIGHT HAPPEN: NOT AT ALL
5. BEING SO RESTLESS THAT IT IS HARD TO SIT STILL: NOT AT ALL

## 2024-06-13 ASSESSMENT — PATIENT HEALTH QUESTIONNAIRE - PHQ9
10. IF YOU CHECKED OFF ANY PROBLEMS, HOW DIFFICULT HAVE THESE PROBLEMS MADE IT FOR YOU TO DO YOUR WORK, TAKE CARE OF THINGS AT HOME, OR GET ALONG WITH OTHER PEOPLE: SOMEWHAT DIFFICULT
SUM OF ALL RESPONSES TO PHQ QUESTIONS 1-9: 10
SUM OF ALL RESPONSES TO PHQ QUESTIONS 1-9: 10

## 2024-06-13 NOTE — PATIENT INSTRUCTIONS
"INcrease the zoloft to 150 mg and do an evisit on mychart for follow-up in 1-2 month.     Let me send over a prescription for the zepbound because it shows that it is on your formulary for wt loss and you're a good condidate. If approved, follow-up with me OR Dr. Gutierrez in 1 month.     We talked about the pill and potentially stopping estrogen containing pills. Alternatives include changing to progesterone only pills OR IUD or nothing.     Patient Education   Preventive Care Advice   This is general advice we often give to help people stay healthy. Your care team may have specific advice just for you. Please talk to your care team about your own preventive care needs.  Lifestyle  Exercise at least 150 minutes each week (30 minutes a day, 5 days a week).  Do muscle strengthening activities 2 days a week. These help control your weight and prevent disease.  No smoking.  Wear sunscreen to prevent skin cancer.  Have your home tested for radon every 2 to 5 years. Radon is a colorless, odorless gas that can harm your lungs. To learn more, go to www.health.state.[place]. and search for \"Radon in Homes.\"  Keep guns unloaded and locked up in a safe place like a safe or gun vault, or, use a gun lock and hide the keys. Always lock away bullets separately. To learn more, visit Panc[employer]a.[place].gov and search for \"safe gun storage.\"  Nutrition  Eat 5 or more servings of fruits and vegetables each day.  Try wheat bread, brown rice and whole grain pasta (instead of white bread, rice, and pasta).  Get enough calcium and vitamin D. Check the label on foods and aim for 100% of the RDA (recommended daily allowance).  Regular exams  Have a dental exam and cleaning every 6 months.  See your health care team every year to talk about:  Any changes in your health.  Any medicines your care team has prescribed.  Preventive care, family planning, and ways to prevent chronic diseases.  Shots (vaccines)   HPV shots (up to age 26), if you've never had them " before.  Hepatitis B shots (up to age 59), if you've never had them before.  COVID-19 shot: Get this shot when it's due.  Flu shot: Get a flu shot every year.  Tetanus shot: Get a tetanus shot every 10 years.  Pneumococcal, hepatitis A, and RSV shots: Ask your care team if you need these based on your risk.  Shingles shot (for age 50 and up).  General health tests  Diabetes screening:  Starting at age 35, Get screened for diabetes at least every 3 years.  If you are younger than age 35, ask your care team if you should be screened for diabetes.  Cholesterol test: At age 39, start having a cholesterol test every 5 years, or more often if advised.  Bone density scan (DEXA): At age 50, ask your care team if you should have this scan for osteoporosis (brittle bones).  Hepatitis C: Get tested at least once in your life.  Abdominal aortic aneurysm screening: Talk to your doctor about having this screening if you:  Have ever smoked; and  Are biologically male; and  Are between the ages of 65 and 75.  STIs (sexually transmitted infections)  Before age 24: Ask your care team if you should be screened for STIs.  After age 24: Get screened for STIs if you're at risk. You are at risk for STIs (including HIV) if:  You are sexually active with more than one person.  You don't use condoms every time.  You or a partner was diagnosed with a sexually transmitted infection.  If you are at risk for HIV, ask about PrEP medicine to prevent HIV.  Get tested for HIV at least once in your life, whether you are at risk for HIV or not.  Cancer screening tests  Cervical cancer screening: If you have a cervix, begin getting regular cervical cancer screening tests at age 21. Most people who have regular screenings with normal results can stop after age 65. Talk about this with your provider.  Breast cancer scan (mammogram): If you've ever had breasts, begin having regular mammograms starting at age 40. This is a scan to check for breast  cancer.  Colon cancer screening: It is important to start screening for colon cancer at age 45.  Have a colonoscopy test every 10 years (or more often if you're at risk) Or, ask your provider about stool tests like a FIT test every year or Cologuard test every 3 years.  To learn more about your testing options, visit: www.Audaster/485815.pdf.  For help making a decision, visit: kayla/ld89350.  Prostate cancer screening test: If you have a prostate and are age 55 to 69, ask your provider if you would benefit from a yearly prostate cancer screening test.  Lung cancer screening: If you are a current or former smoker age 50 to 80, ask your care team if ongoing lung cancer screenings are right for you.  For informational purposes only. Not to replace the advice of your health care provider. Copyright   2023 [employer]. All rights reserved. Clinically reviewed by the [facility] Transitions Program. RealPage 763024 - REV 04/24.  Learning About Depression Screening  What is depression screening?  Depression screening is a way to see if you have depression symptoms. It may be done by a doctor or counselor. It's often part of a routine checkup. That's because your mental health is just as important as your physical health.  Depression is a mental health condition that affects how you feel, think, and act. You may:  Have less energy.  Lose interest in your daily activities.  Feel sad and grouchy for a long time.  Depression is very common. It affects people of all ages.  Many things can lead to depression. Some people become depressed after they have a stroke or find out they have a major illness like cancer or heart disease. The death of a loved one or a breakup may lead to depression. It can run in families. Most experts believe that a combination of inherited genes and stressful life events can cause it.  What happens during screening?  You may be asked to fill out a form about your depression  "symptoms. You and the doctor will discuss your answers. The doctor may ask you more questions to learn more about how you think, act, and feel.  What happens after screening?  If you have symptoms of depression, your doctor will talk to you about your options.  Doctors usually treat depression with medicines or counseling. Often, combining the two works best. Many people don't get help because they think that they'll get over the depression on their own. But people with depression may not get better unless they get treatment.  The cause of depression is not well understood. There may be many factors involved. But if you have depression, it's not your fault.  A serious symptom of depression is thinking about death or suicide. If you or someone you care about talks about this or about feeling hopeless, get help right away.  It's important to know that depression can be treated. Medicine, counseling, and self-care may help.  Where can you learn more?  Go to https://www.OptiMedica.net/patiented  Enter T185 in the search box to learn more about \"Learning About Depression Screening.\"  Current as of: June 24, 2023               Content Version: 14.0    6362-7817 [employer].   Care instructions adapted under license by your healthcare professional. If you have questions about a medical condition or this instruction, always ask your healthcare professional. [employer] disclaims any warranty or liability for your use of this information.         "

## 2024-06-13 NOTE — PROGRESS NOTES
Preventive Care Visit  [facility] ISA Boateng CNP, Internal Medicine - Pediatrics  Jun 13, 2024      Assessment & Plan     Routine general medical examination at a health care facility      Class 3 severe obesity due to excess calories with serious comorbidity and body mass index (BMI) of 40.0 to 44.9 in adult (H)  Had success wt loss through glp, but insurance denied it. Will try to send as it appears it is on her formulary. If approved, follow-up with wt mgmt or myself in one month  - tirzepatide-Weight Management (ZEPBOUND) 2.5 MG/0.5ML prefilled pen; Inject 0.5 mLs (2.5 mg) Subcutaneous every 7 days    Major depressive disorder, recurrent episode, in partial remission (H24)  Stable on seratonin specific reuptake inhibitors, no changes made. Feeeling overall worse because of wt gain.     Mild intermittent asthma, unspecified whether complicated  stable    Generalized anxiety disorder  stabl  - sertraline (ZOLOFT) 100 MG tablet; Take 1.5 tablets (150 mg) by mouth daily  - traZODone (DESYREL) 50 MG tablet; Take 0.5-1 tablets (25-50 mg) by mouth nightly as needed for sleep    Crohn's disease of large intestine with complication (H)  Continue with gi    Excessive or frequent menstruation  We discussed the role of est containing birht contorl as women get older and clotting risk factors. She accepts these risks today, will consider changing to prog only pill in a yr.   - levonorgestrel-ethinyl estradiol (SEASONALE) 0.15-0.03 MG tablet; Take 1 tablet by mouth daily            Counseling  Appropriate preventive services were discussed with this patient, including applicable screening as appropriate for fall prevention, nutrition, physical activity, Tobacco-use cessation, weight loss and cognition.  Checklist reviewing preventive services available has been given to the patient.  Reviewed patient's diet, addressing concerns and/or questions.   The patient's PHQ-9 score is consistent  with moderate depression. She was provided with information regarding depression.           Galileo Luis is a 49 year old, presenting for the following:  Physical    History of depression and anxiety, on zoloft and trazodone, notes mood is not great due to wt gain. She wishes she could be on her medications to help, but haivng challenges with coverage.       4/10/2023     8:05 AM 10/13/2023     8:02 AM 6/13/2024     4:10 PM   PHQ   PHQ-9 Total Score 3 0 10   Q9: Thoughts of better off dead/self-harm past 2 weeks Not at all Not at all Not at all         5/17/2023     8:08 AM 12/22/2023    11:37 AM 6/13/2024     4:11 PM   EVIE-7 SCORE   Total Score 1 (minimal anxiety)  3 (minimal anxiety)   Total Score 1 5 3     History of crohns, is followed by [facility].     H of obesity followed by wt mgmt, history of bypass, was on wegovy but insurance stopped paying. Was going to restarted phentermine last spring, but not covered by insurance.     Wt Readings from Last 4 Encounters:   06/13/24 104.7 kg (230 lb 14.4 oz)   01/29/24 96.4 kg (212 lb 9.6 oz)   08/31/23 91.1 kg (200 lb 14.4 oz)   07/28/23 86.4 kg (190 lb 9 oz)               6/13/2024     4:20 PM   Additional Questions   Roomed by miss   Accompanied by self         6/13/2024     4:20 PM   Patient Reported Additional Medications   Patient reports taking the following new medications no        Health Care Directive  Patient does not have a Health Care Directive or Living Will: Discussed advance care planning with patient; however, patient declined at this time.    HPI              6/10/2024   General Health   How would you rate your overall physical health? (!) POOR   Feel stress (tense, anxious, or unable to sleep) To some extent   (!) STRESS CONCERN      6/10/2024   Nutrition   Three or more servings of calcium each day? (!) I DON'T KNOW   Diet: I don't know   How many servings of fruit and vegetables per day? (!) 0-1   How many sweetened beverages each day? 0-1          6/10/2024   Exercise   Days per week of moderate/strenous exercise 0 days   Average minutes spent exercising at this level 0 min   (!) EXERCISE CONCERN      6/10/2024   Social Factors   Frequency of gathering with friends or relatives Once a week   Worry food won't last until get money to buy more No   Food not last or not have enough money for food? No   Do you have housing?  Yes   Are you worried about losing your housing? No   Lack of transportation? No   Unable to get utilities (heat,electricity)? No         6/10/2024   Dental   Dentist two times every year? Yes         6/10/2024   TB Screening   Were you born outside of the [place]? No       Today's PHQ-9 Score:       2024     4:10 PM   PHQ-9 SCORE   PHQ-9 Total Score MyChart 10 (Moderate depression)   PHQ-9 Total Score 10         6/10/2024   Substance Use   Alcohol more than 3/day or more than 7/wk No   Do you use any other substances recreationally? No     Social History     Tobacco Use    Smoking status: Former     Current packs/day: 0.00     Average packs/day: 0.2 packs/day for 17.9 years (3.6 ttl pk-yrs)     Types: Cigarettes     Start date: 2001     Quit date: 2018     Years since quittin.5    Smokeless tobacco: Never   Vaping Use    Vaping status: Never Used   Substance Use Topics    Alcohol use: Yes     Comment: very rarely, but when I do I usually overdue it :-    Drug use: No     Frequency: 1.0 times per week           2023   LAST FHS-7 RESULTS   Any ONE woman have BOTH breast AND ovarian cancer No   Any woman with breast cancer before 50yrs No   2 or more relatives with breast AND/OR ovarian cancer No   2 or more relatives with breast AND/OR bowel cancer Yes    Yes                6/10/2024   STI Screening   New sexual partner(s) since last STI/HIV test? No     History of abnormal Pap smear: YES - reflected in Problem List and Health Maintenance accordingly        Latest Ref Rng & Units 2023     4:30 PM 10/7/2020      "3:18 PM 10/7/2020     3:00 PM   PAP / HPV   PAP  Atypical squamous cells of undetermined significance (ASC-US)      PAP (Historical)   NIL     HPV 16 DNA Negative Negative   Negative    HPV 18 DNA Negative Negative   Negative    Other HR HPV Negative Negative   Negative      ASCVD Risk   The 10-year ASCVD risk score (Triston CANO, et al., 2019) is: 0.7%    Values used to calculate the score:      Age: 49 years      Sex: Female      Is Non- : No      Diabetic: No      Tobacco smoker: No      Systolic Blood Pressure: 116 mmHg      Is BP treated: No      HDL Cholesterol: 73 mg/dL      Total Cholesterol: 205 mg/dL        6/10/2024   Contraception/Family Planning   Questions about contraception or family planning No        Reviewed and updated as needed this visit by Provider                             Objective    Exam  /80 (BP Location: Right arm, Patient Position: Sitting, Cuff Size: Adult Regular)   Pulse 71   Temp 97.4  F (36.3  C) (Tympanic)   Resp 18   Ht 1.56 m (5' 1.42\")   Wt 104.7 kg (230 lb 14.4 oz)   SpO2 97%   BMI 43.04 kg/m     Estimated body mass index is 43.04 kg/m  as calculated from the following:    Height as of this encounter: 1.56 m (5' 1.42\").    Weight as of this encounter: 104.7 kg (230 lb 14.4 oz).    Physical Exam  GENERAL: alert and no distress  EYES: Eyes grossly normal to inspection, PERRL and conjunctivae and sclerae normal  HENT: ear canals and TM's normal, nose and mouth without ulcers or lesions  NECK: no adenopathy, no asymmetry, masses, or scars  RESP: lungs clear to auscultation - no rales, rhonchi or wheezes  CV: regular rate and rhythm, normal S1 S2, no S3 or S4, no murmur, click or rub, no peripheral edema  MS: no gross musculoskeletal defects noted, no edema        Signed Electronically by: ISA Tavera CNP    "

## 2024-06-14 RX ORDER — LEVONORGESTREL AND ETHINYL ESTRADIOL 0.15-0.03
1 KIT ORAL DAILY
Qty: 91 TABLET | Refills: 4 | Status: SHIPPED | OUTPATIENT
Start: 2024-06-14

## 2024-06-14 RX ORDER — SERTRALINE HYDROCHLORIDE 100 MG/1
150 TABLET, FILM COATED ORAL DAILY
Qty: 135 TABLET | Refills: 1 | Status: SHIPPED | OUTPATIENT
Start: 2024-06-14 | End: 2024-09-25

## 2024-06-17 ENCOUNTER — TELEPHONE (OUTPATIENT)
Dept: PEDIATRICS | Facility: CLINIC | Age: 49
End: 2024-06-17
Payer: COMMERCIAL

## 2024-06-25 NOTE — TELEPHONE ENCOUNTER
-1st attempt: sent [employer] message to patient with information of outcome.    Thank you kindly,  Alpesh ALBRIGHT      
-Patient read Gist message: Last read by Janelle Coelho at  8:05 AM on 6/25/2024.   
Marcela Vizcaino Ea/Trey now (1:07 PM)       Hel,    This prior authorization has been denied as a drug exclusion.    Drug exclusions cannot be appealed. This medication will not be covered by the prescription plan for any reason. The drug is not on formulary and there are no loopholes to gaining approval.    The patient is able to use [employer], [employer] or another discount card to help with the cost of the medication.    Please follow up with the patient and close the encounter.    Thank you!    Retail Pharmacy Prior Authorization Team  Phone: [phone]     
PRIOR AUTHORIZATION DENIED    Medication: ZEPBOUND 2.5 MG/0.5ML SC SOAJ  Insurance Company: [employer] - Phone [phone] Fax [phone]  Denial Date: 6/24/2024  Denial Reason(s): DRUG IS A PLAN EXCLUSION    Hello,    This prior authorization has been denied as a drug exclusion.    Drug exclusions cannot be appealed. This medication will not be covered by the prescription plan for any reason. The drug is not on formulary and there are no loopholes to gaining approval.    The patient is able to use [employer], [employer] or another discount card to help with the cost of the medication.    Please follow up with the patient and close the encounter.     Thank you!    Retail Pharmacy Prior Authorization Team   Phone: [phone]    Appeal Information: N/A  Patient Notified: NO    CALLED [employer] - SPOKE WITH ROMAN - ZEPBOUND IS A PLAN EXCLUSION. THERE IS NO PATHWAY TO APPROVAL.             
Please let her know of denial, - she is likely antiicpating this outcome.   
Prior Authorization Retail Medication Request    Medication/Dose: tirzepatide-Weight Management (ZEPBOUND) 2.5 MG/0.5ML prefilled pen  Diagnosis and ICD code (if different than what is on RX):  Class 3 severe obesity due to excess calories with serious comorbidity and body mass index (BMI) of 40.0 to 44.9 in adult (H) [E66.01, Z68.41]   New/renewal/insurance change PA/secondary ins. PA:  Previously Tried and Failed:    Rationale:      Insurance   Primary:   Insurance ID:      Secondary (if applicable):  Insurance ID:      Pharmacy Information (if different than what is on RX)  Name:  [facility]  Phone:    Fax:    
Patient/Caregiver provided printed discharge information.

## 2024-07-15 ENCOUNTER — E-VISIT (OUTPATIENT)
Dept: PEDIATRICS | Facility: CLINIC | Age: 49
End: 2024-07-15
Payer: COMMERCIAL

## 2024-07-15 DIAGNOSIS — F33.41 MAJOR DEPRESSIVE DISORDER, RECURRENT EPISODE, IN PARTIAL REMISSION (H): Primary | ICD-10-CM

## 2024-07-15 PROCEDURE — 99422 OL DIG E/M SVC 11-20 MIN: CPT | Performed by: NURSE PRACTITIONER

## 2024-07-15 ASSESSMENT — PATIENT HEALTH QUESTIONNAIRE - PHQ9
10. IF YOU CHECKED OFF ANY PROBLEMS, HOW DIFFICULT HAVE THESE PROBLEMS MADE IT FOR YOU TO DO YOUR WORK, TAKE CARE OF THINGS AT HOME, OR GET ALONG WITH OTHER PEOPLE: SOMEWHAT DIFFICULT
SUM OF ALL RESPONSES TO PHQ QUESTIONS 1-9: 8
SUM OF ALL RESPONSES TO PHQ QUESTIONS 1-9: 8

## 2024-07-15 ASSESSMENT — ANXIETY QUESTIONNAIRES
2. NOT BEING ABLE TO STOP OR CONTROL WORRYING: SEVERAL DAYS
GAD7 TOTAL SCORE: 6
3. WORRYING TOO MUCH ABOUT DIFFERENT THINGS: SEVERAL DAYS
7. FEELING AFRAID AS IF SOMETHING AWFUL MIGHT HAPPEN: NOT AT ALL
IF YOU CHECKED OFF ANY PROBLEMS ON THIS QUESTIONNAIRE, HOW DIFFICULT HAVE THESE PROBLEMS MADE IT FOR YOU TO DO YOUR WORK, TAKE CARE OF THINGS AT HOME, OR GET ALONG WITH OTHER PEOPLE: VERY DIFFICULT
1. FEELING NERVOUS, ANXIOUS, OR ON EDGE: SEVERAL DAYS
GAD7 TOTAL SCORE: 6
7. FEELING AFRAID AS IF SOMETHING AWFUL MIGHT HAPPEN: NOT AT ALL
GAD7 TOTAL SCORE: 6
6. BECOMING EASILY ANNOYED OR IRRITABLE: NEARLY EVERY DAY
4. TROUBLE RELAXING: NOT AT ALL
8. IF YOU CHECKED OFF ANY PROBLEMS, HOW DIFFICULT HAVE THESE MADE IT FOR YOU TO DO YOUR WORK, TAKE CARE OF THINGS AT HOME, OR GET ALONG WITH OTHER PEOPLE?: VERY DIFFICULT
5. BEING SO RESTLESS THAT IT IS HARD TO SIT STILL: NOT AT ALL

## 2024-07-15 NOTE — TELEPHONE ENCOUNTER
Provider E-Visit time total (minutes): 15        10/13/2023     8:02 AM 6/13/2024     4:10 PM 7/15/2024    10:02 AM   PHQ   PHQ-9 Total Score 0 10 8   Q9: Thoughts of better off dead/self-harm past 2 weeks Not at all Not at all Not at all         12/22/2023    11:37 AM 6/13/2024     4:11 PM 7/15/2024    10:02 AM   EVIE-7 SCORE   Total Score  3 (minimal anxiety) 6 (mild anxiety)   Total Score 5 3 6

## 2024-07-16 ASSESSMENT — PATIENT HEALTH QUESTIONNAIRE - PHQ9: SUM OF ALL RESPONSES TO PHQ QUESTIONS 1-9: 8

## 2024-08-09 ENCOUNTER — OFFICE VISIT (OUTPATIENT)
Dept: FAMILY MEDICINE | Facility: CLINIC | Age: 49
End: 2024-08-09
Payer: COMMERCIAL

## 2024-08-09 VITALS
WEIGHT: 232 LBS | HEART RATE: 76 BPM | DIASTOLIC BLOOD PRESSURE: 76 MMHG | SYSTOLIC BLOOD PRESSURE: 108 MMHG | OXYGEN SATURATION: 97 % | RESPIRATION RATE: 14 BRPM | BODY MASS INDEX: 43.8 KG/M2 | HEIGHT: 61 IN | TEMPERATURE: 98.7 F

## 2024-08-09 DIAGNOSIS — J45.20 MILD INTERMITTENT ASTHMA, UNSPECIFIED WHETHER COMPLICATED: ICD-10-CM

## 2024-08-09 DIAGNOSIS — N82.3 RECTAL VAGINAL FISTULA: ICD-10-CM

## 2024-08-09 DIAGNOSIS — E78.5 DYSLIPIDEMIA: ICD-10-CM

## 2024-08-09 DIAGNOSIS — G47.00 INSOMNIA, UNSPECIFIED TYPE: ICD-10-CM

## 2024-08-09 DIAGNOSIS — F33.41 MAJOR DEPRESSIVE DISORDER, RECURRENT EPISODE, IN PARTIAL REMISSION (H): Chronic | ICD-10-CM

## 2024-08-09 DIAGNOSIS — K50.119 CROHN'S DISEASE OF PERIANAL REGION WITH COMPLICATION (H): ICD-10-CM

## 2024-08-09 DIAGNOSIS — Z01.818 PREOP GENERAL PHYSICAL EXAM: Primary | ICD-10-CM

## 2024-08-09 LAB
ANION GAP SERPL CALCULATED.3IONS-SCNC: 11 MMOL/L (ref 7–15)
BUN SERPL-MCNC: 18.3 MG/DL (ref 6–20)
CALCIUM SERPL-MCNC: 8.5 MG/DL (ref 8.8–10.4)
CHLORIDE SERPL-SCNC: 106 MMOL/L (ref 98–107)
CREAT SERPL-MCNC: 0.89 MG/DL (ref 0.51–0.95)
EGFRCR SERPLBLD CKD-EPI 2021: 79 ML/MIN/1.73M2
ERYTHROCYTE [DISTWIDTH] IN BLOOD BY AUTOMATED COUNT: 12.9 % (ref 10–15)
GLUCOSE SERPL-MCNC: 71 MG/DL (ref 70–99)
HCG UR QL: NEGATIVE
HCO3 SERPL-SCNC: 22 MMOL/L (ref 22–29)
HCT VFR BLD AUTO: 35.2 % (ref 35–47)
HGB BLD-MCNC: 11.7 G/DL (ref 11.7–15.7)
MCH RBC QN AUTO: 29 PG (ref 26.5–33)
MCHC RBC AUTO-ENTMCNC: 33.2 G/DL (ref 31.5–36.5)
MCV RBC AUTO: 87 FL (ref 78–100)
PLATELET # BLD AUTO: 311 10E3/UL (ref 150–450)
POTASSIUM SERPL-SCNC: 4.3 MMOL/L (ref 3.4–5.3)
RBC # BLD AUTO: 4.03 10E6/UL (ref 3.8–5.2)
SODIUM SERPL-SCNC: 139 MMOL/L (ref 135–145)
WBC # BLD AUTO: 9.7 10E3/UL (ref 4–11)

## 2024-08-09 PROCEDURE — 85027 COMPLETE CBC AUTOMATED: CPT | Performed by: PHYSICIAN ASSISTANT

## 2024-08-09 PROCEDURE — 81025 URINE PREGNANCY TEST: CPT | Performed by: PHYSICIAN ASSISTANT

## 2024-08-09 PROCEDURE — G2211 COMPLEX E/M VISIT ADD ON: HCPCS | Performed by: PHYSICIAN ASSISTANT

## 2024-08-09 PROCEDURE — 80048 BASIC METABOLIC PNL TOTAL CA: CPT | Performed by: PHYSICIAN ASSISTANT

## 2024-08-09 PROCEDURE — 99214 OFFICE O/P EST MOD 30 MIN: CPT | Performed by: PHYSICIAN ASSISTANT

## 2024-08-09 PROCEDURE — 36415 COLL VENOUS BLD VENIPUNCTURE: CPT | Performed by: PHYSICIAN ASSISTANT

## 2024-08-09 RX ORDER — CHLORHEXIDINE GLUCONATE ORAL RINSE 1.2 MG/ML
15 SOLUTION DENTAL 2 TIMES DAILY
COMMUNITY
Start: 2024-07-25 | End: 2024-09-11

## 2024-08-09 ASSESSMENT — ENCOUNTER SYMPTOMS
CHILLS: 0
SEIZURES: 0
ARTHRALGIAS: 0
ABDOMINAL PAIN: 0
BACK PAIN: 0
COLOR CHANGE: 0
EYE PAIN: 0
PALPITATIONS: 0
HEMATURIA: 0
SORE THROAT: 0
VOMITING: 0
SHORTNESS OF BREATH: 0
DYSURIA: 0
COUGH: 0
FEVER: 0

## 2024-08-09 NOTE — PROGRESS NOTES
Preoperative Evaluation  [facility]  [address]  [place]  [facility] [address]  Phone: [phone]  Fax: [phone]  Primary Provider: ISA Tavera CNP  Pre-op Performing Provider: Rajiv Gordon PA-C  Aug 9, 2024             8/5/2024   Surgical Information   What procedure is being done? EUA with Seton placement and possible fistulotomy   Facility or Hospital where procedure/surgery [facility] be [facility]   Who is doing the procedure / surgery? Dr. Beltrán   Date of surgery / procedure: 8/19/24   Time of surgery / procedure: 8am   Where do you plan to recover after surgery? at home with family        Fax number for surgical facility: [phone]    Assessment & Plan     The proposed surgical procedure is considered INTERMEDIATE risk.    Preop general physical exam  Rectal vaginal fistula  Janelle is a pleasant 49-year-old female who presents to the clinic today for preop physical.  She will be undergoing exploration under anesthesia due to a rectal vaginal fistula on 8/19/2024..  She does plan to undergo a CT and placement.  She has had this placed in the past due to Crohn's disease.  She is otherwise feeling well and at her baseline.  Denies any chest pain, shortness of breath, lightheaded or dizziness.  - CBC with platelets; Future  - Basic metabolic panel; Future  - HCG qualitative urine; Future  - CBC with platelets  - Basic metabolic panel  - HCG qualitative urine    Mild intermittent asthma, unspecified whether complicated  Asthma well-controlled at this time.  Denies any coughing, wheezing or shortness of breath    Crohn's disease of perianal region with complication (H)  Currently stable at this time.  Follows closely with [facility].  Currently on Remicade    Major depressive disorder, recurrent episode, in partial remission (H24)  Working with her primary  care provider.  Currently on Zoloft.  Zoloft was recently discontinued due to side effects.  She states that her mood is not the best.  Recommended continuing to work with primary care provider and medication management    Insomnia, unspecified type  Previously on is open but discontinued this    Dyslipidemia  Continue to watch diet and stay active.  Last lipid panel mildly elevated.  Not on statin therapy.  Recent Labs   Lab Test 11/07/23  0815 07/30/21  0843   CHOL 205* 187   HDL 73 49*   * 119   TRIG 92 97        - No identified additional risk factors other than previously addressed    Preoperative Medication Instructions  Antiplatelet or Anticoagulation Medication Instructions   - Patient is on no antiplatelet or anticoagulation medications.    Additional Medication Instructions  Hold all ibuprofen, Motrin, Aleve, herbal supplements, and multivitamins 7 days prior to procedure    Recommendation  Hold all NSAIDs, herbal supplements, and multivitamins 7 days prior to procedure    Subjective   Janelle is a 49 year old, presenting for the following:  Pre-Op Exam (DOS 8/19/24 for EUA with Seton placement and possible fistulotomy with Dr. Beltrán at [facility])          8/9/2024     1:20 PM   Additional Questions   Roomed by Soledad Stacy   Accompanied by none     HPI related to upcoming procedure: Janelle is a pleasant 49-year-old female who presents to the clinic today for preop physical.  She will be undergoing exploratory seizure under anesthesia on 8/19/2024 due to ongoing rectal vaginal fistula.  She is feeling well and at her baseline.  Denies any chest pain, shortness of breath, lightheaded or dizziness.        8/5/2024   Pre-Op Questionnaire   Have you ever had a heart attack or stroke? No   Have you ever had surgery on your heart or blood vessels, such as a stent placement, a coronary artery bypass, or surgery on an artery in your head, neck, heart, or legs? No   Do you have chest pain with  activity? No   Do you have a history of heart failure? No   Do you currently have a cold, bronchitis or symptoms of other infection? No   Do you have a cough, shortness of breath, or wheezing? No   Do you or anyone in your family have previous history of blood clots? (!) YES mom DVT   Do you or does anyone in your family have a serious bleeding problem such as prolonged bleeding following surgeries or cuts? No   Have you ever had problems with anemia or been told to take iron pills? (!) YES-B12 injections    Have you had any abnormal blood loss such as black, tarry or bloody stools, or abnormal vaginal bleeding? No   Have you ever had a blood transfusion? (!) YES   Have you ever had a transfusion reaction? No   Are you willing to have a blood transfusion if it is medically needed before, during, or after your surgery? Yes   Have you or any of your relatives ever had problems with anesthesia? No   Do you have sleep apnea, excessive snoring or daytime drowsiness? No   Do you have any artifical heart valves or other implanted medical devices like a pacemaker, defibrillator, or continuous glucose monitor? No   Do you have artificial joints? No   Are you allergic to latex? No        Patient Active Problem List    Diagnosis Date Noted    Hypercholesteremia 12/22/2023     Priority: Medium    Mechanical low back pain 06/06/2022     Priority: Medium    Poor posture 06/06/2022     Priority: Medium    Cervical radiculopathy 06/06/2022     Priority: Medium    Crohn's disease of large intestine with complication (H) 06/01/2018     Priority: Medium    Crohn's disease of perianal region (H) 02/09/2018     Priority: Medium    Vitamin D deficiency 04/13/2017     Priority: Medium    Mild intermittent asthma, unspecified whether complicated 10/05/2016     Priority: Medium    History of Heraclio-en-Y gastric bypass 10/05/2016     Priority: Medium    Insomnia, unspecified type 10/05/2016     Priority: Medium    Family history of breast  cancer in mother 10/05/2016     Priority: Medium    Major depressive disorder, recurrent episode, in partial remission (H24) 10/13/2015     Priority: Medium    Generalized anxiety disorder 08/21/2014     Priority: Medium    Class 2 severe obesity due to excess calories with serious comorbidity and body mass index (BMI) of 36.0 to 36.9 in adult (H)      Priority: Medium    ASCUS of cervix with negative high risk HPV 03/23/2013     Priority: Medium     2008: ASCUS , HR HPV, dysplasia not present on colp per notes  7/09: NIL pap  6/2010 LSIL pap  9/12: ASCUS, neg HPV. Per MD pap in 1 yr.  4/2014: NIL pap. New ASCCP algorithms, cotest 3 years after ASCUS, neg pap.  2015 NIL pap, Neg HPV.   10/7/20 NIL pap, Neg HPV.   8/31/23 ASCUS pap, neg HPV. Plan cotest in 3 years.   9/7/23 Pt viewed result on MyChart.          Past Medical History:   Diagnosis Date    Acute Crohn's disease (H)     Acute posthemorrhagic anemia     ASCUS favor benign 2012    hpv neg. needs cotest in 3 years    ASCUS with positive high risk HPV cervical     Depressive disorder     since teens    Depressive disorder, not elsewhere classified 2/10/2014    Excessive and frequent menstruation     Family history of breast cancer in mother     Generalized anxiety disorder     History of blood transfusion     few years ago, should be in my records, it was at a [place]    History of transfusion     many years    Infected cyst of Bartholin's gland duct     Insomnia     Intermittent asthma, uncontrolled     LSIL (low grade squamous intraepithelial lesion) on Pap smear 2008    per chart notes colp dysplasia?    Major depressive disorder in partial remission (H24)     Obesity     Obesity     Tobacco abuse     Uncomplicated asthma     since baby     Past Surgical History:   Procedure Laterality Date    BIOPSY      Breast cycst (done at [place]) many years ago    CHOLECYSTECTOMY  late 90's    during gastric bypass    CHOLECYSTECTOMY  10/05/2016    COLONOSCOPY   Jan/Feb 2018, Feb 2019    Confirmed Crohn's Disease    COLONOSCOPY N/A 3/28/2022    Procedure: COLONOSCOPY with biopsies and polypectomy;  Surgeon: Chad Brown MD;  Location: [facility] OR    EXCIS BARTHOLIN GLAND/CYST  2016    GASTRIC BYPASS  2000    with cholecystectomy and subsequent revision gastric bypass    GASTRIC BYPASS  2006    revision of gastric bypass    GASTRIC BYPASS  10/05/2016    GASTRIC BYPASS      HERNIA REPAIR  12/9/16    SYMONE Zamora    INCISION AND DRAINAGE OF WOUND N/A 2/9/2018    Procedure: EXAM UNDER ANESTHESIA WITH I&D OF INTERSPHINCTERIC ABSCESS  SETON PLACEMENT ;  Surgeon: Chad Pereira MD;  Location: [facility];  Service:     MARSUPIALIZATION BARTHOLIN CYST  09/23/2016    wisdom teeth extraction  1995    WISDOM TOOTH EXTRACTION      ZZHC COLONOSCOPY W/WO BRUSH/WASH N/A 2/23/2021    Procedure: COLONOSCOPY with biopsies.;  Surgeon: Chad Plata MD;  Location: [facility];  Service: Gastroenterology     Current Outpatient Medications   Medication Sig Dispense Refill    albuterol (PROAIR HFA/PROVENTIL HFA/VENTOLIN HFA) 108 (90 Base) MCG/ACT inhaler Inhale 2 puffs into the lungs every 6 hours as needed for shortness of breath / dyspnea or wheezing 6.7 g 0    cetirizine (ZYRTEC) 10 MG tablet Take 10 mg by mouth daily      chlorhexidine (PERIDEX) 0.12 % solution Take 15 mLs by mouth 2 times daily      cyanocobalamin (VITAMIN B12) 1000 MCG/ML injection Inject 1 mL into the muscle every 30 days      levonorgestrel-ethinyl estradiol (SEASONALE) 0.15-0.03 MG tablet Take 1 tablet by mouth daily 91 tablet 4    sertraline (ZOLOFT) 100 MG tablet Take 1.5 tablets (150 mg) by mouth daily 135 tablet 1    traZODone (DESYREL) 50 MG tablet Take 0.5-1 tablets (25-50 mg) by mouth nightly as needed for sleep 90 tablet 11    vitamin D3 (CHOLECALCIFEROL) 50 mcg (2000 units) tablet Take 1 tablet by mouth daily         Allergies   Allergen Reactions     "Dextran Anaphylaxis        Social History     Tobacco Use    Smoking status: Former     Current packs/day: 0.00     Average packs/day: 0.2 packs/day for 17.9 years (3.6 ttl pk-yrs)     Types: Cigarettes     Start date: 2001     Quit date: 2018     Years since quittin.7    Smokeless tobacco: Never   Substance Use Topics    Alcohol use: Yes     Comment: very rarely, but when I do I usually overdue it :-     Family History   Problem Relation Age of Onset    Breast Cancer Mother     Diabetes Mother     Depression Mother     Obesity Mother     Thyroid Disease Mother         Has Thyroid Cancer    Prostate Cancer Father     Breast Cancer Maternal Grandmother     Colon Cancer Maternal Grandmother     Prostate Cancer Maternal Grandfather     Breast Cancer Paternal Grandmother     Diabetes Brother         severe complications from diabetes, is on a transplant list and has lost his vision.    Obesity Brother     Family History Negative No family hx of     Thyroid Cancer No family hx of      History   Drug Use No           Review of Systems   Constitutional:  Negative for chills and fever.   HENT:  Negative for ear pain and sore throat.    Eyes:  Negative for pain and visual disturbance.   Respiratory:  Negative for cough and shortness of breath.    Cardiovascular:  Negative for chest pain and palpitations.   Gastrointestinal:  Negative for abdominal pain and vomiting.   Genitourinary:  Negative for dysuria and hematuria.   Musculoskeletal:  Negative for arthralgias and back pain.   Skin:  Negative for color change and rash.   Neurological:  Negative for seizures and syncope.   All other systems reviewed and are negative.        Objective    /76 (BP Location: Right arm, Patient Position: Sitting, Cuff Size: Adult Regular)   Pulse 76   Temp 98.7  F (37.1  C) (Oral)   Resp 14   Ht 1.549 m (5' 1\")   Wt 105.2 kg (232 lb)   LMP  (LMP Unknown)   SpO2 97%   Breastfeeding No   BMI 43.84 kg/m     Estimated " "body mass index is 43.84 kg/m  as calculated from the following:    Height as of this encounter: 1.549 m (5' 1\").    Weight as of this encounter: 105.2 kg (232 lb).  Physical Exam  Vitals and nursing note reviewed.   Constitutional:       General: She is not in acute distress.     Appearance: Normal appearance. She is well-developed and well-groomed. She is not ill-appearing or toxic-appearing.   HENT:      Head: Normocephalic and atraumatic.      Right Ear: Tympanic membrane, ear canal and external ear normal.      Left Ear: Tympanic membrane, ear canal and external ear normal.      Mouth/Throat:      Lips: Pink.      Mouth: Mucous membranes are moist.      Palate: No mass.      Pharynx: Oropharynx is clear. Uvula midline.      Tonsils: No tonsillar exudate or tonsillar abscesses.   Eyes:      General: Lids are normal.         Right eye: No discharge.         Left eye: No discharge.   Neck:      Trachea: Trachea normal.   Cardiovascular:      Rate and Rhythm: Normal rate and regular rhythm.      Heart sounds: S1 normal and S2 normal. No murmur heard.  Pulmonary:      Effort: Pulmonary effort is normal.      Breath sounds: Normal breath sounds and air entry.   Abdominal:      General: Bowel sounds are normal. There is no distension.      Palpations: Abdomen is soft.      Tenderness: There is no abdominal tenderness. There is no guarding or rebound.   Musculoskeletal:      Cervical back: Full passive range of motion without pain and neck supple.      Right lower leg: No edema.      Left lower leg: No edema.   Lymphadenopathy:      Cervical: No cervical adenopathy.   Skin:     General: Skin is warm and dry.      Findings: No lesion or rash.   Neurological:      General: No focal deficit present.      Mental Status: She is alert.      Cranial Nerves: No cranial nerve deficit.      Gait: Gait is intact.      Deep Tendon Reflexes:      Reflex Scores:       Patellar reflexes are 2+ on the right side and 2+ on the left " side.  Psychiatric:         Attention and Perception: Attention normal.         Mood and Affect: Mood normal.         Speech: Speech normal.         Recent Labs   Lab Test 11/07/23  0815   HGB 11.8         POTASSIUM 4.4   CR 0.79        Diagnostics  Recent Results (from the past 240 hour(s))   HCG qualitative urine    Collection Time: 08/09/24  1:47 PM   Result Value Ref Range    hCG Urine Qualitative Negative Negative   CBC with platelets    Collection Time: 08/09/24  1:51 PM   Result Value Ref Range    WBC Count 9.7 4.0 - 11.0 10e3/uL    RBC Count 4.03 3.80 - 5.20 10e6/uL    Hemoglobin 11.7 11.7 - 15.7 g/dL    Hematocrit 35.2 35.0 - 47.0 %    MCV 87 78 - 100 fL    MCH 29.0 26.5 - 33.0 pg    MCHC 33.2 31.5 - 36.5 g/dL    RDW 12.9 10.0 - 15.0 %    Platelet Count 311 150 - 450 10e3/uL   Basic metabolic panel    Collection Time: 08/09/24  1:51 PM   Result Value Ref Range    Sodium 139 135 - 145 mmol/L    Potassium 4.3 3.4 - 5.3 mmol/L    Chloride 106 98 - 107 mmol/L    Carbon Dioxide (CO2) 22 22 - 29 mmol/L    Anion Gap 11 7 - 15 mmol/L    Urea Nitrogen 18.3 6.0 - 20.0 mg/dL    Creatinine 0.89 0.51 - 0.95 mg/dL    GFR Estimate 79 >60 mL/min/1.73m2    Calcium 8.5 (L) 8.8 - 10.4 mg/dL    Glucose 71 70 - 99 mg/dL      No EKG required, no history of coronary heart disease, significant arrhythmia, peripheral arterial disease or other structural heart disease.    Revised Cardiac Risk Index (RCRI)  The patient has the following serious cardiovascular risks for perioperative complications:   - No serious cardiac risks = 0 points     RCRI Interpretation: 0 points: Class I (very low risk - 0.4% complication rate)         Signed Electronically by: Rajiv Gordon PA-C  A copy of this evaluation report is provided to the requesting physician.

## 2024-08-09 NOTE — PATIENT INSTRUCTIONS

## 2024-08-12 NOTE — PROGRESS NOTES
Pre-op faxed to [facility] [place] at [phone].  On August 12, 2024 Anushka Soria CMA

## 2024-08-30 ENCOUNTER — TRANSFERRED RECORDS (OUTPATIENT)
Dept: HEALTH INFORMATION MANAGEMENT | Facility: CLINIC | Age: 49
End: 2024-08-30
Payer: COMMERCIAL

## 2024-08-30 LAB
ALT SERPL-CCNC: 12 IU/L (ref 0–32)
AST SERPL-CCNC: 15 IU/L (ref 0–40)

## 2024-09-04 ENCOUNTER — MYC MEDICAL ADVICE (OUTPATIENT)
Dept: FAMILY MEDICINE | Facility: CLINIC | Age: 49
End: 2024-09-04
Payer: COMMERCIAL

## 2024-09-09 ASSESSMENT — ASTHMA QUESTIONNAIRES
ACT_TOTALSCORE: 24
QUESTION_2 LAST FOUR WEEKS HOW OFTEN HAVE YOU HAD SHORTNESS OF BREATH: ONCE OR TWICE A WEEK
QUESTION_5 LAST FOUR WEEKS HOW WOULD YOU RATE YOUR ASTHMA CONTROL: COMPLETELY CONTROLLED
ACT_TOTALSCORE: 24
QUESTION_4 LAST FOUR WEEKS HOW OFTEN HAVE YOU USED YOUR RESCUE INHALER OR NEBULIZER MEDICATION (SUCH AS ALBUTEROL): NOT AT ALL
QUESTION_1 LAST FOUR WEEKS HOW MUCH OF THE TIME DID YOUR ASTHMA KEEP YOU FROM GETTING AS MUCH DONE AT WORK, SCHOOL OR AT HOME: NONE OF THE TIME
QUESTION_3 LAST FOUR WEEKS HOW OFTEN DID YOUR ASTHMA SYMPTOMS (WHEEZING, COUGHING, SHORTNESS OF BREATH, CHEST TIGHTNESS OR PAIN) WAKE YOU UP AT NIGHT OR EARLIER THAN USUAL IN THE MORNING: NOT AT ALL

## 2024-09-11 ENCOUNTER — VIRTUAL VISIT (OUTPATIENT)
Dept: FAMILY MEDICINE | Facility: CLINIC | Age: 49
End: 2024-09-11
Payer: COMMERCIAL

## 2024-09-11 DIAGNOSIS — E66.01 CLASS 2 SEVERE OBESITY DUE TO EXCESS CALORIES WITH SERIOUS COMORBIDITY AND BODY MASS INDEX (BMI) OF 36.0 TO 36.9 IN ADULT (H): Primary | ICD-10-CM

## 2024-09-11 DIAGNOSIS — E78.00 HYPERCHOLESTEREMIA: ICD-10-CM

## 2024-09-11 DIAGNOSIS — E66.812 CLASS 2 SEVERE OBESITY DUE TO EXCESS CALORIES WITH SERIOUS COMORBIDITY AND BODY MASS INDEX (BMI) OF 36.0 TO 36.9 IN ADULT (H): Primary | ICD-10-CM

## 2024-09-11 PROCEDURE — 99214 OFFICE O/P EST MOD 30 MIN: CPT | Mod: 95 | Performed by: FAMILY MEDICINE

## 2024-09-11 PROCEDURE — G2211 COMPLEX E/M VISIT ADD ON: HCPCS | Mod: 95 | Performed by: FAMILY MEDICINE

## 2024-09-11 RX ORDER — SEMAGLUTIDE 0.5 MG/.5ML
0.5 INJECTION, SOLUTION SUBCUTANEOUS WEEKLY
Qty: 2 ML | Refills: 0 | Status: SHIPPED | OUTPATIENT
Start: 2024-09-11

## 2024-09-11 RX ORDER — SEMAGLUTIDE 0.5 MG/.5ML
0.5 INJECTION, SOLUTION SUBCUTANEOUS WEEKLY
Qty: 2 ML | Refills: 0 | Status: SHIPPED | OUTPATIENT
Start: 2024-09-11 | End: 2024-09-11

## 2024-09-11 NOTE — PROGRESS NOTES
Janelle is a 49 year old who is being evaluated via a billable video visit.    How would you like to obtain your AVS? [employer]  If the video visit is dropped, the invitation should be resent by: Send to e-mail at: [email or web address]  Will anyone else be joining your video visit? No      Assessment & Plan   Problem List Items Addressed This Visit       Class 2 severe obesity due to excess calories with serious comorbidity and body mass index (BMI) of 36.0 to 36.9 in adult (H) - Primary     This patient has worked with my clinic for medical weight loss in the past.  She has been successful with weight loss while in Wegovy.  It is no longer covered by insurance and she has been off it for a number of months.  Concurrently she is struggling with a rectovaginal fistula.  Her team has proposed a staged approach in which they divert her colonic contents through her colostomy followed by surgical repair.  As part of the colostomy placement she has been told that she needs to lose as much weight as possible in a short period of time prior to the surgery which is scheduled for the latter half of November.  She is curious about pharmaceutical options, particular given her success on Wegovy.  We will resume Wegovy using the compounded pharmacy through [facility].  She had minimal side effects with the initial titration and so we will go up as quickly as she can tolerate.  She will give us feedback through the [employer] system to ensure that she has refills on hand.  There is no contraindication.  She understands that she will to pay cash.  We also reviewed that she needs to be off the medicine for 7+ days prior to her surgery.  We also discussed phentermine which she has been on the past without much weight loss even at high doses.         Relevant Medications    Semaglutide-Weight Management (WEGOVY) 0.5 MG/0.5ML pen    Hypercholesteremia    Relevant Medications    Semaglutide-Weight Management (WEGOVY) 0.5 MG/0.5ML pen      "  BMI  Estimated body mass index is 43.84 kg/m  as calculated from the following:    Height as of 8/9/24: 1.549 m (5' 1\").    Weight as of 8/9/24: 105.2 kg (232 lb).   Weight management plan: Specific weight management program called Comprehensive weight Management discussed    The longitudinal plan of care for the diagnosis(es)/condition(s) as documented were addressed during this visit. Due to the added complexity in care, I will continue to support Janelle in the subsequent management and with ongoing continuity of care.    Subjective   Janelle is a 49 year old, presenting for the following health issues:  Weight Loss (Follow-up/)        9/11/2024    11:45 AM   Additional Questions   Roomed by carlos eduardo     Weight:   -she has a rectal vaginal fistula.  She will need a colostomy as part of the staging to get this to heal.  She has been told that she needs to lose as much weight as possible before the surgery.  ~8 week timetable.   - working to add exercise.    - \"miserable on a daily basis.\"     History of Present Illness       Reason for visit:  Weight mgmnt    She eats 2-3 servings of fruits and vegetables daily.She consumes 1 sweetened beverage(s) daily.She exercises with enough effort to increase her heart rate 9 or less minutes per day.  She exercises with enough effort to increase her heart rate 3 or less days per week.   She is taking medications regularly.        Objective    Vitals - Patient Reported  Weight (Patient Reported): 108.9 kg (240 lb)        Physical Exam   GENERAL: alert and no distress  EYES: Eyes grossly normal to inspection.  No discharge or erythema, or obvious scleral/conjunctival abnormalities.  RESP: No audible wheeze, cough, or visible cyanosis.    SKIN: Visible skin clear. No significant rash, abnormal pigmentation or lesions.  NEURO: Cranial nerves grossly intact.  Mentation and speech appropriate for age.  PSYCH: Appropriate affect, tone, and pace of words        Video-Visit " Details    Type of service:  Video Visit   Originating Location (pt. Location): Home    Distant Location (provider location):  On-site  Platform used for Video Visit: Sona  Signed Electronically by: Chava Baig MD

## 2024-09-11 NOTE — ASSESSMENT & PLAN NOTE
This patient has worked with my clinic for medical weight loss in the past.  She has been successful with weight loss while in Wegovy.  It is no longer covered by insurance and she has been off it for a number of months.  Concurrently she is struggling with a rectovaginal fistula.  Her team has proposed a staged approach in which they divert her colonic contents through her colostomy followed by surgical repair.  As part of the colostomy placement she has been told that she needs to lose as much weight as possible in a short period of time prior to the surgery which is scheduled for the latter half of November.  She is curious about pharmaceutical options, particular given her success on Wegovy.  We will resume Wegovy using the compounded pharmacy through [facility].  She had minimal side effects with the initial titration and so we will go up as quickly as she can tolerate.  She will give us feedback through the [employer] system to ensure that she has refills on hand.  There is no contraindication.  She understands that she will to pay cash.  We also reviewed that she needs to be off the medicine for 7+ days prior to her surgery.  We also discussed phentermine which she has been on the past without much weight loss even at high doses.

## 2024-09-11 NOTE — PATIENT INSTRUCTIONS
GLP1-ra options:    Wegovy (or Ozempic) aka semaglutide: cost per month retail ~$1400      Zepbound (or Mounjaro) aka tirzepatide: cost per month retail ~$1100 (there is an online coupon)      Compounded options for GLPs:    How long will the therapy to be the an option?      - https://www.npr.org/sections/shots-health-news/2024/07/24/ek-f7-7029645/cheaper-semaglutide-wegovy-ozempic-zepbound-copies-could-disappear  (July 2024)    Semaglutide: 1 month multi-dose vial  - 1mL (dose 1mg or less), $230  - 2mL (dose over 1mg) , $370     Tirzepatide: 1 month, 4 pre-filled syringes  - 2.5mg, $400  - 5mg, $450  - 7.5mg, $500  - 10mg, $533  - 12.5mg, $550 (it becomes cheaper to use manufacture's coupon at [employer] and get the name brand medication)   - 15mg, $600 (it becomes cheaper to use manufacture's coupon at [employer] and get the name brand medication)

## 2024-09-13 ENCOUNTER — ANCILLARY PROCEDURE (OUTPATIENT)
Dept: MAMMOGRAPHY | Facility: CLINIC | Age: 49
End: 2024-09-13
Attending: NURSE PRACTITIONER
Payer: COMMERCIAL

## 2024-09-13 DIAGNOSIS — Z12.31 VISIT FOR SCREENING MAMMOGRAM: ICD-10-CM

## 2024-09-13 PROCEDURE — 77067 SCR MAMMO BI INCL CAD: CPT | Mod: TC | Performed by: RADIOLOGY

## 2024-09-13 PROCEDURE — 77063 BREAST TOMOSYNTHESIS BI: CPT | Mod: TC | Performed by: RADIOLOGY

## 2024-09-24 ENCOUNTER — MYC MEDICAL ADVICE (OUTPATIENT)
Dept: PEDIATRICS | Facility: CLINIC | Age: 49
End: 2024-09-24
Payer: COMMERCIAL

## 2024-09-24 ENCOUNTER — E-VISIT (OUTPATIENT)
Dept: PEDIATRICS | Facility: CLINIC | Age: 49
End: 2024-09-24
Payer: COMMERCIAL

## 2024-09-24 DIAGNOSIS — F41.1 GENERALIZED ANXIETY DISORDER: Chronic | ICD-10-CM

## 2024-09-24 PROCEDURE — 99421 OL DIG E/M SVC 5-10 MIN: CPT | Performed by: NURSE PRACTITIONER

## 2024-09-25 RX ORDER — SERTRALINE HYDROCHLORIDE 100 MG/1
150 TABLET, FILM COATED ORAL DAILY
Qty: 135 TABLET | Refills: 0 | Status: SHIPPED | OUTPATIENT
Start: 2024-09-25

## 2024-10-03 ENCOUNTER — TRANSFERRED RECORDS (OUTPATIENT)
Dept: HEALTH INFORMATION MANAGEMENT | Facility: CLINIC | Age: 49
End: 2024-10-03
Payer: COMMERCIAL

## 2024-10-07 ASSESSMENT — PATIENT HEALTH QUESTIONNAIRE - PHQ9
SUM OF ALL RESPONSES TO PHQ QUESTIONS 1-9: 8
10. IF YOU CHECKED OFF ANY PROBLEMS, HOW DIFFICULT HAVE THESE PROBLEMS MADE IT FOR YOU TO DO YOUR WORK, TAKE CARE OF THINGS AT HOME, OR GET ALONG WITH OTHER PEOPLE: VERY DIFFICULT
SUM OF ALL RESPONSES TO PHQ QUESTIONS 1-9: 8

## 2024-10-08 ENCOUNTER — VIRTUAL VISIT (OUTPATIENT)
Dept: FAMILY MEDICINE | Facility: CLINIC | Age: 49
End: 2024-10-08
Payer: COMMERCIAL

## 2024-10-08 DIAGNOSIS — E66.01 CLASS 2 SEVERE OBESITY DUE TO EXCESS CALORIES WITH SERIOUS COMORBIDITY AND BODY MASS INDEX (BMI) OF 36.0 TO 36.9 IN ADULT (H): Primary | ICD-10-CM

## 2024-10-08 DIAGNOSIS — E66.812 CLASS 2 SEVERE OBESITY DUE TO EXCESS CALORIES WITH SERIOUS COMORBIDITY AND BODY MASS INDEX (BMI) OF 36.0 TO 36.9 IN ADULT (H): Primary | ICD-10-CM

## 2024-10-08 PROCEDURE — 99213 OFFICE O/P EST LOW 20 MIN: CPT | Mod: 95 | Performed by: FAMILY MEDICINE

## 2024-10-08 PROCEDURE — G2211 COMPLEX E/M VISIT ADD ON: HCPCS | Mod: 95 | Performed by: FAMILY MEDICINE

## 2024-10-08 NOTE — ASSESSMENT & PLAN NOTE
Initial response favorable.  Minimal side effects.  Immediate relief of hunger and cravings. She is ready to advance to 1.7 mg/week.  Reviewed compounding pharmacy option availability.

## 2024-10-08 NOTE — PROGRESS NOTES
"Janelle is a 49 year old who is being evaluated via a billable video visit.    How would you like to obtain your AVS? MyChart  If the video visit is dropped, the invitation should be resent by: Send to e-mail at: [email or web address]  Will anyone else be joining your video visit? No  {If patient encounters technical issues they should call [phone] :[id number]}    {PROVIDER CHARTING PREFERENCE:628669}    Galileo Luis is a 49 year old, presenting for the following health issues:  Weight Loss (Follow-up )        10/8/2024    11:46 AM   Additional Questions   Roomed by xl     History of Present Illness       Reason for visit:  Weight Mgmnt meds check    She eats 2-3 servings of fruits and vegetables daily.She consumes 0 sweetened beverage(s) daily.She exercises with enough effort to increase her heart rate 9 or less minutes per day.  She exercises with enough effort to increase her heart rate 3 or less days per week.   She is taking medications regularly.       {SUPERLIST (Optional):752095}  {additonal problems for provider to add (Optional):181250}    {ROS Picklists (Optional):452972}      Objective    Vitals - Patient Reported  Weight (Patient Reported): 105.2 kg (232 lb)        Physical Exam   {video visit exam brief selected:037464}    {Diagnostic Test Results (Optional):621785}      Video-Visit Details    Type of service:  Video Visit   Originating Location (pt. Location): {video visit patient location:489261::\"Home\"}  {PROVIDER LOCATION On-site should be selected for visits conducted from your clinic location or adjoining [facility] hospital, academic office, or other nearby [facility] building. Off-site should be selected for all other provider locations, including home:422756}  Distant Location (provider location):  {virtual location provider:849722}  Platform used for Video Visit: {Virtual Visit Platforms:440088::\""[employer]\"}  Signed Electronically by: Chava Baig MD  {Email feedback regarding this note to " [email or web address]   :425323}

## 2024-10-08 NOTE — PROGRESS NOTES
Janelle is a 49 year old who is being evaluated via a billable video visit.    How would you like to obtain your AVS? MyChart  If the video visit is dropped, the invitation should be resent by: Text to cell phone: [phone]  Will anyone else be joining your video visit? No      Assessment & Plan   Problem List Items Addressed This Visit       Class 2 severe obesity due to excess calories with serious comorbidity and body mass index (BMI) of 36.0 to 36.9 in adult (H) - Primary     Initial response favorable.  Minimal side effects.  Immediate relief of hunger and cravings. She is ready to advance to 1.7 mg/week.  Reviewed compounding pharmacy option availability.           Relevant Medications    Semaglutide-Weight Management (WEGOVY) 1.7 MG/0.75ML pen      The longitudinal plan of care for the diagnosis(es)/condition(s) as documented were addressed during this visit. Due to the added complexity in care, I will continue to support Janelle in the subsequent management and with ongoing continuity of care.    Subjective   Janelle is a 49 year old, presenting for the following health issues:  Weight Loss (Follow-up )        10/8/2024    11:46 AM   Additional Questions   Roomed by xl     Initial response was favorable.  Minimal side effects. Symptoms wane as week progresses.  Ready to go up to 1.7mg. she experiences almost immediate relief of hunger.     History of Present Illness       Reason for visit:  Weight Mgmnt meds check    She eats 2-3 servings of fruits and vegetables daily.She consumes 0 sweetened beverage(s) daily.She exercises with enough effort to increase her heart rate 9 or less minutes per day.  She exercises with enough effort to increase her heart rate 3 or less days per week.   She is taking medications regularly.         Objective    Vitals - Patient Reported  Weight (Patient Reported): 105.2 kg (232 lb)        Physical Exam   GENERAL: alert and no distress  EYES: Eyes grossly normal to inspection.   No discharge or erythema, or obvious scleral/conjunctival abnormalities.  RESP: No audible wheeze, cough, or visible cyanosis.    SKIN: Visible skin clear. No significant rash, abnormal pigmentation or lesions.  NEURO: Cranial nerves grossly intact.  Mentation and speech appropriate for age.  PSYCH: Appropriate affect, tone, and pace of words      Video-Visit Details    Type of service:  Video Visit   Originating Location (pt. Location): Home    Distant Location (provider location):  On-site  Platform used for Video Visit: Sona  Signed Electronically by: Chava Baig MD

## 2024-10-17 ENCOUNTER — TRANSFERRED RECORDS (OUTPATIENT)
Dept: HEALTH INFORMATION MANAGEMENT | Facility: CLINIC | Age: 49
End: 2024-10-17
Payer: COMMERCIAL

## 2024-11-04 ENCOUNTER — TRANSFERRED RECORDS (OUTPATIENT)
Dept: HEALTH INFORMATION MANAGEMENT | Facility: CLINIC | Age: 49
End: 2024-11-04
Payer: COMMERCIAL

## 2024-11-05 ENCOUNTER — MYC MEDICAL ADVICE (OUTPATIENT)
Dept: FAMILY MEDICINE | Facility: CLINIC | Age: 49
End: 2024-11-05
Payer: COMMERCIAL

## 2024-11-05 DIAGNOSIS — E66.812 CLASS 2 SEVERE OBESITY DUE TO EXCESS CALORIES WITH SERIOUS COMORBIDITY AND BODY MASS INDEX (BMI) OF 36.0 TO 36.9 IN ADULT (H): Primary | ICD-10-CM

## 2024-11-05 DIAGNOSIS — E66.01 CLASS 2 SEVERE OBESITY DUE TO EXCESS CALORIES WITH SERIOUS COMORBIDITY AND BODY MASS INDEX (BMI) OF 36.0 TO 36.9 IN ADULT (H): Primary | ICD-10-CM

## 2024-11-05 NOTE — TELEPHONE ENCOUNTER
Adam Welch MD     Please review and advise on results for PAP.     Tian DEAN RN 9/6/2023 at 4:19 PM

## 2024-11-14 ENCOUNTER — TRANSFERRED RECORDS (OUTPATIENT)
Dept: HEALTH INFORMATION MANAGEMENT | Facility: CLINIC | Age: 49
End: 2024-11-14
Payer: COMMERCIAL

## 2024-11-18 ENCOUNTER — TRANSFERRED RECORDS (OUTPATIENT)
Dept: HEALTH INFORMATION MANAGEMENT | Facility: CLINIC | Age: 49
End: 2024-11-18
Payer: COMMERCIAL

## 2024-11-25 ENCOUNTER — OFFICE VISIT (OUTPATIENT)
Dept: FAMILY MEDICINE | Facility: CLINIC | Age: 49
End: 2024-11-25
Payer: COMMERCIAL

## 2024-11-25 VITALS
WEIGHT: 226.2 LBS | OXYGEN SATURATION: 99 % | RESPIRATION RATE: 22 BRPM | TEMPERATURE: 98.9 F | SYSTOLIC BLOOD PRESSURE: 100 MMHG | BODY MASS INDEX: 41.62 KG/M2 | HEIGHT: 62 IN | DIASTOLIC BLOOD PRESSURE: 70 MMHG | HEART RATE: 73 BPM

## 2024-11-25 DIAGNOSIS — K50.119 CROHN'S DISEASE OF PERIANAL REGION WITH COMPLICATION (H): ICD-10-CM

## 2024-11-25 DIAGNOSIS — J45.20 MILD INTERMITTENT ASTHMA, UNSPECIFIED WHETHER COMPLICATED: ICD-10-CM

## 2024-11-25 DIAGNOSIS — Z01.818 PREOP GENERAL PHYSICAL EXAM: Primary | ICD-10-CM

## 2024-11-25 DIAGNOSIS — N20.0 KIDNEY STONE: ICD-10-CM

## 2024-11-25 PROCEDURE — 99214 OFFICE O/P EST MOD 30 MIN: CPT | Performed by: NURSE PRACTITIONER

## 2024-11-25 RX ORDER — CEFAZOLIN SODIUM/WATER 2 G/20 ML
2 SYRINGE (ML) INTRAVENOUS
Status: CANCELLED | OUTPATIENT
Start: 2024-11-25

## 2024-11-25 RX ORDER — CEFAZOLIN SODIUM/WATER 2 G/20 ML
2 SYRINGE (ML) INTRAVENOUS SEE ADMIN INSTRUCTIONS
Status: CANCELLED | OUTPATIENT
Start: 2024-11-25

## 2024-11-25 RX ORDER — ACETAMINOPHEN 650 MG/1
650 SUPPOSITORY RECTAL ONCE
Status: CANCELLED | OUTPATIENT
Start: 2024-11-25

## 2024-11-25 RX ORDER — ACETAMINOPHEN 325 MG/1
975 TABLET ORAL ONCE
Status: CANCELLED | OUTPATIENT
Start: 2024-11-25 | End: 2024-11-25

## 2024-11-25 ASSESSMENT — PATIENT HEALTH QUESTIONNAIRE - PHQ9
SUM OF ALL RESPONSES TO PHQ QUESTIONS 1-9: 5
SUM OF ALL RESPONSES TO PHQ QUESTIONS 1-9: 5
10. IF YOU CHECKED OFF ANY PROBLEMS, HOW DIFFICULT HAVE THESE PROBLEMS MADE IT FOR YOU TO DO YOUR WORK, TAKE CARE OF THINGS AT HOME, OR GET ALONG WITH OTHER PEOPLE: NOT DIFFICULT AT ALL

## 2024-11-25 NOTE — H&P (VIEW-ONLY)
Preoperative Evaluation  [facility]  [address] [address]  Phone: [phone]  Fax: [phone]  Primary Provider: IAS Tavera CNP  Pre-op Performing Provider: ISA Wright CNP  Nov 25, 2024 11/22/2024   Surgical Information   What procedure is being done? Remove Kidney stone    Facility or Hospital where procedure/surgery will be performed: [facility]. [place].    Who is doing the procedure / surgery? Mary Rolle    Date of surgery / procedure: 12/05/2024    Time of surgery / procedure: unknown    Where do you plan to recover after surgery? at home with family        Patient-reported     Fax number for surgical facility: Note does not need to be faxed, will be available electronically in Epic.    Assessment & Plan     The proposed surgical procedure is considered LOW risk.    Preop general physical exam  Patient presented for preoperative exam with complete health history, physical examination, medication reconciliation. Reviewed patient medication hold times and labs. Patient is cleared to proceed with surgery without further workup or diagnostics.    Kidney stone  ED visit 11/15 for kidney stone. Stable at this time, managing pain with tramadol as needed.    Crohn's disease of perianal region with complication (H)  Stable, managed by GI. No recent flares.    Mild intermittent asthma, unspecified whether complicated  Chronic, stable. Reports only requiring albuterol inhaler with upper respiratory infections; cannot recall last dose.      - No identified additional risk factors other than previously addressed    Preoperative Medication Instructions  Antiplatelet or Anticoagulation Medication Instructions   - Patient is on no antiplatelet or anticoagulation medications.    Additional Medication Instructions   - GLP-1 Injectable (exenitide, liraglutide, semaglutide, dulaglutide, etc.): DO NOT TAKE 7 days  before surgery     Recommendation  Approval given to proceed with proposed procedure, without further diagnostic evaluation.    Subjective   Janelle is a 49 year old, presenting for the following:  Pre-Op Exam          11/25/2024     9:20 AM   Additional Questions   Roomed by mervat sanders   Accompanied by self     HPI related to upcoming procedure:     Janelle is a 50yo with PMH Crohn's disease, intermittent asthma who presents today for preoperative evaluation prior to removal of a kidney stone on 12/5 at [facility].     Patient went to ED on 11/15 with sharp abdominal pain, CT exam found 9mm kidney stone. At this point she reports that her pain is minimal, with intermittent nausea. She's been taking tramadol for pain, which she last needed on 11/22. Denies nausea, gross hematuria.     Patient has a history of Crohn's disease, currently stable with negative colonoscopies. She receives avsola infusions every eight weeks; last one was November 1st.     Patient has intermittent asthma, she denies symptoms apart from upper respiratory infection. When she has one she an exacerbation of symptoms; she has an albuterol inhaler but rarely uses it.     Patient has had multiple surgeries previously without issues with anesthesia. The last one was this summer for fistula management. Currently on 2.4 mg of semaglutide, last dose was 11/24. She denies side effects. Patient has stopped her vitamins for the week. Denies family history of bleeding or cardiac disorders.     She denies chest pain, heart palpitations, shortness of breath, nausea/vomiting/diarrhea, recent fevers or chills.         11/22/2024   Pre-Op Questionnaire   Have you ever had a heart attack or stroke? No    Have you ever had surgery on your heart or blood vessels, such as a stent placement, a coronary artery bypass, or surgery on an artery in your head, neck, heart, or legs? No    Do you have chest pain with activity? No    Do you have a history of heart  failure? No    Do you currently have a cold, bronchitis or symptoms of other infection? No    Do you have a cough, shortness of breath, or wheezing? No    Do you or anyone in your family have previous history of blood clots? (!) YES - her mother has had DVTs    Do you or does anyone in your family have a serious bleeding problem such as prolonged bleeding following surgeries or cuts? No    Have you ever had problems with anemia or been told to take iron pills? No    Have you had any abnormal blood loss such as black, tarry or bloody stools, or abnormal vaginal bleeding? No    Have you ever had a blood transfusion? (!) YES - denies history of reactions    Have you ever had a transfusion reaction? No    Are you willing to have a blood transfusion if it is medically needed before, during, or after your surgery? Yes    Have you or any of your relatives ever had problems with anesthesia? No    Do you have sleep apnea, excessive snoring or daytime drowsiness? No    Do you have any artifical heart valves or other implanted medical devices like a pacemaker, defibrillator, or continuous glucose monitor? No    Do you have artificial joints? No    Are you allergic to latex? No        Patient-reported     Health Care Directive  Patient does not have a Health Care Directive: Discussed advance care planning with patient; information given to patient to review.    Preoperative Review of    reviewed - controlled substances reflected in medication list.      \    Patient Active Problem List    Diagnosis Date Noted    Hypercholesteremia 12/22/2023     Priority: Medium    Mechanical low back pain 06/06/2022     Priority: Medium    Poor posture 06/06/2022     Priority: Medium    Cervical radiculopathy 06/06/2022     Priority: Medium    Crohn's disease of large intestine with complication (H) 06/01/2018     Priority: Medium    Crohn's disease of perianal region (H) 02/09/2018     Priority: Medium    Vitamin D deficiency 04/13/2017      Priority: Medium    Mild intermittent asthma, unspecified whether complicated 10/05/2016     Priority: Medium    History of Heraclio-en-Y gastric bypass 10/05/2016     Priority: Medium    Insomnia, unspecified type 10/05/2016     Priority: Medium    Family history of breast cancer in mother 10/05/2016     Priority: Medium    Major depressive disorder, recurrent episode, in partial remission (H) 10/13/2015     Priority: Medium    Generalized anxiety disorder 08/21/2014     Priority: Medium    Class 2 severe obesity due to excess calories with serious comorbidity and body mass index (BMI) of 36.0 to 36.9 in adult (H)      Priority: Medium    ASCUS of cervix with negative high risk HPV 03/23/2013     Priority: Medium     2008: ASCUS , HR HPV, dysplasia not present on colp per notes  7/09: NIL pap  6/2010 LSIL pap  9/12: ASCUS, neg HPV. Per MD pap in 1 yr.  4/2014: NIL pap. New ASCCP algorithms, cotest 3 years after ASCUS, neg pap.  2015 NIL pap, Neg HPV.   10/7/20 NIL pap, Neg HPV.   8/31/23 ASCUS pap, neg HPV. Plan cotest in 3 years.   9/7/23 Pt viewed result on MyChart.          Past Medical History:   Diagnosis Date    Acute Crohn's disease (H)     Acute posthemorrhagic anemia     ASCUS favor benign 2012    hpv neg. needs cotest in 3 years    ASCUS with positive high risk HPV cervical     Depressive disorder     since teens    Depressive disorder, not elsewhere classified 2/10/2014    Excessive and frequent menstruation     Family history of breast cancer in mother     Generalized anxiety disorder     History of blood transfusion     few years ago, should be in my records, it was at [facility]    History of transfusion     many years    Infected cyst of Bartholin's gland duct     Insomnia     Intermittent asthma, uncontrolled     LSIL (low grade squamous intraepithelial lesion) on Pap smear 2008    per chart notes colp dysplasia?    Major depressive disorder in partial remission (H)     Obesity     Obesity      Tobacco abuse     Uncomplicated asthma     since baby     Past Surgical History:   Procedure Laterality Date    BIOPSY      Breast cycst (done at [place]) many years ago    CHOLECYSTECTOMY  late 90's    during gastric bypass    CHOLECYSTECTOMY  10/05/2016    COLONOSCOPY  Jan/Feb 2018, Feb 2019    Confirmed Crohn's Disease    COLONOSCOPY N/A 3/28/2022    Procedure: COLONOSCOPY with biopsies and polypectomy;  Surgeon: Chad Brown MD;  Location: [facility] OR    EXCIS BARTHOLIN GLAND/CYST  2016    GASTRIC BYPASS  2000    with cholecystectomy and subsequent revision gastric bypass    GASTRIC BYPASS  2006    revision of gastric bypass    GASTRIC BYPASS  10/05/2016    GASTRIC BYPASS      HERNIA REPAIR  12/9/16    [facility]    INCISION AND DRAINAGE OF WOUND N/A 2/9/2018    Procedure: EXAM UNDER ANESTHESIA WITH I&D OF INTERSPHINCTERIC ABSCESS  SETON PLACEMENT ;  Surgeon: Chad Pereira MD;  Location: [facility];  Service:     MARSUPIALIZATION BARTHOLIN CYST  09/23/2016    wisdom teeth extraction  1995    WISDOM TOOTH EXTRACTION      ZZHC COLONOSCOPY W/WO BRUSH/WASH N/A 2/23/2021    Procedure: COLONOSCOPY with biopsies.;  Surgeon: Chad Plata MD;  Location: [facility];  Service: Gastroenterology     Current Outpatient Medications   Medication Sig Dispense Refill    albuterol (PROAIR HFA/PROVENTIL HFA/VENTOLIN HFA) 108 (90 Base) MCG/ACT inhaler Inhale 2 puffs into the lungs every 6 hours as needed for shortness of breath / dyspnea or wheezing 6.7 g 0    cetirizine (ZYRTEC) 10 MG tablet Take 10 mg by mouth daily      cyanocobalamin (VITAMIN B12) 1000 MCG/ML injection Inject 1 mL into the muscle every 30 days      levonorgestrel-ethinyl estradiol (SEASONALE) 0.15-0.03 MG tablet Take 1 tablet by mouth daily 91 tablet 4    Semaglutide-Weight Management (WEGOVY) 2.4 MG/0.75ML pen Inject 2.4 mg subcutaneously once a week. 3 mL 3    sertraline (ZOLOFT) 100 MG tablet  "Take 1.5 tablets (150 mg) by mouth daily. 135 tablet 0    traZODone (DESYREL) 50 MG tablet Take 0.5-1 tablets (25-50 mg) by mouth nightly as needed for sleep 90 tablet 11    vitamin D3 (CHOLECALCIFEROL) 50 mcg (2000 units) tablet Take 1 tablet by mouth daily      Semaglutide-Weight Management (WEGOVY) 0.5 MG/0.5ML pen Inject 0.5 mg subcutaneously once a week. (Advance dose to 1mg/wk as tolerated) 2 mL 0    Semaglutide-Weight Management (WEGOVY) 1.7 MG/0.75ML pen Inject 1.7 mg subcutaneously once a week. 3 mL 0       Allergies   Allergen Reactions    Dextran Anaphylaxis        Social History     Tobacco Use    Smoking status: Former     Current packs/day: 0.00     Average packs/day: 0.2 packs/day for 17.9 years (3.6 ttl pk-yrs)     Types: Cigarettes     Start date: 2001     Quit date: 2018     Years since quittin.0    Smokeless tobacco: Never   Substance Use Topics    Alcohol use: Yes     Comment: very rarely, but when I do I usually overdue it :-       History   Drug Use No               Objective    /70 (BP Location: Right arm, Patient Position: Sitting, Cuff Size: Adult Regular)   Pulse 73   Temp 98.9  F (37.2  C) (Oral)   Resp 22   Ht 1.565 m (5' .61\")   Wt 102.6 kg (226 lb 3.2 oz)   LMP  (LMP Unknown)   SpO2 99%   BMI 41.89 kg/m     Estimated body mass index is 41.89 kg/m  as calculated from the following:    Height as of this encounter: 1.565 m (5' .61\").    Weight as of this encounter: 102.6 kg (226 lb 3.2 oz).  Physical Exam  GENERAL: alert and no distress  NECK: no adenopathy, no asymmetry, masses, or scars  RESP: lungs clear to auscultation - no rales, rhonchi or wheezes  CV: regular rate and rhythm, normal S1 S2, no S3 or S4, no murmur, click or rub, no peripheral edema  ABDOMEN: soft, nontender, no hepatosplenomegaly, no masses and bowel sounds normal  MS: no gross musculoskeletal defects noted, no edema    Recent Labs   Lab Test 24  1351   HGB 11.7       "   POTASSIUM 4.3   CR 0.89        Diagnostics  No labs were ordered during this visit.   No EKG required for low risk surgery (cataract, skin procedure, breast biopsy, etc).    Revised Cardiac Risk Index (RCRI)  The patient has the following serious cardiovascular risks for perioperative complications:   - No serious cardiac risks = 0 points     RCRI Interpretation: 0 points: Class I (very low risk - 0.4% complication rate)       COLLEEN Dyson, RN, [employer] DNP Student  Signed Electronically by: ISA Wright CNP  A copy of this evaluation report is provided to the requesting physician.        Answers submitted by the patient for this visit:  Patient Health Questionnaire (Submitted on 11/25/2024)  If you checked off any problems, how difficult have these problems made it for you to do your work, take care of things at home, or get along with other people?: Not difficult at all  PHQ9 TOTAL SCORE: 5

## 2024-11-25 NOTE — PATIENT INSTRUCTIONS
Look into [facility] to complete a health care directive: [email or web address]    How to Take Your Medication Before Surgery  Preoperative Medication Instructions   Antiplatelet or Anticoagulation Medication Instructions   - Patient is on no antiplatelet or anticoagulation medications.    Additional Medication Instructions   - GLP-1 Injectable (exenitide, liraglutide, semaglutide, dulaglutide, etc.): DO NOT TAKE 7 days before surgery        Patient Education   Preparing for Your Surgery  For Adults  Getting started  In most cases, a nurse will call to review your health history and instructions. They will give you an arrival time based on your scheduled surgery time. Please be ready to share:  Your doctor's clinic name and phone number  Your medical, surgical, and anesthesia history  A list of allergies and sensitivities  A list of medicines, including herbal treatments and over-the-counter drugs  Whether the patient has a legal guardian (ask how to send us the papers in advance)  Note: You may not receive a call if you were seen at our PAC (Preoperative Assessment Center).  Please tell us if you're pregnant--or if there's any chance you might be pregnant. Some surgeries may injure a fetus (unborn baby), so they require a pregnancy test. Surgeries that are safe for a fetus don't always need a test, and you can choose whether to have one.   Preparing for surgery  Within 10 to 30 days of surgery: Have a pre-op exam (sometimes called an H&P, or History and Physical). This can be done at a clinic or pre-operative center.  If you're having a , you may not need this exam. Talk to your care team.  At your pre-op exam, talk to your care team about all medicines you take. (This includes CBD oil and any drugs, such as THC, marijuana, and other forms of cannabis.) If you need to stop any medicine before surgery, ask when to start taking it again.  This is for your  safety. Many medicines and drugs can make you bleed too much during surgery. Some change how well surgery (anesthesia) drugs work.  Call your insurance company to let them know you're having surgery. (If you don't have insurance, call [phone].)  Call your clinic if there's any change in your health. This includes a scrape or scratch near the surgery site, or any signs of a cold (sore throat, runny nose, cough, rash, fever).  Eating and drinking guidelines  For your safety: Unless your surgeon tells you otherwise, follow the guidelines below.  Eat and drink as normal until 8 hours before you arrive for surgery. After that, no food or milk. You can spit out gum when you arrive.  Drink clear liquids until 2 hours before you arrive. These are liquids you can see through, like water, Gatorade, and Propel Water. They also include plain black coffee and tea (no cream or milk).  No alcohol for 24 hours before you arrive. The night before surgery, stop any drinks that contain THC.  If your care team tells you to take medicine on the morning of surgery, it's okay to take it with a sip of water. No other medicines or drugs are allowed (including CBD oil)--follow your care team's instructions.  If you have questions the day of surgery, call your hospital or surgery center.   Preventing infection  Shower or bathe the night before and the morning of surgery. Follow the instructions your clinic gave you. (If no instructions, use regular soap.)  Don't shave or clip hair near your surgery site. We'll remove the hair if needed.  Don't smoke or vape the morning of surgery. No chewing tobacco for 6 hours before you arrive. A nicotine patch is okay. You may spit out nicotine gum when you arrive.  For some surgeries, the surgeon will tell you to fully quit smoking and nicotine.  We will make every effort to keep you safe from infection. We will:  Clean our hands often with soap and water (or an alcohol-based hand rub).  Clean the  skin at your surgery site with a special soap that kills germs.  Give you a special gown to keep you warm. (Cold raises the risk of infection.)  Wear hair covers, masks, gowns, and gloves during surgery.  Give antibiotic medicine, if prescribed. Not all surgeries need this medicine.  What to bring on the day of surgery  Photo ID and insurance card  Copy of your health care directive, if you have one  Glasses and hearing aids (bring cases)  You can't wear contacts during surgery  Inhaler and eye drops, if you use them (tell us about these when you arrive)  CPAP machine or breathing device, if you use them  A few personal items, if spending the night  If you have . . .  A pacemaker, ICD (cardiac defibrillator), or other implant: Bring the ID card.  An implanted stimulator: Bring the remote control.  A legal guardian: Bring a copy of the certified (court-stamped) guardianship papers.  Please remove any jewelry, including body piercings. Leave jewelry and other valuables at home.  If you're going home the day of surgery  You must have a responsible adult drive you home. They should stay with you overnight as well.  If you don't have someone to stay with you, and you aren't safe to go home alone, we may keep you overnight. Insurance often won't pay for this.  After surgery  If it's hard to control your pain or you need more pain medicine, please call your surgeon's office.  Questions?   If you have any questions for your care team, list them here:   ____________________________________________________________________________________________________________________________________________________________________________________________________________________________________________________________  For informational purposes only. Not to replace the advice of your health care provider. Copyright   2003, 2019 [facility]. All rights reserved. Clinically reviewed by Juan Jose Riggins MD. [employer] [id number] -  REV 08/24.

## 2024-11-25 NOTE — PROGRESS NOTES
Preoperative Evaluation  [facility]  [address] [address]  Phone: [phone]  Fax: [phone]  Primary Provider: ISA Tavera CNP  Pre-op Performing Provider: ISA Wright CNP  Nov 25, 2024 11/22/2024   Surgical Information   What procedure is being done? Remove Kidney stone    Facility or Hospital where procedure/surgery will be performed: [facility]. [place].    Who is doing the procedure / surgery? Mary Rolle    Date of surgery / procedure: 12/05/2024    Time of surgery / procedure: unknown    Where do you plan to recover after surgery? at home with family        Patient-reported     Fax number for surgical facility: Note does not need to be faxed, will be available electronically in Epic.    Assessment & Plan     The proposed surgical procedure is considered LOW risk.    Preop general physical exam  Patient presented for preoperative exam with complete health history, physical examination, medication reconciliation. Reviewed patient medication hold times and labs. Patient is cleared to proceed with surgery without further workup or diagnostics.    Kidney stone  ED visit 11/15 for kidney stone. Stable at this time, managing pain with tramadol as needed.    Crohn's disease of perianal region with complication (H)  Stable, managed by GI. No recent flares.    Mild intermittent asthma, unspecified whether complicated  Chronic, stable. Reports only requiring albuterol inhaler with upper respiratory infections; cannot recall last dose.      - No identified additional risk factors other than previously addressed    Preoperative Medication Instructions  Antiplatelet or Anticoagulation Medication Instructions   - Patient is on no antiplatelet or anticoagulation medications.    Additional Medication Instructions   - GLP-1 Injectable (exenitide, liraglutide, semaglutide, dulaglutide, etc.): DO NOT TAKE 7 days  before surgery     Recommendation  Approval given to proceed with proposed procedure, without further diagnostic evaluation.    Subjective   Janelle is a 49 year old, presenting for the following:  Pre-Op Exam          11/25/2024     9:20 AM   Additional Questions   Roomed by mervat sanders   Accompanied by self     HPI related to upcoming procedure:     Janelle is a 50yo with PMH Crohn's disease, intermittent asthma who presents today for preoperative evaluation prior to removal of a kidney stone on 12/5 at [facility].     Patient went to ED on 11/15 with sharp abdominal pain, CT exam found 9mm kidney stone. At this point she reports that her pain is minimal, with intermittent nausea. She's been taking tramadol for pain, which she last needed on 11/22. Denies nausea, gross hematuria.     Patient has a history of Crohn's disease, currently stable with negative colonoscopies. She receives avsola infusions every eight weeks; last one was November 1st.     Patient has intermittent asthma, she denies symptoms apart from upper respiratory infection. When she has one she an exacerbation of symptoms; she has an albuterol inhaler but rarely uses it.     Patient has had multiple surgeries previously without issues with anesthesia. The last one was this summer for fistula management. Currently on 2.4 mg of semaglutide, last dose was 11/24. She denies side effects. Patient has stopped her vitamins for the week. Denies family history of bleeding or cardiac disorders.     She denies chest pain, heart palpitations, shortness of breath, nausea/vomiting/diarrhea, recent fevers or chills.         11/22/2024   Pre-Op Questionnaire   Have you ever had a heart attack or stroke? No    Have you ever had surgery on your heart or blood vessels, such as a stent placement, a coronary artery bypass, or surgery on an artery in your head, neck, heart, or legs? No    Do you have chest pain with activity? No    Do you have a history of heart  failure? No    Do you currently have a cold, bronchitis or symptoms of other infection? No    Do you have a cough, shortness of breath, or wheezing? No    Do you or anyone in your family have previous history of blood clots? (!) YES - her mother has had DVTs    Do you or does anyone in your family have a serious bleeding problem such as prolonged bleeding following surgeries or cuts? No    Have you ever had problems with anemia or been told to take iron pills? No    Have you had any abnormal blood loss such as black, tarry or bloody stools, or abnormal vaginal bleeding? No    Have you ever had a blood transfusion? (!) YES - denies history of reactions    Have you ever had a transfusion reaction? No    Are you willing to have a blood transfusion if it is medically needed before, during, or after your surgery? Yes    Have you or any of your relatives ever had problems with anesthesia? No    Do you have sleep apnea, excessive snoring or daytime drowsiness? No    Do you have any artifical heart valves or other implanted medical devices like a pacemaker, defibrillator, or continuous glucose monitor? No    Do you have artificial joints? No    Are you allergic to latex? No        Patient-reported     Health Care Directive  Patient does not have a Health Care Directive: Discussed advance care planning with patient; information given to patient to review.    Preoperative Review of    reviewed - controlled substances reflected in medication list.      \    Patient Active Problem List    Diagnosis Date Noted    Hypercholesteremia 12/22/2023     Priority: Medium    Mechanical low back pain 06/06/2022     Priority: Medium    Poor posture 06/06/2022     Priority: Medium    Cervical radiculopathy 06/06/2022     Priority: Medium    Crohn's disease of large intestine with complication (H) 06/01/2018     Priority: Medium    Crohn's disease of perianal region (H) 02/09/2018     Priority: Medium    Vitamin D deficiency 04/13/2017      Priority: Medium    Mild intermittent asthma, unspecified whether complicated 10/05/2016     Priority: Medium    History of Heraclio-en-Y gastric bypass 10/05/2016     Priority: Medium    Insomnia, unspecified type 10/05/2016     Priority: Medium    Family history of breast cancer in mother 10/05/2016     Priority: Medium    Major depressive disorder, recurrent episode, in partial remission (H) 10/13/2015     Priority: Medium    Generalized anxiety disorder 08/21/2014     Priority: Medium    Class 2 severe obesity due to excess calories with serious comorbidity and body mass index (BMI) of 36.0 to 36.9 in adult (H)      Priority: Medium    ASCUS of cervix with negative high risk HPV 03/23/2013     Priority: Medium     2008: ASCUS , HR HPV, dysplasia not present on colp per notes  7/09: NIL pap  6/2010 LSIL pap  9/12: ASCUS, neg HPV. Per MD pap in 1 yr.  4/2014: NIL pap. New ASCCP algorithms, cotest 3 years after ASCUS, neg pap.  2015 NIL pap, Neg HPV.   10/7/20 NIL pap, Neg HPV.   8/31/23 ASCUS pap, neg HPV. Plan cotest in 3 years.   9/7/23 Pt viewed result on MyChart.          Past Medical History:   Diagnosis Date    Acute Crohn's disease (H)     Acute posthemorrhagic anemia     ASCUS favor benign 2012    hpv neg. needs cotest in 3 years    ASCUS with positive high risk HPV cervical     Depressive disorder     since teens    Depressive disorder, not elsewhere classified 2/10/2014    Excessive and frequent menstruation     Family history of breast cancer in mother     Generalized anxiety disorder     History of blood transfusion     few years ago, should be in my records, it was at a [facility]    History of transfusion     many years    Infected cyst of Bartholin's gland duct     Insomnia     Intermittent asthma, uncontrolled     LSIL (low grade squamous intraepithelial lesion) on Pap smear 2008    per chart notes colp dysplasia?    Major depressive disorder in partial remission (H)     Obesity     Obesity      Tobacco abuse     Uncomplicated asthma     since baby     Past Surgical History:   Procedure Laterality Date    BIOPSY      Breast cycst (done at [facility]) many years ago    CHOLECYSTECTOMY  late 90's    during gastric bypass    CHOLECYSTECTOMY  10/05/2016    COLONOSCOPY  Jan/Feb 2018, Feb 2019    Confirmed Crohn's Disease    COLONOSCOPY N/A 3/28/2022    Procedure: COLONOSCOPY with biopsies and polypectomy;  Surgeon: Chad Brown MD;  Location: [facility] OR    EXCIS BARTHOLIN GLAND/CYST  2016    GASTRIC BYPASS  2000    with cholecystectomy and subsequent revision gastric bypass    GASTRIC BYPASS  2006    revision of gastric bypass    GASTRIC BYPASS  10/05/2016    GASTRIC BYPASS      [facility]PA[facility]  12[facility]9/16    [facility]    INCISION AND DRAINAGE OF WOUND N/A 2/9/2018    Procedure: EXAM UNDER ANESTHESIA WITH I&D OF INTERSPHINCTERIC ABSCESS  SETON PLACEMENT ;  Surgeon: Chad Pereira MD;  Location: [facility];  Service:     MARSUPIALIZATION BARTHOLIN CYST  09/23/2016    wisdom teeth extraction  1995    WISDOM TOOTH EXTRACTION      ZZHC COLONOSCOPY W/WO BRUSH/WASH N/A 2/23/2021    Procedure: COLONOSCOPY with biopsies.;  Surgeon: Chad Plata MD;  Location: [facility];  Service: Gastroenterology     Current Outpatient Medications   Medication Sig Dispense Refill    albuterol (PROAIR HFA/PROVENTIL HFA/VENTOLIN HFA) 108 (90 Base) MCG/ACT inhaler Inhale 2 puffs into the lungs every 6 hours as needed for shortness of breath / dyspnea or wheezing 6.7 g 0    cetirizine (ZYRTEC) 10 MG tablet Take 10 mg by mouth daily      cyanocobalamin (VITAMIN B12) 1000 MCG/ML injection Inject 1 mL into the muscle every 30 days      levonorgestrel-ethinyl estradiol (SEASONALE) 0.15-0.03 MG tablet Take 1 tablet by mouth daily 91 tablet 4    Semaglutide-Weight Management (WEGOVY) 2.4 MG/0.75ML pen Inject 2.4 mg subcutaneously once a week. 3 mL 3    sertraline (ZOLOFT) 100 MG tablet  "Take 1.5 tablets (150 mg) by mouth daily. 135 tablet 0    traZODone (DESYREL) 50 MG tablet Take 0.5-1 tablets (25-50 mg) by mouth nightly as needed for sleep 90 tablet 11    vitamin D3 (CHOLECALCIFEROL) 50 mcg (2000 units) tablet Take 1 tablet by mouth daily      Semaglutide-Weight Management (WEGOVY) 0.5 MG/0.5ML pen Inject 0.5 mg subcutaneously once a week. (Advance dose to 1mg/wk as tolerated) 2 mL 0    Semaglutide-Weight Management (WEGOVY) 1.7 MG/0.75ML pen Inject 1.7 mg subcutaneously once a week. 3 mL 0       Allergies   Allergen Reactions    Dextran Anaphylaxis        Social History     Tobacco Use    Smoking status: Former     Current packs/day: 0.00     Average packs/day: 0.2 packs/day for 17.9 years (3.6 ttl pk-yrs)     Types: Cigarettes     Start date: 2001     Quit date: 2018     Years since quittin.0    Smokeless tobacco: Never   Substance Use Topics    Alcohol use: Yes     Comment: very rarely, but when I do I usually overdue it :-       History   Drug Use No               Objective    /70 (BP Location: Right arm, Patient Position: Sitting, Cuff Size: Adult Regular)   Pulse 73   Temp 98.9  F (37.2  C) (Oral)   Resp 22   Ht 1.565 m (5' .61\")   Wt 102.6 kg (226 lb 3.2 oz)   LMP  (LMP Unknown)   SpO2 99%   BMI 41.89 kg/m     Estimated body mass index is 41.89 kg/m  as calculated from the following:    Height as of this encounter: 1.565 m (5' .61\").    Weight as of this encounter: 102.6 kg (226 lb 3.2 oz).  Physical Exam  GENERAL: alert and no distress  NECK: no adenopathy, no asymmetry, masses, or scars  RESP: lungs clear to auscultation - no rales, rhonchi or wheezes  CV: regular rate and rhythm, normal S1 S2, no S3 or S4, no murmur, click or rub, no peripheral edema  ABDOMEN: soft, nontender, no hepatosplenomegaly, no masses and bowel sounds normal  MS: no gross musculoskeletal defects noted, no edema    Recent Labs   Lab Test 24  1351   HGB 11.7       "   POTASSIUM 4.3   CR 0.89        Diagnostics  No labs were ordered during this visit.   No EKG required for low risk surgery (cataract, skin procedure, breast biopsy, etc).    Revised Cardiac Risk Index (RCRI)  The patient has the following serious cardiovascular risks for perioperative complications:   - No serious cardiac risks = 0 points     RCRI Interpretation: 0 points: Class I (very low risk - 0.4% complication rate)       COLLEEN Dyson, RN, [employer] DNP Student  Signed Electronically by: ISA Wright CNP  A copy of this evaluation report is provided to the requesting physician.        Answers submitted by the patient for this visit:  Patient Health Questionnaire (Submitted on 11/25/2024)  If you checked off any problems, how difficult have these problems made it for you to do your work, take care of things at home, or get along with other people?: Not difficult at all  PHQ9 TOTAL SCORE: 5

## 2024-12-05 ENCOUNTER — HOSPITAL ENCOUNTER (OUTPATIENT)
Facility: HOSPITAL | Age: 49
Discharge: HOME | End: 2024-12-05
Attending: STUDENT IN AN ORGANIZED HEALTH CARE EDUCATION/TRAINING PROGRAM | Admitting: STUDENT IN AN ORGANIZED HEALTH CARE EDUCATION/TRAINING PROGRAM
Payer: COMMERCIAL

## 2024-12-05 ENCOUNTER — APPOINTMENT (OUTPATIENT)
Dept: RADIOLOGY | Facility: HOSPITAL | Age: 49
End: 2024-12-05
Attending: STUDENT IN AN ORGANIZED HEALTH CARE EDUCATION/TRAINING PROGRAM
Payer: COMMERCIAL

## 2024-12-05 ENCOUNTER — ANESTHESIA (OUTPATIENT)
Dept: SURGERY | Facility: HOSPITAL | Age: 49
End: 2024-12-05
Payer: COMMERCIAL

## 2024-12-05 ENCOUNTER — ANESTHESIA EVENT (OUTPATIENT)
Dept: SURGERY | Facility: HOSPITAL | Age: 49
End: 2024-12-05
Payer: COMMERCIAL

## 2024-12-05 VITALS
HEART RATE: 75 BPM | BODY MASS INDEX: 41.3 KG/M2 | OXYGEN SATURATION: 98 % | WEIGHT: 223 LBS | SYSTOLIC BLOOD PRESSURE: 141 MMHG | DIASTOLIC BLOOD PRESSURE: 79 MMHG | RESPIRATION RATE: 20 BRPM | TEMPERATURE: 98.4 F

## 2024-12-05 LAB — HCG UR QL: NEGATIVE

## 2024-12-05 PROCEDURE — 250N000011 HC RX IP 250 OP 636: Performed by: NURSE PRACTITIONER

## 2024-12-05 PROCEDURE — 999N000180 XR SURGERY CARM FLUORO LESS THAN 5 MIN: Mod: TC

## 2024-12-05 PROCEDURE — 250N000011 HC RX IP 250 OP 636: Performed by: ANESTHESIOLOGY

## 2024-12-05 PROCEDURE — 710N000009 HC RECOVERY PHASE 1, LEVEL 1, PER MIN: Performed by: STUDENT IN AN ORGANIZED HEALTH CARE EDUCATION/TRAINING PROGRAM

## 2024-12-05 PROCEDURE — 250N000013 HC RX MED GY IP 250 OP 250 PS 637: Performed by: NURSE PRACTITIONER

## 2024-12-05 PROCEDURE — 258N000003 HC RX IP 258 OP 636: Performed by: ANESTHESIOLOGY

## 2024-12-05 PROCEDURE — 258N000001 HC RX 258: Performed by: STUDENT IN AN ORGANIZED HEALTH CARE EDUCATION/TRAINING PROGRAM

## 2024-12-05 PROCEDURE — 370N000017 HC ANESTHESIA TECHNICAL FEE, PER MIN: Performed by: STUDENT IN AN ORGANIZED HEALTH CARE EDUCATION/TRAINING PROGRAM

## 2024-12-05 PROCEDURE — 999N000141 HC STATISTIC PRE-PROCEDURE NURSING ASSESSMENT: Performed by: STUDENT IN AN ORGANIZED HEALTH CARE EDUCATION/TRAINING PROGRAM

## 2024-12-05 PROCEDURE — 250N000025 HC SEVOFLURANE, PER MIN: Performed by: STUDENT IN AN ORGANIZED HEALTH CARE EDUCATION/TRAINING PROGRAM

## 2024-12-05 PROCEDURE — 360N000082 HC SURGERY LEVEL 2 W/ FLUORO, PER MIN: Performed by: STUDENT IN AN ORGANIZED HEALTH CARE EDUCATION/TRAINING PROGRAM

## 2024-12-05 PROCEDURE — 255N000002 HC RX 255 OP 636: Performed by: STUDENT IN AN ORGANIZED HEALTH CARE EDUCATION/TRAINING PROGRAM

## 2024-12-05 PROCEDURE — 710N000012 HC RECOVERY PHASE 2, PER MINUTE: Performed by: STUDENT IN AN ORGANIZED HEALTH CARE EDUCATION/TRAINING PROGRAM

## 2024-12-05 PROCEDURE — 81025 URINE PREGNANCY TEST: CPT | Performed by: NURSE PRACTITIONER

## 2024-12-05 PROCEDURE — 250N000009 HC RX 250: Performed by: ANESTHESIOLOGY

## 2024-12-05 PROCEDURE — 250N000009 HC RX 250

## 2024-12-05 PROCEDURE — 272N000001 HC OR GENERAL SUPPLY STERILE: Performed by: STUDENT IN AN ORGANIZED HEALTH CARE EDUCATION/TRAINING PROGRAM

## 2024-12-05 PROCEDURE — C1769 GUIDE WIRE: HCPCS | Performed by: STUDENT IN AN ORGANIZED HEALTH CARE EDUCATION/TRAINING PROGRAM

## 2024-12-05 PROCEDURE — 250N000011 HC RX IP 250 OP 636

## 2024-12-05 RX ORDER — ONDANSETRON 4 MG/1
4 TABLET, ORALLY DISINTEGRATING ORAL EVERY 30 MIN PRN
Status: DISCONTINUED | OUTPATIENT
Start: 2024-12-05 | End: 2024-12-05 | Stop reason: HOSPADM

## 2024-12-05 RX ORDER — ONDANSETRON 2 MG/ML
INJECTION INTRAMUSCULAR; INTRAVENOUS PRN
Status: DISCONTINUED | OUTPATIENT
Start: 2024-12-05 | End: 2024-12-05

## 2024-12-05 RX ORDER — HYDROMORPHONE HCL IN WATER/PF 6 MG/30 ML
0.4 PATIENT CONTROLLED ANALGESIA SYRINGE INTRAVENOUS EVERY 5 MIN PRN
Status: DISCONTINUED | OUTPATIENT
Start: 2024-12-05 | End: 2024-12-05 | Stop reason: HOSPADM

## 2024-12-05 RX ORDER — PROPOFOL 10 MG/ML
INJECTION, EMULSION INTRAVENOUS PRN
Status: DISCONTINUED | OUTPATIENT
Start: 2024-12-05 | End: 2024-12-05

## 2024-12-05 RX ORDER — CEFAZOLIN SODIUM/WATER 2 G/20 ML
2 SYRINGE (ML) INTRAVENOUS
Status: COMPLETED | OUTPATIENT
Start: 2024-12-05 | End: 2024-12-05

## 2024-12-05 RX ORDER — DEXAMETHASONE SODIUM PHOSPHATE 10 MG/ML
INJECTION, SOLUTION INTRAMUSCULAR; INTRAVENOUS PRN
Status: DISCONTINUED | OUTPATIENT
Start: 2024-12-05 | End: 2024-12-05

## 2024-12-05 RX ORDER — HYDROMORPHONE HCL IN WATER/PF 6 MG/30 ML
0.2 PATIENT CONTROLLED ANALGESIA SYRINGE INTRAVENOUS EVERY 5 MIN PRN
Status: DISCONTINUED | OUTPATIENT
Start: 2024-12-05 | End: 2024-12-05 | Stop reason: HOSPADM

## 2024-12-05 RX ORDER — ACETAMINOPHEN 650 MG/1
650 SUPPOSITORY RECTAL ONCE
Status: COMPLETED | OUTPATIENT
Start: 2024-12-05 | End: 2024-12-05

## 2024-12-05 RX ORDER — FENTANYL CITRATE 50 UG/ML
INJECTION, SOLUTION INTRAMUSCULAR; INTRAVENOUS PRN
Status: DISCONTINUED | OUTPATIENT
Start: 2024-12-05 | End: 2024-12-05

## 2024-12-05 RX ORDER — ACETAMINOPHEN 325 MG/1
975 TABLET ORAL ONCE
Status: CANCELLED | OUTPATIENT
Start: 2024-12-05 | End: 2024-12-05

## 2024-12-05 RX ORDER — SODIUM CHLORIDE, SODIUM LACTATE, POTASSIUM CHLORIDE, CALCIUM CHLORIDE 600; 310; 30; 20 MG/100ML; MG/100ML; MG/100ML; MG/100ML
INJECTION, SOLUTION INTRAVENOUS CONTINUOUS
Status: DISCONTINUED | OUTPATIENT
Start: 2024-12-05 | End: 2024-12-05 | Stop reason: HOSPADM

## 2024-12-05 RX ORDER — FENTANYL CITRATE 50 UG/ML
50 INJECTION, SOLUTION INTRAMUSCULAR; INTRAVENOUS EVERY 5 MIN PRN
Status: DISCONTINUED | OUTPATIENT
Start: 2024-12-05 | End: 2024-12-05 | Stop reason: HOSPADM

## 2024-12-05 RX ORDER — ACETAMINOPHEN 325 MG/1
975 TABLET ORAL ONCE
Status: COMPLETED | OUTPATIENT
Start: 2024-12-05 | End: 2024-12-05

## 2024-12-05 RX ORDER — LIDOCAINE 40 MG/G
CREAM TOPICAL
Status: DISCONTINUED | OUTPATIENT
Start: 2024-12-05 | End: 2024-12-05 | Stop reason: HOSPADM

## 2024-12-05 RX ORDER — NALOXONE HYDROCHLORIDE 0.4 MG/ML
0.1 INJECTION, SOLUTION INTRAMUSCULAR; INTRAVENOUS; SUBCUTANEOUS
Status: DISCONTINUED | OUTPATIENT
Start: 2024-12-05 | End: 2024-12-05 | Stop reason: HOSPADM

## 2024-12-05 RX ORDER — ONDANSETRON 2 MG/ML
4 INJECTION INTRAMUSCULAR; INTRAVENOUS EVERY 30 MIN PRN
Status: DISCONTINUED | OUTPATIENT
Start: 2024-12-05 | End: 2024-12-05 | Stop reason: HOSPADM

## 2024-12-05 RX ORDER — FENTANYL CITRATE 50 UG/ML
25 INJECTION, SOLUTION INTRAMUSCULAR; INTRAVENOUS EVERY 5 MIN PRN
Status: DISCONTINUED | OUTPATIENT
Start: 2024-12-05 | End: 2024-12-05 | Stop reason: HOSPADM

## 2024-12-05 RX ORDER — DEXAMETHASONE SODIUM PHOSPHATE 4 MG/ML
4 INJECTION, SOLUTION INTRA-ARTICULAR; INTRALESIONAL; INTRAMUSCULAR; INTRAVENOUS; SOFT TISSUE
Status: DISCONTINUED | OUTPATIENT
Start: 2024-12-05 | End: 2024-12-05 | Stop reason: HOSPADM

## 2024-12-05 RX ORDER — CEFAZOLIN SODIUM/WATER 2 G/20 ML
2 SYRINGE (ML) INTRAVENOUS SEE ADMIN INSTRUCTIONS
Status: DISCONTINUED | OUTPATIENT
Start: 2024-12-05 | End: 2024-12-05 | Stop reason: HOSPADM

## 2024-12-05 RX ADMIN — SODIUM CHLORIDE, POTASSIUM CHLORIDE, SODIUM LACTATE AND CALCIUM CHLORIDE: 600; 310; 30; 20 INJECTION, SOLUTION INTRAVENOUS at 13:00

## 2024-12-05 RX ADMIN — Medication 2 G: at 13:55

## 2024-12-05 RX ADMIN — ONDANSETRON 4 MG: 2 INJECTION INTRAMUSCULAR; INTRAVENOUS at 14:13

## 2024-12-05 RX ADMIN — LIDOCAINE HYDROCHLORIDE 50 MG: 10 INJECTION, SOLUTION INFILTRATION; PERINEURAL at 13:52

## 2024-12-05 RX ADMIN — Medication 100 MG: at 13:53

## 2024-12-05 RX ADMIN — MIDAZOLAM HYDROCHLORIDE 2 MG: 1 INJECTION, SOLUTION INTRAMUSCULAR; INTRAVENOUS at 13:44

## 2024-12-05 RX ADMIN — PROPOFOL 200 MG: 10 INJECTION, EMULSION INTRAVENOUS at 13:52

## 2024-12-05 RX ADMIN — DEXAMETHASONE SODIUM PHOSPHATE 10 MG: 10 INJECTION, SOLUTION INTRAMUSCULAR; INTRAVENOUS at 13:55

## 2024-12-05 RX ADMIN — SUGAMMADEX 200 MG: 100 INJECTION, SOLUTION INTRAVENOUS at 14:13

## 2024-12-05 RX ADMIN — FENTANYL CITRATE 50 MCG: 50 INJECTION, SOLUTION INTRAMUSCULAR; INTRAVENOUS at 13:48

## 2024-12-05 RX ADMIN — ACETAMINOPHEN 975 MG: 325 TABLET ORAL at 12:56

## 2024-12-05 RX ADMIN — ROCURONIUM BROMIDE 30 MG: 50 INJECTION, SOLUTION INTRAVENOUS at 14:04

## 2024-12-05 ASSESSMENT — ACTIVITIES OF DAILY LIVING (ADL)
ADLS_ACUITY_SCORE: 38

## 2024-12-05 NOTE — DISCHARGE INSTRUCTIONS
Home Care After  Cystoscopy and left Ureteroscopy  The following instructions will help you care for yourself, or be cared for upon your return home today. These are guidelines for your care right after surgery only.     Diet   Drink plenty of liquids and eat light meals today.   Start your regular diet tomorrow.     Activity   Start normal activities in twenty-four (24) hours.     Wound Care and Hygiene   No restrictions, start normal routine.     Anesthesia Precautions & Expectations   After anesthesia, rest for 24 hours.   Do not drive, drink alcoholic beverages or make any important decisions during this time. General anesthesia may cause a sore throat, jaw discomfort or muscle aches.   These symptoms can last for one or two days.     What to Expect after Surgery   Mild pain with voiding.   Frequency or urgency.   Bladder cramps.   Minimal bleeding with voiding.     Medications   Take tylenol every 6 hours for the next 2 days for pain control alternate with ibuprofen every 6 hours (take tylenol, then 3 hours later take ibuprofen, then 3 hours later take tylenol, etc.)   Take toradol every 6 hours as needed. Do not take other NSAIDs with it (Advil, Motrin, Ibuprofen, etc.)  ****Take narcotic pain medications as needed for breakthrough pain. Take an over the counter stool softener while on narcotic medications   ***You will also be sent with oxybutynin. You can take this up to 3 times a day for bladder irritation including frequency, urgency, and bladder cramps. It can have side effects of constipation, dry eyes, and dry mouth so consider and over the counter stool softener while taking it.     Call your Doctor   Passing clots in urine preventing bladder emptying   Severe pain not controlled by oral medication   Temperature above 100.5 degrees   Inability to urinate within eight (8) hours after surgery       Other Contacts   [facility] [phone]    Other Instructions        Follow up Appointment   Your stent  removal will be cancelled and you will be scheduled for follow-up in 2-3 months with renal ultrasound.      If it is a question or problem that can wait until business hours, call the clinic phone number listed above when the clinic is open during weekdays, 8am-4pm.   If you are unable to reach your doctor and it is a medical emergency call your local  Emergency Department or dial 911.

## 2024-12-05 NOTE — INTERVAL H&P NOTE
"I have reviewed the surgical (or preoperative) H&P that is linked to this encounter, and examined the patient. There are no significant changes    Clinical Conditions Present on Arrival:  Clinically Significant Risk Factors Present on Admission                       # Severe Obesity: Estimated body mass index is 41.3 kg/m  as calculated from the following:    Height as of 11/25/24: 1.565 m (5' 1.61\").    Weight as of this encounter: 101.2 kg (223 lb).       "

## 2024-12-05 NOTE — ANESTHESIA PROCEDURE NOTES
Airway         Procedure Start/Stop Times: 12/5/2024 1:54 PM  Staff -        Anesthesiologist:  Matthew Desai MD       CRNA: Jax Francois       Performed By: CRNA  Consent for Airway        Urgency: elective  Indications and Patient Condition       Indications for airway management: chintan-procedural       Induction type:RSI       Mask difficulty assessment: 0 - not attempted    Final Airway Details       Final airway type: endotracheal airway       Successful airway: ETT - single  Endotracheal Airway Details        ETT size (mm): 7.0       Cuffed: yes       Successful intubation technique: direct laryngoscopy and video laryngoscopy       VL Blade Size: Glidescope 3       Grade View of Cords: 2       Adjucts: stylet       Position: Right       Measured from: lips       Secured at (cm): 20       Bite block used: None    Post intubation assessment        Placement verified by: capnometry, equal breath sounds and chest rise        Number of attempts at approach: 1       Secured with: tape       Ease of procedure: easy       Dentition: Intact       Dental guard used and removed.    Medication(s) Administered   Medication Administration Time: 12/5/2024 1:54 PM

## 2024-12-05 NOTE — ANESTHESIA PREPROCEDURE EVALUATION
Anesthesia Pre-Procedure Evaluation    Patient: Janelle Coelho   MRN: [id number] : 1975        Procedure : Procedure(s):  CYSTOSCOPY, LEFT URETEROSCOPY, LASER LITHOTRIPSY, LEFT URETERAL STENT PLACEMENT          Past Medical History:   Diagnosis Date    Acute Crohn's disease (H)     Acute posthemorrhagic anemia     Anxiety     ASCUS favor benign 2012    hpv neg. needs cotest in 3 years    ASCUS with positive high risk HPV cervical     Asthma     Cervical radiculopathy     Depressive disorder     since teens    Depressive disorder, not elsewhere classified 02/10/2014    Excessive and frequent menstruation     Family history of breast cancer in mother     Generalized anxiety disorder     History of blood transfusion     few years ago, should be in my records, it was at a [facility]    History of transfusion     many years    Hypercholesteremia     Infected cyst of Bartholin's gland duct     Insomnia     Intermittent asthma, uncontrolled     Kidney stone     LSIL (low grade squamous intraepithelial lesion) on Pap smear     per chart notes colp dysplasia?    Major depressive disorder in partial remission (H)     Mechanical low back pain     Obesity     Tobacco abuse     Uncomplicated asthma     since baby    Vitamin D deficiency       Past Surgical History:   Procedure Laterality Date    BIOPSY      Breast cycst (done at [facility]) many years ago    CHOLECYSTECTOMY  late     during gastric bypass    CHOLECYSTECTOMY  10/05/2016    COLONOSCOPY  2018, 2019    Confirmed Crohn's Disease    COLONOSCOPY N/A 3/28/2022    Procedure: COLONOSCOPY with biopsies and polypectomy;  Surgeon: Chad Brown MD;  Location: [facility] OR    EXCIS BARTHOLIN GLAND/CYST      GASTRIC BYPASS  2000    with cholecystectomy and subsequent revision gastric bypass    GASTRIC BYPASS      revision of gastric bypass    GASTRIC BYPASS  10/05/2016    GASTRIC BYPASS      HERNIA REPAIR  16    [facility]  - [facility]ONE [facility]ES    INCISION AND DRAINAGE OF WOUND N/A 2018    Procedure: EXAM UNDER ANESTHESIA WITH I&D OF INTERSPHINCTERIC ABSCESS  SETON PLACEMENT ;  Surgeon: Chad Pereira MD;  Location: [facility];  Service:     MARSUPIALIZATION BARTHOLIN CYST  2016    wisdom teeth extraction      WISDOM TOOTH EXTRACTION      ZZHC COLONOSCOPY W/WO BRUSH/WASH N/A 2021    Procedure: COLONOSCOPY with biopsies.;  Surgeon: Chad Plata MD;  Location: [facility];  Service: Gastroenterology      Allergies   Allergen Reactions    Dextran Anaphylaxis      Social History     Tobacco Use    Smoking status: Former     Current packs/day: 0.00     Average packs/day: 0.2 packs/day for 17.9 years (3.6 ttl pk-yrs)     Types: Cigarettes     Start date: 2001     Quit date: 2018     Years since quittin.0    Smokeless tobacco: Never   Substance Use Topics    Alcohol use: Yes     Comment: very rarely, but when I do I usually overdue it :-      Wt Readings from Last 1 Encounters:   24 101.2 kg (223 lb)        Anesthesia Evaluation            ROS/MED HX  ENT/Pulmonary:     (+)                      asthma                  Neurologic:       Cardiovascular:       METS/Exercise Tolerance:     Hematologic:       Musculoskeletal:       GI/Hepatic:       Renal/Genitourinary:     (+) renal disease,      Nephrolithiasis ,       Endo:     (+)               Obesity,       Psychiatric/Substance Use:       Infectious Disease:       Malignancy:       Other:            Physical Exam    Airway        Mallampati: II   TM distance: < 3 FB   Neck ROM: full   Mouth opening: > 3 cm    Respiratory Devices and Support         Dental       (+) Completely normal teeth      Cardiovascular          Rhythm and rate: regular and normal     Pulmonary           breath sounds clear to auscultation           OUTSIDE LABS:  CBC:   Lab Results   Component Value Date    WBC 9.7 2024    WBC 7.1 2023    HGB  "11.7 08/09/2024    HGB 11.8 11/07/2023    HCT 35.2 08/09/2024    HCT 36.2 11/07/2023     08/09/2024     11/07/2023     BMP:   Lab Results   Component Value Date     08/09/2024     11/07/2023    POTASSIUM 4.3 08/09/2024    POTASSIUM 4.4 11/07/2023    CHLORIDE 106 08/09/2024    CHLORIDE 108 (H) 11/07/2023    CO2 22 08/09/2024    CO2 21 (L) 11/07/2023    BUN 18.3 08/09/2024    BUN 14.0 11/07/2023    CR 0.89 08/09/2024    CR 0.79 11/07/2023    GLC 71 08/09/2024    GLC 87 11/07/2023     COAGS: No results found for: \"PTT\", \"INR\", \"FIBR\"  POC:   Lab Results   Component Value Date    HCG Negative 08/09/2024     HEPATIC:   Lab Results   Component Value Date    ALBUMIN 3.2 (L) 07/30/2021    PROTTOTAL 6.0 07/30/2021    ALT 10 11/07/2023    AST 12 07/30/2021    ALKPHOS 69 07/30/2021    BILITOTAL 0.4 07/30/2021     OTHER:   Lab Results   Component Value Date    A1C 5.1 03/06/2017    SAVANA 8.5 (L) 08/09/2024    PHOS 3.1 10/05/2016    MAG 2.4 (H) 10/05/2016    TSH 1.82 06/09/2020    T4 0.99 03/06/2017       Anesthesia Plan    ASA Status:  3       Anesthesia Type: General.     - Airway: ETT   Induction: Intravenous, RSI.   Maintenance: Inhalation.        Consents    Anesthesia Plan(s) and associated risks, benefits, and realistic alternatives discussed. Questions answered and patient/representative(s) expressed understanding.     - Discussed: Risks, Benefits and Alternatives for BOTH SEDATION and the PROCEDURE were discussed     - Discussed with:  Patient      - Extended Intubation/Ventilatory Support Discussed: No.      - Patient is DNR/DNI Status: No     Use of blood products discussed: No .     Postoperative Care    Pain management: IV analgesics, Oral pain medications.   PONV prophylaxis: Ondansetron (or other 5HT-3), Dexamethasone or Solumedrol     Comments:    Other Comments: RBCASSIDY GETA discussed including but not limited to dental damage, sore throat/hoarseness, N/V/Aspiration, MI/CVA/death/disability, " "positioning injuries, blood loss requiring transfusion     RSI due to h/o gastric bypass    Preop anxiolysis with IV midazolam            Matthew Desai MD    I have reviewed the pertinent notes and labs in the chart from the past 30 days and (re)examined the patient.  Any updates or changes from those notes are reflected in this note.                         # Severe Obesity: Estimated body mass index is 41.3 kg/m  as calculated from the following:    Height as of 11/25/24: 1.565 m (5' 1.61\").    Weight as of this encounter: 101.2 kg (223 lb).       # Asthma: noted on problem list       "

## 2024-12-05 NOTE — ANESTHESIA CARE TRANSFER NOTE
Patient: Janelle Coelho    Procedure: Procedure(s):  CYSTOSCOPY, LEFT URETEROSCOPY, LEFT RETROGRADE PYELOGRAM       Diagnosis: Calculus of kidney [N20.0]  Diagnosis Additional Information: No value filed.    Anesthesia Type:   General     Note:    Oropharynx: spontaneously breathing and oropharynx clear of all foreign objects  Level of Consciousness: awake  Oxygen Supplementation: face mask  Level of Supplemental Oxygen (L/min / FiO2): 5  Independent Airway: airway patency satisfactory and stable  Dentition: dentition unchanged  Vital Signs Stable: post-procedure vital signs reviewed and stable  Report to RN Given: handoff report given  Patient transferred to: PACU  Comments: Dropped off by DEX Pedro  Handoff Report: Identifed the Patient, Identified the Reponsible Provider, Reviewed the pertinent medical history, Discussed the surgical course, Reviewed Intra-OP anesthesia mangement and issues during anesthesia, Set expectations for post-procedure period and Allowed opportunity for questions and acknowledgement of understanding  Vitals:  Vitals Value Taken Time   /69 12/05/24 1423   Temp     Pulse 71 12/05/24 1424   Resp 15 12/05/24 1424   SpO2 100 % 12/05/24 1424   Vitals shown include unfiled device data.    Electronically Signed By: Jax Francois  December 5, 2024  2:25 PM

## 2024-12-05 NOTE — OP NOTE
Preoperative diagnosis   Left  ureteral stone     Postoperative diagnosis   Left  ureteral stone     Procedure performed   1. Rigid cystoscopy, left retrograde pyelogram   2. Left ureteroscopy   3. Fluoroscopy time < 1 hour with interpretation of images     Attending surgeon   Mary Reeder MD    Anesthesia   General     Complications   None     Specimen   None    Findings   Urethroscopy revealed no strictures or other abnormalities.   Cystoscopy revealed no tumors, stones or other mucosal abnormalities.   Left  retrograde pyelogram revealed a delicate system and caliber of left ureter was smooth and normal.   Ureteroscopy revealed normal ureter without stricture or tumor. Ureteroscopy and pyeloscopy negative for stone.     Indications   49 year old female agreed to undergo the above named procedure after discussion of the alternatives, risks and benefits. Informed consent was obtained.     Procedure   The patient was taken to the operating room and placed supine on the operating table. Pre-operative antibiotics were administered. Bilateral lower extremity SCDs were placed. After induction of general anesthesia the patient was positioned in dorsal lithotomy, prepped and draped in a sterile fashion. A time-out was performed.   A  22F rigid cystoscope was passed carefully via urethra into the bladder. The left ureteral orifice was identified and a Sensor wire was passed retrograde to the level of the kidney and confirmed by fluoroscopy.  The bladder was emptied with the scope and the scope offloaded. Direct vision ureteroscopy was performed with the semirigid ureteroscope to the level of the kidney. A retrograde pyelogram was performed through the scope with findings above. The scope was removed and the flexible ureteroscope was passed to the level of the kidney under fluoroscopy. The wire was removed. Careful pyeloscopy was performed with findings above. The scope was removed under direct vision.  The bladder  was emptied and the procedure was complete. The patient tolerated the procedure well and was stable throughout.   Mary Reeder MD was present in the procedure room the entire duration of the case.

## 2024-12-05 NOTE — ANESTHESIA POSTPROCEDURE EVALUATION
Patient: Janelle Coelho    Procedure: Procedure(s):  CYSTOSCOPY, LEFT URETEROSCOPY, LEFT RETROGRADE PYELOGRAM       Anesthesia Type:  General    Note:  Disposition: Outpatient   Postop Pain Control: Uneventful            Sign Out: Well controlled pain   PONV: No   Neuro/Psych: Uneventful            Sign Out: Acceptable/Baseline neuro status   Airway/Respiratory: Uneventful            Sign Out: Acceptable/Baseline resp. status   CV/Hemodynamics: Uneventful            Sign Out: Acceptable CV status; No obvious hypovolemia; No obvious fluid overload   Other NRE: NONE   DID A NON-ROUTINE EVENT OCCUR? No           Last vitals:  Vitals Value Taken Time   /61 12/05/24 1447   Temp 36.8  C (98.2  F) 12/05/24 1447   Pulse 72 12/05/24 1449   Resp 20 12/05/24 1447   SpO2 99 % 12/05/24 1450   Vitals shown include unfiled device data.    Electronically Signed By: Matthew Desai MD  December 5, 2024  3:04 PM

## 2025-01-03 ENCOUNTER — TRANSFERRED RECORDS (OUTPATIENT)
Dept: HEALTH INFORMATION MANAGEMENT | Facility: CLINIC | Age: 50
End: 2025-01-03
Payer: COMMERCIAL

## 2025-01-03 LAB
ALT SERPL-CCNC: 15 IU/L (ref 0–32)
AST SERPL-CCNC: 20 IU/L (ref 0–40)

## 2025-01-12 ASSESSMENT — PATIENT HEALTH QUESTIONNAIRE - PHQ9
10. IF YOU CHECKED OFF ANY PROBLEMS, HOW DIFFICULT HAVE THESE PROBLEMS MADE IT FOR YOU TO DO YOUR WORK, TAKE CARE OF THINGS AT HOME, OR GET ALONG WITH OTHER PEOPLE: SOMEWHAT DIFFICULT
SUM OF ALL RESPONSES TO PHQ QUESTIONS 1-9: 7
SUM OF ALL RESPONSES TO PHQ QUESTIONS 1-9: 7

## 2025-01-13 ENCOUNTER — VIRTUAL VISIT (OUTPATIENT)
Dept: FAMILY MEDICINE | Facility: CLINIC | Age: 50
End: 2025-01-13
Attending: FAMILY MEDICINE
Payer: COMMERCIAL

## 2025-01-13 DIAGNOSIS — K50.119 CROHN'S DISEASE OF PERIANAL REGION WITH COMPLICATION (H): ICD-10-CM

## 2025-01-13 DIAGNOSIS — E78.00 HYPERCHOLESTEREMIA: ICD-10-CM

## 2025-01-13 DIAGNOSIS — E66.812 CLASS 2 SEVERE OBESITY DUE TO EXCESS CALORIES WITH SERIOUS COMORBIDITY AND BODY MASS INDEX (BMI) OF 36.0 TO 36.9 IN ADULT (H): Primary | ICD-10-CM

## 2025-01-13 DIAGNOSIS — E66.01 CLASS 2 SEVERE OBESITY DUE TO EXCESS CALORIES WITH SERIOUS COMORBIDITY AND BODY MASS INDEX (BMI) OF 36.0 TO 36.9 IN ADULT (H): Primary | ICD-10-CM

## 2025-01-13 DIAGNOSIS — Z98.84 HISTORY OF ROUX-EN-Y GASTRIC BYPASS: ICD-10-CM

## 2025-01-13 PROCEDURE — 98006 SYNCH AUDIO-VIDEO EST MOD 30: CPT | Performed by: FAMILY MEDICINE

## 2025-01-13 RX ORDER — CYCLOBENZAPRINE HCL 5 MG
1 TABLET ORAL 3 TIMES DAILY
COMMUNITY

## 2025-01-13 RX ORDER — LORAZEPAM 1 MG/1
TABLET ORAL
COMMUNITY
End: 2025-01-13

## 2025-01-13 RX ORDER — KETOROLAC TROMETHAMINE 10 MG/1
TABLET, FILM COATED ORAL
COMMUNITY
End: 2025-01-13

## 2025-01-13 NOTE — ASSESSMENT & PLAN NOTE
50 year old year old female in clinic today to discuss treatment of the following conditions through diet and lifestyle modification and weight loss:  1. Class 2 severe obesity due to excess calories with serious comorbidity and body mass index (BMI) of 36.0 to 36.9 in adult (H)    2. History of Heraclio-en-Y gastric bypass    3. Hypercholesteremia    4. Crohn's disease of perianal region with complication (H)      The patient's weight loss result since the last visit was successful based on weight loss maintenance.  She is down about 20.5% (77 lbs) since starting GLP1-ra.  She has recovered from kidney stone. At one point she was as low as 185 lbs.   given her frustration, she is interested in a trial of tirzepatide.  She understands that if not covered by insurance this is considerably more expensive than the compounded semaglutide that she has been on.  There are no contraindications.  She is on an OCP and we reviewed that theoretically this will reduce the efficacy of her contraception (OCP primarily for cycle regulation).  Reviewed nutrition.  She continues to focus on protein, vegetables and fruit.  Physical activity has been somewhat limited but she is going to make a goal of increasing physical activity and focusing on strength training during the year 2025.  Tentatively plan recheck in 3 to 6 months.    BMI: There is no height or weight on file to calculate BMI.  Ideal body weight: 49.2 kg (108 lb 7.9 oz)  Adjusted ideal body weight: 70 kg (154 lb 4.7 oz)    Current therapies:    Medications: YES currently Semaglutide   - Starting weight for GLP1 receptor agonist: 277 lbs   - Total weight loss: 77 lbs (20.5%)    Nutritional goals/strategies: reviewed.   - caloric restriction: Yes   - time restriction: NO   - diet (macronutrient) framework: high protein/low carbohydrate     Movement:   - predominantly cardiovascular    Follow-up: 3-6 months

## 2025-01-13 NOTE — PROGRESS NOTES
Janelle is a 50 year old who is being evaluated via a billable video visit.    How would you like to obtain your AVS? MyChart  If the video visit is dropped, the invitation should be resent by: Text to cell phone: [phone]  Will anyone else be joining your video visit? No      Assessment & Plan   Problem List Items Addressed This Visit       Class 2 severe obesity due to excess calories with serious comorbidity and body mass index (BMI) of 36.0 to 36.9 in adult (H) - Primary     50 year old year old female in clinic today to discuss treatment of the following conditions through diet and lifestyle modification and weight loss:  1. Class 2 severe obesity due to excess calories with serious comorbidity and body mass index (BMI) of 36.0 to 36.9 in adult (H)    2. History of Heraclio-en-Y gastric bypass    3. Hypercholesteremia    4. Crohn's disease of perianal region with complication (H)      The patient's weight loss result since the last visit was successful based on weight loss maintenance.  She is down about 20.5% (77 lbs) since starting GLP1-ra.  She has recovered from kidney stone. At one point she was as low as 185 lbs.   given her frustration, she is interested in a trial of tirzepatide.  She understands that if not covered by insurance this is considerably more expensive than the compounded semaglutide that she has been on.  There are no contraindications.  She is on an OCP and we reviewed that theoretically this will reduce the efficacy of her contraception (OCP primarily for cycle regulation).  Reviewed nutrition.  She continues to focus on protein, vegetables and fruit.  Physical activity has been somewhat limited but she is going to make a goal of increasing physical activity and focusing on strength training during the year 2025.  Tentatively plan recheck in 3 to 6 months.    BMI: There is no height or weight on file to calculate BMI.  Ideal body weight: 49.2 kg (108 lb 7.9 oz)  Adjusted ideal body  "weight: 70 kg (154 lb 4.7 oz)    Current therapies:    Medications: YES currently Semaglutide   - Starting weight for GLP1 receptor agonist: 277 lbs   - Total weight loss: 77 lbs (20.5%)    Nutritional goals/strategies: reviewed.   - caloric restriction: Yes   - time restriction: NO   - diet (macronutrient) framework: high protein/low carbohydrate     Movement:   - predominantly cardiovascular    Follow-up: 3-6 months           Relevant Medications    tirzepatide-Weight Management (ZEPBOUND) 10 MG/0.5ML prefilled pen    Crohn's disease of perianal region (H)     Currently no symptoms.  Not a contraindication to the proposed change in medications..         History of Heraclio-en-Y gastric bypass    Relevant Medications    tirzepatide-Weight Management (ZEPBOUND) 10 MG/0.5ML prefilled pen    Hypercholesteremia    Relevant Medications    tirzepatide-Weight Management (ZEPBOUND) 10 MG/0.5ML prefilled pen      The longitudinal plan of care for the diagnosis(es)/condition(s) as documented were addressed during this visit. Due to the added complexity in care, I will continue to support Janelle in the subsequent management and with ongoing continuity of care.     Subjective   Janelle is a 50 year old, presenting for the following health issues:  Recheck Medication        1/13/2025     8:17 AM   Additional Questions   Roomed by Chang Stahl MA   Accompanied by Self         1/13/2025     8:17 AM   Patient Reported Additional Medications   Patient reports taking the following new medications None     Patient presents for treatment of chronic, comorbid conditions using intensive therapeutic lifestyle interventions including nutrition, physical activity, and behavior management.   - successes: looking to restart semaglutide. Helps with cravings. She feels like has plateau. Frustrated.   - struggles: had to take care of mother who underwent hip replacement.   - movement: working to get up and move.  \"Walk in the shop.\"  Stress has been " "high. Time has been low.    - tracking/journaling: more veggies and fruit. More protein.    - following nutritional plan: yes.  Deviations from plan: infrequent   - hunger: Stable.    - medication benefits: helpful. Side effects: none      History of Present Illness       Reason for visit:  Check up    She eats 2-3 servings of fruits and vegetables daily.She consumes 0 sweetened beverage(s) daily.She exercises with enough effort to increase her heart rate 10 to 19 minutes per day.  She exercises with enough effort to increase her heart rate 3 or less days per week.   She is taking medications regularly.         Objective    Vitals - Patient Reported  Weight (Patient Reported): 99.8 kg (220 lb)  Height (Patient Reported): 156.5 cm (5' 1.61\")  BMI (Based on Pt Reported Ht/Wt): 40.75        Physical Exam   GENERAL: alert and no distress  EYES: Eyes grossly normal to inspection.  No discharge or erythema, or obvious scleral/conjunctival abnormalities.  RESP: No audible wheeze, cough, or visible cyanosis.    SKIN: Visible skin clear. No significant rash, abnormal pigmentation or lesions.  NEURO: Cranial nerves grossly intact.  Mentation and speech appropriate for age.  PSYCH: Appropriate affect, tone, and pace of words        Video-Visit Details    Type of service:  Video Visit   Originating Location (pt. Location): Home    Distant Location (provider location):  On-site  Platform used for Video Visit: Sona  Signed Electronically by: Chava Baig MD    "

## 2025-02-03 ENCOUNTER — TELEPHONE (OUTPATIENT)
Dept: FAMILY MEDICINE | Facility: CLINIC | Age: 50
End: 2025-02-03
Payer: COMMERCIAL

## 2025-02-03 NOTE — TELEPHONE ENCOUNTER
Prior Authorization Request  Who s requesting:  Pharmacy  Pharmacy Name and Location: [place] [id number]  Medication Name: tirzepatide-Weight Management (ZEPBOUND) 10 MG/0.5ML prefilled pen   Insurance Plan: [employer]  Insurance Member ID Number:  [id number]   CoverMyMeds Key:   Informed patient that prior authorizations can take up to 10 business days for response:   NA  Okay to leave a detailed message: N/A

## 2025-02-11 ENCOUNTER — MYC MEDICAL ADVICE (OUTPATIENT)
Dept: FAMILY MEDICINE | Facility: CLINIC | Age: 50
End: 2025-02-11
Payer: COMMERCIAL

## 2025-02-11 DIAGNOSIS — E66.812 CLASS 2 SEVERE OBESITY DUE TO EXCESS CALORIES WITH SERIOUS COMORBIDITY AND BODY MASS INDEX (BMI) OF 36.0 TO 36.9 IN ADULT (H): Primary | ICD-10-CM

## 2025-02-11 DIAGNOSIS — E66.01 CLASS 2 SEVERE OBESITY DUE TO EXCESS CALORIES WITH SERIOUS COMORBIDITY AND BODY MASS INDEX (BMI) OF 36.0 TO 36.9 IN ADULT (H): Primary | ICD-10-CM

## 2025-02-11 DIAGNOSIS — Z98.84 HISTORY OF ROUX-EN-Y GASTRIC BYPASS: ICD-10-CM

## 2025-02-11 DIAGNOSIS — E78.00 HYPERCHOLESTEREMIA: ICD-10-CM

## 2025-02-11 NOTE — TELEPHONE ENCOUNTER
PRIOR AUTHORIZATION DENIED    Medication: ZEPBOUND 10 MG/0.5ML SC SOAJ  Insurance Company: [employer] Non-Specialty PA's - Phone [phone] Fax [phone]  Denial Date: 2/6/2025  Denial Reason(s):       Confirmed with insurance, drug is excluded, PAs and appeals are not available for this medication.    Appeal Information: N/A  Patient Notified: NO

## 2025-02-28 ENCOUNTER — TRANSFERRED RECORDS (OUTPATIENT)
Dept: HEALTH INFORMATION MANAGEMENT | Facility: CLINIC | Age: 50
End: 2025-02-28
Payer: COMMERCIAL

## 2025-04-25 ENCOUNTER — TRANSFERRED RECORDS (OUTPATIENT)
Dept: ADMINISTRATIVE | Facility: CLINIC | Age: 50
End: 2025-04-25
Payer: COMMERCIAL

## 2025-04-28 NOTE — TELEPHONE ENCOUNTER
LOV 10/10/17  
Prescription approved per [facility] Refill Protocol.    Kaitlyn ORTIZ RN, BSN, PHN  [place] [facility] RN    
1

## 2025-04-29 NOTE — PROGRESS NOTES
_________________________________________________________________________________________________    Patient name: Janelle Coelho  YOB: 1975  Encounter date: 04/25/2025 02:45 PM  Current date: 04/25/2025 04:00 PM  Procedure: Infusion      Infusion/Additional Medication Orders    Assessment: Crohn's disease of both small and large intestine w fistula K50.813     Medication grids  Order Date Medication Dose Route Rate Rate 2 Rate 3 Rate 4 Int. of Treat.   01/03/2025 Avsola 10mg/kg  mL/hr    8 Weeks   Inf. Date Medication % Conc. Inf. # Therapy Type Bag # Vials Total mgs Lot # Exp. Date   04/25/2025 Avsola  48 maintenance  11 1041.00 [id number] 08/31/2028   Inf. Date Medication IV Site IV Gauge Start Stop Total Time Wt. (lbs) Wt. (kgs)   04/25/2025 Avsola left antecubital 22 241 PM 3:41 PM 0 hour(s) 0 minute(s) 229.00 103.855     Vitals Flowsheet  Time Temp Pulse Resp Sys BP Valerie BP Reaction Conc Rate   2:33 PM 97.5 86 16 113 56      3:19 PM 97.1 81 16 97 56      3:48 PM 98.0 85 16 110 58        Post Infusion  The patient did tolerate the infusion.     Nursing Notes  Order date:1/3/25  Last infusion date:2/28/25  Oral premeds per order: N/A  Specialty Pharmacy: N  Change in health status per assessment? N (if Y, describe)  IV maintenance 0.9% NS 500ml TKO  Start time:1435  IV start by: Malinda Sandoval RN  IV and Fluid D/C time:1556  NS volume infused: <50mL  Site condition: CDI and patent throughout infusion, no redness/swelling at IV site  D/C instructions reviewed, Pt verbalized understanding.  Malinda Sandoval RN    Date Medication Total mg Used mg Wasted mg   04/25/2025 Avsola 1100.00 1041.00 59.00   02/28/2025 Avsola 1100.00 1100.00 0.00   01/03/2025 Avsola 1100.00 1033.00 67.00   11/04/2024 Avsola 1100.00 1056.00 44.00   08/30/2024 Avsola 1100.00 1100.00 0.00   07/05/2024 Avsola 1100.00 1028.00 72.00       Visit oversight provided by:  Curtis Bauer MD 04/25/2025 02:45 PM

## 2025-05-02 ENCOUNTER — E-VISIT (OUTPATIENT)
Dept: URGENT CARE | Facility: CLINIC | Age: 50
End: 2025-05-02
Payer: COMMERCIAL

## 2025-05-02 DIAGNOSIS — R05.1 ACUTE COUGH: ICD-10-CM

## 2025-05-02 DIAGNOSIS — R06.2 WHEEZING: Primary | ICD-10-CM

## 2025-05-02 PROCEDURE — 99207 PR NON-BILLABLE SERV PER CHARTING: CPT | Performed by: EMERGENCY MEDICINE

## 2025-05-05 NOTE — PATIENT INSTRUCTIONS
Dear Janelle Coelho,    We are sorry you are not feeling well. Based on the responses you provided, it is recommended that you be seen in-person in urgent care so we can better evaluate your symptoms. Please click here to find the nearest urgent care location to you.   You will not be charged for this Visit. Thank you for trusting us with your care.    Percy Aleman MD

## 2025-05-09 ASSESSMENT — ASTHMA QUESTIONNAIRES
QUESTION_4 LAST FOUR WEEKS HOW OFTEN HAVE YOU USED YOUR RESCUE INHALER OR NEBULIZER MEDICATION (SUCH AS ALBUTEROL): NOT AT ALL
QUESTION_5 LAST FOUR WEEKS HOW WOULD YOU RATE YOUR ASTHMA CONTROL: COMPLETELY CONTROLLED
ACT_TOTALSCORE: 25
QUESTION_2 LAST FOUR WEEKS HOW OFTEN HAVE YOU HAD SHORTNESS OF BREATH: NOT AT ALL
QUESTION_1 LAST FOUR WEEKS HOW MUCH OF THE TIME DID YOUR ASTHMA KEEP YOU FROM GETTING AS MUCH DONE AT WORK, SCHOOL OR AT HOME: NONE OF THE TIME
QUESTION_3 LAST FOUR WEEKS HOW OFTEN DID YOUR ASTHMA SYMPTOMS (WHEEZING, COUGHING, SHORTNESS OF BREATH, CHEST TIGHTNESS OR PAIN) WAKE YOU UP AT NIGHT OR EARLIER THAN USUAL IN THE MORNING: NOT AT ALL

## 2025-05-14 ENCOUNTER — VIRTUAL VISIT (OUTPATIENT)
Dept: FAMILY MEDICINE | Facility: CLINIC | Age: 50
End: 2025-05-14
Attending: FAMILY MEDICINE
Payer: COMMERCIAL

## 2025-05-14 DIAGNOSIS — Z98.84 HISTORY OF ROUX-EN-Y GASTRIC BYPASS: ICD-10-CM

## 2025-05-14 DIAGNOSIS — E78.00 HYPERCHOLESTEREMIA: ICD-10-CM

## 2025-05-14 DIAGNOSIS — E66.01 CLASS 2 SEVERE OBESITY DUE TO EXCESS CALORIES WITH SERIOUS COMORBIDITY AND BODY MASS INDEX (BMI) OF 36.0 TO 36.9 IN ADULT (H): Primary | ICD-10-CM

## 2025-05-14 DIAGNOSIS — E66.812 CLASS 2 SEVERE OBESITY DUE TO EXCESS CALORIES WITH SERIOUS COMORBIDITY AND BODY MASS INDEX (BMI) OF 36.0 TO 36.9 IN ADULT (H): Primary | ICD-10-CM

## 2025-05-14 PROCEDURE — 98005 SYNCH AUDIO-VIDEO EST LOW 20: CPT | Performed by: FAMILY MEDICINE

## 2025-05-14 RX ORDER — MAGNESIUM 200 MG
1 TABLET ORAL DAILY
COMMUNITY
Start: 2025-04-06

## 2025-05-14 ASSESSMENT — ANXIETY QUESTIONNAIRES
6. BECOMING EASILY ANNOYED OR IRRITABLE: SEVERAL DAYS
GAD7 TOTAL SCORE: 2
7. FEELING AFRAID AS IF SOMETHING AWFUL MIGHT HAPPEN: NOT AT ALL
IF YOU CHECKED OFF ANY PROBLEMS ON THIS QUESTIONNAIRE, HOW DIFFICULT HAVE THESE PROBLEMS MADE IT FOR YOU TO DO YOUR WORK, TAKE CARE OF THINGS AT HOME, OR GET ALONG WITH OTHER PEOPLE: SOMEWHAT DIFFICULT
2. NOT BEING ABLE TO STOP OR CONTROL WORRYING: NOT AT ALL
7. FEELING AFRAID AS IF SOMETHING AWFUL MIGHT HAPPEN: NOT AT ALL
4. TROUBLE RELAXING: NOT AT ALL
5. BEING SO RESTLESS THAT IT IS HARD TO SIT STILL: NOT AT ALL
GAD7 TOTAL SCORE: 2
8. IF YOU CHECKED OFF ANY PROBLEMS, HOW DIFFICULT HAVE THESE MADE IT FOR YOU TO DO YOUR WORK, TAKE CARE OF THINGS AT HOME, OR GET ALONG WITH OTHER PEOPLE?: SOMEWHAT DIFFICULT
1. FEELING NERVOUS, ANXIOUS, OR ON EDGE: SEVERAL DAYS
3. WORRYING TOO MUCH ABOUT DIFFERENT THINGS: NOT AT ALL
GAD7 TOTAL SCORE: 2

## 2025-05-14 ASSESSMENT — PATIENT HEALTH QUESTIONNAIRE - PHQ9
10. IF YOU CHECKED OFF ANY PROBLEMS, HOW DIFFICULT HAVE THESE PROBLEMS MADE IT FOR YOU TO DO YOUR WORK, TAKE CARE OF THINGS AT HOME, OR GET ALONG WITH OTHER PEOPLE: SOMEWHAT DIFFICULT
SUM OF ALL RESPONSES TO PHQ QUESTIONS 1-9: 4
SUM OF ALL RESPONSES TO PHQ QUESTIONS 1-9: 4

## 2025-05-14 NOTE — ASSESSMENT & PLAN NOTE
50 year old year old female in clinic today to discuss treatment of the following conditions through diet and lifestyle modification and weight loss:  1. Class 2 severe obesity due to excess calories with serious comorbidity and body mass index (BMI) of 36.0 to 36.9 in adult (H)    2. History of Heraclio-en-Y gastric bypass    3. Hypercholesteremia      The patient's weight loss result since the last visit was successful based on recent weight loss.  She is back on tirzepatide through a compounding pharmacy.  We discussed the precarious nature of the compounding GLP-1 medications.  No side effects.  It sounds like her current dose is 15 mg per week.  She has identified exercise and something that she needs to improve.  She is generally eating well.  I suggested she move her protein shake supplement into the evenings as an alternative to some of the night snacking behaviors that she continues to have.  She will see her PCP in the near future which will serve as a weight height check.  She has lost weight which justifies this therapy.  Starting weight for GLP medications is 277 pounds.  She remains approximately 20% down with positive trajectory going forward.

## 2025-05-14 NOTE — PROGRESS NOTES
Janelle is a 50 year old who is being evaluated via a billable video visit.    How would you like to obtain your AVS? MyChart  If the video visit is dropped, the invitation should be resent by: Send to e-mail at: [email or web address]  Will anyone else be joining your video visit? No      Assessment & Plan   Problem List Items Addressed This Visit       Class 2 severe obesity due to excess calories with serious comorbidity and body mass index (BMI) of 36.0 to 36.9 in adult (H) - Primary    50 year old year old female in clinic today to discuss treatment of the following conditions through diet and lifestyle modification and weight loss:  1. Class 2 severe obesity due to excess calories with serious comorbidity and body mass index (BMI) of 36.0 to 36.9 in adult (H)    2. History of Heraclio-en-Y gastric bypass    3. Hypercholesteremia      The patient's weight loss result since the last visit was successful based on recent weight loss.  She is back on tirzepatide through a compounding pharmacy.  We discussed the precarious nature of the compounding GLP-1 medications.  No side effects.  It sounds like her current dose is 15 mg per week.  She has identified exercise and something that she needs to improve.  She is generally eating well.  I suggested she move her protein shake supplement into the evenings as an alternative to some of the night snacking behaviors that she continues to have.  She will see her PCP in the near future which will serve as a weight height check.  She has lost weight which justifies this therapy.  Starting weight for GLP medications is 277 pounds.  She remains approximately 20% down with positive trajectory going forward.         Relevant Medications    tirzepatide-Weight Management (ZEPBOUND) 15 MG/0.5ML prefilled pen    History of Heraclio-en-Y gastric bypass    Hypercholesteremia    Relevant Orders    Lipid panel reflex to direct LDL Fasting         BMI  Estimated body mass index is 41.3 kg/m  as  "calculated from the following:    Height as of 11/25/24: 1.565 m (5' 1.61\").    Weight as of 12/5/24: 101.2 kg (223 lb).   Weight management plan: Specific weight management program called comprehensive weight management discussed    The longitudinal plan of care for the diagnosis(es)/condition(s) as documented were addressed during this visit. Due to the added complexity in care, I will continue to support Janelle in the subsequent management and with ongoing continuity of care.    Subjective   Janelle is a 50 year old, presenting for the following health issues:  Weight Loss (Follow-up )        5/14/2025     6:51 AM   Additional Questions   Roomed by xl     Patient presents for treatment of chronic, comorbid conditions using intensive therapeutic lifestyle interventions including nutrition, physical activity, and behavior management.   - she is on compounded tirzepatide. - she is on max dose    - successes: she is down 15-20 lbs over the past couple of months. \"It helps with the food hunger noise.   - struggles: bedtime snacking   - movement: walking more.     - tracking/journaling: ad garcía   - following nutritional plan: protein focused.     - hunger: Stable.    - medication benefits: helpful. Side effects: none      History of Present Illness       Reason for visit:  Follow up    She eats 2-3 servings of fruits and vegetables daily.She consumes 0 sweetened beverage(s) daily.She exercises with enough effort to increase her heart rate 9 or less minutes per day.  She exercises with enough effort to increase her heart rate 3 or less days per week. She is missing 1 dose(s) of medications per week.  She is not taking prescribed medications regularly due to remembering to take.            Objective    Vitals - Patient Reported  Weight (Patient Reported): 100.2 kg (221 lb)    Physical Exam   GENERAL: alert and no distress  EYES: Eyes grossly normal to inspection.  No discharge or erythema, or obvious scleral/conjunctival " abnormalities.  RESP: No audible wheeze, cough, or visible cyanosis.    SKIN: Visible skin clear. No significant rash, abnormal pigmentation or lesions.  NEURO: Cranial nerves grossly intact.  Mentation and speech appropriate for age.        Video-Visit Details    Type of service:  Video Visit   Originating Location (pt. Location): Home  Distant Location (provider location):  On-site  Platform used for Video Visit: Sona  Signed Electronically by: Chava Baig MD

## 2025-06-03 ENCOUNTER — TRANSFERRED RECORDS (OUTPATIENT)
Dept: ADMINISTRATIVE | Facility: CLINIC | Age: 50
End: 2025-06-03
Payer: COMMERCIAL

## 2025-06-05 NOTE — PROGRESS NOTES
2025        Janelle Meliton   [address] [place] [address]-      Janelle Coelho,  :  1975    I am writing to let you know the results of the tests that were done the other day.   Thank you for allowing [facility] the opportunity to take part in your healthcare.  At [facility] we strive to provide each patient with the finest gastroenterology care available.  We hope your experience was pleasant and informative.    The MR enterography showed a possible 3 cm segment of inflammation in the descending colon.  The colon will be evaluated further at your upcoming colonoscopy.  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  If you would like to discuss the stable splenic lesion further, then please schedule an appointment with a hematologist (blood specialist).      Thank you.    Electronically signed by:  Dax Connell MD 2025 07:06 AM  Document generated by:  Dax Connell MD  2025  If your provider ordered multiple tests; the results may not become available at the same time.  If multiple test results are received within 14 days of one another, you may receive a duplicate.  cc:  Ivett Martino NP

## 2025-06-16 ENCOUNTER — TRANSFERRED RECORDS (OUTPATIENT)
Dept: MULTI SPECIALTY CLINIC | Facility: CLINIC | Age: 50
End: 2025-06-16
Payer: COMMERCIAL

## 2025-06-16 ENCOUNTER — TRANSFERRED RECORDS (OUTPATIENT)
Dept: ADMINISTRATIVE | Facility: CLINIC | Age: 50
End: 2025-06-16
Payer: COMMERCIAL

## 2025-06-16 LAB
ALT SERPL-CCNC: 13 IU/L (ref 0–32)
AST SERPL-CCNC: 13 IU/L (ref 0–40)

## 2025-06-17 ENCOUNTER — TRANSFERRED RECORDS (OUTPATIENT)
Dept: ADMINISTRATIVE | Facility: CLINIC | Age: 50
End: 2025-06-17
Payer: COMMERCIAL

## 2025-06-17 NOTE — PROGRESS NOTES
_________________________________________________________________________________________________    Patient name: Janelle Coelho  YOB: 1975  Encounter date: 06/16/2025 02:45 PM  Current date: 06/16/2025 04:01 PM  Procedure: Infusion      Infusion/Additional Medication Orders    Assessment: Crohn's disease of both small and large intestine with fistula K50.813     Medication grids  Order Date Medication Dose Route Rate Rate 2 Rate 3 Rate 4 Int. of Treat.   01/03/2025 Avsola 10 mg/kg  mL/hr    8 Weeks   Inf. Date Medication % Conc. Inf. # Therapy Type Bag # Vials Total mgs Lot # Exp. Date   06/16/2025 Avsola  49 maintenance  11 1041.00 [id number] 10/31/2028   Inf. Date Medication IV Site IV Gauge Start Stop Total Time Wt. (lbs) Wt. (kgs)   06/16/2025 Avsola left antecubital 22 2:43 PM 3:43 PM  229.00 103.855     Vitals Flowsheet  Time Temp Pulse Resp Sys BP Valerie BP Reaction Conc Rate   2:38 PM 95.6 83 14 110 65      3:14 PM 96.7 74 14 98 56      3:58 PM 97.3 85 16 97 53        Post Infusion  The patient did tolerate the infusion.     Nursing Notes  Order date: 1/3/25  Last infusion date: 4/25/25  Oral premeds per order: N/A  Specialty Pharmacy: N  Change in health status per assessment? N  IV maintenance 0.9% NS 500ml TKO  Start time: 1440  IV start by: Ashley Amin RN  IV and Fluid D/C time: 1558  NS volume infused: <50mL  Site condition: CDI and patent throughout infusion, no redness/swelling at IV site  D/C instructions reviewed, Pt verbalized understanding.  Ashley Amin RN    Date Medication Total mg Used mg Wasted mg   06/16/2025 Avsola 1100.00 1041.00 59.00   04/25/2025 Avsola 1100.00 1041.00 59.00   02/28/2025 Avsola 1100.00 1100.00 0.00   01/03/2025 Avsola 1100.00 1033.00 67.00   11/04/2024 Avsola 1100.00 1056.00 44.00   08/30/2024 Avsola 1100.00 1100.00 0.00     Provider Notes  *labs drawn from PIV      Visit oversight provided by:  Howard Clements MD 06/16/2025 02:45 PM

## 2025-06-18 SDOH — HEALTH STABILITY: PHYSICAL HEALTH: ON AVERAGE, HOW MANY DAYS PER WEEK DO YOU ENGAGE IN MODERATE TO STRENUOUS EXERCISE (LIKE A BRISK WALK)?: 1 DAY

## 2025-06-18 SDOH — HEALTH STABILITY: PHYSICAL HEALTH: ON AVERAGE, HOW MANY MINUTES DO YOU ENGAGE IN EXERCISE AT THIS LEVEL?: 10 MIN

## 2025-06-18 ASSESSMENT — ASTHMA QUESTIONNAIRES
QUESTION_1 LAST FOUR WEEKS HOW MUCH OF THE TIME DID YOUR ASTHMA KEEP YOU FROM GETTING AS MUCH DONE AT WORK, SCHOOL OR AT HOME: NONE OF THE TIME
QUESTION_2 LAST FOUR WEEKS HOW OFTEN HAVE YOU HAD SHORTNESS OF BREATH: NOT AT ALL
ACT_TOTALSCORE: 25
QUESTION_5 LAST FOUR WEEKS HOW WOULD YOU RATE YOUR ASTHMA CONTROL: COMPLETELY CONTROLLED
QUESTION_4 LAST FOUR WEEKS HOW OFTEN HAVE YOU USED YOUR RESCUE INHALER OR NEBULIZER MEDICATION (SUCH AS ALBUTEROL): NOT AT ALL
QUESTION_3 LAST FOUR WEEKS HOW OFTEN DID YOUR ASTHMA SYMPTOMS (WHEEZING, COUGHING, SHORTNESS OF BREATH, CHEST TIGHTNESS OR PAIN) WAKE YOU UP AT NIGHT OR EARLIER THAN USUAL IN THE MORNING: NOT AT ALL

## 2025-06-18 ASSESSMENT — PATIENT HEALTH QUESTIONNAIRE - PHQ9
10. IF YOU CHECKED OFF ANY PROBLEMS, HOW DIFFICULT HAVE THESE PROBLEMS MADE IT FOR YOU TO DO YOUR WORK, TAKE CARE OF THINGS AT HOME, OR GET ALONG WITH OTHER PEOPLE: SOMEWHAT DIFFICULT
SUM OF ALL RESPONSES TO PHQ QUESTIONS 1-9: 5
SUM OF ALL RESPONSES TO PHQ QUESTIONS 1-9: 5

## 2025-06-18 ASSESSMENT — SOCIAL DETERMINANTS OF HEALTH (SDOH): HOW OFTEN DO YOU GET TOGETHER WITH FRIENDS OR RELATIVES?: ONCE A WEEK

## 2025-06-18 NOTE — PROCEDURES
2025        Janelle Meliton   [address] [place] [address]      Janelle Coelho,  :  1975    I'm writing to let you know about the tests that were taken recently.   Thank you for allowing [facility] the opportunity to take part in your healthcare.  At [facility] we strive to provide each patient with the finest gastroenterology care available.  We hope your experience was pleasant and informative.    Your blood work including blood counts, liver function, and Vit B12 and Vit D are all normal.  Vitamin B12 2025 14:33   Description Result Units Flags Range   Vitamin B12 566 pg/mL  232-1245   Comments   Performed At: DV, [facility] [address], [place], [id number]  Yadira Randolph MD, Phone: [phone]   Vitamin D, 25-Hydroxy 2025 14:33   Description Result Units Flags Range   Vitamin D, 25-Hydroxy 40.2 ng/mL  30.0-100.0   Comments   Performed At: [facility], [facility] [address], [place], [id number]  Yadira Randolph MD, Phone: [phone]   Vitamin D, 25-Hydroxy:  Vitamin D deficiency has been defined by the [place] of  Medicine and an Endocrine Society practice guideline as a  level of serum 25-OH vitamin D less than 20 ng/mL (1,2).  The Endocrine Society went on to further define vitamin D  insufficiency as a level between 21 and 29 ng/mL (2).  1. IOM ([place] of Medicine). 2010. Dietary reference     intakes for calcium and D. [place]: The     National Academies Press.  2. Lynnette MF, Mario CAMACHO, Jacquie GIBBS, et al.     Evaluation, treatment, and prevention of vitamin D     deficiency: an Endocrine Society clinical practice     guideline. JCEM. 2011; 96(7):1911-30.   Hepatic Function Panel (7) 2025 14:33   Description Result Units Flags Range   Bilirubin, Total <0.2 mg/dL  0.0-1.2   Bilirubin, Direct 0.08 mg/dL  0.00-0.40   AST (SGOT) 13 IU/L  0-40   ALT (SGPT) 13 IU/L  0-32   Albumin 3.9 g/dL  3.9-4.9   Alkaline Phosphatase 69 IU/L      Protein, Total 6.4 g/dL  6.0-8.5   Comments   Performed At: , [facility]  [address] [place], [place], [id number]  Yadira Randolph MD, Phone: [phone]   CBC With Differential/Platelet 06/16/2025 14:33   Description Result Units Flags Range   Hemoglobin 11.9 g/dL  11.1-15.9   Hematocrit 36.9 %  34.0-46.6   MCV 90 fL  79-97   MCHC 32.2 g/dL  31.5-35.7   MCH 29.0 pg  26.6-33.0   RDW 12.8 %  11.7-15.4   Platelets 372 x10E3/uL  150-450   Neutrophils 57 %  Not Estab.   Lymphs 32 %  Not Estab.   Monocytes 6 %  Not Estab.   Eos 3 %  Not Estab.   Basos 1 %  Not Estab.   Neutrophils (Absolute) 6.0 x10E3/uL  1.4-7.0   Lymphs (Absolute) 3.4 x10E3/uL H 0.7-3.1   Monocytes(Absolute) 0.6 x10E3/uL  0.1-0.9   Eos (Absolute) 0.3 x10E3/uL  0.0-0.4   Baso (Absolute) 0.1 x10E3/uL  0.0-0.2   Immature Grans (Abs) 0.1 x10E3/uL  0.0-0.1   Immature Granulocytes 1 %  Not Estab.   RBC 4.11 x10E6/uL  3.77-5.28   WBC 10.5 x10E3/uL  3.4-10.8   Comments   Draw at June 16 Avsola inf usion First Available        Performed At: [facility]  [address] [place], [place], [id number]  Yadira Randolph MD, Phone: [phone]           Thank you.    Electronically signed by:  Emma Pennington NP 06/17/2025 10:19 AM  Document generated by:  Emma Pennington NP  06/17/2025  If your provider ordered multiple tests; the results may not become available at the same time.  If multiple test results are received within 14 days of one another, you may receive a duplicate.  cc:  Ivett Martino NP

## 2025-06-19 ENCOUNTER — OFFICE VISIT (OUTPATIENT)
Dept: PEDIATRICS | Facility: CLINIC | Age: 50
End: 2025-06-19
Attending: NURSE PRACTITIONER
Payer: COMMERCIAL

## 2025-06-19 VITALS
WEIGHT: 227 LBS | HEIGHT: 61 IN | SYSTOLIC BLOOD PRESSURE: 95 MMHG | BODY MASS INDEX: 42.86 KG/M2 | OXYGEN SATURATION: 97 % | DIASTOLIC BLOOD PRESSURE: 65 MMHG | RESPIRATION RATE: 16 BRPM | HEART RATE: 86 BPM | TEMPERATURE: 98.3 F

## 2025-06-19 DIAGNOSIS — E66.01 CLASS 2 SEVERE OBESITY DUE TO EXCESS CALORIES WITH SERIOUS COMORBIDITY AND BODY MASS INDEX (BMI) OF 36.0 TO 36.9 IN ADULT (H): ICD-10-CM

## 2025-06-19 DIAGNOSIS — E78.00 HYPERCHOLESTEREMIA: ICD-10-CM

## 2025-06-19 DIAGNOSIS — J45.20 MILD INTERMITTENT ASTHMA, UNSPECIFIED WHETHER COMPLICATED: ICD-10-CM

## 2025-06-19 DIAGNOSIS — M79.641 BILATERAL HAND PAIN: ICD-10-CM

## 2025-06-19 DIAGNOSIS — Z12.31 ENCOUNTER FOR SCREENING MAMMOGRAM FOR BREAST CANCER: ICD-10-CM

## 2025-06-19 DIAGNOSIS — M79.642 BILATERAL HAND PAIN: ICD-10-CM

## 2025-06-19 DIAGNOSIS — F33.41 MAJOR DEPRESSIVE DISORDER, RECURRENT EPISODE, IN PARTIAL REMISSION: Chronic | ICD-10-CM

## 2025-06-19 DIAGNOSIS — Z00.00 ROUTINE GENERAL MEDICAL EXAMINATION AT A HEALTH CARE FACILITY: Primary | ICD-10-CM

## 2025-06-19 DIAGNOSIS — F41.1 GENERALIZED ANXIETY DISORDER: Chronic | ICD-10-CM

## 2025-06-19 DIAGNOSIS — K50.119 CROHN'S DISEASE OF PERIANAL REGION WITH COMPLICATION (H): ICD-10-CM

## 2025-06-19 DIAGNOSIS — E66.812 CLASS 2 SEVERE OBESITY DUE TO EXCESS CALORIES WITH SERIOUS COMORBIDITY AND BODY MASS INDEX (BMI) OF 36.0 TO 36.9 IN ADULT (H): ICD-10-CM

## 2025-06-19 RX ORDER — SERTRALINE HYDROCHLORIDE 100 MG/1
200 TABLET, FILM COATED ORAL DAILY
Qty: 180 TABLET | Refills: 1 | Status: SHIPPED | OUTPATIENT
Start: 2025-06-19

## 2025-06-19 RX ORDER — TRAZODONE HYDROCHLORIDE 50 MG/1
25-50 TABLET ORAL
Qty: 90 TABLET | Refills: 4 | Status: SHIPPED | OUTPATIENT
Start: 2025-06-19

## 2025-06-19 RX ORDER — INFLIXIMAB-AXXQ 100 MG/10ML
INJECTION, POWDER, LYOPHILIZED, FOR SOLUTION INTRAVENOUS
COMMUNITY
Start: 2024-02-21

## 2025-06-19 ASSESSMENT — ANXIETY QUESTIONNAIRES
6. BECOMING EASILY ANNOYED OR IRRITABLE: MORE THAN HALF THE DAYS
7. FEELING AFRAID AS IF SOMETHING AWFUL MIGHT HAPPEN: NOT AT ALL
IF YOU CHECKED OFF ANY PROBLEMS ON THIS QUESTIONNAIRE, HOW DIFFICULT HAVE THESE PROBLEMS MADE IT FOR YOU TO DO YOUR WORK, TAKE CARE OF THINGS AT HOME, OR GET ALONG WITH OTHER PEOPLE: SOMEWHAT DIFFICULT
4. TROUBLE RELAXING: NOT AT ALL
GAD7 TOTAL SCORE: 4
8. IF YOU CHECKED OFF ANY PROBLEMS, HOW DIFFICULT HAVE THESE MADE IT FOR YOU TO DO YOUR WORK, TAKE CARE OF THINGS AT HOME, OR GET ALONG WITH OTHER PEOPLE?: SOMEWHAT DIFFICULT
2. NOT BEING ABLE TO STOP OR CONTROL WORRYING: NOT AT ALL
3. WORRYING TOO MUCH ABOUT DIFFERENT THINGS: NOT AT ALL
5. BEING SO RESTLESS THAT IT IS HARD TO SIT STILL: NOT AT ALL
1. FEELING NERVOUS, ANXIOUS, OR ON EDGE: MORE THAN HALF THE DAYS
GAD7 TOTAL SCORE: 4
7. FEELING AFRAID AS IF SOMETHING AWFUL MIGHT HAPPEN: NOT AT ALL
GAD7 TOTAL SCORE: 4

## 2025-06-19 ASSESSMENT — PAIN SCALES - GENERAL: PAINLEVEL_OUTOF10: NO PAIN (0)

## 2025-06-19 NOTE — PROGRESS NOTES
Preventive Care Visit  [facility] ISA Boateng CNP, Internal Medicine - Pediatrics  Jun 19, 2025      Assessment & Plan     Routine general medical examination at a health care facility      Generalized anxiety disorder  Open to seeing therapist, symptoms worsened, will increaes zoloft dose. Follow-up by lorraine in 2 mon  - Adult Mental Health  Referral; Future  - sertraline (ZOLOFT) 100 MG tablet; Take 2 tablets (200 mg) by mouth daily.  - traZODone (DESYREL) 50 MG tablet; Take 0.5-1 tablets (25-50 mg) by mouth nightly as needed for sleep.    Mild intermittent asthma, unspecified whether complicated  stable    Major depressive disorder, recurrent episode, in partial remission  Per above  - Adult Mental [facility]  Referral; Future    Hypercholesteremia  Last labs reviewed    Class 2 severe obesity due to excess calories with serious comorbidity and body mass index (BMI) of 36.0 to 36.9 in adult (H)  Has been seeing wt [facility], reviewed course of treatment, has been stable more recently.     Crohn's disease of perianal region with complication (H)  Reviewed course of caree through [facility].  - Adult Rheumatology  Referral; Future    Bilateral hand pain  Was referred by [facility], but didn't get any call, will refer to tco  - Adult Rheumatology  Referral; Future    Encounter for screening mammogram for breast cancer    - MA Screen Bilateral w/Alan; Future    The longitudinal plan of care for the diagnosis(es)/condition(s) as documented were addressed during this visit. Due to the added complexity in care, I will continue to support Janelle in the subsequent management and with ongoing continuity of care.        Counseling  Appropriate preventive services were addressed with this patient via screening, questionnaire, or discussion as appropriate for fall prevention, nutrition, physical activity, Tobacco-use cessation, social engagement, weight loss and cognition.   Checklist reviewing preventive services available has been given to the patient.  Reviewed patient's diet, addressing concerns and/or questions.   She is at risk for lack of exercise and has been provided with information to increase physical activity for the benefit of her well-being.             Galileo Luis is a 50 year old, presenting for the following:  Physical        6/19/2025     3:49 PM   Additional Questions   Roomed by Viktor SIMPSON   Accompanied by Self         6/19/2025     3:49 PM   Patient Reported Additional Medications   Patient reports taking the following new medications No          HPI    History of crohns, doing infusions with [facility]. She has had continued pain of her hands, doing infusions, wondering if its related. She was referred to rheum by [facility], but hasn't heard back. Has rectal vag fistula, had consulted for surg, needed to lose wt.     History of depression and anxiety, on traz, zoloft, notes she still struggles with anxiety and temper, wondering if she can increase. Takes trazodone prn less than 50% of the night.         1/12/2025     3:28 PM 5/14/2025     6:38 AM 6/18/2025     7:09 AM   PHQ   PHQ-9 Total Score 7  4  5    Q9: Thoughts of better off dead/self-harm past 2 weeks Not at all Not at all Not at all       Patient-reported         7/15/2024    10:02 AM 5/14/2025     6:43 AM 6/19/2025     3:44 PM   EVIE-7 SCORE   Total Score 6 (mild anxiety) 2 (minimal anxiety) 4 (minimal anxiety)   Total Score 6 2  4        Patient-reported       History of obesity, on zepbound. Is followed by wt, saw specialty last month, noted working on diet and exercise.     Wt Readings from Last 4 Encounters:   06/19/25 103 kg (227 lb)   12/05/24 101.2 kg (223 lb)   11/25/24 102.6 kg (226 lb 3.2 oz)   08/09/24 105.2 kg (232 lb)                Advance Care Planning    Discussed advance care planning with patient; informed AVS has link to [facility].        6/18/2025   General Health   How would you  rate your overall physical health? (!) FAIR   Feel stress (tense, anxious, or unable to sleep) Only a little   (!) STRESS CONCERN      2025   Nutrition   Three or more servings of calcium each day? (!) I DON'T KNOW   Diet: Other   If other, please elaborate: Focus on high protein, low carb   How many servings of fruit and vegetables per day? (!) 2-3   How many sweetened beverages each day? 0-1         2025   Exercise   Days per week of moderate/strenous exercise 1 day   Average minutes spent exercising at this level 10 min   (!) EXERCISE CONCERN      2025   Social Factors   Frequency of gathering with friends or relatives Once a week   Worry food won't last until get money to buy more No   Food not last or not have enough money for food? No   Do you have housing? (Housing is defined as stable permanent housing and does not include staying outside in a car, in a tent, in an abandoned building, in an overnight shelter, or couch-surfing.) Yes   Are you worried about losing your housing? No   Lack of transportation? No   Unable to get utilities (heat,electricity)? No         2025   Fall Risk   Fallen 2 or more times in the past year? No   Trouble with walking or balance? No          2025   Dental   Dentist two times every year? Yes       Today's PHQ-9 Score:       2025     7:09 AM   PHQ-9 SCORE   PHQ-9 Total Score MyChart 5 (Mild depression)   PHQ-9 Total Score 5        Patient-reported         2025   Substance Use   Alcohol more than 3/day or more than 7/wk No   Do you use any other substances recreationally? No     Social History     Tobacco Use    Smoking status: Former     Current packs/day: 0.00     Average packs/day: 0.2 packs/day for 17.9 years (3.6 ttl pk-yrs)     Types: Cigarettes     Start date: 2001     Quit date: 2018     Years since quittin.5    Smokeless tobacco: Never   Vaping Use    Vaping status: Never Used   Substance Use Topics    Alcohol use: Yes      "Comment: very rarely, but when I do I usually overdue it :-    Drug use: No     Frequency: 1.0 times per week           9/13/2024   LAST FHS-7 RESULTS   1st degree relative breast or ovarian cancer Yes   Any relative bilateral breast cancer No   Any male have breast cancer No   Any ONE woman have BOTH breast AND ovarian cancer Yes   Any woman with breast cancer before 50yrs No   2 or more relatives with breast AND/OR ovarian cancer No   2 or more relatives with breast AND/OR bowel cancer Yes                6/18/2025   STI Screening   New sexual partner(s) since last STI/HIV test? No     History of abnormal Pap smear: YES - reflected in Problem List and Health Maintenance accordingly        Latest Ref Rng & Units 8/31/2023     4:30 PM 10/7/2020     3:18 PM 10/7/2020     3:00 PM   PAP / HPV   PAP  Atypical squamous cells of undetermined significance (ASC-US)      PAP (Historical)   NIL     HPV 16 DNA Negative Negative   Negative    HPV 18 DNA Negative Negative   Negative    Other HR HPV Negative Negative   Negative      ASCVD Risk   The 10-year ASCVD risk score (Triston CANO, et al., 2019) is: 0.5%    Values used to calculate the score:      Age: 50 years      Sex: Female      Is Non- : No      Diabetic: No      Tobacco smoker: No      Systolic Blood Pressure: 95 mmHg      Is BP treated: No      HDL Cholesterol: 73 mg/dL      Total Cholesterol: 205 mg/dL            6/18/2025   Contraception/Family Planning   Questions about contraception or family planning No   What are your periods like? Not currently having periods        Reviewed and updated as needed this visit by Provider                             Objective    Exam  BP 95/65 (BP Location: Right arm, Patient Position: Sitting, Cuff Size: Adult Large)   Pulse 86   Temp 98.3  F (36.8  C) (Tympanic)   Resp 16   Ht 1.56 m (5' 1.42\")   Wt 103 kg (227 lb)   LMP  (LMP Unknown)   SpO2 97%   BMI 42.31 kg/m     Estimated body mass " "index is 42.31 kg/m  as calculated from the following:    Height as of this encounter: 1.56 m (5' 1.42\").    Weight as of this encounter: 103 kg (227 lb).    Physical Exam  GENERAL: alert and no distress  EYES: Eyes grossly normal to inspection, PERRL and conjunctivae and sclerae normal  HENT: ear canals and TM's normal, nose and mouth without ulcers or lesions  NECK: no adenopathy, no asymmetry, masses, or scars  RESP: lungs clear to auscultation - no rales, rhonchi or wheezes  CV: regular rate and rhythm, normal S1 S2, no S3 or S4, no murmur, click or rub, no peripheral edema  MS: no gross musculoskeletal defects noted, no edema        Signed Electronically by: ISA Tavera CNP    "

## 2025-06-19 NOTE — PATIENT INSTRUCTIONS
Patient Education   STOP the pill, you may have a menses. See if you bleed after that. If you don't, and you don't have any other symptoms of menopause, we could do a blood work after 2-3 month to see if you're postmenopausal (we don't have to do blood work). If your period comes back monthly, you can decide what you want you want to do.   Preventive Care Advice   This is general advice given by our system to help you stay healthy. However, your care team may have specific advice just for you. Please talk to your care team about your preventive care needs.  Nutrition  Eat 5 or more servings of fruits and vegetables each day.  Try wheat bread, brown rice and whole grain pasta (instead of white bread, rice, and pasta).  Get enough calcium and vitamin D. Check the label on foods and aim for 100% of the RDA (recommended daily allowance).  Lifestyle  Exercise at least 150 minutes each week  (30 minutes a day, 5 days a week).  Do muscle strengthening activities 2 days a week. These help control your weight and prevent disease.  No smoking.  Wear sunscreen to prevent skin cancer.  Have a dental exam and cleaning every 6 months.  Yearly exams  See your health care team every year to talk about:  Any changes in your health.  Any medicines your care team has prescribed.  Preventive care, family planning, and ways to prevent chronic diseases.  Shots (vaccines)   HPV shots (up to age 26), if you've never had them before.  Hepatitis B shots (up to age 59), if you've never had them before.  COVID-19 shot: Get this shot when it's due.  Flu shot: Get a flu shot every year.  Tetanus shot: Get a tetanus shot every 10 years.  Pneumococcal, hepatitis A, and RSV shots: Ask your care team if you need these based on your risk.  Shingles shot (for age 50 and up)  General health tests  Diabetes screening:  Starting at age 35, Get screened for diabetes at least every 3 years.  If you are younger than age 35, ask your care team if you should  be screened for diabetes.  Cholesterol test: At age 39, start having a cholesterol test every 5 years, or more often if advised.  Bone density scan (DEXA): At age 50, ask your care team if you should have this scan for osteoporosis (brittle bones).  Hepatitis C: Get tested at least once in your life.  STIs (sexually transmitted infections)  Before age 24: Ask your care team if you should be screened for STIs.  After age 24: Get screened for STIs if you're at risk. You are at risk for STIs (including HIV) if:  You are sexually active with more than one person.  You don't use condoms every time.  You or a partner was diagnosed with a sexually transmitted infection.  If you are at risk for HIV, ask about PrEP medicine to prevent HIV.  Get tested for HIV at least once in your life, whether you are at risk for HIV or not.  Cancer screening tests  Cervical cancer screening: If you have a cervix, begin getting regular cervical cancer screening tests starting at age 21.  Breast cancer scan (mammogram): If you've ever had breasts, begin having regular mammograms starting at age 40. This is a scan to check for breast cancer.  Colon cancer screening: It is important to start screening for colon cancer at age 45.  Have a colonoscopy test every 10 years (or more often if you're at risk) Or, ask your provider about stool tests like a FIT test every year or Cologuard test every 3 years.  To learn more about your testing options, visit:   .  For help making a decision, visit:   https://bit.ly/qw65085.  Prostate cancer screening test: If you have a prostate, ask your care team if a prostate cancer screening test (PSA) at age 55 is right for you.  Lung cancer screening: If you are a current or former smoker ages 50 to 80, ask your care team if ongoing lung cancer screenings are right for you.  For informational purposes only. Not to replace the advice of your health care provider. Copyright   2023 [employer]. All rights  reserved. Clinically reviewed by the [facility]. [employer] [id number] - REV 01/24.  Recovering From Depression: Care Instructions  Overview    Sticking to your treatment plan is important as you recover from depression. It may take time for your symptoms to get better after you start treatment. Try not to give up if you don't feel better right away. Make sure you keep going to counseling and taking any prescribed medicine if they are part of your treatment plan.  Focus on things that can help you feel better, such as being with friends and family. Try to eat healthy foods, be active, and get enough sleep. Take things slowly as you begin to recover.  Follow-up care is a key part of your treatment and safety. Be sure to make and go to all appointments, and call your doctor if you are having problems. It's also a good idea to know your test results and keep a list of the medicines you take.  How can you care for yourself at home?  Be realistic  If you have a large task to do, break it up into smaller steps you can handle, and just do what you can.  You may want to put off important decisions until your depression has lifted. If you have plans that will have a major impact on your life, such as marriage, divorce, or a job change, try to wait a bit. Talk it over with friends and loved ones who can help you look at the overall picture first.  Reaching out to people for help is important. Do not isolate yourself. Let your family and friends help you. Find someone you can trust and confide in, and talk to that person.  Be patient, and be kind to yourself. Remember that depression is not your fault and is not something you can overcome with willpower alone. Treatment is important for depression, just like for any other illness. Feeling better takes time, and your mood will improve little by little.  Stay active  Stay busy and get outside. Take a walk, or try some other light exercise.  Talk with your  doctor about an exercise program. Exercise can help with mild depression.  Go to a movie or concert. Take part in a Hindu activity or other social gathering. Go to a ball game.  Ask a friend to have dinner with you.  Take care of yourself  Eat healthy foods such as fresh fruits and vegetables, whole grains, and lean protein. If you have lost your appetite, eat small snacks rather than large meals.  Avoid using marijuana and other drugs and drinking alcohol. Do not take medicines that have not been prescribed for you. They may interfere with medicines you may be taking for depression, or they may make your depression worse.  Take your medicines exactly as they are prescribed. You may start to feel better within 1 to 3 weeks of taking antidepressant medicine. But it can take as many as 6 to 8 weeks to see more improvement. If you have questions or concerns about your medicines, or if you do not notice any improvement by 3 weeks, talk to your doctor.  Continue to take your medicine after your symptoms improve. Taking your medicine for at least 6 months after you feel better can help keep you from getting depressed again. If this isn't the first time you have been depressed, your doctor may recommend you to take medicine even longer.  If you have any side effects from your medicine, tell your doctor. Many side effects are mild and will go away on their own after you have been taking the medicine for a few weeks. Some may last longer. Talk to your doctor if side effects are bothering you too much. You might be able to try a different medicine.  Continue counseling. It may help prevent depression from returning, especially if you've had multiple episodes of depression. Talk with your counselor if you are having a hard time attending your sessions or you think the sessions aren't working. Don't just stop going.  Get enough sleep. Talk to your doctor if you are having problems sleeping.  Avoid sleeping pills unless they  are prescribed by the doctor treating your depression. Sleeping pills may make you groggy during the day, and they may interact with other medicine you are taking.  If you have any other illnesses, such as diabetes, heart disease, or high blood pressure, make sure to continue with your treatment. Tell your doctor about all of the medicines you take, including those with or without a prescription.  Where to get help 24 hours a day, 7 days a week  If you or someone you know talks about suicide, self-harm, a mental health crisis, a substance use crisis, or any other kind of emotional distress, get help right away. You can:  Call the Suicide and Crisis Lifeline at [employer].  Text HOME to 034290 to access the Crisis Text Line.  Consider saving these numbers in your phone.  Go to [employer] for more information or to chat online.  Call 911 anytime you think you may need emergency care. For example, call if:  You feel like hurting yourself or someone else.  Someone you know has depression and is about to attempt or is attempting suicide.  Where to get help 24 hours a day, 7 days a week  If you or someone you know talks about suicide, self-harm, a mental health crisis, a substance use crisis, or any other kind of emotional distress, get help right away. You can:  Call the Suicide and Crisis Lifeline at [employer].  Text HOME to 225843 to access the Crisis Text Line.  Consider saving these numbers in your phone.  Go to [employer] for more information or to chat online.  Call your doctor now or seek immediate medical care if:  You hear voices.  Someone you know has depression and:  Starts to give away possessions.  Uses illegal drugs or drinks alcohol heavily.  Talks or writes about death, including writing suicide notes or talking about guns, knives, or pills.  Starts to spend a lot of time alone.  Acts very aggressively or suddenly appears calm.  Watch closely for changes in your health, and be sure to contact your doctor  "if:  You do not get better as expected.  Where can you learn more?  Go to https://www.[employer].net/patiented  Enter N529 in the search box to learn more about \"Recovering From Depression: Care Instructions.\"  Current as of: July 31, 2024  Content Version: 14.5 2024-2025 [employer].   Care instructions adapted under license by your healthcare professional. If you have questions about a medical condition or this instruction, always ask your healthcare professional. [employer] disclaims any warranty or liability for your use of this information.       "

## 2025-06-23 ENCOUNTER — PATIENT OUTREACH (OUTPATIENT)
Dept: CARE COORDINATION | Facility: CLINIC | Age: 50
End: 2025-06-23
Payer: COMMERCIAL

## 2025-06-30 ENCOUNTER — TRANSFERRED RECORDS (OUTPATIENT)
Dept: GASTROENTEROLOGY | Facility: HOSPITAL | Age: 50
End: 2025-06-30
Payer: COMMERCIAL

## 2025-07-01 NOTE — PROCEDURES
[facility]   [address], [address], [place] [address]     Patient Name: Janelle Coelho  Gender:  Female  Exam Date: 06/30/2025 Visit Number:  [id number]  Age: 50 Years YOB: 1975  Attending MD: Earnestine Wright MD Medical Record#:  [id number]    Procedure: Colonoscopy   Indications: Crohn's disease      Referring MD: Referral Self  Primary MD:      Ivett Martino NP  Medications: Admitting Medications:   0.9% Normal Saline at TKO   Intra Procedure Medications:   Patient received monitored anesthesia care.     Complications: No immediate complications  ______________________________________________________________________________  Procedure:   An examination of the heart and lungs was performed and found to be within acceptable limits.  .  The patient was therefore deemed a reasonable candidate for endoscopy and sedation.   The risks and benefits of the procedure were explained to the patient.After obtaining informed consent, the patient received monitored anesthesia care and I passed the scope   without difficulty via the rectum to the ileum.  The appendiceal orifice and ic valve were identified.  The scope was retroflexed during the examination  The quality of the prep was good  (Miralax/Gatorade/2 tablets Bisacodyl/Magnesium Citrate).    This was a complete examination throughout the entire colon.    Findings:    Normal finding.  Location  - ileum.  Biopsy taken.  Maneuver - cold biopsy forceps - random biopsies.    Normal finding.  Location  - cecum - ascending colon - transverse colon.  Biopsy taken.  Maneuver - cold biopsy forceps - random biopsies.    Normal finding.  Location  - descending colon - sigmoid.  There were evidence of scarring and pseudopolyps seen. Biopsy taken.  Maneuver - cold biopsy forceps - random biopsies.    A fistula opening was seen in the distal rectum. A small pseudopolyp was seen as well.   Location.  Maneuver - cold biopsy forceps -  dysplasia surveillance biopsies.    Impression:  Crohn's disease of both small and large intestine with fistula    Preliminary Plan:  The patient and their physician will receive a copy of the pathology report as well as pathology-based recommendations for future screening or surveillance.  Pathology Results:  A: ILEUM, TERMINAL, BIOPSY:           1. Normal ileal mucosa           2. Negative for active and chronic ileitis      B: COLON, CECUM/ASCENDING, BIOPSY:           1. Normal colonic mucosa           2. Negative for microscopic, active, and chronic colitis           3. Negative for dysplasia      C: COLON, TRANSVERSE, BIOPSY:           1. Normal colonic mucosa           2. Negative for microscopic, active, and chronic colitis           3. Negative for dysplasia      D: COLON, DESCENDING/SIGMOID, BIOPSY:           1. Normal colonic mucosa           2. Negative for microscopic, active, and chronic colitis           3. Negative for dysplasia      E: RECTUM, BIOPSY:           1. Normal colonic mucosa           2. Negative for microscopic, active, and chronic colitis           3. Negative for dysplasia      MICROSCOPIC  A: Performed   B: Performed   C: Performed   D: Performed   E: Performed     Electronically signed by: Jordin Swartz MD    Interpreted at [facility], [address] [place] [id number]    Orders    Instruction(s)/Education:  Instruction/Education Timeframe Assessment   Colon Cancer Prevention  K50.813       Final Plan:  Repeat colonoscopy in 3 years.    We will attempt to contact you at appropriate intervals via [place] mail.  We may not be able to find you or contact you at that time, therefore you should know that the responsibility for following our recommendation rests with you.  If you don't hear from us at the time your procedure is due, please contact our office to schedule an appointment.  If your contact information should change, please contact our office  so that we can update your record.  Additional Comments:  Your Crohn's disease appears to be in remission based on biopsies.      _Electronically signed by:___________________  Earnestine Wright MD                 06/30/2025    cc: Ivett Martino NP

## 2025-07-30 ENCOUNTER — TRANSFERRED RECORDS (OUTPATIENT)
Dept: HEALTH INFORMATION MANAGEMENT | Facility: CLINIC | Age: 50
End: 2025-07-30
Payer: COMMERCIAL

## 2025-08-20 ENCOUNTER — E-VISIT (OUTPATIENT)
Dept: PEDIATRICS | Facility: CLINIC | Age: 50
End: 2025-08-20
Payer: COMMERCIAL

## 2025-08-20 DIAGNOSIS — F41.1 GENERALIZED ANXIETY DISORDER: Chronic | ICD-10-CM

## 2025-08-21 RX ORDER — SERTRALINE HYDROCHLORIDE 100 MG/1
200 TABLET, FILM COATED ORAL DAILY
Qty: 180 TABLET | Refills: 1 | Status: SHIPPED | OUTPATIENT
Start: 2025-08-21

## 2025-08-21 ASSESSMENT — ANXIETY QUESTIONNAIRES
5. BEING SO RESTLESS THAT IT IS HARD TO SIT STILL: NOT AT ALL
8. IF YOU CHECKED OFF ANY PROBLEMS, HOW DIFFICULT HAVE THESE MADE IT FOR YOU TO DO YOUR WORK, TAKE CARE OF THINGS AT HOME, OR GET ALONG WITH OTHER PEOPLE?: NOT DIFFICULT AT ALL
GAD7 TOTAL SCORE: 1
7. FEELING AFRAID AS IF SOMETHING AWFUL MIGHT HAPPEN: NOT AT ALL
7. FEELING AFRAID AS IF SOMETHING AWFUL MIGHT HAPPEN: NOT AT ALL
1. FEELING NERVOUS, ANXIOUS, OR ON EDGE: NOT AT ALL
GAD7 TOTAL SCORE: 1
3. WORRYING TOO MUCH ABOUT DIFFERENT THINGS: NOT AT ALL
2. NOT BEING ABLE TO STOP OR CONTROL WORRYING: NOT AT ALL
4. TROUBLE RELAXING: NOT AT ALL
IF YOU CHECKED OFF ANY PROBLEMS ON THIS QUESTIONNAIRE, HOW DIFFICULT HAVE THESE PROBLEMS MADE IT FOR YOU TO DO YOUR WORK, TAKE CARE OF THINGS AT HOME, OR GET ALONG WITH OTHER PEOPLE: NOT DIFFICULT AT ALL
6. BECOMING EASILY ANNOYED OR IRRITABLE: SEVERAL DAYS
GAD7 TOTAL SCORE: 1

## 2025-08-21 ASSESSMENT — PATIENT HEALTH QUESTIONNAIRE - PHQ9
10. IF YOU CHECKED OFF ANY PROBLEMS, HOW DIFFICULT HAVE THESE PROBLEMS MADE IT FOR YOU TO DO YOUR WORK, TAKE CARE OF THINGS AT HOME, OR GET ALONG WITH OTHER PEOPLE: SOMEWHAT DIFFICULT
SUM OF ALL RESPONSES TO PHQ QUESTIONS 1-9: 6
SUM OF ALL RESPONSES TO PHQ QUESTIONS 1-9: 6

## (undated) DEVICE — SOL WATER IRRIG 1000ML BOTTLE 2F7114

## (undated) DEVICE — KIT ENDO FIRST STEP DISINFECTANT 200ML W/POUCH EP-4

## (undated) DEVICE — MAT FLOOR WATERPROOF BACKSHEET FMBP30

## (undated) DEVICE — FORCEP BIOPSY DISP 000386

## (undated) DEVICE — TUBING SUCTION MEDI-VAC 1/4"X20' N620A - HE

## (undated) DEVICE — SPONGE RAY-TEC 4X8" 7318

## (undated) DEVICE — SOLUTION IRRIG 2B7127 .9NS 3000ML BAG

## (undated) DEVICE — ADAPTER CATH 403250

## (undated) DEVICE — PREP DYNA-HEX 4% CHG SCRUB 4OZ BOTTLE MDS098710

## (undated) DEVICE — CATH URETERAL OPEN END 5FRX70CM M0064002010

## (undated) DEVICE — SUCTION MANIFOLD NEPTUNE 2 SYS 1 PORT 702-025-000

## (undated) DEVICE — TUBING SUCTION MEDI-VAC 1/4"X20' N620A

## (undated) DEVICE — ADAPTER SCOPE UROLOK II LF M0067301400

## (undated) DEVICE — CUSTOM PACK CYSTO PREFERRED SOT5BCYHEA

## (undated) DEVICE — TUBING SET THERMEDX UROLOGY SGL USE LL0006

## (undated) DEVICE — GUIDEWIRE SENSOR DUAL FLEX STR 0.035"X150CM M0066703080

## (undated) DEVICE — GLOVE UNDER INDICATOR PI SZ 7.0 LF 41670

## (undated) DEVICE — GLOVE BIOGEL PI ULTRATOUCH G SZ 6.5 42165

## (undated) DEVICE — ENDO SNARE EXACTO COLD 9MM LOOP 2.4MMX230CM 00711115

## (undated) RX ORDER — FENTANYL CITRATE 50 UG/ML
INJECTION, SOLUTION INTRAMUSCULAR; INTRAVENOUS
Status: DISPENSED
Start: 2024-12-05